# Patient Record
Sex: MALE | Race: WHITE | Employment: OTHER | URBAN - METROPOLITAN AREA
[De-identification: names, ages, dates, MRNs, and addresses within clinical notes are randomized per-mention and may not be internally consistent; named-entity substitution may affect disease eponyms.]

---

## 2018-06-27 ENCOUNTER — HOSPITAL ENCOUNTER (OUTPATIENT)
Age: 63
Discharge: HOME | End: 2018-06-29
Payer: MEDICARE

## 2018-06-27 LAB
AMPHETAMINE SCREEN, URINE: NOT DETECTED
BARBITURATE SCREEN URINE: NOT DETECTED
BENZODIAZEPINE SCREEN, URINE: NOT DETECTED
CANNABINOID SCREEN URINE: NOT DETECTED
COCAINE METABOLITE SCREEN URINE: NOT DETECTED
METHADONE SCREEN, URINE: NOT DETECTED
OPIATE SCREEN URINE: NOT DETECTED
PHENCYCLIDINE SCREEN URINE: NOT DETECTED
PROPOXYPHENE SCREEN: NOT DETECTED

## 2018-06-27 PROCEDURE — G0480 DRUG TEST DEF 1-7 CLASSES: HCPCS

## 2018-06-27 PROCEDURE — 80307 DRUG TEST PRSMV CHEM ANLYZR: CPT

## 2018-07-01 LAB
6AM URINE: <10 NG/ML
CODEINE, URINE: <20 NG/ML
HYDROCODONE, URINE: <20 NG/ML
HYDROMORPHONE, URINE: <20 NG/ML
MORPHINE URINE: <20 NG/ML
NORHYDROCODONE, URINE: <20 NG/ML
NOROXYCODONE, URINE: >4000 NG/ML
NOROXYMORPHONE, URINE: >1000 NG/ML
OXYCODONE, URINE CONFIRMATION: >4000 NG/ML
OXYMORPHONE, URINE: 187 NG/ML

## 2018-08-09 ENCOUNTER — HOSPITAL ENCOUNTER (OUTPATIENT)
Age: 63
Discharge: HOME | End: 2018-08-09
Payer: MEDICARE

## 2018-08-09 ENCOUNTER — HOSPITAL ENCOUNTER (OUTPATIENT)
Dept: MRI IMAGING | Age: 63
Discharge: HOME | End: 2018-08-11
Payer: MEDICARE

## 2018-08-09 DIAGNOSIS — Z86.69 H/O DEMYELINATING DISEASE: ICD-10-CM

## 2018-08-09 LAB
BUN BLDV-MCNC: 16 MG/DL (ref 8–23)
CREAT SERPL-MCNC: 1.8 MG/DL (ref 0.7–1.2)
GFR AFRICAN AMERICAN: 47
GFR NON-AFRICAN AMERICAN: 39 ML/MIN/1.73

## 2018-08-09 PROCEDURE — 36415 COLL VENOUS BLD VENIPUNCTURE: CPT

## 2018-08-09 PROCEDURE — 72156 MRI NECK SPINE W/O & W/DYE: CPT

## 2018-08-09 PROCEDURE — 72157 MRI CHEST SPINE W/O & W/DYE: CPT

## 2018-08-09 PROCEDURE — 6360000004 HC RX CONTRAST MEDICATION: Performed by: RADIOLOGY

## 2018-08-09 PROCEDURE — 84520 ASSAY OF UREA NITROGEN: CPT

## 2018-08-09 PROCEDURE — A9585 GADOBUTROL INJECTION: HCPCS | Performed by: RADIOLOGY

## 2018-08-09 PROCEDURE — 82565 ASSAY OF CREATININE: CPT

## 2018-08-09 RX ADMIN — GADOBUTROL 7 ML: 604.72 INJECTION INTRAVENOUS at 18:09

## 2019-08-02 ENCOUNTER — HOSPITAL ENCOUNTER (OUTPATIENT)
Age: 64
Discharge: HOME | End: 2019-08-04
Payer: MEDICARE

## 2019-08-02 LAB
ALBUMIN SERPL-MCNC: 3.6 G/DL (ref 3.5–5.2)
ALP BLD-CCNC: 136 U/L (ref 40–129)
ALT SERPL-CCNC: 6 U/L (ref 0–40)
ANION GAP SERPL CALCULATED.3IONS-SCNC: 18 MMOL/L (ref 7–16)
AST SERPL-CCNC: 16 U/L (ref 0–39)
BASOPHILS ABSOLUTE: 0.13 E9/L (ref 0–0.2)
BASOPHILS RELATIVE PERCENT: 1.2 % (ref 0–2)
BILIRUB SERPL-MCNC: 0.3 MG/DL (ref 0–1.2)
BUN BLDV-MCNC: 13 MG/DL (ref 8–23)
CALCIUM SERPL-MCNC: 9.2 MG/DL (ref 8.6–10.2)
CHLORIDE BLD-SCNC: 102 MMOL/L (ref 98–107)
CHOLESTEROL, TOTAL: 187 MG/DL (ref 0–199)
CO2: 22 MMOL/L (ref 22–29)
CREAT SERPL-MCNC: 1.8 MG/DL (ref 0.7–1.2)
EOSINOPHILS ABSOLUTE: 0.64 E9/L (ref 0.05–0.5)
EOSINOPHILS RELATIVE PERCENT: 5.7 % (ref 0–6)
GFR AFRICAN AMERICAN: 46
GFR NON-AFRICAN AMERICAN: 38 ML/MIN/1.73
GLUCOSE BLD-MCNC: 162 MG/DL (ref 74–99)
HCT VFR BLD CALC: 48.1 % (ref 37–54)
HDLC SERPL-MCNC: 36 MG/DL
HEMOGLOBIN: 14.7 G/DL (ref 12.5–16.5)
IMMATURE GRANULOCYTES #: 0.05 E9/L
IMMATURE GRANULOCYTES %: 0.4 % (ref 0–5)
LDL CHOLESTEROL CALCULATED: 115 MG/DL (ref 0–99)
LYMPHOCYTES ABSOLUTE: 2.29 E9/L (ref 1.5–4)
LYMPHOCYTES RELATIVE PERCENT: 20.5 % (ref 20–42)
MCH RBC QN AUTO: 28.8 PG (ref 26–35)
MCHC RBC AUTO-ENTMCNC: 30.6 % (ref 32–34.5)
MCV RBC AUTO: 94.1 FL (ref 80–99.9)
MONOCYTES ABSOLUTE: 0.84 E9/L (ref 0.1–0.95)
MONOCYTES RELATIVE PERCENT: 7.5 % (ref 2–12)
NEUTROPHILS ABSOLUTE: 7.23 E9/L (ref 1.8–7.3)
NEUTROPHILS RELATIVE PERCENT: 64.7 % (ref 43–80)
PDW BLD-RTO: 15.3 FL (ref 11.5–15)
PLATELET # BLD: 322 E9/L (ref 130–450)
PMV BLD AUTO: 10.8 FL (ref 7–12)
POTASSIUM SERPL-SCNC: 4.8 MMOL/L (ref 3.5–5)
PROSTATE SPECIFIC ANTIGEN: 4.78 NG/ML (ref 0–4)
RBC # BLD: 5.11 E12/L (ref 3.8–5.8)
SODIUM BLD-SCNC: 142 MMOL/L (ref 132–146)
TOTAL PROTEIN: 6.7 G/DL (ref 6.4–8.3)
TRIGL SERPL-MCNC: 181 MG/DL (ref 0–149)
TSH SERPL DL<=0.05 MIU/L-ACNC: 2.7 UIU/ML (ref 0.27–4.2)
VITAMIN D 25-HYDROXY: 21 NG/ML (ref 30–100)
VLDLC SERPL CALC-MCNC: 36 MG/DL
WBC # BLD: 11.2 E9/L (ref 4.5–11.5)

## 2019-08-02 PROCEDURE — G0103 PSA SCREENING: HCPCS

## 2019-08-02 PROCEDURE — 85025 COMPLETE CBC W/AUTO DIFF WBC: CPT

## 2019-08-02 PROCEDURE — 80053 COMPREHEN METABOLIC PANEL: CPT

## 2019-08-02 PROCEDURE — 80061 LIPID PANEL: CPT

## 2019-08-02 PROCEDURE — 82306 VITAMIN D 25 HYDROXY: CPT

## 2019-08-02 PROCEDURE — 84443 ASSAY THYROID STIM HORMONE: CPT

## 2019-08-20 ENCOUNTER — HOSPITAL ENCOUNTER (OUTPATIENT)
Age: 64
Discharge: HOME | End: 2019-08-20
Payer: MEDICARE

## 2019-08-28 ENCOUNTER — HOSPITAL ENCOUNTER (OUTPATIENT)
Age: 64
Discharge: HOME | End: 2019-08-28
Payer: MEDICARE

## 2019-08-28 PROCEDURE — 89055 LEUKOCYTE ASSESSMENT FECAL: CPT

## 2019-08-28 PROCEDURE — 87449 NOS EACH ORGANISM AG IA: CPT

## 2019-08-28 PROCEDURE — 83993 ASSAY FOR CALPROTECTIN FECAL: CPT

## 2019-08-28 PROCEDURE — 87328 CRYPTOSPORIDIUM AG IA: CPT

## 2019-08-28 PROCEDURE — 87324 CLOSTRIDIUM AG IA: CPT

## 2019-08-28 PROCEDURE — 87329 GIARDIA AG IA: CPT

## 2019-08-28 PROCEDURE — 87045 FECES CULTURE AEROBIC BACT: CPT

## 2019-08-29 LAB
C DIFF TOXIN/ANTIGEN: NORMAL
CRYPTOSPORIDIUM ANTIGEN STOOL: NORMAL
GIARDIA ANTIGEN STOOL: NORMAL
WHITE BLOOD CELLS (WBC), STOOL: NORMAL

## 2019-08-30 LAB — CULTURE, STOOL: NORMAL

## 2019-08-31 LAB — MISCELLANEOUS LAB TEST RESULT: ABNORMAL

## 2019-09-05 ENCOUNTER — HOSPITAL ENCOUNTER (OUTPATIENT)
Age: 64
Discharge: HOME | End: 2019-09-07
Payer: MEDICARE

## 2019-09-05 LAB
AMPHETAMINE SCREEN, URINE: NOT DETECTED
BARBITURATE SCREEN URINE: NOT DETECTED
BENZODIAZEPINE SCREEN, URINE: NOT DETECTED
CANNABINOID SCREEN URINE: NOT DETECTED
COCAINE METABOLITE SCREEN URINE: NOT DETECTED
Lab: NORMAL
METHADONE SCREEN, URINE: NOT DETECTED
OPIATE SCREEN URINE: NOT DETECTED
PHENCYCLIDINE SCREEN URINE: NOT DETECTED

## 2019-09-05 PROCEDURE — G0480 DRUG TEST DEF 1-7 CLASSES: HCPCS

## 2019-09-05 PROCEDURE — 80307 DRUG TEST PRSMV CHEM ANLYZR: CPT

## 2019-09-10 ENCOUNTER — HOSPITAL ENCOUNTER (OUTPATIENT)
Dept: GENERAL RADIOLOGY | Age: 64
Discharge: HOME | End: 2019-09-12
Payer: MEDICARE

## 2019-09-10 DIAGNOSIS — K50.119 CROHN'S DISEASE OF COLON WITH COMPLICATION (HCC): ICD-10-CM

## 2019-09-10 PROCEDURE — 2500000003 HC RX 250 WO HCPCS: Performed by: INTERNAL MEDICINE

## 2019-09-10 PROCEDURE — 74250 X-RAY XM SM INT 1CNTRST STD: CPT

## 2019-09-10 RX ADMIN — BARIUM SULFATE 352 G: 960 POWDER, FOR SUSPENSION ORAL at 08:47

## 2019-09-11 LAB
6AM URINE: <10 NG/ML
CODEINE, URINE: <20 NG/ML
HYDROCODONE, URINE: <20 NG/ML
HYDROMORPHONE, URINE: <20 NG/ML
MORPHINE URINE: <20 NG/ML
NORHYDROCODONE, URINE: <20 NG/ML
NOROXYCODONE, URINE: 3661 NG/ML
NOROXYMORPHONE, URINE: 380 NG/ML
OXYCODONE, URINE CONFIRMATION: 2041 NG/ML
OXYMORPHONE, URINE: 49 NG/ML

## 2019-12-23 ENCOUNTER — HOSPITAL ENCOUNTER (OUTPATIENT)
Age: 64
Discharge: HOME | End: 2019-12-23
Payer: MEDICARE

## 2019-12-23 LAB
ALBUMIN SERPL-MCNC: 3.8 G/DL (ref 3.5–5.2)
ALP BLD-CCNC: 107 U/L (ref 40–129)
ALT SERPL-CCNC: 15 U/L (ref 0–40)
ANION GAP SERPL CALCULATED.3IONS-SCNC: 12 MMOL/L (ref 7–16)
AST SERPL-CCNC: 18 U/L (ref 0–39)
BILIRUB SERPL-MCNC: <0.2 MG/DL (ref 0–1.2)
BUN BLDV-MCNC: 19 MG/DL (ref 8–23)
CALCIUM SERPL-MCNC: 9.2 MG/DL (ref 8.6–10.2)
CHLORIDE BLD-SCNC: 101 MMOL/L (ref 98–107)
CO2: 27 MMOL/L (ref 22–29)
CREAT SERPL-MCNC: 1.7 MG/DL (ref 0.7–1.2)
GFR AFRICAN AMERICAN: 50
GFR NON-AFRICAN AMERICAN: 41 ML/MIN/1.73
GLUCOSE BLD-MCNC: 71 MG/DL (ref 74–99)
POTASSIUM SERPL-SCNC: 4.2 MMOL/L (ref 3.5–5)
SODIUM BLD-SCNC: 140 MMOL/L (ref 132–146)
TOTAL PROTEIN: 6.5 G/DL (ref 6.4–8.3)

## 2019-12-23 PROCEDURE — 80053 COMPREHEN METABOLIC PANEL: CPT

## 2019-12-23 PROCEDURE — 36415 COLL VENOUS BLD VENIPUNCTURE: CPT

## 2020-01-01 ENCOUNTER — HOSPITAL ENCOUNTER (OUTPATIENT)
Age: 65
Discharge: HOME | End: 2020-09-27
Payer: MEDICARE

## 2020-01-01 ENCOUNTER — HOSPITAL ENCOUNTER (OUTPATIENT)
Age: 65
Discharge: HOME | End: 2020-12-28
Payer: MEDICARE

## 2020-01-01 LAB
ALBUMIN SERPL-MCNC: 3.5 G/DL (ref 3.5–5.2)
ALBUMIN SERPL-MCNC: 3.8 G/DL (ref 3.5–5.2)
ALP BLD-CCNC: 86 U/L (ref 40–129)
ALP BLD-CCNC: 96 U/L (ref 40–129)
ALT SERPL-CCNC: 25 U/L (ref 0–40)
ALT SERPL-CCNC: 8 U/L (ref 0–40)
ANION GAP SERPL CALCULATED.3IONS-SCNC: 12 MMOL/L (ref 7–16)
ANION GAP SERPL CALCULATED.3IONS-SCNC: 20 MMOL/L (ref 7–16)
AST SERPL-CCNC: 16 U/L (ref 0–39)
AST SERPL-CCNC: 29 U/L (ref 0–39)
BASOPHILS ABSOLUTE: 0.02 E9/L (ref 0–0.2)
BASOPHILS ABSOLUTE: 0.09 E9/L (ref 0–0.2)
BASOPHILS RELATIVE PERCENT: 0.1 % (ref 0–2)
BASOPHILS RELATIVE PERCENT: 0.8 % (ref 0–2)
BILIRUB SERPL-MCNC: 0.4 MG/DL (ref 0–1.2)
BILIRUB SERPL-MCNC: <0.2 MG/DL (ref 0–1.2)
BUN BLDV-MCNC: 11 MG/DL (ref 8–23)
BUN BLDV-MCNC: 16 MG/DL (ref 8–23)
C-REACTIVE PROTEIN: 10.5 MG/DL (ref 0–0.4)
CALCIUM SERPL-MCNC: 8.2 MG/DL (ref 8.6–10.2)
CALCIUM SERPL-MCNC: 8.6 MG/DL (ref 8.6–10.2)
CHLORIDE BLD-SCNC: 101 MMOL/L (ref 98–107)
CHLORIDE BLD-SCNC: 95 MMOL/L (ref 98–107)
CHOLESTEROL, TOTAL: 206 MG/DL (ref 0–199)
CO2: 21 MMOL/L (ref 22–29)
CO2: 30 MMOL/L (ref 22–29)
CREAT SERPL-MCNC: 1.6 MG/DL (ref 0.7–1.2)
CREAT SERPL-MCNC: 1.8 MG/DL (ref 0.7–1.2)
EOSINOPHILS ABSOLUTE: 0 E9/L (ref 0.05–0.5)
EOSINOPHILS ABSOLUTE: 0.21 E9/L (ref 0.05–0.5)
EOSINOPHILS RELATIVE PERCENT: 0 % (ref 0–6)
EOSINOPHILS RELATIVE PERCENT: 1.8 % (ref 0–6)
FERRITIN: 912 NG/ML
FOLATE: 12.6 NG/ML (ref 4.8–24.2)
GFR AFRICAN AMERICAN: 46
GFR AFRICAN AMERICAN: 53
GFR NON-AFRICAN AMERICAN: 38 ML/MIN/1.73
GFR NON-AFRICAN AMERICAN: 44 ML/MIN/1.73
GLUCOSE BLD-MCNC: 115 MG/DL (ref 74–99)
GLUCOSE BLD-MCNC: 153 MG/DL (ref 74–99)
HCT VFR BLD CALC: 43.9 % (ref 37–54)
HCT VFR BLD CALC: 46.3 % (ref 37–54)
HDLC SERPL-MCNC: 46 MG/DL
HEMOGLOBIN: 14 G/DL (ref 12.5–16.5)
HEMOGLOBIN: 14 G/DL (ref 12.5–16.5)
HEPATITIS B CORE TOTAL ANTIBODY: NONREACTIVE
IMMATURE GRANULOCYTES #: 0.1 E9/L
IMMATURE GRANULOCYTES #: 0.22 E9/L
IMMATURE GRANULOCYTES %: 0.8 % (ref 0–5)
IMMATURE GRANULOCYTES %: 1.6 % (ref 0–5)
IRON SATURATION: 10 % (ref 20–55)
IRON: 26 MCG/DL (ref 59–158)
LDL CHOLESTEROL CALCULATED: 125 MG/DL (ref 0–99)
LYMPHOCYTES ABSOLUTE: 1.23 E9/L (ref 1.5–4)
LYMPHOCYTES ABSOLUTE: 1.72 E9/L (ref 1.5–4)
LYMPHOCYTES RELATIVE PERCENT: 14.4 % (ref 20–42)
LYMPHOCYTES RELATIVE PERCENT: 9 % (ref 20–42)
MCH RBC QN AUTO: 28.1 PG (ref 26–35)
MCH RBC QN AUTO: 29 PG (ref 26–35)
MCHC RBC AUTO-ENTMCNC: 30.2 % (ref 32–34.5)
MCHC RBC AUTO-ENTMCNC: 31.9 % (ref 32–34.5)
MCV RBC AUTO: 91.1 FL (ref 80–99.9)
MCV RBC AUTO: 92.8 FL (ref 80–99.9)
MONOCYTES ABSOLUTE: 0.66 E9/L (ref 0.1–0.95)
MONOCYTES ABSOLUTE: 1.07 E9/L (ref 0.1–0.95)
MONOCYTES RELATIVE PERCENT: 4.8 % (ref 2–12)
MONOCYTES RELATIVE PERCENT: 9 % (ref 2–12)
NEUTROPHILS ABSOLUTE: 11.52 E9/L (ref 1.8–7.3)
NEUTROPHILS ABSOLUTE: 8.75 E9/L (ref 1.8–7.3)
NEUTROPHILS RELATIVE PERCENT: 73.2 % (ref 43–80)
NEUTROPHILS RELATIVE PERCENT: 84.5 % (ref 43–80)
PDW BLD-RTO: 14.6 FL (ref 11.5–15)
PDW BLD-RTO: 17 FL (ref 11.5–15)
PLATELET # BLD: 301 E9/L (ref 130–450)
PLATELET # BLD: 356 E9/L (ref 130–450)
PMV BLD AUTO: 10.4 FL (ref 7–12)
PMV BLD AUTO: 9.8 FL (ref 7–12)
POTASSIUM SERPL-SCNC: 3.5 MMOL/L (ref 3.5–5)
POTASSIUM SERPL-SCNC: 3.7 MMOL/L (ref 3.5–5)
PROSTATE SPECIFIC ANTIGEN: 3.14 NG/ML (ref 0–4)
RBC # BLD: 4.82 E12/L (ref 3.8–5.8)
RBC # BLD: 4.99 E12/L (ref 3.8–5.8)
SEDIMENTATION RATE, ERYTHROCYTE: 47 MM/HR (ref 0–15)
SODIUM BLD-SCNC: 137 MMOL/L (ref 132–146)
SODIUM BLD-SCNC: 142 MMOL/L (ref 132–146)
TOTAL IRON BINDING CAPACITY: 249 MCG/DL (ref 250–450)
TOTAL PROTEIN: 6.4 G/DL (ref 6.4–8.3)
TOTAL PROTEIN: 6.8 G/DL (ref 6.4–8.3)
TRIGL SERPL-MCNC: 173 MG/DL (ref 0–149)
TSH SERPL DL<=0.05 MIU/L-ACNC: 0.79 UIU/ML (ref 0.27–4.2)
VITAMIN B-12: 263 PG/ML (ref 211–946)
VLDLC SERPL CALC-MCNC: 35 MG/DL
WBC # BLD: 11.9 E9/L (ref 4.5–11.5)
WBC # BLD: 13.7 E9/L (ref 4.5–11.5)

## 2020-01-01 PROCEDURE — 86481 TB AG RESPONSE T-CELL SUSP: CPT

## 2020-01-01 PROCEDURE — 82728 ASSAY OF FERRITIN: CPT

## 2020-01-01 PROCEDURE — 80053 COMPREHEN METABOLIC PANEL: CPT

## 2020-01-01 PROCEDURE — 82746 ASSAY OF FOLIC ACID SERUM: CPT

## 2020-01-01 PROCEDURE — 84443 ASSAY THYROID STIM HORMONE: CPT

## 2020-01-01 PROCEDURE — 36415 COLL VENOUS BLD VENIPUNCTURE: CPT

## 2020-01-01 PROCEDURE — 85025 COMPLETE CBC W/AUTO DIFF WBC: CPT

## 2020-01-01 PROCEDURE — 85651 RBC SED RATE NONAUTOMATED: CPT

## 2020-01-01 PROCEDURE — 86140 C-REACTIVE PROTEIN: CPT

## 2020-01-01 PROCEDURE — 82607 VITAMIN B-12: CPT

## 2020-01-01 PROCEDURE — 86704 HEP B CORE ANTIBODY TOTAL: CPT

## 2020-01-01 PROCEDURE — G0103 PSA SCREENING: HCPCS

## 2020-01-01 PROCEDURE — 80061 LIPID PANEL: CPT

## 2020-01-01 PROCEDURE — 83540 ASSAY OF IRON: CPT

## 2020-01-01 PROCEDURE — 83550 IRON BINDING TEST: CPT

## 2021-01-01 ENCOUNTER — INITIAL CONSULT (OUTPATIENT)
Dept: SURGERY | Age: 66
End: 2021-01-01
Payer: MEDICARE

## 2021-01-01 ENCOUNTER — APPOINTMENT (OUTPATIENT)
Dept: GENERAL RADIOLOGY | Age: 66
DRG: 329 | End: 2021-01-01
Payer: MEDICARE

## 2021-01-01 ENCOUNTER — APPOINTMENT (OUTPATIENT)
Dept: CT IMAGING | Age: 66
End: 2021-01-01
Payer: MEDICARE

## 2021-01-01 ENCOUNTER — APPOINTMENT (OUTPATIENT)
Dept: NUCLEAR MEDICINE | Age: 66
DRG: 101 | End: 2021-01-01
Payer: MEDICARE

## 2021-01-01 ENCOUNTER — HOSPITAL ENCOUNTER (INPATIENT)
Age: 66
LOS: 5 days | Discharge: HOME | DRG: 871 | End: 2021-02-12
Attending: EMERGENCY MEDICINE | Admitting: INTERNAL MEDICINE
Payer: MEDICARE

## 2021-01-01 ENCOUNTER — HOSPITAL ENCOUNTER (OUTPATIENT)
Age: 66
Setting detail: OUTPATIENT SURGERY
Discharge: HOME | DRG: 327 | End: 2021-06-01
Attending: SURGERY | Admitting: SURGERY
Payer: MEDICARE

## 2021-01-01 ENCOUNTER — APPOINTMENT (OUTPATIENT)
Dept: CT IMAGING | Age: 66
DRG: 872 | End: 2021-01-01
Payer: MEDICARE

## 2021-01-01 ENCOUNTER — OFFICE VISIT (OUTPATIENT)
Dept: CARDIOLOGY CLINIC | Age: 66
End: 2021-01-01
Payer: MEDICARE

## 2021-01-01 ENCOUNTER — HOSPITAL ENCOUNTER (INPATIENT)
Age: 66
LOS: 5 days | Discharge: HOME | DRG: 176 | End: 2021-02-01
Attending: EMERGENCY MEDICINE | Admitting: INTERNAL MEDICINE
Payer: MEDICARE

## 2021-01-01 ENCOUNTER — APPOINTMENT (OUTPATIENT)
Dept: GENERAL RADIOLOGY | Age: 66
End: 2021-01-01
Payer: MEDICARE

## 2021-01-01 ENCOUNTER — HOSPITAL ENCOUNTER (INPATIENT)
Age: 66
LOS: 3 days | Discharge: HOME | DRG: 101 | End: 2021-04-21
Attending: EMERGENCY MEDICINE | Admitting: INTERNAL MEDICINE
Payer: MEDICARE

## 2021-01-01 ENCOUNTER — TELEPHONE (OUTPATIENT)
Dept: CARDIOLOGY CLINIC | Age: 66
End: 2021-01-01

## 2021-01-01 ENCOUNTER — HOSPITAL ENCOUNTER (OUTPATIENT)
Dept: INFUSION THERAPY | Age: 66
Setting detail: INFUSION SERIES
Discharge: HOME | End: 2021-01-11
Payer: MEDICARE

## 2021-01-01 ENCOUNTER — APPOINTMENT (OUTPATIENT)
Dept: CT IMAGING | Age: 66
DRG: 329 | End: 2021-01-01
Payer: MEDICARE

## 2021-01-01 ENCOUNTER — HOSPITAL ENCOUNTER (INPATIENT)
Age: 66
LOS: 5 days | Discharge: HOME | DRG: 327 | End: 2021-06-08
Attending: EMERGENCY MEDICINE | Admitting: INTERNAL MEDICINE
Payer: MEDICARE

## 2021-01-01 ENCOUNTER — APPOINTMENT (OUTPATIENT)
Dept: ULTRASOUND IMAGING | Age: 66
DRG: 872 | End: 2021-01-01
Payer: MEDICARE

## 2021-01-01 ENCOUNTER — HOSPITAL ENCOUNTER (INPATIENT)
Age: 66
LOS: 6 days | Discharge: HOME | DRG: 872 | End: 2021-04-02
Attending: EMERGENCY MEDICINE | Admitting: INTERNAL MEDICINE
Payer: MEDICARE

## 2021-01-01 ENCOUNTER — ANESTHESIA EVENT (OUTPATIENT)
Dept: ENDOSCOPY | Age: 66
DRG: 327 | End: 2021-01-01
Payer: MEDICARE

## 2021-01-01 ENCOUNTER — APPOINTMENT (OUTPATIENT)
Dept: GENERAL RADIOLOGY | Age: 66
DRG: 327 | End: 2021-01-01
Payer: MEDICARE

## 2021-01-01 ENCOUNTER — APPOINTMENT (OUTPATIENT)
Dept: GENERAL RADIOLOGY | Age: 66
DRG: 872 | End: 2021-01-01
Payer: MEDICARE

## 2021-01-01 ENCOUNTER — APPOINTMENT (OUTPATIENT)
Dept: GENERAL RADIOLOGY | Age: 66
DRG: 683 | End: 2021-01-01
Payer: MEDICARE

## 2021-01-01 ENCOUNTER — APPOINTMENT (OUTPATIENT)
Dept: NUCLEAR MEDICINE | Age: 66
DRG: 872 | End: 2021-01-01
Payer: MEDICARE

## 2021-01-01 ENCOUNTER — APPOINTMENT (OUTPATIENT)
Dept: NEUROLOGY | Age: 66
DRG: 101 | End: 2021-01-01
Payer: MEDICARE

## 2021-01-01 ENCOUNTER — ANESTHESIA (OUTPATIENT)
Dept: OPERATING ROOM | Age: 66
DRG: 327 | End: 2021-01-01
Payer: MEDICARE

## 2021-01-01 ENCOUNTER — HOSPITAL ENCOUNTER (INPATIENT)
Age: 66
LOS: 7 days | Discharge: HOME | DRG: 683 | End: 2021-05-10
Attending: EMERGENCY MEDICINE | Admitting: INTERNAL MEDICINE
Payer: MEDICARE

## 2021-01-01 ENCOUNTER — APPOINTMENT (OUTPATIENT)
Dept: NUCLEAR MEDICINE | Age: 66
DRG: 176 | End: 2021-01-01
Payer: MEDICARE

## 2021-01-01 ENCOUNTER — HOSPITAL ENCOUNTER (OUTPATIENT)
Dept: INFUSION THERAPY | Age: 66
Setting detail: INFUSION SERIES
Discharge: HOME | End: 2021-01-13
Payer: MEDICARE

## 2021-01-01 ENCOUNTER — HOSPITAL ENCOUNTER (EMERGENCY)
Age: 66
Discharge: HOME | End: 2021-01-10
Attending: EMERGENCY MEDICINE
Payer: MEDICARE

## 2021-01-01 ENCOUNTER — APPOINTMENT (OUTPATIENT)
Dept: GENERAL RADIOLOGY | Age: 66
DRG: 101 | End: 2021-01-01
Payer: MEDICARE

## 2021-01-01 ENCOUNTER — APPOINTMENT (OUTPATIENT)
Dept: GENERAL RADIOLOGY | Age: 66
DRG: 871 | End: 2021-01-01
Payer: MEDICARE

## 2021-01-01 ENCOUNTER — ANESTHESIA EVENT (OUTPATIENT)
Dept: SURGICAL ICU | Age: 66
DRG: 329 | End: 2021-01-01
Payer: MEDICARE

## 2021-01-01 ENCOUNTER — HOSPITAL ENCOUNTER (OUTPATIENT)
Dept: INFUSION THERAPY | Age: 66
Setting detail: INFUSION SERIES
Discharge: HOME | End: 2021-01-12
Payer: MEDICARE

## 2021-01-01 ENCOUNTER — HOSPITAL ENCOUNTER (INPATIENT)
Age: 66
LOS: 5 days | DRG: 329 | End: 2021-06-15
Attending: EMERGENCY MEDICINE | Admitting: INTERNAL MEDICINE
Payer: MEDICARE

## 2021-01-01 ENCOUNTER — APPOINTMENT (OUTPATIENT)
Dept: CT IMAGING | Age: 66
DRG: 101 | End: 2021-01-01
Payer: MEDICARE

## 2021-01-01 ENCOUNTER — ANESTHESIA EVENT (OUTPATIENT)
Dept: OPERATING ROOM | Age: 66
DRG: 329 | End: 2021-01-01
Payer: MEDICARE

## 2021-01-01 ENCOUNTER — APPOINTMENT (OUTPATIENT)
Dept: MRI IMAGING | Age: 66
DRG: 101 | End: 2021-01-01
Payer: MEDICARE

## 2021-01-01 ENCOUNTER — APPOINTMENT (OUTPATIENT)
Dept: CT IMAGING | Age: 66
DRG: 871 | End: 2021-01-01
Payer: MEDICARE

## 2021-01-01 ENCOUNTER — ANESTHESIA (OUTPATIENT)
Dept: ENDOSCOPY | Age: 66
DRG: 327 | End: 2021-01-01
Payer: MEDICARE

## 2021-01-01 ENCOUNTER — ANESTHESIA (OUTPATIENT)
Dept: OPERATING ROOM | Age: 66
DRG: 329 | End: 2021-01-01
Payer: MEDICARE

## 2021-01-01 ENCOUNTER — TELEPHONE (OUTPATIENT)
Dept: SURGERY | Age: 66
End: 2021-01-01

## 2021-01-01 ENCOUNTER — ANESTHESIA (OUTPATIENT)
Dept: SURGICAL ICU | Age: 66
DRG: 329 | End: 2021-01-01
Payer: MEDICARE

## 2021-01-01 ENCOUNTER — ANESTHESIA EVENT (OUTPATIENT)
Dept: ENDOSCOPY | Age: 66
DRG: 683 | End: 2021-01-01
Payer: MEDICARE

## 2021-01-01 ENCOUNTER — ANESTHESIA EVENT (OUTPATIENT)
Dept: OPERATING ROOM | Age: 66
DRG: 327 | End: 2021-01-01
Payer: MEDICARE

## 2021-01-01 ENCOUNTER — APPOINTMENT (OUTPATIENT)
Dept: CT IMAGING | Age: 66
DRG: 176 | End: 2021-01-01
Payer: MEDICARE

## 2021-01-01 ENCOUNTER — APPOINTMENT (OUTPATIENT)
Dept: CT IMAGING | Age: 66
DRG: 327 | End: 2021-01-01
Payer: MEDICARE

## 2021-01-01 ENCOUNTER — ANESTHESIA (OUTPATIENT)
Dept: ENDOSCOPY | Age: 66
DRG: 683 | End: 2021-01-01
Payer: MEDICARE

## 2021-01-01 VITALS
BODY MASS INDEX: 23.75 KG/M2 | TEMPERATURE: 97.5 F | HEART RATE: 83 BPM | OXYGEN SATURATION: 94 % | WEIGHT: 121 LBS | RESPIRATION RATE: 18 BRPM | HEIGHT: 60 IN | SYSTOLIC BLOOD PRESSURE: 137 MMHG | DIASTOLIC BLOOD PRESSURE: 76 MMHG

## 2021-01-01 VITALS
TEMPERATURE: 98.2 F | BODY MASS INDEX: 21.46 KG/M2 | SYSTOLIC BLOOD PRESSURE: 112 MMHG | OXYGEN SATURATION: 92 % | WEIGHT: 109.3 LBS | HEART RATE: 78 BPM | RESPIRATION RATE: 16 BRPM | DIASTOLIC BLOOD PRESSURE: 64 MMHG | HEIGHT: 60 IN

## 2021-01-01 VITALS
DIASTOLIC BLOOD PRESSURE: 73 MMHG | TEMPERATURE: 97.9 F | RESPIRATION RATE: 22 BRPM | OXYGEN SATURATION: 97 % | SYSTOLIC BLOOD PRESSURE: 150 MMHG | HEART RATE: 110 BPM

## 2021-01-01 VITALS
HEART RATE: 91 BPM | BODY MASS INDEX: 23.75 KG/M2 | HEIGHT: 60 IN | WEIGHT: 121 LBS | RESPIRATION RATE: 18 BRPM | OXYGEN SATURATION: 97 % | DIASTOLIC BLOOD PRESSURE: 75 MMHG | TEMPERATURE: 97.3 F | SYSTOLIC BLOOD PRESSURE: 152 MMHG

## 2021-01-01 VITALS
HEART RATE: 103 BPM | TEMPERATURE: 98 F | HEIGHT: 61 IN | SYSTOLIC BLOOD PRESSURE: 147 MMHG | BODY MASS INDEX: 24.89 KG/M2 | DIASTOLIC BLOOD PRESSURE: 97 MMHG | WEIGHT: 131.84 LBS | OXYGEN SATURATION: 38 % | RESPIRATION RATE: 26 BRPM

## 2021-01-01 VITALS
SYSTOLIC BLOOD PRESSURE: 131 MMHG | OXYGEN SATURATION: 99 % | DIASTOLIC BLOOD PRESSURE: 77 MMHG | RESPIRATION RATE: 11 BRPM

## 2021-01-01 VITALS
HEART RATE: 95 BPM | BODY MASS INDEX: 21.87 KG/M2 | TEMPERATURE: 96.9 F | RESPIRATION RATE: 18 BRPM | DIASTOLIC BLOOD PRESSURE: 62 MMHG | OXYGEN SATURATION: 98 % | SYSTOLIC BLOOD PRESSURE: 118 MMHG | WEIGHT: 112 LBS

## 2021-01-01 VITALS
SYSTOLIC BLOOD PRESSURE: 101 MMHG | BODY MASS INDEX: 20.66 KG/M2 | OXYGEN SATURATION: 99 % | DIASTOLIC BLOOD PRESSURE: 58 MMHG | HEIGHT: 61 IN | RESPIRATION RATE: 14 BRPM | TEMPERATURE: 97.4 F | HEART RATE: 58 BPM | WEIGHT: 109.44 LBS

## 2021-01-01 VITALS
HEART RATE: 80 BPM | SYSTOLIC BLOOD PRESSURE: 98 MMHG | WEIGHT: 109 LBS | TEMPERATURE: 98 F | DIASTOLIC BLOOD PRESSURE: 63 MMHG | BODY MASS INDEX: 21.4 KG/M2 | HEIGHT: 60 IN

## 2021-01-01 VITALS
TEMPERATURE: 98.2 F | SYSTOLIC BLOOD PRESSURE: 151 MMHG | HEIGHT: 61 IN | RESPIRATION RATE: 16 BRPM | BODY MASS INDEX: 18.69 KG/M2 | RESPIRATION RATE: 22 BRPM | HEART RATE: 64 BPM | SYSTOLIC BLOOD PRESSURE: 120 MMHG | OXYGEN SATURATION: 97 % | HEART RATE: 108 BPM | WEIGHT: 99 LBS | TEMPERATURE: 97 F | DIASTOLIC BLOOD PRESSURE: 70 MMHG | OXYGEN SATURATION: 97 % | DIASTOLIC BLOOD PRESSURE: 85 MMHG

## 2021-01-01 VITALS
RESPIRATION RATE: 18 BRPM | SYSTOLIC BLOOD PRESSURE: 150 MMHG | OXYGEN SATURATION: 96 % | DIASTOLIC BLOOD PRESSURE: 94 MMHG

## 2021-01-01 VITALS
HEART RATE: 71 BPM | RESPIRATION RATE: 17 BRPM | OXYGEN SATURATION: 99 % | SYSTOLIC BLOOD PRESSURE: 122 MMHG | HEIGHT: 61 IN | DIASTOLIC BLOOD PRESSURE: 72 MMHG | BODY MASS INDEX: 18.69 KG/M2 | WEIGHT: 99 LBS | TEMPERATURE: 98.6 F

## 2021-01-01 VITALS
RESPIRATION RATE: 16 BRPM | DIASTOLIC BLOOD PRESSURE: 55 MMHG | WEIGHT: 112 LBS | SYSTOLIC BLOOD PRESSURE: 105 MMHG | BODY MASS INDEX: 21.14 KG/M2 | HEIGHT: 61 IN | OXYGEN SATURATION: 100 % | HEART RATE: 59 BPM

## 2021-01-01 VITALS — OXYGEN SATURATION: 99 % | RESPIRATION RATE: 22 BRPM | TEMPERATURE: 90.9 F

## 2021-01-01 VITALS
RESPIRATION RATE: 22 BRPM | OXYGEN SATURATION: 97 % | TEMPERATURE: 98 F | DIASTOLIC BLOOD PRESSURE: 88 MMHG | HEART RATE: 70 BPM | SYSTOLIC BLOOD PRESSURE: 157 MMHG

## 2021-01-01 VITALS
SYSTOLIC BLOOD PRESSURE: 140 MMHG | HEART RATE: 94 BPM | DIASTOLIC BLOOD PRESSURE: 81 MMHG | BODY MASS INDEX: 23.16 KG/M2 | OXYGEN SATURATION: 97 % | WEIGHT: 118 LBS | RESPIRATION RATE: 18 BRPM | TEMPERATURE: 98.4 F | HEIGHT: 60 IN

## 2021-01-01 VITALS — SYSTOLIC BLOOD PRESSURE: 114 MMHG | DIASTOLIC BLOOD PRESSURE: 77 MMHG | OXYGEN SATURATION: 100 %

## 2021-01-01 DIAGNOSIS — E43 SEVERE PROTEIN-CALORIE MALNUTRITION (HCC): Primary | ICD-10-CM

## 2021-01-01 DIAGNOSIS — K50.919 CROHN'S DISEASE WITH COMPLICATION, UNSPECIFIED GASTROINTESTINAL TRACT LOCATION (HCC): ICD-10-CM

## 2021-01-01 DIAGNOSIS — E86.1 HYPOVOLEMIA: ICD-10-CM

## 2021-01-01 DIAGNOSIS — J18.9 HCAP (HEALTHCARE-ASSOCIATED PNEUMONIA): ICD-10-CM

## 2021-01-01 DIAGNOSIS — R10.84 GENERALIZED ABDOMINAL PAIN: Primary | ICD-10-CM

## 2021-01-01 DIAGNOSIS — K56.609 SBO (SMALL BOWEL OBSTRUCTION) (HCC): Primary | ICD-10-CM

## 2021-01-01 DIAGNOSIS — E83.51 HYPOCALCEMIA: ICD-10-CM

## 2021-01-01 DIAGNOSIS — E87.6 HYPOKALEMIA: ICD-10-CM

## 2021-01-01 DIAGNOSIS — E83.42 HYPOMAGNESEMIA: ICD-10-CM

## 2021-01-01 DIAGNOSIS — G35 MULTIPLE SCLEROSIS (HCC): Primary | ICD-10-CM

## 2021-01-01 DIAGNOSIS — Z79.01 ANTICOAGULATED: ICD-10-CM

## 2021-01-01 DIAGNOSIS — S00.81XA ABRASION OF FOREHEAD, INITIAL ENCOUNTER: ICD-10-CM

## 2021-01-01 DIAGNOSIS — R77.8 ELEVATED TROPONIN: ICD-10-CM

## 2021-01-01 DIAGNOSIS — K94.23 PEG TUBE MALFUNCTION (HCC): ICD-10-CM

## 2021-01-01 DIAGNOSIS — I21.4 NSTEMI (NON-ST ELEVATED MYOCARDIAL INFARCTION) (HCC): ICD-10-CM

## 2021-01-01 DIAGNOSIS — J18.9 PNEUMONIA DUE TO ORGANISM: ICD-10-CM

## 2021-01-01 DIAGNOSIS — N17.9 ACUTE RENAL FAILURE, UNSPECIFIED ACUTE RENAL FAILURE TYPE (HCC): Primary | ICD-10-CM

## 2021-01-01 DIAGNOSIS — E21.3 HYPERPARATHYROIDISM (HCC): ICD-10-CM

## 2021-01-01 DIAGNOSIS — I51.9 HEART PROBLEM: Primary | ICD-10-CM

## 2021-01-01 DIAGNOSIS — R63.8 DECREASED ORAL INTAKE: ICD-10-CM

## 2021-01-01 DIAGNOSIS — E86.0 SEVERE DEHYDRATION: ICD-10-CM

## 2021-01-01 DIAGNOSIS — G40.919 BREAKTHROUGH SEIZURE (HCC): ICD-10-CM

## 2021-01-01 DIAGNOSIS — K56.609 SMALL BOWEL OBSTRUCTION (HCC): ICD-10-CM

## 2021-01-01 DIAGNOSIS — R19.7 DIARRHEA, UNSPECIFIED TYPE: ICD-10-CM

## 2021-01-01 DIAGNOSIS — N28.9 ACUTE RENAL INSUFFICIENCY: ICD-10-CM

## 2021-01-01 DIAGNOSIS — I26.99 BILATERAL PULMONARY EMBOLISM (HCC): Primary | ICD-10-CM

## 2021-01-01 DIAGNOSIS — K50.919 EXACERBATION OF CROHN'S DISEASE WITH COMPLICATION (HCC): ICD-10-CM

## 2021-01-01 DIAGNOSIS — E86.0 DEHYDRATION: ICD-10-CM

## 2021-01-01 DIAGNOSIS — Z72.0 TOBACCO ABUSE: ICD-10-CM

## 2021-01-01 DIAGNOSIS — G35 MULTIPLE SCLEROSIS (HCC): ICD-10-CM

## 2021-01-01 DIAGNOSIS — E87.5 HYPERKALEMIA: ICD-10-CM

## 2021-01-01 DIAGNOSIS — I25.5 ISCHEMIC CARDIOMYOPATHY: Chronic | ICD-10-CM

## 2021-01-01 DIAGNOSIS — N39.0 URINARY TRACT INFECTION WITHOUT HEMATURIA, SITE UNSPECIFIED: ICD-10-CM

## 2021-01-01 DIAGNOSIS — S51.811A SKIN TEAR OF RIGHT FOREARM WITHOUT COMPLICATION, INITIAL ENCOUNTER: ICD-10-CM

## 2021-01-01 DIAGNOSIS — Z86.711 HISTORY OF PULMONARY EMBOLISM: Chronic | ICD-10-CM

## 2021-01-01 DIAGNOSIS — E43 SEVERE PROTEIN-CALORIE MALNUTRITION (HCC): Chronic | ICD-10-CM

## 2021-01-01 DIAGNOSIS — I26.99 BILATERAL PULMONARY EMBOLISM (HCC): ICD-10-CM

## 2021-01-01 DIAGNOSIS — K56.609 SBO (SMALL BOWEL OBSTRUCTION) (HCC): ICD-10-CM

## 2021-01-01 DIAGNOSIS — Q61.3 POLYCYSTIC KIDNEY: ICD-10-CM

## 2021-01-01 DIAGNOSIS — K56.609 SMALL BOWEL OBSTRUCTION (HCC): Primary | ICD-10-CM
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
## 2021-01-01 DIAGNOSIS — R41.0 CONFUSION: ICD-10-CM

## 2021-01-01 DIAGNOSIS — R56.9 SEIZURE-LIKE ACTIVITY (HCC): Primary | ICD-10-CM

## 2021-01-01 DIAGNOSIS — E16.2 HYPOGLYCEMIA: ICD-10-CM

## 2021-01-01 DIAGNOSIS — R56.9 SEIZURES (HCC): ICD-10-CM

## 2021-01-01 DIAGNOSIS — E44.0 MODERATE PROTEIN-CALORIE MALNUTRITION (HCC): ICD-10-CM

## 2021-01-01 DIAGNOSIS — E43 SEVERE MALNUTRITION (HCC): ICD-10-CM

## 2021-01-01 DIAGNOSIS — K50.119 CROHN'S COLITIS, UNSPECIFIED COMPLICATION (HCC): ICD-10-CM

## 2021-01-01 DIAGNOSIS — N17.9 ACUTE KIDNEY INJURY SUPERIMPOSED ON CKD (HCC): ICD-10-CM

## 2021-01-01 DIAGNOSIS — M25.562 ACUTE PAIN OF LEFT KNEE: ICD-10-CM

## 2021-01-01 DIAGNOSIS — N18.32 STAGE 3B CHRONIC KIDNEY DISEASE (HCC): ICD-10-CM

## 2021-01-01 DIAGNOSIS — Z01.818 PREOP TESTING: Primary | ICD-10-CM

## 2021-01-01 DIAGNOSIS — S09.90XA CLOSED HEAD INJURY, INITIAL ENCOUNTER: ICD-10-CM

## 2021-01-01 DIAGNOSIS — N18.9 ACUTE KIDNEY INJURY SUPERIMPOSED ON CKD (HCC): ICD-10-CM

## 2021-01-01 DIAGNOSIS — R55 SYNCOPE AND COLLAPSE: ICD-10-CM

## 2021-01-01 DIAGNOSIS — N28.9 ACUTE ON CHRONIC RENAL INSUFFICIENCY: ICD-10-CM

## 2021-01-01 DIAGNOSIS — N18.9 ACUTE ON CHRONIC RENAL INSUFFICIENCY: ICD-10-CM

## 2021-01-01 DIAGNOSIS — K66.8 PNEUMOPERITONEUM: ICD-10-CM

## 2021-01-01 LAB
AADO2: 441.7 MMHG
AADO2: 493.4 MMHG
AADO2: 515.9 MMHG
AADO2: 520.1 MMHG
AADO2: 526.8 MMHG
AADO2: 531.9 MMHG
AADO2: 561.2 MMHG
AADO2: 584.5 MMHG
AADO2: 585.3 MMHG
AADO2: 599.6 MMHG
ABO/RH: NORMAL
ABO/RH: NORMAL
ACANTHOCYTES: ABNORMAL
ALBUMIN SERPL-MCNC: 0.9 G/DL (ref 3.5–5.2)
ALBUMIN SERPL-MCNC: 1 G/DL (ref 3.5–5.2)
ALBUMIN SERPL-MCNC: 1.1 G/DL (ref 3.5–5.2)
ALBUMIN SERPL-MCNC: 1.5 G/DL (ref 3.5–5.2)
ALBUMIN SERPL-MCNC: 1.7 G/DL (ref 3.5–5.2)
ALBUMIN SERPL-MCNC: 1.9 G/DL (ref 3.5–5.2)
ALBUMIN SERPL-MCNC: 2 G/DL (ref 3.5–5.2)
ALBUMIN SERPL-MCNC: 2 G/DL (ref 3.5–5.2)
ALBUMIN SERPL-MCNC: 2.2 G/DL (ref 3.5–5.2)
ALBUMIN SERPL-MCNC: 2.2 G/DL (ref 3.5–5.2)
ALBUMIN SERPL-MCNC: 2.3 G/DL (ref 3.5–5.2)
ALBUMIN SERPL-MCNC: 2.4 G/DL (ref 3.5–5.2)
ALBUMIN SERPL-MCNC: 2.5 G/DL (ref 3.5–5.2)
ALBUMIN SERPL-MCNC: 2.6 G/DL (ref 3.5–5.2)
ALBUMIN SERPL-MCNC: 2.7 G/DL (ref 3.5–5.2)
ALBUMIN SERPL-MCNC: 2.8 G/DL (ref 3.5–5.2)
ALBUMIN SERPL-MCNC: 2.8 G/DL (ref 3.5–5.2)
ALBUMIN SERPL-MCNC: 2.9 G/DL (ref 3.5–5.2)
ALBUMIN SERPL-MCNC: 2.9 G/DL (ref 3.5–5.2)
ALBUMIN SERPL-MCNC: 3.1 G/DL (ref 3.5–5.2)
ALBUMIN SERPL-MCNC: 3.2 G/DL (ref 3.5–5.2)
ALBUMIN SERPL-MCNC: 3.2 G/DL (ref 3.5–5.2)
ALP BLD-CCNC: 101 U/L (ref 40–129)
ALP BLD-CCNC: 103 U/L (ref 40–129)
ALP BLD-CCNC: 104 U/L (ref 40–129)
ALP BLD-CCNC: 106 U/L (ref 40–129)
ALP BLD-CCNC: 107 U/L (ref 40–129)
ALP BLD-CCNC: 114 U/L (ref 40–129)
ALP BLD-CCNC: 122 U/L (ref 40–129)
ALP BLD-CCNC: 125 U/L (ref 40–129)
ALP BLD-CCNC: 133 U/L (ref 40–129)
ALP BLD-CCNC: 153 U/L (ref 40–129)
ALP BLD-CCNC: 161 U/L (ref 40–129)
ALP BLD-CCNC: 31 U/L (ref 40–129)
ALP BLD-CCNC: 32 U/L (ref 40–129)
ALP BLD-CCNC: 36 U/L (ref 40–129)
ALP BLD-CCNC: 43 U/L (ref 40–129)
ALP BLD-CCNC: 53 U/L (ref 40–129)
ALP BLD-CCNC: 53 U/L (ref 40–129)
ALP BLD-CCNC: 57 U/L (ref 40–129)
ALP BLD-CCNC: 58 U/L (ref 40–129)
ALP BLD-CCNC: 62 U/L (ref 40–129)
ALP BLD-CCNC: 75 U/L (ref 40–129)
ALP BLD-CCNC: 78 U/L (ref 40–129)
ALP BLD-CCNC: 79 U/L (ref 40–129)
ALP BLD-CCNC: 80 U/L (ref 40–129)
ALP BLD-CCNC: 81 U/L (ref 40–129)
ALP BLD-CCNC: 84 U/L (ref 40–129)
ALP BLD-CCNC: 87 U/L (ref 40–129)
ALP BLD-CCNC: 89 U/L (ref 40–129)
ALP BLD-CCNC: 91 U/L (ref 40–129)
ALP BLD-CCNC: 91 U/L (ref 40–129)
ALP BLD-CCNC: 93 U/L (ref 40–129)
ALP BLD-CCNC: 93 U/L (ref 40–129)
ALP BLD-CCNC: 94 U/L (ref 40–129)
ALP BLD-CCNC: 96 U/L (ref 40–129)
ALP BLD-CCNC: 98 U/L (ref 40–129)
ALT SERPL-CCNC: 10 U/L (ref 0–40)
ALT SERPL-CCNC: 107 U/L (ref 0–40)
ALT SERPL-CCNC: 11 U/L (ref 0–40)
ALT SERPL-CCNC: 114 U/L (ref 0–40)
ALT SERPL-CCNC: 116 U/L (ref 0–40)
ALT SERPL-CCNC: 12 U/L (ref 0–40)
ALT SERPL-CCNC: 120 U/L (ref 0–40)
ALT SERPL-CCNC: 13 U/L (ref 0–40)
ALT SERPL-CCNC: 133 U/L (ref 0–40)
ALT SERPL-CCNC: 14 U/L (ref 0–40)
ALT SERPL-CCNC: 14 U/L (ref 0–40)
ALT SERPL-CCNC: 15 U/L (ref 0–40)
ALT SERPL-CCNC: 15 U/L (ref 0–40)
ALT SERPL-CCNC: 16 U/L (ref 0–40)
ALT SERPL-CCNC: 16 U/L (ref 0–40)
ALT SERPL-CCNC: 18 U/L (ref 0–40)
ALT SERPL-CCNC: 20 U/L (ref 0–40)
ALT SERPL-CCNC: 20 U/L (ref 0–40)
ALT SERPL-CCNC: 21 U/L (ref 0–40)
ALT SERPL-CCNC: 21 U/L (ref 0–40)
ALT SERPL-CCNC: 22 U/L (ref 0–40)
ALT SERPL-CCNC: 23 U/L (ref 0–40)
ALT SERPL-CCNC: 26 U/L (ref 0–40)
ALT SERPL-CCNC: 30 U/L (ref 0–40)
ALT SERPL-CCNC: 8 U/L (ref 0–40)
ALT SERPL-CCNC: 8 U/L (ref 0–40)
AMMONIA: 29 UMOL/L (ref 16–60)
ANGLE (CLOT STRENGTH): 61.6 DEGREE (ref 59–74)
ANION GAP SERPL CALCULATED.3IONS-SCNC: 10 MMOL/L (ref 7–16)
ANION GAP SERPL CALCULATED.3IONS-SCNC: 11 MMOL/L (ref 7–16)
ANION GAP SERPL CALCULATED.3IONS-SCNC: 12 MMOL/L (ref 7–16)
ANION GAP SERPL CALCULATED.3IONS-SCNC: 12 MMOL/L (ref 7–16)
ANION GAP SERPL CALCULATED.3IONS-SCNC: 13 MMOL/L (ref 7–16)
ANION GAP SERPL CALCULATED.3IONS-SCNC: 14 MMOL/L (ref 7–16)
ANION GAP SERPL CALCULATED.3IONS-SCNC: 15 MMOL/L (ref 7–16)
ANION GAP SERPL CALCULATED.3IONS-SCNC: 16 MMOL/L (ref 7–16)
ANION GAP SERPL CALCULATED.3IONS-SCNC: 17 MMOL/L (ref 7–16)
ANION GAP SERPL CALCULATED.3IONS-SCNC: 18 MMOL/L (ref 7–16)
ANION GAP SERPL CALCULATED.3IONS-SCNC: 19 MMOL/L (ref 7–16)
ANION GAP SERPL CALCULATED.3IONS-SCNC: 6 MMOL/L (ref 7–16)
ANION GAP SERPL CALCULATED.3IONS-SCNC: 7 MMOL/L (ref 7–16)
ANION GAP SERPL CALCULATED.3IONS-SCNC: 8 MMOL/L (ref 7–16)
ANION GAP SERPL CALCULATED.3IONS-SCNC: 9 MMOL/L (ref 7–16)
ANION GAP SERPL CALCULATED.3IONS-SCNC: 9 MMOL/L (ref 7–16)
ANISOCYTOSIS: ABNORMAL
ANTIBODY SCREEN: NORMAL
ANTIBODY SCREEN: NORMAL
APTT: 112.6 SEC (ref 24.5–35.1)
APTT: 128.8 SEC (ref 24.5–35.1)
APTT: 151.8 SEC (ref 24.5–35.1)
APTT: 154.6 SEC (ref 24.5–35.1)
APTT: 23.7 SEC (ref 24.5–35.1)
APTT: 28.2 SEC (ref 24.5–35.1)
APTT: 29.1 SEC (ref 24.5–35.1)
APTT: 30.1 SEC (ref 24.5–35.1)
APTT: 31.8 SEC (ref 24.5–35.1)
APTT: 33.2 SEC (ref 24.5–35.1)
APTT: 40.1 SEC (ref 24.5–35.1)
APTT: 42.9 SEC (ref 24.5–35.1)
APTT: 46 SEC (ref 24.5–35.1)
APTT: 48.5 SEC (ref 24.5–35.1)
APTT: 52.4 SEC (ref 24.5–35.1)
APTT: 79.6 SEC (ref 24.5–35.1)
APTT: 89.3 SEC (ref 24.5–35.1)
APTT: 92.4 SEC (ref 24.5–35.1)
APTT: >240 SEC (ref 24.5–35.1)
APTT: >240 SEC (ref 24.5–35.1)
AST SERPL-CCNC: 13 U/L (ref 0–39)
AST SERPL-CCNC: 14 U/L (ref 0–39)
AST SERPL-CCNC: 15 U/L (ref 0–39)
AST SERPL-CCNC: 16 U/L (ref 0–39)
AST SERPL-CCNC: 19 U/L (ref 0–39)
AST SERPL-CCNC: 19 U/L (ref 0–39)
AST SERPL-CCNC: 20 U/L (ref 0–39)
AST SERPL-CCNC: 21 U/L (ref 0–39)
AST SERPL-CCNC: 22 U/L (ref 0–39)
AST SERPL-CCNC: 23 U/L (ref 0–39)
AST SERPL-CCNC: 238 U/L (ref 0–39)
AST SERPL-CCNC: 248 U/L (ref 0–39)
AST SERPL-CCNC: 25 U/L (ref 0–39)
AST SERPL-CCNC: 25 U/L (ref 0–39)
AST SERPL-CCNC: 251 U/L (ref 0–39)
AST SERPL-CCNC: 26 U/L (ref 0–39)
AST SERPL-CCNC: 27 U/L (ref 0–39)
AST SERPL-CCNC: 27 U/L (ref 0–39)
AST SERPL-CCNC: 28 U/L (ref 0–39)
AST SERPL-CCNC: 281 U/L (ref 0–39)
AST SERPL-CCNC: 29 U/L (ref 0–39)
AST SERPL-CCNC: 29 U/L (ref 0–39)
AST SERPL-CCNC: 31 U/L (ref 0–39)
AST SERPL-CCNC: 328 U/L (ref 0–39)
AST SERPL-CCNC: 34 U/L (ref 0–39)
AST SERPL-CCNC: 35 U/L (ref 0–39)
AST SERPL-CCNC: 38 U/L (ref 0–39)
AST SERPL-CCNC: 42 U/L (ref 0–39)
AST SERPL-CCNC: 43 U/L (ref 0–39)
B.E.: -10.3 MMOL/L (ref -3–3)
B.E.: -10.7 MMOL/L (ref -3–3)
B.E.: -11.2 MMOL/L (ref -3–3)
B.E.: -11.6 MMOL/L (ref -3–3)
B.E.: -11.6 MMOL/L (ref -3–3)
B.E.: -13.3 MMOL/L (ref -3–3)
B.E.: -13.3 MMOL/L (ref -3–3)
B.E.: -13.6 MMOL/L (ref -3–3)
B.E.: -13.8 MMOL/L (ref -3–3)
B.E.: -15.3 MMOL/L (ref -3–3)
B.E.: -15.3 MMOL/L (ref -3–3)
B.E.: -4.9 MMOL/L (ref -3–3)
B.E.: -7 MMOL/L (ref -3–3)
B.E.: -9.2 MMOL/L (ref -3–0)
B.E.: -9.2 MMOL/L (ref -3–0)
BACTERIA: ABNORMAL /HPF
BACTERIA: ABNORMAL /HPF
BASOPHILS ABSOLUTE: 0 E9/L (ref 0–0.2)
BASOPHILS ABSOLUTE: 0.01 E9/L (ref 0–0.2)
BASOPHILS ABSOLUTE: 0.04 E9/L (ref 0–0.2)
BASOPHILS ABSOLUTE: 0.04 E9/L (ref 0–0.2)
BASOPHILS ABSOLUTE: 0.05 E9/L (ref 0–0.2)
BASOPHILS ABSOLUTE: 0.06 E9/L (ref 0–0.2)
BASOPHILS ABSOLUTE: 0.08 E9/L (ref 0–0.2)
BASOPHILS ABSOLUTE: 0.09 E9/L (ref 0–0.2)
BASOPHILS ABSOLUTE: 0.18 E9/L (ref 0–0.2)
BASOPHILS ABSOLUTE: 0.2 E9/L (ref 0–0.2)
BASOPHILS ABSOLUTE: 0.26 E9/L (ref 0–0.2)
BASOPHILS RELATIVE PERCENT: 0 % (ref 0–2)
BASOPHILS RELATIVE PERCENT: 0.2 % (ref 0–2)
BASOPHILS RELATIVE PERCENT: 0.3 % (ref 0–2)
BASOPHILS RELATIVE PERCENT: 0.4 % (ref 0–2)
BASOPHILS RELATIVE PERCENT: 0.5 % (ref 0–2)
BASOPHILS RELATIVE PERCENT: 0.6 % (ref 0–2)
BASOPHILS RELATIVE PERCENT: 0.9 % (ref 0–2)
BASOPHILS RELATIVE PERCENT: 1 % (ref 0–2)
BASOPHILS RELATIVE PERCENT: 1 % (ref 0–2)
BASOPHILS RELATIVE PERCENT: 1.5 % (ref 0–2)
BILIRUB SERPL-MCNC: 0.3 MG/DL (ref 0–1.2)
BILIRUB SERPL-MCNC: 0.4 MG/DL (ref 0–1.2)
BILIRUB SERPL-MCNC: 0.5 MG/DL (ref 0–1.2)
BILIRUB SERPL-MCNC: 0.6 MG/DL (ref 0–1.2)
BILIRUB SERPL-MCNC: 0.7 MG/DL (ref 0–1.2)
BILIRUB SERPL-MCNC: 0.7 MG/DL (ref 0–1.2)
BILIRUB SERPL-MCNC: 0.9 MG/DL (ref 0–1.2)
BILIRUB SERPL-MCNC: <0.2 MG/DL (ref 0–1.2)
BILIRUBIN URINE: NEGATIVE
BLOOD BANK DISPENSE STATUS: NORMAL
BLOOD BANK PRODUCT CODE: NORMAL
BLOOD CULTURE, ROUTINE: NORMAL
BLOOD, URINE: ABNORMAL
BLOOD, URINE: ABNORMAL
BLOOD, URINE: NEGATIVE
BLOOD, URINE: NEGATIVE
BPU ID: NORMAL
BUN BLDV-MCNC: 10 MG/DL (ref 6–23)
BUN BLDV-MCNC: 12 MG/DL (ref 6–23)
BUN BLDV-MCNC: 12 MG/DL (ref 6–23)
BUN BLDV-MCNC: 12 MG/DL (ref 8–23)
BUN BLDV-MCNC: 12 MG/DL (ref 8–23)
BUN BLDV-MCNC: 13 MG/DL (ref 6–23)
BUN BLDV-MCNC: 14 MG/DL (ref 6–23)
BUN BLDV-MCNC: 15 MG/DL (ref 6–23)
BUN BLDV-MCNC: 16 MG/DL (ref 8–23)
BUN BLDV-MCNC: 17 MG/DL (ref 6–23)
BUN BLDV-MCNC: 17 MG/DL (ref 8–23)
BUN BLDV-MCNC: 18 MG/DL (ref 6–23)
BUN BLDV-MCNC: 18 MG/DL (ref 8–23)
BUN BLDV-MCNC: 19 MG/DL (ref 8–23)
BUN BLDV-MCNC: 20 MG/DL (ref 6–23)
BUN BLDV-MCNC: 22 MG/DL (ref 6–23)
BUN BLDV-MCNC: 25 MG/DL (ref 6–23)
BUN BLDV-MCNC: 25 MG/DL (ref 8–23)
BUN BLDV-MCNC: 28 MG/DL (ref 8–23)
BUN BLDV-MCNC: 31 MG/DL (ref 6–23)
BUN BLDV-MCNC: 32 MG/DL (ref 8–23)
BUN BLDV-MCNC: 36 MG/DL (ref 8–23)
BUN BLDV-MCNC: 42 MG/DL (ref 8–23)
BUN BLDV-MCNC: 43 MG/DL (ref 8–23)
BUN BLDV-MCNC: 44 MG/DL (ref 6–23)
BUN BLDV-MCNC: 60 MG/DL (ref 8–23)
BUN BLDV-MCNC: 61 MG/DL (ref 8–23)
BUN BLDV-MCNC: 65 MG/DL (ref 8–23)
BUN BLDV-MCNC: 67 MG/DL (ref 8–23)
BUN BLDV-MCNC: 68 MG/DL (ref 6–23)
BUN BLDV-MCNC: 72 MG/DL (ref 6–23)
BUN BLDV-MCNC: 9 MG/DL (ref 8–23)
BURR CELLS: ABNORMAL
C DIFF TOXIN/ANTIGEN: NORMAL
C DIFF TOXIN/ANTIGEN: NORMAL
C-REACTIVE PROTEIN: 12 MG/DL (ref 0–0.4)
C-REACTIVE PROTEIN: 16.1 MG/DL (ref 0–0.4)
C-REACTIVE PROTEIN: 2.1 MG/DL (ref 0–0.4)
CALCIUM IONIZED: 0.93 MMOL/L (ref 1.15–1.33)
CALCIUM IONIZED: 1 MMOL/L (ref 1.15–1.33)
CALCIUM IONIZED: 1.11 MMOL/L (ref 1.15–1.33)
CALCIUM IONIZED: 1.13 MMOL/L (ref 1.15–1.33)
CALCIUM IONIZED: 1.14 MMOL/L (ref 1.15–1.33)
CALCIUM IONIZED: 1.14 MMOL/L (ref 1.15–1.33)
CALCIUM IONIZED: 1.16 MMOL/L (ref 1.15–1.33)
CALCIUM IONIZED: 1.16 MMOL/L (ref 1.15–1.33)
CALCIUM IONIZED: 1.21 MMOL/L (ref 1.15–1.33)
CALCIUM SERPL-MCNC: 5.4 MG/DL (ref 8.6–10.2)
CALCIUM SERPL-MCNC: 5.4 MG/DL (ref 8.6–10.2)
CALCIUM SERPL-MCNC: 6 MG/DL (ref 8.6–10.2)
CALCIUM SERPL-MCNC: 6.4 MG/DL (ref 8.6–10.2)
CALCIUM SERPL-MCNC: 6.5 MG/DL (ref 8.6–10.2)
CALCIUM SERPL-MCNC: 6.6 MG/DL (ref 8.6–10.2)
CALCIUM SERPL-MCNC: 6.7 MG/DL (ref 8.6–10.2)
CALCIUM SERPL-MCNC: 6.7 MG/DL (ref 8.6–10.2)
CALCIUM SERPL-MCNC: 6.8 MG/DL (ref 8.6–10.2)
CALCIUM SERPL-MCNC: 6.9 MG/DL (ref 8.6–10.2)
CALCIUM SERPL-MCNC: 7 MG/DL (ref 8.6–10.2)
CALCIUM SERPL-MCNC: 7 MG/DL (ref 8.6–10.2)
CALCIUM SERPL-MCNC: 7.1 MG/DL (ref 8.6–10.2)
CALCIUM SERPL-MCNC: 7.2 MG/DL (ref 8.6–10.2)
CALCIUM SERPL-MCNC: 7.3 MG/DL (ref 8.6–10.2)
CALCIUM SERPL-MCNC: 7.4 MG/DL (ref 8.6–10.2)
CALCIUM SERPL-MCNC: 7.4 MG/DL (ref 8.6–10.2)
CALCIUM SERPL-MCNC: 7.5 MG/DL (ref 8.6–10.2)
CALCIUM SERPL-MCNC: 7.6 MG/DL (ref 8.6–10.2)
CALCIUM SERPL-MCNC: 7.8 MG/DL (ref 8.6–10.2)
CALCIUM SERPL-MCNC: 7.9 MG/DL (ref 8.6–10.2)
CALCIUM SERPL-MCNC: 7.9 MG/DL (ref 8.6–10.2)
CALCIUM SERPL-MCNC: 8.3 MG/DL (ref 8.6–10.2)
CALCIUM SERPL-MCNC: 9.2 MG/DL (ref 8.6–10.2)
CALCIUM SERPL-MCNC: 9.5 MG/DL (ref 8.6–10.2)
CARDIOPULMONARY BYPASS: NO
CARDIOPULMONARY BYPASS: NO
CHLORIDE BLD-SCNC: 100 MMOL/L (ref 98–107)
CHLORIDE BLD-SCNC: 101 MMOL/L (ref 98–107)
CHLORIDE BLD-SCNC: 102 MMOL/L (ref 98–107)
CHLORIDE BLD-SCNC: 103 MMOL/L (ref 98–107)
CHLORIDE BLD-SCNC: 104 MMOL/L (ref 98–107)
CHLORIDE BLD-SCNC: 105 MMOL/L (ref 98–107)
CHLORIDE BLD-SCNC: 106 MMOL/L (ref 98–107)
CHLORIDE BLD-SCNC: 107 MMOL/L (ref 98–107)
CHLORIDE BLD-SCNC: 108 MMOL/L (ref 98–107)
CHLORIDE BLD-SCNC: 108 MMOL/L (ref 98–107)
CHLORIDE BLD-SCNC: 109 MMOL/L (ref 98–107)
CHLORIDE BLD-SCNC: 110 MMOL/L (ref 98–107)
CHLORIDE BLD-SCNC: 111 MMOL/L (ref 98–107)
CHLORIDE BLD-SCNC: 111 MMOL/L (ref 98–107)
CHLORIDE BLD-SCNC: 112 MMOL/L (ref 98–107)
CHLORIDE BLD-SCNC: 114 MMOL/L (ref 98–107)
CHLORIDE BLD-SCNC: 94 MMOL/L (ref 98–107)
CHLORIDE BLD-SCNC: 95 MMOL/L (ref 98–107)
CHLORIDE BLD-SCNC: 98 MMOL/L (ref 98–107)
CHLORIDE BLD-SCNC: 99 MMOL/L (ref 98–107)
CHLORIDE BLD-SCNC: 99 MMOL/L (ref 98–107)
CHLORIDE URINE RANDOM: 33 MMOL/L
CHP ED QC CHECK: YES
CLARITY: ABNORMAL
CLARITY: ABNORMAL
CLARITY: CLEAR
CLARITY: CLEAR
CO2: 12 MMOL/L (ref 22–29)
CO2: 12 MMOL/L (ref 22–29)
CO2: 13 MMOL/L (ref 22–29)
CO2: 13 MMOL/L (ref 22–29)
CO2: 14 MMOL/L (ref 22–29)
CO2: 14 MMOL/L (ref 22–29)
CO2: 15 MMOL/L (ref 22–29)
CO2: 16 MMOL/L (ref 22–29)
CO2: 16 MMOL/L (ref 22–29)
CO2: 17 MMOL/L (ref 22–29)
CO2: 18 MMOL/L (ref 22–29)
CO2: 19 MMOL/L (ref 22–29)
CO2: 20 MMOL/L (ref 22–29)
CO2: 20 MMOL/L (ref 22–29)
CO2: 21 MMOL/L (ref 22–29)
CO2: 22 MMOL/L (ref 22–29)
CO2: 23 MMOL/L (ref 22–29)
CO2: 24 MMOL/L (ref 22–29)
CO2: 25 MMOL/L (ref 22–29)
CO2: 25 MMOL/L (ref 22–29)
CO2: 26 MMOL/L (ref 22–29)
CO2: 27 MMOL/L (ref 22–29)
CO2: 29 MMOL/L (ref 22–29)
COHB: 0 % (ref 0–1.5)
COHB: 0 % (ref 0–1.5)
COHB: 0.2 % (ref 0–1.5)
COHB: 0.3 % (ref 0–1.5)
COHB: 0.4 % (ref 0–1.5)
COHB: 0.4 % (ref 0–1.5)
COHB: 0.5 % (ref 0–1.5)
COHB: 0.7 % (ref 0–1.5)
COHB: 0.7 % (ref 0–1.5)
COLOR: ABNORMAL
COLOR: YELLOW
COMMENT: NORMAL
CREAT SERPL-MCNC: 0.9 MG/DL (ref 0.7–1.2)
CREAT SERPL-MCNC: 1 MG/DL (ref 0.7–1.2)
CREAT SERPL-MCNC: 1.1 MG/DL (ref 0.7–1.2)
CREAT SERPL-MCNC: 1.2 MG/DL (ref 0.7–1.2)
CREAT SERPL-MCNC: 1.3 MG/DL (ref 0.7–1.2)
CREAT SERPL-MCNC: 1.4 MG/DL (ref 0.7–1.2)
CREAT SERPL-MCNC: 1.5 MG/DL (ref 0.7–1.2)
CREAT SERPL-MCNC: 1.6 MG/DL (ref 0.7–1.2)
CREAT SERPL-MCNC: 1.6 MG/DL (ref 0.7–1.2)
CREAT SERPL-MCNC: 1.7 MG/DL (ref 0.7–1.2)
CREAT SERPL-MCNC: 1.8 MG/DL (ref 0.7–1.2)
CREAT SERPL-MCNC: 2 MG/DL (ref 0.7–1.2)
CREAT SERPL-MCNC: 2.1 MG/DL (ref 0.7–1.2)
CREAT SERPL-MCNC: 2.5 MG/DL (ref 0.7–1.2)
CREAT SERPL-MCNC: 2.7 MG/DL (ref 0.7–1.2)
CREAT SERPL-MCNC: 2.9 MG/DL (ref 0.7–1.2)
CREAT SERPL-MCNC: 3 MG/DL (ref 0.7–1.2)
CREAT SERPL-MCNC: 3.3 MG/DL (ref 0.7–1.2)
CREAT SERPL-MCNC: 3.3 MG/DL (ref 0.7–1.2)
CREAT SERPL-MCNC: 3.8 MG/DL (ref 0.7–1.2)
CREAT SERPL-MCNC: 4.1 MG/DL (ref 0.7–1.2)
CREATININE URINE: 131 MG/DL (ref 40–278)
CRITICAL NOTIFICATION: YES
CRITICAL NOTIFICATION: YES
CRITICAL: ABNORMAL
CULTURE, BLOOD 2: NORMAL
DATE ANALYZED: ABNORMAL
DATE OF COLLECTION: ABNORMAL
DESCRIPTION BLOOD BANK: NORMAL
DEVICE: ABNORMAL
DEVICE: ABNORMAL
DOHLE BODIES: ABNORMAL
DOHLE BODIES: ABNORMAL
EKG ATRIAL RATE: 83 BPM
EKG ATRIAL RATE: 83 BPM
EKG ATRIAL RATE: 89 BPM
EKG ATRIAL RATE: 92 BPM
EKG ATRIAL RATE: 93 BPM
EKG ATRIAL RATE: 95 BPM
EKG P AXIS: 60 DEGREES
EKG P AXIS: 62 DEGREES
EKG P AXIS: 62 DEGREES
EKG P AXIS: 76 DEGREES
EKG P AXIS: 76 DEGREES
EKG P AXIS: 78 DEGREES
EKG P-R INTERVAL: 102 MS
EKG P-R INTERVAL: 106 MS
EKG P-R INTERVAL: 112 MS
EKG P-R INTERVAL: 116 MS
EKG P-R INTERVAL: 116 MS
EKG P-R INTERVAL: 118 MS
EKG Q-T INTERVAL: 330 MS
EKG Q-T INTERVAL: 366 MS
EKG Q-T INTERVAL: 390 MS
EKG Q-T INTERVAL: 396 MS
EKG Q-T INTERVAL: 404 MS
EKG Q-T INTERVAL: 420 MS
EKG QRS DURATION: 68 MS
EKG QRS DURATION: 70 MS
EKG QRS DURATION: 78 MS
EKG QRS DURATION: 80 MS
EKG QTC CALCULATION (BAZETT): 415 MS
EKG QTC CALCULATION (BAZETT): 455 MS
EKG QTC CALCULATION (BAZETT): 474 MS
EKG QTC CALCULATION (BAZETT): 479 MS
EKG QTC CALCULATION (BAZETT): 481 MS
EKG QTC CALCULATION (BAZETT): 493 MS
EKG R AXIS: 60 DEGREES
EKG R AXIS: 66 DEGREES
EKG R AXIS: 68 DEGREES
EKG R AXIS: 72 DEGREES
EKG R AXIS: 75 DEGREES
EKG R AXIS: 82 DEGREES
EKG T AXIS: -11 DEGREES
EKG T AXIS: -2 DEGREES
EKG T AXIS: 3 DEGREES
EKG T AXIS: 40 DEGREES
EKG T AXIS: 52 DEGREES
EKG T AXIS: 74 DEGREES
EKG VENTRICULAR RATE: 83 BPM
EKG VENTRICULAR RATE: 83 BPM
EKG VENTRICULAR RATE: 89 BPM
EKG VENTRICULAR RATE: 91 BPM
EKG VENTRICULAR RATE: 93 BPM
EKG VENTRICULAR RATE: 95 BPM
EOSINOPHIL, URINE: 0 % (ref 0–1)
EOSINOPHILS ABSOLUTE: 0 E9/L (ref 0.05–0.5)
EOSINOPHILS ABSOLUTE: 0.02 E9/L (ref 0.05–0.5)
EOSINOPHILS ABSOLUTE: 0.02 E9/L (ref 0.05–0.5)
EOSINOPHILS ABSOLUTE: 0.04 E9/L (ref 0.05–0.5)
EOSINOPHILS ABSOLUTE: 0.13 E9/L (ref 0.05–0.5)
EOSINOPHILS ABSOLUTE: 0.13 E9/L (ref 0.05–0.5)
EOSINOPHILS ABSOLUTE: 0.21 E9/L (ref 0.05–0.5)
EOSINOPHILS ABSOLUTE: 0.22 E9/L (ref 0.05–0.5)
EOSINOPHILS ABSOLUTE: 0.28 E9/L (ref 0.05–0.5)
EOSINOPHILS ABSOLUTE: 0.57 E9/L (ref 0.05–0.5)
EOSINOPHILS ABSOLUTE: 1.07 E9/L (ref 0.05–0.5)
EOSINOPHILS RELATIVE PERCENT: 0 % (ref 0–6)
EOSINOPHILS RELATIVE PERCENT: 0.2 % (ref 0–6)
EOSINOPHILS RELATIVE PERCENT: 0.2 % (ref 0–6)
EOSINOPHILS RELATIVE PERCENT: 0.4 % (ref 0–6)
EOSINOPHILS RELATIVE PERCENT: 0.9 % (ref 0–6)
EOSINOPHILS RELATIVE PERCENT: 1 % (ref 0–6)
EOSINOPHILS RELATIVE PERCENT: 1 % (ref 0–6)
EOSINOPHILS RELATIVE PERCENT: 1.2 % (ref 0–6)
EOSINOPHILS RELATIVE PERCENT: 1.5 % (ref 0–6)
EOSINOPHILS RELATIVE PERCENT: 11 % (ref 0–6)
EOSINOPHILS RELATIVE PERCENT: 2.3 % (ref 0–6)
EOSINOPHILS RELATIVE PERCENT: 3 % (ref 0–6)
EOSINOPHILS RELATIVE PERCENT: 5.3 % (ref 0–6)
EPL-TEG: 0 % (ref 0–15)
FERRITIN: 724 NG/ML
FIO2: 100 %
FIO2: 85 %
FIO2: 90 %
FIO2: 95 %
FOLATE: 7.4 NG/ML (ref 4.8–24.2)
G-TEG: 7.4 K D/SC (ref 4.5–11)
GFR AFRICAN AMERICAN: 18
GFR AFRICAN AMERICAN: 20
GFR AFRICAN AMERICAN: 23
GFR AFRICAN AMERICAN: 23
GFR AFRICAN AMERICAN: 26
GFR AFRICAN AMERICAN: 27
GFR AFRICAN AMERICAN: 29
GFR AFRICAN AMERICAN: 32
GFR AFRICAN AMERICAN: 39
GFR AFRICAN AMERICAN: 41
GFR AFRICAN AMERICAN: 46
GFR AFRICAN AMERICAN: 49
GFR AFRICAN AMERICAN: 53
GFR AFRICAN AMERICAN: 53
GFR AFRICAN AMERICAN: 57
GFR AFRICAN AMERICAN: >60
GFR NON-AFRICAN AMERICAN: 15 ML/MIN/1.73
GFR NON-AFRICAN AMERICAN: 16 ML/MIN/1.73
GFR NON-AFRICAN AMERICAN: 19 ML/MIN/1.73
GFR NON-AFRICAN AMERICAN: 19 ML/MIN/1.73
GFR NON-AFRICAN AMERICAN: 21 ML/MIN/1.73
GFR NON-AFRICAN AMERICAN: 22 ML/MIN/1.73
GFR NON-AFRICAN AMERICAN: 24 ML/MIN/1.73
GFR NON-AFRICAN AMERICAN: 26 ML/MIN/1.73
GFR NON-AFRICAN AMERICAN: 32 ML/MIN/1.73
GFR NON-AFRICAN AMERICAN: 34 ML/MIN/1.73
GFR NON-AFRICAN AMERICAN: 38 ML/MIN/1.73
GFR NON-AFRICAN AMERICAN: 41 ML/MIN/1.73
GFR NON-AFRICAN AMERICAN: 44 ML/MIN/1.73
GFR NON-AFRICAN AMERICAN: 44 ML/MIN/1.73
GFR NON-AFRICAN AMERICAN: 47 ML/MIN/1.73
GFR NON-AFRICAN AMERICAN: 51 ML/MIN/1.73
GFR NON-AFRICAN AMERICAN: 56 ML/MIN/1.73
GFR NON-AFRICAN AMERICAN: >60 ML/MIN/1.73
GLUCOSE BLD-MCNC: 101 MG/DL (ref 74–99)
GLUCOSE BLD-MCNC: 104 MG/DL (ref 74–99)
GLUCOSE BLD-MCNC: 105 MG/DL (ref 74–99)
GLUCOSE BLD-MCNC: 109 MG/DL
GLUCOSE BLD-MCNC: 109 MG/DL (ref 74–99)
GLUCOSE BLD-MCNC: 111 MG/DL (ref 74–99)
GLUCOSE BLD-MCNC: 113 MG/DL (ref 74–99)
GLUCOSE BLD-MCNC: 116 MG/DL (ref 74–99)
GLUCOSE BLD-MCNC: 117 MG/DL (ref 74–99)
GLUCOSE BLD-MCNC: 119 MG/DL (ref 74–99)
GLUCOSE BLD-MCNC: 122 MG/DL (ref 74–99)
GLUCOSE BLD-MCNC: 124 MG/DL (ref 74–99)
GLUCOSE BLD-MCNC: 125 MG/DL (ref 74–99)
GLUCOSE BLD-MCNC: 125 MG/DL (ref 74–99)
GLUCOSE BLD-MCNC: 128 MG/DL (ref 74–99)
GLUCOSE BLD-MCNC: 131 MG/DL (ref 74–99)
GLUCOSE BLD-MCNC: 132 MG/DL (ref 74–99)
GLUCOSE BLD-MCNC: 135 MG/DL (ref 74–99)
GLUCOSE BLD-MCNC: 136 MG/DL (ref 74–99)
GLUCOSE BLD-MCNC: 143 MG/DL
GLUCOSE BLD-MCNC: 144 MG/DL (ref 74–99)
GLUCOSE BLD-MCNC: 148 MG/DL (ref 74–99)
GLUCOSE BLD-MCNC: 151 MG/DL (ref 74–99)
GLUCOSE BLD-MCNC: 160 MG/DL (ref 74–99)
GLUCOSE BLD-MCNC: 163 MG/DL (ref 74–99)
GLUCOSE BLD-MCNC: 167 MG/DL (ref 74–99)
GLUCOSE BLD-MCNC: 174 MG/DL (ref 74–99)
GLUCOSE BLD-MCNC: 181 MG/DL (ref 74–99)
GLUCOSE BLD-MCNC: 39 MG/DL (ref 74–99)
GLUCOSE BLD-MCNC: 52 MG/DL (ref 74–99)
GLUCOSE BLD-MCNC: 54 MG/DL (ref 74–99)
GLUCOSE BLD-MCNC: 67 MG/DL (ref 74–99)
GLUCOSE BLD-MCNC: 67 MG/DL (ref 74–99)
GLUCOSE BLD-MCNC: 70 MG/DL
GLUCOSE BLD-MCNC: 70 MG/DL (ref 74–99)
GLUCOSE BLD-MCNC: 75 MG/DL (ref 74–99)
GLUCOSE BLD-MCNC: 80 MG/DL (ref 74–99)
GLUCOSE BLD-MCNC: 83 MG/DL (ref 74–99)
GLUCOSE BLD-MCNC: 84 MG/DL (ref 74–99)
GLUCOSE BLD-MCNC: 92 MG/DL (ref 74–99)
GLUCOSE BLD-MCNC: 93 MG/DL (ref 74–99)
GLUCOSE BLD-MCNC: 93 MG/DL (ref 74–99)
GLUCOSE BLD-MCNC: 94 MG/DL (ref 74–99)
GLUCOSE BLD-MCNC: 94 MG/DL (ref 74–99)
GLUCOSE BLD-MCNC: 95 MG/DL (ref 74–99)
GLUCOSE URINE: NEGATIVE MG/DL
GRAM STAIN ORDERABLE: NORMAL
HCO3 ARTERIAL: 16.5 MMOL/L (ref 22–26)
HCO3 ARTERIAL: 18.1 MMOL/L (ref 22–26)
HCO3: 12 MMOL/L (ref 22–26)
HCO3: 12.4 MMOL/L (ref 22–26)
HCO3: 12.9 MMOL/L (ref 22–26)
HCO3: 13 MMOL/L (ref 22–26)
HCO3: 13 MMOL/L (ref 22–26)
HCO3: 13.2 MMOL/L (ref 22–26)
HCO3: 13.3 MMOL/L (ref 22–26)
HCO3: 13.3 MMOL/L (ref 22–26)
HCO3: 14.9 MMOL/L (ref 22–26)
HCO3: 15.5 MMOL/L (ref 22–26)
HCO3: 15.6 MMOL/L (ref 22–26)
HCO3: 17.9 MMOL/L (ref 22–26)
HCO3: 18.9 MMOL/L (ref 22–26)
HCT (EST): 12 % (ref 37–54)
HCT (EST): 18 % (ref 37–54)
HCT VFR BLD CALC: 19.4 % (ref 37–54)
HCT VFR BLD CALC: 20 % (ref 37–54)
HCT VFR BLD CALC: 22.4 % (ref 37–54)
HCT VFR BLD CALC: 22.5 % (ref 37–54)
HCT VFR BLD CALC: 25.9 % (ref 37–54)
HCT VFR BLD CALC: 26 % (ref 37–54)
HCT VFR BLD CALC: 26 % (ref 37–54)
HCT VFR BLD CALC: 26.5 % (ref 37–54)
HCT VFR BLD CALC: 26.7 % (ref 37–54)
HCT VFR BLD CALC: 26.9 % (ref 37–54)
HCT VFR BLD CALC: 27.1 % (ref 37–54)
HCT VFR BLD CALC: 27.1 % (ref 37–54)
HCT VFR BLD CALC: 27.4 % (ref 37–54)
HCT VFR BLD CALC: 27.6 % (ref 37–54)
HCT VFR BLD CALC: 27.8 % (ref 37–54)
HCT VFR BLD CALC: 28.4 % (ref 37–54)
HCT VFR BLD CALC: 29 % (ref 37–54)
HCT VFR BLD CALC: 29.2 % (ref 37–54)
HCT VFR BLD CALC: 29.4 % (ref 37–54)
HCT VFR BLD CALC: 29.8 % (ref 37–54)
HCT VFR BLD CALC: 29.8 % (ref 37–54)
HCT VFR BLD CALC: 30.8 % (ref 37–54)
HCT VFR BLD CALC: 31.2 % (ref 37–54)
HCT VFR BLD CALC: 31.4 % (ref 37–54)
HCT VFR BLD CALC: 31.5 % (ref 37–54)
HCT VFR BLD CALC: 31.6 % (ref 37–54)
HCT VFR BLD CALC: 32 % (ref 37–54)
HCT VFR BLD CALC: 32.6 % (ref 37–54)
HCT VFR BLD CALC: 32.8 % (ref 37–54)
HCT VFR BLD CALC: 32.9 % (ref 37–54)
HCT VFR BLD CALC: 33 % (ref 37–54)
HCT VFR BLD CALC: 33.3 % (ref 37–54)
HCT VFR BLD CALC: 33.4 % (ref 37–54)
HCT VFR BLD CALC: 33.6 % (ref 37–54)
HCT VFR BLD CALC: 33.8 % (ref 37–54)
HCT VFR BLD CALC: 34.4 % (ref 37–54)
HCT VFR BLD CALC: 34.9 % (ref 37–54)
HCT VFR BLD CALC: 36.2 % (ref 37–54)
HCT VFR BLD CALC: 37.2 % (ref 37–54)
HCT VFR BLD CALC: 37.4 % (ref 37–54)
HCT VFR BLD CALC: 37.9 % (ref 37–54)
HCT VFR BLD CALC: 38.9 % (ref 37–54)
HCT VFR BLD CALC: 39.5 % (ref 37–54)
HCT VFR BLD CALC: 39.8 % (ref 37–54)
HCT VFR BLD CALC: 40.3 % (ref 37–54)
HCT VFR BLD CALC: 41.3 % (ref 37–54)
HCT VFR BLD CALC: 41.5 % (ref 37–54)
HCT VFR BLD CALC: 41.6 % (ref 37–54)
HCT VFR BLD CALC: 42.9 % (ref 37–54)
HCT VFR BLD CALC: 47.4 % (ref 37–54)
HEMOGLOBIN: 10 G/DL (ref 12.5–16.5)
HEMOGLOBIN: 10 G/DL (ref 12.5–16.5)
HEMOGLOBIN: 10.2 G/DL (ref 12.5–16.5)
HEMOGLOBIN: 10.3 G/DL (ref 12.5–16.5)
HEMOGLOBIN: 10.5 G/DL (ref 12.5–16.5)
HEMOGLOBIN: 10.7 G/DL (ref 12.5–16.5)
HEMOGLOBIN: 10.7 G/DL (ref 12.5–16.5)
HEMOGLOBIN: 10.8 G/DL (ref 12.5–16.5)
HEMOGLOBIN: 10.9 G/DL (ref 12.5–16.5)
HEMOGLOBIN: 11 G/DL (ref 12.5–16.5)
HEMOGLOBIN: 11.1 G/DL (ref 12.5–16.5)
HEMOGLOBIN: 11.2 G/DL (ref 12.5–16.5)
HEMOGLOBIN: 11.3 G/DL (ref 12.5–16.5)
HEMOGLOBIN: 11.4 G/DL (ref 12.5–16.5)
HEMOGLOBIN: 11.4 G/DL (ref 12.5–16.5)
HEMOGLOBIN: 12.2 G/DL (ref 12.5–16.5)
HEMOGLOBIN: 12.3 G/DL (ref 12.5–16.5)
HEMOGLOBIN: 12.7 G/DL (ref 12.5–16.5)
HEMOGLOBIN: 12.8 G/DL (ref 12.5–16.5)
HEMOGLOBIN: 13.1 G/DL (ref 12.5–16.5)
HEMOGLOBIN: 13.2 G/DL (ref 12.5–16.5)
HEMOGLOBIN: 14.3 G/DL (ref 12.5–16.5)
HEMOGLOBIN: 6.2 G/DL (ref 12.5–16.5)
HEMOGLOBIN: 6.5 G/DL (ref 12.5–16.5)
HEMOGLOBIN: 6.9 G/DL (ref 12.5–16.5)
HEMOGLOBIN: 7.1 G/DL (ref 12.5–16.5)
HEMOGLOBIN: 7.9 G/DL (ref 12.5–16.5)
HEMOGLOBIN: 7.9 G/DL (ref 12.5–16.5)
HEMOGLOBIN: 8 G/DL (ref 12.5–16.5)
HEMOGLOBIN: 8.1 G/DL (ref 12.5–16.5)
HEMOGLOBIN: 8.1 G/DL (ref 12.5–16.5)
HEMOGLOBIN: 8.6 G/DL (ref 12.5–16.5)
HEMOGLOBIN: 8.6 G/DL (ref 12.5–16.5)
HEMOGLOBIN: 8.7 G/DL (ref 12.5–16.5)
HEMOGLOBIN: 8.9 G/DL (ref 12.5–16.5)
HEMOGLOBIN: 8.9 G/DL (ref 12.5–16.5)
HEMOGLOBIN: 9 G/DL (ref 12.5–16.5)
HEMOGLOBIN: 9.2 G/DL (ref 12.5–16.5)
HEMOGLOBIN: 9.3 G/DL (ref 12.5–16.5)
HEMOGLOBIN: 9.4 G/DL (ref 12.5–16.5)
HEMOGLOBIN: 9.5 G/DL (ref 12.5–16.5)
HEMOGLOBIN: 9.7 G/DL (ref 12.5–16.5)
HEMOGLOBIN: 9.8 G/DL (ref 12.5–16.5)
HEMOGLOBIN: 9.9 G/DL (ref 12.5–16.5)
HGB, (EST): 4.2 G/DL (ref 12.5–15.5)
HGB, (EST): 6.3 G/DL (ref 12.5–15.5)
HHB: 1.9 % (ref 0–5)
HHB: 12.1 % (ref 0–5)
HHB: 2 % (ref 0–5)
HHB: 2.7 % (ref 0–5)
HHB: 20.3 % (ref 0–5)
HHB: 3.2 % (ref 0–5)
HHB: 3.3 % (ref 0–5)
HHB: 4.6 % (ref 0–5)
HHB: 5 % (ref 0–5)
HHB: 5.5 % (ref 0–5)
HHB: 6.1 % (ref 0–5)
HHB: 6.7 % (ref 0–5)
HHB: 8.4 % (ref 0–5)
HYPOCHROMIA: ABNORMAL
IMMATURE GRANULOCYTES #: 0.14 E9/L
IMMATURE GRANULOCYTES #: 0.17 E9/L
IMMATURE GRANULOCYTES #: 0.22 E9/L
IMMATURE GRANULOCYTES #: 0.31 E9/L
IMMATURE GRANULOCYTES #: 0.74 E9/L
IMMATURE GRANULOCYTES #: 0.77 E9/L
IMMATURE GRANULOCYTES %: 1.2 % (ref 0–5)
IMMATURE GRANULOCYTES %: 1.6 % (ref 0–5)
IMMATURE GRANULOCYTES %: 1.6 % (ref 0–5)
IMMATURE GRANULOCYTES %: 1.8 % (ref 0–5)
IMMATURE GRANULOCYTES %: 15.5 % (ref 0–5)
IMMATURE GRANULOCYTES %: 7.5 % (ref 0–5)
INR BLD: 1.4
INR BLD: 1.7
INR BLD: 2.4
INR BLD: 2.7
IRON SATURATION: 58 % (ref 20–55)
IRON: 105 MCG/DL (ref 59–158)
K (CLOTTING TIME): 2 MIN (ref 1–3)
KEPPRA: 34 UG/ML (ref 12–46)
KETONES, URINE: NEGATIVE MG/DL
LAB: ABNORMAL
LACTIC ACID, SEPSIS: 1.2 MMOL/L (ref 0.5–1.9)
LACTIC ACID, SEPSIS: 1.7 MMOL/L (ref 0.5–1.9)
LACTIC ACID, SEPSIS: 1.8 MMOL/L (ref 0.5–1.9)
LACTIC ACID, SEPSIS: 2.1 MMOL/L (ref 0.5–1.9)
LACTIC ACID, SEPSIS: 2.2 MMOL/L (ref 0.5–1.9)
LACTIC ACID, SEPSIS: 2.4 MMOL/L (ref 0.5–1.9)
LACTIC ACID, SEPSIS: 2.8 MMOL/L (ref 0.5–1.9)
LACTIC ACID, SEPSIS: 5.1 MMOL/L (ref 0.5–1.9)
LACTIC ACID: 1.2 MMOL/L (ref 0.5–2.2)
LACTIC ACID: 1.4 MMOL/L (ref 0.5–2.2)
LACTIC ACID: 1.9 MMOL/L (ref 0.5–2.2)
LACTIC ACID: 2 MMOL/L (ref 0.5–2.2)
LACTIC ACID: 2.4 MMOL/L (ref 0.5–2.2)
LACTIC ACID: 2.5 MMOL/L (ref 0.5–2.2)
LACTIC ACID: 4.7 MMOL/L (ref 0.5–2.2)
LACTIC ACID: 7.1 MMOL/L (ref 0.5–2.2)
LACTIC ACID: 7.4 MMOL/L (ref 0.5–2.2)
LACTIC ACID: 7.8 MMOL/L (ref 0.5–2.2)
LACTIC ACID: 7.8 MMOL/L (ref 0.5–2.2)
LACTIC ACID: 8.1 MMOL/L (ref 0.5–2.2)
LACTIC ACID: 8.4 MMOL/L (ref 0.5–2.2)
LEUKOCYTE ESTERASE, URINE: ABNORMAL
LEUKOCYTE ESTERASE, URINE: NEGATIVE
LIPASE: 22 U/L (ref 13–60)
LIPASE: 33 U/L (ref 13–60)
LV EF: 43 %
LV EF: 53 %
LV EF: 84 %
LVEF MODALITY: NORMAL
LY30 (FIBRINOLYSIS): 0 % (ref 0–8)
LYMPHOCYTES ABSOLUTE: 0.31 E9/L (ref 1.5–4)
LYMPHOCYTES ABSOLUTE: 0.34 E9/L (ref 1.5–4)
LYMPHOCYTES ABSOLUTE: 0.34 E9/L (ref 1.5–4)
LYMPHOCYTES ABSOLUTE: 0.37 E9/L (ref 1.5–4)
LYMPHOCYTES ABSOLUTE: 0.4 E9/L (ref 1.5–4)
LYMPHOCYTES ABSOLUTE: 0.43 E9/L (ref 1.5–4)
LYMPHOCYTES ABSOLUTE: 0.56 E9/L (ref 1.5–4)
LYMPHOCYTES ABSOLUTE: 0.67 E9/L (ref 1.5–4)
LYMPHOCYTES ABSOLUTE: 0.86 E9/L (ref 1.5–4)
LYMPHOCYTES ABSOLUTE: 0.92 E9/L (ref 1.5–4)
LYMPHOCYTES ABSOLUTE: 0.95 E9/L (ref 1.5–4)
LYMPHOCYTES ABSOLUTE: 1.38 E9/L (ref 1.5–4)
LYMPHOCYTES ABSOLUTE: 1.58 E9/L (ref 1.5–4)
LYMPHOCYTES ABSOLUTE: 1.77 E9/L (ref 1.5–4)
LYMPHOCYTES ABSOLUTE: 1.84 E9/L (ref 1.5–4)
LYMPHOCYTES ABSOLUTE: 1.85 E9/L (ref 1.5–4)
LYMPHOCYTES ABSOLUTE: 2.12 E9/L (ref 1.5–4)
LYMPHOCYTES ABSOLUTE: 2.38 E9/L (ref 1.5–4)
LYMPHOCYTES ABSOLUTE: 2.51 E9/L (ref 1.5–4)
LYMPHOCYTES ABSOLUTE: 3.16 E9/L (ref 1.5–4)
LYMPHOCYTES ABSOLUTE: 3.42 E9/L (ref 1.5–4)
LYMPHOCYTES ABSOLUTE: 3.46 E9/L (ref 1.5–4)
LYMPHOCYTES RELATIVE PERCENT: 10 % (ref 20–42)
LYMPHOCYTES RELATIVE PERCENT: 10 % (ref 20–42)
LYMPHOCYTES RELATIVE PERCENT: 10.2 % (ref 20–42)
LYMPHOCYTES RELATIVE PERCENT: 12.3 % (ref 20–42)
LYMPHOCYTES RELATIVE PERCENT: 14 % (ref 20–42)
LYMPHOCYTES RELATIVE PERCENT: 14 % (ref 20–42)
LYMPHOCYTES RELATIVE PERCENT: 14.2 % (ref 20–42)
LYMPHOCYTES RELATIVE PERCENT: 16.4 % (ref 20–42)
LYMPHOCYTES RELATIVE PERCENT: 18 % (ref 20–42)
LYMPHOCYTES RELATIVE PERCENT: 18.6 % (ref 20–42)
LYMPHOCYTES RELATIVE PERCENT: 23 % (ref 20–42)
LYMPHOCYTES RELATIVE PERCENT: 28 % (ref 20–42)
LYMPHOCYTES RELATIVE PERCENT: 3.5 % (ref 20–42)
LYMPHOCYTES RELATIVE PERCENT: 31.9 % (ref 20–42)
LYMPHOCYTES RELATIVE PERCENT: 34 % (ref 20–42)
LYMPHOCYTES RELATIVE PERCENT: 4.4 % (ref 20–42)
LYMPHOCYTES RELATIVE PERCENT: 45.5 % (ref 20–42)
LYMPHOCYTES RELATIVE PERCENT: 5 % (ref 20–42)
LYMPHOCYTES RELATIVE PERCENT: 5.7 % (ref 20–42)
LYMPHOCYTES RELATIVE PERCENT: 6 % (ref 20–42)
LYMPHOCYTES RELATIVE PERCENT: 7 % (ref 20–42)
LYMPHOCYTES RELATIVE PERCENT: 7.4 % (ref 20–42)
Lab: ABNORMAL
MA (MAX AMPLITUDE): 59.7 MM (ref 50–70)
MAGNESIUM: 0.5 MG/DL (ref 1.6–2.6)
MAGNESIUM: 1 MG/DL (ref 1.6–2.6)
MAGNESIUM: 1.2 MG/DL (ref 1.6–2.6)
MAGNESIUM: 1.4 MG/DL (ref 1.6–2.6)
MAGNESIUM: 1.5 MG/DL (ref 1.6–2.6)
MAGNESIUM: 1.5 MG/DL (ref 1.6–2.6)
MAGNESIUM: 1.7 MG/DL (ref 1.6–2.6)
MAGNESIUM: 1.8 MG/DL (ref 1.6–2.6)
MAGNESIUM: 1.9 MG/DL (ref 1.6–2.6)
MAGNESIUM: 1.9 MG/DL (ref 1.6–2.6)
MAGNESIUM: 2 MG/DL (ref 1.6–2.6)
MAGNESIUM: 2 MG/DL (ref 1.6–2.6)
MAGNESIUM: 2.1 MG/DL (ref 1.6–2.6)
MAGNESIUM: 2.2 MG/DL (ref 1.6–2.6)
MAGNESIUM: 2.7 MG/DL (ref 1.6–2.6)
MCH RBC QN AUTO: 26.3 PG (ref 26–35)
MCH RBC QN AUTO: 26.7 PG (ref 26–35)
MCH RBC QN AUTO: 26.9 PG (ref 26–35)
MCH RBC QN AUTO: 27 PG (ref 26–35)
MCH RBC QN AUTO: 27.1 PG (ref 26–35)
MCH RBC QN AUTO: 27.2 PG (ref 26–35)
MCH RBC QN AUTO: 27.3 PG (ref 26–35)
MCH RBC QN AUTO: 27.3 PG (ref 26–35)
MCH RBC QN AUTO: 27.4 PG (ref 26–35)
MCH RBC QN AUTO: 27.5 PG (ref 26–35)
MCH RBC QN AUTO: 27.6 PG (ref 26–35)
MCH RBC QN AUTO: 27.7 PG (ref 26–35)
MCH RBC QN AUTO: 27.8 PG (ref 26–35)
MCH RBC QN AUTO: 27.9 PG (ref 26–35)
MCH RBC QN AUTO: 28 PG (ref 26–35)
MCH RBC QN AUTO: 28.2 PG (ref 26–35)
MCH RBC QN AUTO: 28.2 PG (ref 26–35)
MCH RBC QN AUTO: 28.3 PG (ref 26–35)
MCH RBC QN AUTO: 28.4 PG (ref 26–35)
MCH RBC QN AUTO: 28.5 PG (ref 26–35)
MCH RBC QN AUTO: 28.6 PG (ref 26–35)
MCH RBC QN AUTO: 28.8 PG (ref 26–35)
MCH RBC QN AUTO: 28.9 PG (ref 26–35)
MCH RBC QN AUTO: 29 PG (ref 26–35)
MCH RBC QN AUTO: 29 PG (ref 26–35)
MCH RBC QN AUTO: 29.1 PG (ref 26–35)
MCH RBC QN AUTO: 29.3 PG (ref 26–35)
MCH RBC QN AUTO: 29.6 PG (ref 26–35)
MCH RBC QN AUTO: 29.8 PG (ref 26–35)
MCHC RBC AUTO-ENTMCNC: 29 % (ref 32–34.5)
MCHC RBC AUTO-ENTMCNC: 29.5 % (ref 32–34.5)
MCHC RBC AUTO-ENTMCNC: 29.8 % (ref 32–34.5)
MCHC RBC AUTO-ENTMCNC: 29.9 % (ref 32–34.5)
MCHC RBC AUTO-ENTMCNC: 30.1 % (ref 32–34.5)
MCHC RBC AUTO-ENTMCNC: 30.2 % (ref 32–34.5)
MCHC RBC AUTO-ENTMCNC: 30.2 % (ref 32–34.5)
MCHC RBC AUTO-ENTMCNC: 30.3 % (ref 32–34.5)
MCHC RBC AUTO-ENTMCNC: 30.4 % (ref 32–34.5)
MCHC RBC AUTO-ENTMCNC: 30.4 % (ref 32–34.5)
MCHC RBC AUTO-ENTMCNC: 30.5 % (ref 32–34.5)
MCHC RBC AUTO-ENTMCNC: 30.6 % (ref 32–34.5)
MCHC RBC AUTO-ENTMCNC: 30.7 % (ref 32–34.5)
MCHC RBC AUTO-ENTMCNC: 30.8 % (ref 32–34.5)
MCHC RBC AUTO-ENTMCNC: 30.9 % (ref 32–34.5)
MCHC RBC AUTO-ENTMCNC: 30.9 % (ref 32–34.5)
MCHC RBC AUTO-ENTMCNC: 31 % (ref 32–34.5)
MCHC RBC AUTO-ENTMCNC: 31 % (ref 32–34.5)
MCHC RBC AUTO-ENTMCNC: 31.2 % (ref 32–34.5)
MCHC RBC AUTO-ENTMCNC: 31.4 % (ref 32–34.5)
MCHC RBC AUTO-ENTMCNC: 31.7 % (ref 32–34.5)
MCHC RBC AUTO-ENTMCNC: 31.7 % (ref 32–34.5)
MCHC RBC AUTO-ENTMCNC: 31.8 % (ref 32–34.5)
MCHC RBC AUTO-ENTMCNC: 31.8 % (ref 32–34.5)
MCHC RBC AUTO-ENTMCNC: 31.9 % (ref 32–34.5)
MCHC RBC AUTO-ENTMCNC: 32 % (ref 32–34.5)
MCHC RBC AUTO-ENTMCNC: 32 % (ref 32–34.5)
MCHC RBC AUTO-ENTMCNC: 32.1 % (ref 32–34.5)
MCHC RBC AUTO-ENTMCNC: 32.1 % (ref 32–34.5)
MCHC RBC AUTO-ENTMCNC: 32.2 % (ref 32–34.5)
MCHC RBC AUTO-ENTMCNC: 32.3 % (ref 32–34.5)
MCHC RBC AUTO-ENTMCNC: 32.6 % (ref 32–34.5)
MCHC RBC AUTO-ENTMCNC: 32.6 % (ref 32–34.5)
MCHC RBC AUTO-ENTMCNC: 32.7 % (ref 32–34.5)
MCHC RBC AUTO-ENTMCNC: 32.8 % (ref 32–34.5)
MCHC RBC AUTO-ENTMCNC: 33.5 % (ref 32–34.5)
MCHC RBC AUTO-ENTMCNC: 33.9 % (ref 32–34.5)
MCV RBC AUTO: 84.3 FL (ref 80–99.9)
MCV RBC AUTO: 84.8 FL (ref 80–99.9)
MCV RBC AUTO: 85.4 FL (ref 80–99.9)
MCV RBC AUTO: 85.7 FL (ref 80–99.9)
MCV RBC AUTO: 85.7 FL (ref 80–99.9)
MCV RBC AUTO: 86.1 FL (ref 80–99.9)
MCV RBC AUTO: 86.4 FL (ref 80–99.9)
MCV RBC AUTO: 87 FL (ref 80–99.9)
MCV RBC AUTO: 87 FL (ref 80–99.9)
MCV RBC AUTO: 87.7 FL (ref 80–99.9)
MCV RBC AUTO: 88 FL (ref 80–99.9)
MCV RBC AUTO: 88.1 FL (ref 80–99.9)
MCV RBC AUTO: 88.3 FL (ref 80–99.9)
MCV RBC AUTO: 88.4 FL (ref 80–99.9)
MCV RBC AUTO: 88.5 FL (ref 80–99.9)
MCV RBC AUTO: 88.6 FL (ref 80–99.9)
MCV RBC AUTO: 88.7 FL (ref 80–99.9)
MCV RBC AUTO: 89.2 FL (ref 80–99.9)
MCV RBC AUTO: 89.3 FL (ref 80–99.9)
MCV RBC AUTO: 89.3 FL (ref 80–99.9)
MCV RBC AUTO: 89.5 FL (ref 80–99.9)
MCV RBC AUTO: 90.1 FL (ref 80–99.9)
MCV RBC AUTO: 90.2 FL (ref 80–99.9)
MCV RBC AUTO: 90.4 FL (ref 80–99.9)
MCV RBC AUTO: 90.6 FL (ref 80–99.9)
MCV RBC AUTO: 90.9 FL (ref 80–99.9)
MCV RBC AUTO: 91.1 FL (ref 80–99.9)
MCV RBC AUTO: 91.2 FL (ref 80–99.9)
MCV RBC AUTO: 91.4 FL (ref 80–99.9)
MCV RBC AUTO: 91.4 FL (ref 80–99.9)
MCV RBC AUTO: 91.5 FL (ref 80–99.9)
MCV RBC AUTO: 91.5 FL (ref 80–99.9)
MCV RBC AUTO: 91.7 FL (ref 80–99.9)
MCV RBC AUTO: 91.9 FL (ref 80–99.9)
MCV RBC AUTO: 92.3 FL (ref 80–99.9)
MCV RBC AUTO: 93.1 FL (ref 80–99.9)
MCV RBC AUTO: 93.4 FL (ref 80–99.9)
MCV RBC AUTO: 93.5 FL (ref 80–99.9)
MCV RBC AUTO: 94.4 FL (ref 80–99.9)
METAMYELOCYTES RELATIVE PERCENT: 0.9 % (ref 0–1)
METAMYELOCYTES RELATIVE PERCENT: 1 % (ref 0–1)
METAMYELOCYTES RELATIVE PERCENT: 1.7 % (ref 0–1)
METAMYELOCYTES RELATIVE PERCENT: 24 % (ref 0–1)
METAMYELOCYTES RELATIVE PERCENT: 5.1 % (ref 0–1)
METAMYELOCYTES RELATIVE PERCENT: 9 % (ref 0–1)
METER GLUCOSE: 108 MG/DL (ref 74–99)
METER GLUCOSE: 109 MG/DL (ref 74–99)
METER GLUCOSE: 110 MG/DL (ref 74–99)
METER GLUCOSE: 130 MG/DL (ref 74–99)
METER GLUCOSE: 140 MG/DL (ref 74–99)
METER GLUCOSE: 143 MG/DL (ref 74–99)
METER GLUCOSE: 149 MG/DL (ref 74–99)
METER GLUCOSE: 150 MG/DL (ref 74–99)
METER GLUCOSE: 185 MG/DL (ref 74–99)
METER GLUCOSE: 59 MG/DL (ref 74–99)
METER GLUCOSE: 62 MG/DL (ref 74–99)
METER GLUCOSE: 70 MG/DL (ref 74–99)
METER GLUCOSE: 90 MG/DL (ref 74–99)
METER GLUCOSE: <40 MG/DL (ref 74–99)
METHB: 0 % (ref 0–1.5)
METHB: 0.2 % (ref 0–1.5)
METHB: 0.3 % (ref 0–1.5)
METHB: 0.4 % (ref 0–1.5)
METHB: 0.5 % (ref 0–1.5)
METHB: 0.5 % (ref 0–1.5)
MODE: ABNORMAL
MODE: AC
MONOCYTES ABSOLUTE: 0.08 E9/L (ref 0.1–0.95)
MONOCYTES ABSOLUTE: 0.11 E9/L (ref 0.1–0.95)
MONOCYTES ABSOLUTE: 0.14 E9/L (ref 0.1–0.95)
MONOCYTES ABSOLUTE: 0.19 E9/L (ref 0.1–0.95)
MONOCYTES ABSOLUTE: 0.21 E9/L (ref 0.1–0.95)
MONOCYTES ABSOLUTE: 0.29 E9/L (ref 0.1–0.95)
MONOCYTES ABSOLUTE: 0.36 E9/L (ref 0.1–0.95)
MONOCYTES ABSOLUTE: 0.53 E9/L (ref 0.1–0.95)
MONOCYTES ABSOLUTE: 0.56 E9/L (ref 0.1–0.95)
MONOCYTES ABSOLUTE: 0.58 E9/L (ref 0.1–0.95)
MONOCYTES ABSOLUTE: 0.64 E9/L (ref 0.1–0.95)
MONOCYTES ABSOLUTE: 0.65 E9/L (ref 0.1–0.95)
MONOCYTES ABSOLUTE: 0.72 E9/L (ref 0.1–0.95)
MONOCYTES ABSOLUTE: 0.85 E9/L (ref 0.1–0.95)
MONOCYTES ABSOLUTE: 0.97 E9/L (ref 0.1–0.95)
MONOCYTES ABSOLUTE: 1.19 E9/L (ref 0.1–0.95)
MONOCYTES ABSOLUTE: 1.2 E9/L (ref 0.1–0.95)
MONOCYTES ABSOLUTE: 1.45 E9/L (ref 0.1–0.95)
MONOCYTES ABSOLUTE: 1.5 E9/L (ref 0.1–0.95)
MONOCYTES ABSOLUTE: 1.51 E9/L (ref 0.1–0.95)
MONOCYTES ABSOLUTE: 1.57 E9/L (ref 0.1–0.95)
MONOCYTES ABSOLUTE: 2.28 E9/L (ref 0.1–0.95)
MONOCYTES RELATIVE PERCENT: 1 % (ref 2–12)
MONOCYTES RELATIVE PERCENT: 10.5 % (ref 2–12)
MONOCYTES RELATIVE PERCENT: 11 % (ref 2–12)
MONOCYTES RELATIVE PERCENT: 11 % (ref 2–12)
MONOCYTES RELATIVE PERCENT: 11.2 % (ref 2–12)
MONOCYTES RELATIVE PERCENT: 12 % (ref 2–12)
MONOCYTES RELATIVE PERCENT: 15 % (ref 2–12)
MONOCYTES RELATIVE PERCENT: 16 % (ref 2–12)
MONOCYTES RELATIVE PERCENT: 2.1 % (ref 2–12)
MONOCYTES RELATIVE PERCENT: 2.8 % (ref 2–12)
MONOCYTES RELATIVE PERCENT: 21.4 % (ref 2–12)
MONOCYTES RELATIVE PERCENT: 3 % (ref 2–12)
MONOCYTES RELATIVE PERCENT: 3.4 % (ref 2–12)
MONOCYTES RELATIVE PERCENT: 4.5 % (ref 2–12)
MONOCYTES RELATIVE PERCENT: 5 % (ref 2–12)
MONOCYTES RELATIVE PERCENT: 5.9 % (ref 2–12)
MONOCYTES RELATIVE PERCENT: 6 % (ref 2–12)
MONOCYTES RELATIVE PERCENT: 6.2 % (ref 2–12)
MONOCYTES RELATIVE PERCENT: 6.4 % (ref 2–12)
MONOCYTES RELATIVE PERCENT: 7 % (ref 2–12)
MONOCYTES RELATIVE PERCENT: 7.9 % (ref 2–12)
MONOCYTES RELATIVE PERCENT: 8.7 % (ref 2–12)
MRSA CULTURE ONLY: NORMAL
MYELOCYTE PERCENT: 0.9 % (ref 0–0)
MYELOCYTE PERCENT: 2 % (ref 0–0)
MYELOCYTE PERCENT: 3 % (ref 0–0)
MYELOCYTE PERCENT: 5 % (ref 0–0)
MYELOCYTE PERCENT: 7 % (ref 0–0)
NEUTROPHILS ABSOLUTE: 0.49 E9/L (ref 1.8–7.3)
NEUTROPHILS ABSOLUTE: 0.67 E9/L (ref 1.8–7.3)
NEUTROPHILS ABSOLUTE: 1.43 E9/L (ref 1.8–7.3)
NEUTROPHILS ABSOLUTE: 1.68 E9/L (ref 1.8–7.3)
NEUTROPHILS ABSOLUTE: 11.15 E9/L (ref 1.8–7.3)
NEUTROPHILS ABSOLUTE: 11.87 E9/L (ref 1.8–7.3)
NEUTROPHILS ABSOLUTE: 15.22 E9/L (ref 1.8–7.3)
NEUTROPHILS ABSOLUTE: 17.08 E9/L (ref 1.8–7.3)
NEUTROPHILS ABSOLUTE: 21.08 E9/L (ref 1.8–7.3)
NEUTROPHILS ABSOLUTE: 22.52 E9/L (ref 1.8–7.3)
NEUTROPHILS ABSOLUTE: 3.69 E9/L (ref 1.8–7.3)
NEUTROPHILS ABSOLUTE: 6.16 E9/L (ref 1.8–7.3)
NEUTROPHILS ABSOLUTE: 6.5 E9/L (ref 1.8–7.3)
NEUTROPHILS ABSOLUTE: 7.27 E9/L (ref 1.8–7.3)
NEUTROPHILS ABSOLUTE: 7.3 E9/L (ref 1.8–7.3)
NEUTROPHILS ABSOLUTE: 7.52 E9/L (ref 1.8–7.3)
NEUTROPHILS ABSOLUTE: 8.22 E9/L (ref 1.8–7.3)
NEUTROPHILS ABSOLUTE: 8.38 E9/L (ref 1.8–7.3)
NEUTROPHILS ABSOLUTE: 8.74 E9/L (ref 1.8–7.3)
NEUTROPHILS ABSOLUTE: 9.15 E9/L (ref 1.8–7.3)
NEUTROPHILS ABSOLUTE: 9.18 E9/L (ref 1.8–7.3)
NEUTROPHILS ABSOLUTE: 9.24 E9/L (ref 1.8–7.3)
NEUTROPHILS RELATIVE PERCENT: 31.3 % (ref 43–80)
NEUTROPHILS RELATIVE PERCENT: 54 % (ref 43–80)
NEUTROPHILS RELATIVE PERCENT: 56 % (ref 43–80)
NEUTROPHILS RELATIVE PERCENT: 56.6 % (ref 43–80)
NEUTROPHILS RELATIVE PERCENT: 63 % (ref 43–80)
NEUTROPHILS RELATIVE PERCENT: 63.7 % (ref 43–80)
NEUTROPHILS RELATIVE PERCENT: 65.3 % (ref 43–80)
NEUTROPHILS RELATIVE PERCENT: 69 % (ref 43–80)
NEUTROPHILS RELATIVE PERCENT: 70 % (ref 43–80)
NEUTROPHILS RELATIVE PERCENT: 70 % (ref 43–80)
NEUTROPHILS RELATIVE PERCENT: 74 % (ref 43–80)
NEUTROPHILS RELATIVE PERCENT: 74.1 % (ref 43–80)
NEUTROPHILS RELATIVE PERCENT: 74.4 % (ref 43–80)
NEUTROPHILS RELATIVE PERCENT: 75.2 % (ref 43–80)
NEUTROPHILS RELATIVE PERCENT: 78.1 % (ref 43–80)
NEUTROPHILS RELATIVE PERCENT: 78.8 % (ref 43–80)
NEUTROPHILS RELATIVE PERCENT: 79 % (ref 43–80)
NEUTROPHILS RELATIVE PERCENT: 81 % (ref 43–80)
NEUTROPHILS RELATIVE PERCENT: 83.9 % (ref 43–80)
NEUTROPHILS RELATIVE PERCENT: 84.2 % (ref 43–80)
NEUTROPHILS RELATIVE PERCENT: 86 % (ref 43–80)
NEUTROPHILS RELATIVE PERCENT: 90 % (ref 43–80)
NITRITE, URINE: NEGATIVE
O2 CONTENT: 15.6 ML/DL
O2 CONTENT: 16.2 ML/DL
O2 SATURATION: 79.6 % (ref 92–98.5)
O2 SATURATION: 87.8 % (ref 92–98.5)
O2 SATURATION: 91.6 % (ref 92–98.5)
O2 SATURATION: 93.2 % (ref 92–98.5)
O2 SATURATION: 93.9 % (ref 92–98.5)
O2 SATURATION: 94.5 % (ref 92–98.5)
O2 SATURATION: 95 % (ref 92–98.5)
O2 SATURATION: 95.4 % (ref 92–98.5)
O2 SATURATION: 95.9 % (ref 92–98.5)
O2 SATURATION: 96.7 % (ref 92–98.5)
O2 SATURATION: 96.7 % (ref 92–98.5)
O2 SATURATION: 96.8 % (ref 92–98.5)
O2 SATURATION: 97.3 % (ref 92–98.5)
O2 SATURATION: 98 % (ref 92–98.5)
O2 SATURATION: 98.1 % (ref 92–98.5)
O2HB: 79.1 % (ref 94–97)
O2HB: 87.4 % (ref 94–97)
O2HB: 91.1 % (ref 94–97)
O2HB: 92.4 % (ref 94–97)
O2HB: 93.2 % (ref 94–97)
O2HB: 94 % (ref 94–97)
O2HB: 94.6 % (ref 94–97)
O2HB: 94.8 % (ref 94–97)
O2HB: 95.5 % (ref 94–97)
O2HB: 96.1 % (ref 94–97)
O2HB: 96.1 % (ref 94–97)
O2HB: 97.6 % (ref 94–97)
O2HB: 97.8 % (ref 94–97)
OCCULT BLOOD DIAGNOSTIC: NORMAL
OPERATOR ID: 1632
OPERATOR ID: 1926
OPERATOR ID: 274
OPERATOR ID: 366
OPERATOR ID: 5100
OPERATOR ID: ABNORMAL
ORGANISM: ABNORMAL
OSMOLALITY URINE: 526 MOSM/KG (ref 300–900)
OVALOCYTES: ABNORMAL
PARATHYROID HORMONE INTACT: 124 PG/ML (ref 15–65)
PARATHYROID HORMONE INTACT: 133 PG/ML (ref 15–65)
PATIENT TEMP: 37
PATIENT TEMP: 37 C
PCO2 ARTERIAL: 34.4 MMHG (ref 35–45)
PCO2 ARTERIAL: 46.1 MMHG (ref 35–45)
PCO2: 22.7 MMHG (ref 35–45)
PCO2: 22.9 MMHG (ref 35–45)
PCO2: 26.2 MMHG (ref 35–45)
PCO2: 26.3 MMHG (ref 35–45)
PCO2: 26.7 MMHG (ref 35–45)
PCO2: 27.8 MMHG (ref 35–45)
PCO2: 29.4 MMHG (ref 35–45)
PCO2: 33.4 MMHG (ref 35–45)
PCO2: 38.7 MMHG (ref 35–45)
PCO2: 39.7 MMHG (ref 35–45)
PCO2: 41.2 MMHG (ref 35–45)
PCO2: 47.3 MMHG (ref 35–45)
PCO2: 62.1 MMHG (ref 35–45)
PDW BLD-RTO: 15.9 FL (ref 11.5–15)
PDW BLD-RTO: 16 FL (ref 11.5–15)
PDW BLD-RTO: 16.1 FL (ref 11.5–15)
PDW BLD-RTO: 16.1 FL (ref 11.5–15)
PDW BLD-RTO: 16.3 FL (ref 11.5–15)
PDW BLD-RTO: 16.3 FL (ref 11.5–15)
PDW BLD-RTO: 16.4 FL (ref 11.5–15)
PDW BLD-RTO: 16.5 FL (ref 11.5–15)
PDW BLD-RTO: 16.6 FL (ref 11.5–15)
PDW BLD-RTO: 16.6 FL (ref 11.5–15)
PDW BLD-RTO: 16.7 FL (ref 11.5–15)
PDW BLD-RTO: 16.7 FL (ref 11.5–15)
PDW BLD-RTO: 16.8 FL (ref 11.5–15)
PDW BLD-RTO: 16.9 FL (ref 11.5–15)
PDW BLD-RTO: 16.9 FL (ref 11.5–15)
PDW BLD-RTO: 17 FL (ref 11.5–15)
PDW BLD-RTO: 17.2 FL (ref 11.5–15)
PDW BLD-RTO: 17.2 FL (ref 11.5–15)
PDW BLD-RTO: 17.3 FL (ref 11.5–15)
PDW BLD-RTO: 17.4 FL (ref 11.5–15)
PDW BLD-RTO: 17.4 FL (ref 11.5–15)
PDW BLD-RTO: 17.5 FL (ref 11.5–15)
PDW BLD-RTO: 17.6 FL (ref 11.5–15)
PDW BLD-RTO: 17.8 FL (ref 11.5–15)
PDW BLD-RTO: 17.9 FL (ref 11.5–15)
PDW BLD-RTO: 18 FL (ref 11.5–15)
PDW BLD-RTO: 18.1 FL (ref 11.5–15)
PDW BLD-RTO: 18.1 FL (ref 11.5–15)
PDW BLD-RTO: 18.3 FL (ref 11.5–15)
PDW BLD-RTO: 18.4 FL (ref 11.5–15)
PDW BLD-RTO: 18.4 FL (ref 11.5–15)
PDW BLD-RTO: 18.5 FL (ref 11.5–15)
PDW BLD-RTO: 18.6 FL (ref 11.5–15)
PDW BLD-RTO: 18.6 FL (ref 11.5–15)
PDW BLD-RTO: 18.8 FL (ref 11.5–15)
PEEP/CPAP: 10 CMH2O
PEEP/CPAP: 8 CMH2O
PFO2: 0.61 MMHG/%
PFO2: 0.62 MMHG/%
PFO2: 0.72 MMHG/%
PFO2: 0.76 MMHG/%
PFO2: 0.8 MMHG/%
PFO2: 0.88 MMHG/%
PFO2: 1.16 MMHG/%
PFO2: 1.35 MMHG/%
PFO2: 1.38 MMHG/%
PFO2: 1.95 MMHG/%
PH BLOOD GAS: 7.02 (ref 7.35–7.45)
PH BLOOD GAS: 7.12 (ref 7.35–7.45)
PH BLOOD GAS: 7.12 (ref 7.35–7.45)
PH BLOOD GAS: 7.2 (ref 7.35–7.45)
PH BLOOD GAS: 7.22 (ref 7.35–7.45)
PH BLOOD GAS: 7.22 (ref 7.35–7.45)
PH BLOOD GAS: 7.24 (ref 7.35–7.45)
PH BLOOD GAS: 7.28 (ref 7.35–7.45)
PH BLOOD GAS: 7.29 (ref 7.35–7.45)
PH BLOOD GAS: 7.3 (ref 7.35–7.45)
PH BLOOD GAS: 7.3 (ref 7.35–7.45)
PH BLOOD GAS: 7.31 (ref 7.35–7.45)
PH BLOOD GAS: 7.37 (ref 7.35–7.45)
PH BLOOD GAS: 7.38 (ref 7.35–7.45)
PH BLOOD GAS: 7.44 (ref 7.35–7.45)
PH UA: 5.5 (ref 5–9)
PH UA: 6 (ref 5–9)
PHOSPHORUS: 1.6 MG/DL (ref 2.5–4.5)
PHOSPHORUS: 1.7 MG/DL (ref 2.5–4.5)
PHOSPHORUS: 1.8 MG/DL (ref 2.5–4.5)
PHOSPHORUS: 2.7 MG/DL (ref 2.5–4.5)
PHOSPHORUS: 2.9 MG/DL (ref 2.5–4.5)
PHOSPHORUS: 3.1 MG/DL (ref 2.5–4.5)
PHOSPHORUS: 3.2 MG/DL (ref 2.5–4.5)
PHOSPHORUS: 3.2 MG/DL (ref 2.5–4.5)
PHOSPHORUS: 3.5 MG/DL (ref 2.5–4.5)
PHOSPHORUS: 4.2 MG/DL (ref 2.5–4.5)
PHOSPHORUS: 4.2 MG/DL (ref 2.5–4.5)
PHOSPHORUS: 4.6 MG/DL (ref 2.5–4.5)
PHOSPHORUS: 4.8 MG/DL (ref 2.5–4.5)
PHOSPHORUS: 4.9 MG/DL (ref 2.5–4.5)
PHOSPHORUS: 6.1 MG/DL (ref 2.5–4.5)
PLATELET # BLD: 11 E9/L (ref 130–450)
PLATELET # BLD: 112 E9/L (ref 130–450)
PLATELET # BLD: 14 E9/L (ref 130–450)
PLATELET # BLD: 170 E9/L (ref 130–450)
PLATELET # BLD: 201 E9/L (ref 130–450)
PLATELET # BLD: 206 E9/L (ref 130–450)
PLATELET # BLD: 208 E9/L (ref 130–450)
PLATELET # BLD: 210 E9/L (ref 130–450)
PLATELET # BLD: 238 E9/L (ref 130–450)
PLATELET # BLD: 246 E9/L (ref 130–450)
PLATELET # BLD: 254 E9/L (ref 130–450)
PLATELET # BLD: 27 E9/L (ref 130–450)
PLATELET # BLD: 270 E9/L (ref 130–450)
PLATELET # BLD: 272 E9/L (ref 130–450)
PLATELET # BLD: 273 E9/L (ref 130–450)
PLATELET # BLD: 280 E9/L (ref 130–450)
PLATELET # BLD: 281 E9/L (ref 130–450)
PLATELET # BLD: 285 E9/L (ref 130–450)
PLATELET # BLD: 287 E9/L (ref 130–450)
PLATELET # BLD: 289 E9/L (ref 130–450)
PLATELET # BLD: 292 E9/L (ref 130–450)
PLATELET # BLD: 298 E9/L (ref 130–450)
PLATELET # BLD: 304 E9/L (ref 130–450)
PLATELET # BLD: 309 E9/L (ref 130–450)
PLATELET # BLD: 316 E9/L (ref 130–450)
PLATELET # BLD: 321 E9/L (ref 130–450)
PLATELET # BLD: 322 E9/L (ref 130–450)
PLATELET # BLD: 324 E9/L (ref 130–450)
PLATELET # BLD: 341 E9/L (ref 130–450)
PLATELET # BLD: 342 E9/L (ref 130–450)
PLATELET # BLD: 344 E9/L (ref 130–450)
PLATELET # BLD: 347 E9/L (ref 130–450)
PLATELET # BLD: 359 E9/L (ref 130–450)
PLATELET # BLD: 36 E9/L (ref 130–450)
PLATELET # BLD: 365 E9/L (ref 130–450)
PLATELET # BLD: 383 E9/L (ref 130–450)
PLATELET # BLD: 388 E9/L (ref 130–450)
PLATELET # BLD: 395 E9/L (ref 130–450)
PLATELET # BLD: 396 E9/L (ref 130–450)
PLATELET # BLD: 400 E9/L (ref 130–450)
PLATELET # BLD: 406 E9/L (ref 130–450)
PLATELET # BLD: 423 E9/L (ref 130–450)
PLATELET # BLD: 449 E9/L (ref 130–450)
PLATELET # BLD: 460 E9/L (ref 130–450)
PLATELET # BLD: 57 E9/L (ref 130–450)
PLATELET # BLD: 649 E9/L (ref 130–450)
PLATELET # BLD: 72 E9/L (ref 130–450)
PLATELET CONFIRMATION: NORMAL
PMV BLD AUTO: 10 FL (ref 7–12)
PMV BLD AUTO: 10 FL (ref 7–12)
PMV BLD AUTO: 10.1 FL (ref 7–12)
PMV BLD AUTO: 10.2 FL (ref 7–12)
PMV BLD AUTO: 10.2 FL (ref 7–12)
PMV BLD AUTO: 10.3 FL (ref 7–12)
PMV BLD AUTO: 10.4 FL (ref 7–12)
PMV BLD AUTO: 10.5 FL (ref 7–12)
PMV BLD AUTO: 10.6 FL (ref 7–12)
PMV BLD AUTO: 10.7 FL (ref 7–12)
PMV BLD AUTO: 10.8 FL (ref 7–12)
PMV BLD AUTO: 11 FL (ref 7–12)
PMV BLD AUTO: 11 FL (ref 7–12)
PMV BLD AUTO: 11.1 FL (ref 7–12)
PMV BLD AUTO: 11.3 FL (ref 7–12)
PMV BLD AUTO: 11.4 FL (ref 7–12)
PMV BLD AUTO: 9.4 FL (ref 7–12)
PMV BLD AUTO: 9.4 FL (ref 7–12)
PMV BLD AUTO: 9.6 FL (ref 7–12)
PMV BLD AUTO: 9.6 FL (ref 7–12)
PMV BLD AUTO: 9.7 FL (ref 7–12)
PMV BLD AUTO: 9.7 FL (ref 7–12)
PMV BLD AUTO: 9.8 FL (ref 7–12)
PMV BLD AUTO: 9.9 FL (ref 7–12)
PMV BLD AUTO: ABNORMAL FL (ref 7–12)
PO2 ARTERIAL: 107.2 MMHG (ref 60–80)
PO2 ARTERIAL: 89.6 MMHG (ref 60–80)
PO2: 116.4 MMHG (ref 75–100)
PO2: 134.5 MMHG (ref 75–100)
PO2: 138.3 MMHG (ref 75–100)
PO2: 142.6 MMHG (ref 75–100)
PO2: 185.4 MMHG (ref 75–100)
PO2: 54.8 MMHG (ref 75–100)
PO2: 60.9 MMHG (ref 75–100)
PO2: 62.1 MMHG (ref 75–100)
PO2: 75.7 MMHG (ref 75–100)
PO2: 77.4 MMHG (ref 75–100)
PO2: 80 MMHG (ref 75–100)
PO2: 88.1 MMHG (ref 75–100)
PO2: 88.9 MMHG (ref 75–100)
POIKILOCYTES: ABNORMAL
POLYCHROMASIA: ABNORMAL
POTASSIUM REFLEX MAGNESIUM: 3.5 MMOL/L (ref 3.5–5)
POTASSIUM REFLEX MAGNESIUM: 4.8 MMOL/L (ref 3.5–5)
POTASSIUM REFLEX MAGNESIUM: 5 MMOL/L (ref 3.5–5)
POTASSIUM SERPL-SCNC: 2.8 MMOL/L (ref 3.5–5.5)
POTASSIUM SERPL-SCNC: 3.1 MMOL/L (ref 3.5–5)
POTASSIUM SERPL-SCNC: 3.2 MMOL/L (ref 3.5–5)
POTASSIUM SERPL-SCNC: 3.2 MMOL/L (ref 3.5–5)
POTASSIUM SERPL-SCNC: 3.3 MMOL/L (ref 3.5–5)
POTASSIUM SERPL-SCNC: 3.4 MMOL/L (ref 3.5–5)
POTASSIUM SERPL-SCNC: 3.4 MMOL/L (ref 3.5–5)
POTASSIUM SERPL-SCNC: 3.4 MMOL/L (ref 3.5–5.5)
POTASSIUM SERPL-SCNC: 3.5 MMOL/L (ref 3.5–5)
POTASSIUM SERPL-SCNC: 3.7 MMOL/L (ref 3.5–5)
POTASSIUM SERPL-SCNC: 3.8 MMOL/L (ref 3.5–5)
POTASSIUM SERPL-SCNC: 3.9 MMOL/L (ref 3.5–5)
POTASSIUM SERPL-SCNC: 4 MMOL/L (ref 3.5–5)
POTASSIUM SERPL-SCNC: 4.1 MMOL/L (ref 3.5–5)
POTASSIUM SERPL-SCNC: 4.2 MMOL/L (ref 3.5–5)
POTASSIUM SERPL-SCNC: 4.28 MMOL/L (ref 3.5–5)
POTASSIUM SERPL-SCNC: 4.3 MMOL/L (ref 3.5–5)
POTASSIUM SERPL-SCNC: 4.3 MMOL/L (ref 3.5–5)
POTASSIUM SERPL-SCNC: 4.4 MMOL/L (ref 3.5–5)
POTASSIUM SERPL-SCNC: 4.4 MMOL/L (ref 3.5–5)
POTASSIUM SERPL-SCNC: 4.44 MMOL/L (ref 3.5–5)
POTASSIUM SERPL-SCNC: 4.5 MMOL/L (ref 3.5–5)
POTASSIUM SERPL-SCNC: 4.5 MMOL/L (ref 3.5–5)
POTASSIUM SERPL-SCNC: 4.6 MMOL/L (ref 3.5–5)
POTASSIUM SERPL-SCNC: 4.6 MMOL/L (ref 3.5–5)
POTASSIUM SERPL-SCNC: 4.7 MMOL/L (ref 3.5–5)
POTASSIUM SERPL-SCNC: 4.8 MMOL/L (ref 3.5–5)
POTASSIUM SERPL-SCNC: 4.8 MMOL/L (ref 3.5–5)
POTASSIUM SERPL-SCNC: 4.9 MMOL/L (ref 3.5–5)
POTASSIUM SERPL-SCNC: 4.9 MMOL/L (ref 3.5–5)
POTASSIUM SERPL-SCNC: 5.1 MMOL/L (ref 3.5–5)
POTASSIUM SERPL-SCNC: 5.4 MMOL/L (ref 3.5–5)
POTASSIUM, UR: 40.2 MMOL/L
PRO-BNP: 2149 PG/ML (ref 0–125)
PRO-BNP: 2592 PG/ML (ref 0–125)
PRO-BNP: 559 PG/ML (ref 0–125)
PROCALCITONIN: 45.46 NG/ML (ref 0–0.08)
PROCALCITONIN: >100 NG/ML (ref 0–0.08)
PROTEIN UA: 30 MG/DL
PROTEIN UA: NEGATIVE MG/DL
PROTHROMBIN TIME: 16.2 SEC (ref 9.3–12.4)
PROTHROMBIN TIME: 18.9 SEC (ref 9.3–12.4)
PROTHROMBIN TIME: 19.2 SEC (ref 9.3–12.4)
PROTHROMBIN TIME: 19.4 SEC (ref 9.3–12.4)
PROTHROMBIN TIME: 27.5 SEC (ref 9.3–12.4)
PROTHROMBIN TIME: 31.4 SEC (ref 9.3–12.4)
R (REACTION TIME): 9.3 MIN (ref 5–10)
RBC # BLD: 2.14 E12/L (ref 3.8–5.8)
RBC # BLD: 2.3 E12/L (ref 3.8–5.8)
RBC # BLD: 2.41 E12/L (ref 3.8–5.8)
RBC # BLD: 2.46 E12/L (ref 3.8–5.8)
RBC # BLD: 2.93 E12/L (ref 3.8–5.8)
RBC # BLD: 2.94 E12/L (ref 3.8–5.8)
RBC # BLD: 2.94 E12/L (ref 3.8–5.8)
RBC # BLD: 3 E12/L (ref 3.8–5.8)
RBC # BLD: 3.08 E12/L (ref 3.8–5.8)
RBC # BLD: 3.17 E12/L (ref 3.8–5.8)
RBC # BLD: 3.22 E12/L (ref 3.8–5.8)
RBC # BLD: 3.23 E12/L (ref 3.8–5.8)
RBC # BLD: 3.24 E12/L (ref 3.8–5.8)
RBC # BLD: 3.26 E12/L (ref 3.8–5.8)
RBC # BLD: 3.27 E12/L (ref 3.8–5.8)
RBC # BLD: 3.35 E12/L (ref 3.8–5.8)
RBC # BLD: 3.35 E12/L (ref 3.8–5.8)
RBC # BLD: 3.49 E12/L (ref 3.8–5.8)
RBC # BLD: 3.54 E12/L (ref 3.8–5.8)
RBC # BLD: 3.56 E12/L (ref 3.8–5.8)
RBC # BLD: 3.58 E12/L (ref 3.8–5.8)
RBC # BLD: 3.61 E12/L (ref 3.8–5.8)
RBC # BLD: 3.61 E12/L (ref 3.8–5.8)
RBC # BLD: 3.68 E12/L (ref 3.8–5.8)
RBC # BLD: 3.7 E12/L (ref 3.8–5.8)
RBC # BLD: 3.7 E12/L (ref 3.8–5.8)
RBC # BLD: 3.73 E12/L (ref 3.8–5.8)
RBC # BLD: 3.74 E12/L (ref 3.8–5.8)
RBC # BLD: 3.77 E12/L (ref 3.8–5.8)
RBC # BLD: 3.84 E12/L (ref 3.8–5.8)
RBC # BLD: 3.89 E12/L (ref 3.8–5.8)
RBC # BLD: 3.91 E12/L (ref 3.8–5.8)
RBC # BLD: 3.92 E12/L (ref 3.8–5.8)
RBC # BLD: 3.94 E12/L (ref 3.8–5.8)
RBC # BLD: 4.08 E12/L (ref 3.8–5.8)
RBC # BLD: 4.14 E12/L (ref 3.8–5.8)
RBC # BLD: 4.31 E12/L (ref 3.8–5.8)
RBC # BLD: 4.33 E12/L (ref 3.8–5.8)
RBC # BLD: 4.42 E12/L (ref 3.8–5.8)
RBC # BLD: 4.46 E12/L (ref 3.8–5.8)
RBC # BLD: 4.52 E12/L (ref 3.8–5.8)
RBC # BLD: 4.55 E12/L (ref 3.8–5.8)
RBC # BLD: 4.73 E12/L (ref 3.8–5.8)
RBC # BLD: 4.76 E12/L (ref 3.8–5.8)
RBC # BLD: 5.36 E12/L (ref 3.8–5.8)
RBC UA: >20 /HPF (ref 0–2)
RBC UA: ABNORMAL /HPF (ref 0–2)
REASON FOR REJECTION: NORMAL
REASON FOR REJECTION: NORMAL
REJECTED TEST: NORMAL
REJECTED TEST: NORMAL
REPORT: NORMAL
RI(T): 2.38
RI(T): 3.81
RI(T): 3.87
RI(T): 4.57
RI(T): 6.37
RI(T): 7.32
RI(T): 7.72
RI(T): 8.1
RI(T): 9.41
RI(T): 9.66
RR MECHANICAL: 22 B/MIN
SARS-COV-2, NAAT: NOT DETECTED
SCHISTOCYTES: ABNORMAL
SEDIMENTATION RATE, ERYTHROCYTE: 10 MM/HR (ref 0–15)
SEDIMENTATION RATE, ERYTHROCYTE: 70 MM/HR (ref 0–15)
SMUDGE CELLS: ABNORMAL
SODIUM BLD-SCNC: 121 MMOL/L (ref 132–146)
SODIUM BLD-SCNC: 128 MMOL/L (ref 132–146)
SODIUM BLD-SCNC: 129 MMOL/L (ref 132–146)
SODIUM BLD-SCNC: 129 MMOL/L (ref 132–146)
SODIUM BLD-SCNC: 130 MMOL/L (ref 132–146)
SODIUM BLD-SCNC: 132 MMOL/L (ref 132–146)
SODIUM BLD-SCNC: 132 MMOL/L (ref 132–146)
SODIUM BLD-SCNC: 133 MMOL/L (ref 132–146)
SODIUM BLD-SCNC: 133 MMOL/L (ref 132–146)
SODIUM BLD-SCNC: 134 MMOL/L (ref 132–146)
SODIUM BLD-SCNC: 135 MMOL/L (ref 132–146)
SODIUM BLD-SCNC: 135 MMOL/L (ref 132–146)
SODIUM BLD-SCNC: 136 MMOL/L (ref 132–146)
SODIUM BLD-SCNC: 137 MMOL/L (ref 132–146)
SODIUM BLD-SCNC: 138 MMOL/L (ref 132–146)
SODIUM BLD-SCNC: 139 MMOL/L (ref 132–146)
SODIUM BLD-SCNC: 140 MMOL/L (ref 132–146)
SODIUM BLD-SCNC: 141 MMOL/L (ref 132–146)
SODIUM BLD-SCNC: 142 MMOL/L (ref 132–146)
SODIUM BLD-SCNC: 142 MMOL/L (ref 132–146)
SODIUM BLD-SCNC: 143 MMOL/L (ref 132–146)
SODIUM BLD-SCNC: 144 MMOL/L (ref 132–146)
SODIUM BLD-SCNC: 146 MMOL/L (ref 132–146)
SODIUM URINE: 47 MMOL/L
SOURCE, BLOOD GAS: ABNORMAL
SPECIFIC GRAVITY UA: 1.01 (ref 1–1.03)
SPECIFIC GRAVITY UA: 1.01 (ref 1–1.03)
SPECIFIC GRAVITY UA: <=1.005 (ref 1–1.03)
SPECIFIC GRAVITY UA: >=1.03 (ref 1–1.03)
THB: 10.5 G/DL (ref 11.5–16.5)
THB: 10.6 G/DL (ref 11.5–16.5)
THB: 11.2 G/DL (ref 11.5–16.5)
THB: 11.4 G/DL (ref 11.5–16.5)
THB: 11.7 G/DL (ref 11.5–16.5)
THB: 11.9 G/DL (ref 11.5–16.5)
THB: 12.1 G/DL (ref 11.5–16.5)
THB: 12.1 G/DL (ref 11.5–16.5)
THB: 6.9 G/DL (ref 11.5–16.5)
THB: 7 G/DL (ref 11.5–16.5)
THB: 7.6 G/DL (ref 11.5–16.5)
THB: 9.1 G/DL (ref 11.5–16.5)
THB: 9.4 G/DL (ref 11.5–16.5)
TIME ANALYZED: 1207
TIME ANALYZED: 1226
TIME ANALYZED: 1305
TIME ANALYZED: 1500
TIME ANALYZED: 1740
TIME ANALYZED: 2118
TIME ANALYZED: 2216
TIME ANALYZED: 36
TIME ANALYZED: 42
TIME ANALYZED: 454
TIME ANALYZED: 557
TIME ANALYZED: 608
TIME ANALYZED: 913
TOTAL CK: 118 U/L (ref 20–200)
TOTAL CK: 25 U/L (ref 20–200)
TOTAL IRON BINDING CAPACITY: 181 MCG/DL (ref 250–450)
TOTAL PROTEIN: 1.7 G/DL (ref 6.4–8.3)
TOTAL PROTEIN: 2.1 G/DL (ref 6.4–8.3)
TOTAL PROTEIN: 2.3 G/DL (ref 6.4–8.3)
TOTAL PROTEIN: 2.4 G/DL (ref 6.4–8.3)
TOTAL PROTEIN: 3.5 G/DL (ref 6.4–8.3)
TOTAL PROTEIN: 3.9 G/DL (ref 6.4–8.3)
TOTAL PROTEIN: 3.9 G/DL (ref 6.4–8.3)
TOTAL PROTEIN: 4.1 G/DL (ref 6.4–8.3)
TOTAL PROTEIN: 4.3 G/DL (ref 6.4–8.3)
TOTAL PROTEIN: 4.5 G/DL (ref 6.4–8.3)
TOTAL PROTEIN: 4.6 G/DL (ref 6.4–8.3)
TOTAL PROTEIN: 4.7 G/DL (ref 6.4–8.3)
TOTAL PROTEIN: 4.8 G/DL (ref 6.4–8.3)
TOTAL PROTEIN: 4.9 G/DL (ref 6.4–8.3)
TOTAL PROTEIN: 4.9 G/DL (ref 6.4–8.3)
TOTAL PROTEIN: 5 G/DL (ref 6.4–8.3)
TOTAL PROTEIN: 5.1 G/DL (ref 6.4–8.3)
TOTAL PROTEIN: 5.6 G/DL (ref 6.4–8.3)
TOTAL PROTEIN: 5.6 G/DL (ref 6.4–8.3)
TOTAL PROTEIN: 5.8 G/DL (ref 6.4–8.3)
TOTAL PROTEIN: 5.9 G/DL (ref 6.4–8.3)
TOTAL PROTEIN: 6.9 G/DL (ref 6.4–8.3)
TOXIC GRANULATION: ABNORMAL
TROPONIN, HIGH SENSITIVITY: 32 NG/L (ref 0–11)
TROPONIN, HIGH SENSITIVITY: 38 NG/L (ref 0–11)
TROPONIN, HIGH SENSITIVITY: 38 NG/L (ref 0–11)
TROPONIN, HIGH SENSITIVITY: 39 NG/L (ref 0–11)
TROPONIN, HIGH SENSITIVITY: 68 NG/L (ref 0–11)
TROPONIN, HIGH SENSITIVITY: 69 NG/L (ref 0–11)
TROPONIN: 0.03 NG/ML (ref 0–0.03)
TROPONIN: 0.03 NG/ML (ref 0–0.03)
TROPONIN: 0.04 NG/ML (ref 0–0.03)
TROPONIN: 0.21 NG/ML (ref 0–0.03)
TROPONIN: 0.8 NG/ML (ref 0–0.03)
TROPONIN: 0.82 NG/ML (ref 0–0.03)
TROPONIN: 0.84 NG/ML (ref 0–0.03)
TROPONIN: 0.9 NG/ML (ref 0–0.03)
TSH SERPL DL<=0.05 MIU/L-ACNC: 1.02 UIU/ML (ref 0.27–4.2)
UREA NITROGEN, UR: 800 MG/DL (ref 800–1666)
URINE CULTURE, ROUTINE: ABNORMAL
URINE CULTURE, ROUTINE: NORMAL
UROBILINOGEN, URINE: 0.2 E.U./DL
VACUOLATED NEUTROPHILS: ABNORMAL
VITAMIN B-12: 208 PG/ML (ref 211–946)
VITAMIN D 25-HYDROXY: 27 NG/ML (ref 30–100)
VT MECHANICAL: 400 ML
WBC # BLD: 0.9 E9/L (ref 4.5–11.5)
WBC # BLD: 1.2 E9/L (ref 4.5–11.5)
WBC # BLD: 10.3 E9/L (ref 4.5–11.5)
WBC # BLD: 10.4 E9/L (ref 4.5–11.5)
WBC # BLD: 10.5 E9/L (ref 4.5–11.5)
WBC # BLD: 10.6 E9/L (ref 4.5–11.5)
WBC # BLD: 10.7 E9/L (ref 4.5–11.5)
WBC # BLD: 10.8 E9/L (ref 4.5–11.5)
WBC # BLD: 10.8 E9/L (ref 4.5–11.5)
WBC # BLD: 11.3 E9/L (ref 4.5–11.5)
WBC # BLD: 11.5 E9/L (ref 4.5–11.5)
WBC # BLD: 12 E9/L (ref 4.5–11.5)
WBC # BLD: 12.8 E9/L (ref 4.5–11.5)
WBC # BLD: 13.2 E9/L (ref 4.5–11.5)
WBC # BLD: 13.6 E9/L (ref 4.5–11.5)
WBC # BLD: 13.7 E9/L (ref 4.5–11.5)
WBC # BLD: 14.1 E9/L (ref 4.5–11.5)
WBC # BLD: 14.3 E9/L (ref 4.5–11.5)
WBC # BLD: 14.5 E9/L (ref 4.5–11.5)
WBC # BLD: 14.7 E9/L (ref 4.5–11.5)
WBC # BLD: 16 E9/L (ref 4.5–11.5)
WBC # BLD: 16.3 E9/L (ref 4.5–11.5)
WBC # BLD: 17.2 E9/L (ref 4.5–11.5)
WBC # BLD: 17.7 E9/L (ref 4.5–11.5)
WBC # BLD: 19 E9/L (ref 4.5–11.5)
WBC # BLD: 2.4 E9/L (ref 4.5–11.5)
WBC # BLD: 23 E9/L (ref 4.5–11.5)
WBC # BLD: 23.1 E9/L (ref 4.5–11.5)
WBC # BLD: 25.1 E9/L (ref 4.5–11.5)
WBC # BLD: 28.5 E9/L (ref 4.5–11.5)
WBC # BLD: 28.5 E9/L (ref 4.5–11.5)
WBC # BLD: 4.6 E9/L (ref 4.5–11.5)
WBC # BLD: 5 E9/L (ref 4.5–11.5)
WBC # BLD: 6.3 E9/L (ref 4.5–11.5)
WBC # BLD: 6.7 E9/L (ref 4.5–11.5)
WBC # BLD: 7.8 E9/L (ref 4.5–11.5)
WBC # BLD: 8 E9/L (ref 4.5–11.5)
WBC # BLD: 9.2 E9/L (ref 4.5–11.5)
WBC # BLD: 9.4 E9/L (ref 4.5–11.5)
WBC # BLD: 9.6 E9/L (ref 4.5–11.5)
WBC # BLD: 9.7 E9/L (ref 4.5–11.5)
WBC # BLD: 9.7 E9/L (ref 4.5–11.5)
WBC # BLD: 9.8 E9/L (ref 4.5–11.5)
WBC UA: ABNORMAL /HPF (ref 0–5)
WBC UA: ABNORMAL /HPF (ref 0–5)

## 2021-01-01 PROCEDURE — 85610 PROTHROMBIN TIME: CPT

## 2021-01-01 PROCEDURE — 6370000000 HC RX 637 (ALT 250 FOR IP): Performed by: INTERNAL MEDICINE

## 2021-01-01 PROCEDURE — 2580000003 HC RX 258: Performed by: INTERNAL MEDICINE

## 2021-01-01 PROCEDURE — 6360000002 HC RX W HCPCS: Performed by: INTERNAL MEDICINE

## 2021-01-01 PROCEDURE — 6370000000 HC RX 637 (ALT 250 FOR IP)

## 2021-01-01 PROCEDURE — 6360000002 HC RX W HCPCS: Performed by: EMERGENCY MEDICINE

## 2021-01-01 PROCEDURE — 74019 RADEX ABDOMEN 2 VIEWS: CPT

## 2021-01-01 PROCEDURE — 87324 CLOSTRIDIUM AG IA: CPT

## 2021-01-01 PROCEDURE — 2580000003 HC RX 258: Performed by: EMERGENCY MEDICINE

## 2021-01-01 PROCEDURE — 2580000003 HC RX 258: Performed by: STUDENT IN AN ORGANIZED HEALTH CARE EDUCATION/TRAINING PROGRAM

## 2021-01-01 PROCEDURE — 80048 BASIC METABOLIC PNL TOTAL CA: CPT

## 2021-01-01 PROCEDURE — 84100 ASSAY OF PHOSPHORUS: CPT

## 2021-01-01 PROCEDURE — 2060000000 HC ICU INTERMEDIATE R&B

## 2021-01-01 PROCEDURE — 85025 COMPLETE CBC W/AUTO DIFF WBC: CPT

## 2021-01-01 PROCEDURE — 36556 INSERT NON-TUNNEL CV CATH: CPT

## 2021-01-01 PROCEDURE — 87186 SC STD MICRODIL/AGAR DIL: CPT

## 2021-01-01 PROCEDURE — 1200000000 HC SEMI PRIVATE

## 2021-01-01 PROCEDURE — 97530 THERAPEUTIC ACTIVITIES: CPT

## 2021-01-01 PROCEDURE — 2709999900 HC NON-CHARGEABLE SUPPLY: Performed by: SURGERY

## 2021-01-01 PROCEDURE — 83935 ASSAY OF URINE OSMOLALITY: CPT

## 2021-01-01 PROCEDURE — 93010 ELECTROCARDIOGRAM REPORT: CPT | Performed by: INTERNAL MEDICINE

## 2021-01-01 PROCEDURE — 36430 TRANSFUSION BLD/BLD COMPNT: CPT

## 2021-01-01 PROCEDURE — 93000 ELECTROCARDIOGRAM COMPLETE: CPT | Performed by: NURSE PRACTITIONER

## 2021-01-01 PROCEDURE — 36415 COLL VENOUS BLD VENIPUNCTURE: CPT

## 2021-01-01 PROCEDURE — 6370000000 HC RX 637 (ALT 250 FOR IP): Performed by: SURGERY

## 2021-01-01 PROCEDURE — 96372 THER/PROPH/DIAG INJ SC/IM: CPT

## 2021-01-01 PROCEDURE — 06HY33Z INSERTION OF INFUSION DEVICE INTO LOWER VEIN, PERCUTANEOUS APPROACH: ICD-10-PCS | Performed by: EMERGENCY MEDICINE

## 2021-01-01 PROCEDURE — 78452 HT MUSCLE IMAGE SPECT MULT: CPT

## 2021-01-01 PROCEDURE — 78708 K FLOW/FUNCT IMAGE W/DRUG: CPT

## 2021-01-01 PROCEDURE — 99232 SBSQ HOSP IP/OBS MODERATE 35: CPT | Performed by: STUDENT IN AN ORGANIZED HEALTH CARE EDUCATION/TRAINING PROGRAM

## 2021-01-01 PROCEDURE — C9113 INJ PANTOPRAZOLE SODIUM, VIA: HCPCS | Performed by: INTERNAL MEDICINE

## 2021-01-01 PROCEDURE — 6360000002 HC RX W HCPCS: Performed by: SURGERY

## 2021-01-01 PROCEDURE — 6360000002 HC RX W HCPCS: Performed by: NURSE ANESTHETIST, CERTIFIED REGISTERED

## 2021-01-01 PROCEDURE — 97165 OT EVAL LOW COMPLEX 30 MIN: CPT

## 2021-01-01 PROCEDURE — 3600000014 HC SURGERY LEVEL 4 ADDTL 15MIN: Performed by: SURGERY

## 2021-01-01 PROCEDURE — 96365 THER/PROPH/DIAG IV INF INIT: CPT

## 2021-01-01 PROCEDURE — 71275 CT ANGIOGRAPHY CHEST: CPT

## 2021-01-01 PROCEDURE — 70450 CT HEAD/BRAIN W/O DYE: CPT

## 2021-01-01 PROCEDURE — 86140 C-REACTIVE PROTEIN: CPT

## 2021-01-01 PROCEDURE — 71045 X-RAY EXAM CHEST 1 VIEW: CPT

## 2021-01-01 PROCEDURE — 82805 BLOOD GASES W/O2 SATURATION: CPT

## 2021-01-01 PROCEDURE — 82962 GLUCOSE BLOOD TEST: CPT

## 2021-01-01 PROCEDURE — C9113 INJ PANTOPRAZOLE SODIUM, VIA: HCPCS | Performed by: FAMILY MEDICINE

## 2021-01-01 PROCEDURE — 99233 SBSQ HOSP IP/OBS HIGH 50: CPT | Performed by: SURGERY

## 2021-01-01 PROCEDURE — 2700000000 HC OXYGEN THERAPY PER DAY

## 2021-01-01 PROCEDURE — 99214 OFFICE O/P EST MOD 30 MIN: CPT | Performed by: NURSE PRACTITIONER

## 2021-01-01 PROCEDURE — 96374 THER/PROPH/DIAG INJ IV PUSH: CPT

## 2021-01-01 PROCEDURE — 85014 HEMATOCRIT: CPT

## 2021-01-01 PROCEDURE — 3430000000 HC RX DIAGNOSTIC RADIOPHARMACEUTICAL: Performed by: RADIOLOGY

## 2021-01-01 PROCEDURE — 36620 INSERTION CATHETER ARTERY: CPT

## 2021-01-01 PROCEDURE — 87088 URINE BACTERIA CULTURE: CPT

## 2021-01-01 PROCEDURE — 82330 ASSAY OF CALCIUM: CPT

## 2021-01-01 PROCEDURE — 83605 ASSAY OF LACTIC ACID: CPT

## 2021-01-01 PROCEDURE — 3017F COLORECTAL CA SCREEN DOC REV: CPT | Performed by: NURSE PRACTITIONER

## 2021-01-01 PROCEDURE — 6360000002 HC RX W HCPCS: Performed by: FAMILY MEDICINE

## 2021-01-01 PROCEDURE — 99291 CRITICAL CARE FIRST HOUR: CPT | Performed by: SURGERY

## 2021-01-01 PROCEDURE — 99285 EMERGENCY DEPT VISIT HI MDM: CPT

## 2021-01-01 PROCEDURE — 2580000003 HC RX 258: Performed by: NURSE ANESTHETIST, CERTIFIED REGISTERED

## 2021-01-01 PROCEDURE — 96361 HYDRATE IV INFUSION ADD-ON: CPT

## 2021-01-01 PROCEDURE — 2000000000 HC ICU R&B

## 2021-01-01 PROCEDURE — 80053 COMPREHEN METABOLIC PANEL: CPT

## 2021-01-01 PROCEDURE — 2580000003 HC RX 258

## 2021-01-01 PROCEDURE — 85730 THROMBOPLASTIN TIME PARTIAL: CPT

## 2021-01-01 PROCEDURE — 2500000003 HC RX 250 WO HCPCS: Performed by: INTERNAL MEDICINE

## 2021-01-01 PROCEDURE — 83735 ASSAY OF MAGNESIUM: CPT

## 2021-01-01 PROCEDURE — 83540 ASSAY OF IRON: CPT

## 2021-01-01 PROCEDURE — 87205 SMEAR GRAM STAIN: CPT

## 2021-01-01 PROCEDURE — 2709999900 HC NON-CHARGEABLE SUPPLY: Performed by: INTERNAL MEDICINE

## 2021-01-01 PROCEDURE — 6360000002 HC RX W HCPCS: Performed by: STUDENT IN AN ORGANIZED HEALTH CARE EDUCATION/TRAINING PROGRAM

## 2021-01-01 PROCEDURE — 99024 POSTOP FOLLOW-UP VISIT: CPT | Performed by: SURGERY

## 2021-01-01 PROCEDURE — 96375 TX/PRO/DX INJ NEW DRUG ADDON: CPT

## 2021-01-01 PROCEDURE — 3700000000 HC ANESTHESIA ATTENDED CARE: Performed by: INTERNAL MEDICINE

## 2021-01-01 PROCEDURE — 85651 RBC SED RATE NONAUTOMATED: CPT

## 2021-01-01 PROCEDURE — 84145 PROCALCITONIN (PCT): CPT

## 2021-01-01 PROCEDURE — 6370000000 HC RX 637 (ALT 250 FOR IP): Performed by: FAMILY MEDICINE

## 2021-01-01 PROCEDURE — 84484 ASSAY OF TROPONIN QUANT: CPT

## 2021-01-01 PROCEDURE — 2500000003 HC RX 250 WO HCPCS

## 2021-01-01 PROCEDURE — 37799 UNLISTED PX VASCULAR SURGERY: CPT

## 2021-01-01 PROCEDURE — 83970 ASSAY OF PARATHORMONE: CPT

## 2021-01-01 PROCEDURE — 86900 BLOOD TYPING SEROLOGIC ABO: CPT

## 2021-01-01 PROCEDURE — 2580000003 HC RX 258: Performed by: FAMILY MEDICINE

## 2021-01-01 PROCEDURE — 99284 EMERGENCY DEPT VISIT MOD MDM: CPT

## 2021-01-01 PROCEDURE — 85027 COMPLETE CBC AUTOMATED: CPT

## 2021-01-01 PROCEDURE — 74230 X-RAY XM SWLNG FUNCJ C+: CPT

## 2021-01-01 PROCEDURE — 97161 PT EVAL LOW COMPLEX 20 MIN: CPT

## 2021-01-01 PROCEDURE — 86850 RBC ANTIBODY SCREEN: CPT

## 2021-01-01 PROCEDURE — 74018 RADEX ABDOMEN 1 VIEW: CPT

## 2021-01-01 PROCEDURE — 85018 HEMOGLOBIN: CPT

## 2021-01-01 PROCEDURE — 6370000000 HC RX 637 (ALT 250 FOR IP): Performed by: ANESTHESIOLOGY

## 2021-01-01 PROCEDURE — U0002 COVID-19 LAB TEST NON-CDC: HCPCS

## 2021-01-01 PROCEDURE — 6370000000 HC RX 637 (ALT 250 FOR IP): Performed by: PODIATRIST

## 2021-01-01 PROCEDURE — A9500 TC99M SESTAMIBI: HCPCS | Performed by: RADIOLOGY

## 2021-01-01 PROCEDURE — 49020 DRAINAGE ABDOM ABSCESS OPEN: CPT | Performed by: SURGERY

## 2021-01-01 PROCEDURE — 78070 PARATHYROID PLANAR IMAGING: CPT

## 2021-01-01 PROCEDURE — 83880 ASSAY OF NATRIURETIC PEPTIDE: CPT

## 2021-01-01 PROCEDURE — 87449 NOS EACH ORGANISM AG IA: CPT

## 2021-01-01 PROCEDURE — 3700000001 HC ADD 15 MINUTES (ANESTHESIA): Performed by: SURGERY

## 2021-01-01 PROCEDURE — 76770 US EXAM ABDO BACK WALL COMP: CPT

## 2021-01-01 PROCEDURE — 0DTF0ZZ RESECTION OF RIGHT LARGE INTESTINE, OPEN APPROACH: ICD-10-PCS | Performed by: SURGERY

## 2021-01-01 PROCEDURE — 94002 VENT MGMT INPAT INIT DAY: CPT

## 2021-01-01 PROCEDURE — 97161 PT EVAL LOW COMPLEX 20 MIN: CPT | Performed by: PHYSICAL THERAPIST

## 2021-01-01 PROCEDURE — 88312 SPECIAL STAINS GROUP 1: CPT

## 2021-01-01 PROCEDURE — 1036F TOBACCO NON-USER: CPT | Performed by: NURSE PRACTITIONER

## 2021-01-01 PROCEDURE — P9035 PLATELET PHERES LEUKOREDUCED: HCPCS

## 2021-01-01 PROCEDURE — 87040 BLOOD CULTURE FOR BACTERIA: CPT

## 2021-01-01 PROCEDURE — 2720000010 HC SURG SUPPLY STERILE: Performed by: SURGERY

## 2021-01-01 PROCEDURE — 99232 SBSQ HOSP IP/OBS MODERATE 35: CPT | Performed by: SURGERY

## 2021-01-01 PROCEDURE — 74177 CT ABD & PELVIS W/CONTRAST: CPT

## 2021-01-01 PROCEDURE — 2580000003 HC RX 258: Performed by: SURGERY

## 2021-01-01 PROCEDURE — 73630 X-RAY EXAM OF FOOT: CPT

## 2021-01-01 PROCEDURE — A9562 TC99M MERTIATIDE: HCPCS | Performed by: RADIOLOGY

## 2021-01-01 PROCEDURE — 88307 TISSUE EXAM BY PATHOLOGIST: CPT

## 2021-01-01 PROCEDURE — P9045 ALBUMIN (HUMAN), 5%, 250 ML: HCPCS | Performed by: NURSE ANESTHETIST, CERTIFIED REGISTERED

## 2021-01-01 PROCEDURE — C1751 CATH, INF, PER/CENT/MIDLINE: HCPCS

## 2021-01-01 PROCEDURE — 6370000000 HC RX 637 (ALT 250 FOR IP): Performed by: STUDENT IN AN ORGANIZED HEALTH CARE EDUCATION/TRAINING PROGRAM

## 2021-01-01 PROCEDURE — 81003 URINALYSIS AUTO W/O SCOPE: CPT

## 2021-01-01 PROCEDURE — C9113 INJ PANTOPRAZOLE SODIUM, VIA: HCPCS | Performed by: HOSPITALIST

## 2021-01-01 PROCEDURE — 2500000003 HC RX 250 WO HCPCS: Performed by: SURGERY

## 2021-01-01 PROCEDURE — 6360000004 HC RX CONTRAST MEDICATION: Performed by: RADIOLOGY

## 2021-01-01 PROCEDURE — P9016 RBC LEUKOCYTES REDUCED: HCPCS

## 2021-01-01 PROCEDURE — 96366 THER/PROPH/DIAG IV INF ADDON: CPT

## 2021-01-01 PROCEDURE — 82306 VITAMIN D 25 HYDROXY: CPT

## 2021-01-01 PROCEDURE — 97535 SELF CARE MNGMENT TRAINING: CPT

## 2021-01-01 PROCEDURE — 99222 1ST HOSP IP/OBS MODERATE 55: CPT | Performed by: PHYSICIAN ASSISTANT

## 2021-01-01 PROCEDURE — 36600 WITHDRAWAL OF ARTERIAL BLOOD: CPT

## 2021-01-01 PROCEDURE — 0DB58ZX EXCISION OF ESOPHAGUS, VIA NATURAL OR ARTIFICIAL OPENING ENDOSCOPIC, DIAGNOSTIC: ICD-10-PCS | Performed by: INTERNAL MEDICINE

## 2021-01-01 PROCEDURE — 76937 US GUIDE VASCULAR ACCESS: CPT

## 2021-01-01 PROCEDURE — 97110 THERAPEUTIC EXERCISES: CPT

## 2021-01-01 PROCEDURE — 51798 US URINE CAPACITY MEASURE: CPT

## 2021-01-01 PROCEDURE — 97112 NEUROMUSCULAR REEDUCATION: CPT | Performed by: PHYSICAL THERAPIST

## 2021-01-01 PROCEDURE — 84443 ASSAY THYROID STIM HORMONE: CPT

## 2021-01-01 PROCEDURE — 6360000002 HC RX W HCPCS

## 2021-01-01 PROCEDURE — 99222 1ST HOSP IP/OBS MODERATE 55: CPT | Performed by: SURGERY

## 2021-01-01 PROCEDURE — 80177 DRUG SCRN QUAN LEVETIRACETAM: CPT

## 2021-01-01 PROCEDURE — 93005 ELECTROCARDIOGRAM TRACING: CPT | Performed by: STUDENT IN AN ORGANIZED HEALTH CARE EDUCATION/TRAINING PROGRAM

## 2021-01-01 PROCEDURE — 2500000003 HC RX 250 WO HCPCS: Performed by: FAMILY MEDICINE

## 2021-01-01 PROCEDURE — 99214 OFFICE O/P EST MOD 30 MIN: CPT | Performed by: SURGERY

## 2021-01-01 PROCEDURE — 96367 TX/PROPH/DG ADDL SEQ IV INF: CPT

## 2021-01-01 PROCEDURE — 02HV33Z INSERTION OF INFUSION DEVICE INTO SUPERIOR VENA CAVA, PERCUTANEOUS APPROACH: ICD-10-PCS | Performed by: STUDENT IN AN ORGANIZED HEALTH CARE EDUCATION/TRAINING PROGRAM

## 2021-01-01 PROCEDURE — G8427 DOCREV CUR MEDS BY ELIG CLIN: HCPCS | Performed by: NURSE PRACTITIONER

## 2021-01-01 PROCEDURE — 85347 COAGULATION TIME ACTIVATED: CPT

## 2021-01-01 PROCEDURE — 44141 PARTIAL REMOVAL OF COLON: CPT | Performed by: SURGERY

## 2021-01-01 PROCEDURE — 83690 ASSAY OF LIPASE: CPT

## 2021-01-01 PROCEDURE — 3600000004 HC SURGERY LEVEL 4 BASE: Performed by: SURGERY

## 2021-01-01 PROCEDURE — 2500000003 HC RX 250 WO HCPCS: Performed by: STUDENT IN AN ORGANIZED HEALTH CARE EDUCATION/TRAINING PROGRAM

## 2021-01-01 PROCEDURE — 3017F COLORECTAL CA SCREEN DOC REV: CPT | Performed by: SURGERY

## 2021-01-01 PROCEDURE — 7100000001 HC PACU RECOVERY - ADDTL 15 MIN: Performed by: SURGERY

## 2021-01-01 PROCEDURE — C9113 INJ PANTOPRAZOLE SODIUM, VIA: HCPCS | Performed by: STUDENT IN AN ORGANIZED HEALTH CARE EDUCATION/TRAINING PROGRAM

## 2021-01-01 PROCEDURE — 99221 1ST HOSP IP/OBS SF/LOW 40: CPT | Performed by: SURGERY

## 2021-01-01 PROCEDURE — 31500 INSERT EMERGENCY AIRWAY: CPT | Performed by: ANESTHESIOLOGY

## 2021-01-01 PROCEDURE — 51702 INSERT TEMP BLADDER CATH: CPT

## 2021-01-01 PROCEDURE — 94003 VENT MGMT INPAT SUBQ DAY: CPT

## 2021-01-01 PROCEDURE — 86923 COMPATIBILITY TEST ELECTRIC: CPT

## 2021-01-01 PROCEDURE — 93306 TTE W/DOPPLER COMPLETE: CPT

## 2021-01-01 PROCEDURE — 72125 CT NECK SPINE W/O DYE: CPT

## 2021-01-01 PROCEDURE — 2580000003 HC RX 258: Performed by: RADIOLOGY

## 2021-01-01 PROCEDURE — 0DH68UZ INSERTION OF FEEDING DEVICE INTO STOMACH, VIA NATURAL OR ARTIFICIAL OPENING ENDOSCOPIC: ICD-10-PCS | Performed by: SURGERY

## 2021-01-01 PROCEDURE — 84540 ASSAY OF URINE/UREA-N: CPT

## 2021-01-01 PROCEDURE — 3700000000 HC ANESTHESIA ATTENDED CARE: Performed by: SURGERY

## 2021-01-01 PROCEDURE — 86901 BLOOD TYPING SEROLOGIC RH(D): CPT

## 2021-01-01 PROCEDURE — 6360000002 HC RX W HCPCS: Performed by: RADIOLOGY

## 2021-01-01 PROCEDURE — 90715 TDAP VACCINE 7 YRS/> IM: CPT | Performed by: EMERGENCY MEDICINE

## 2021-01-01 PROCEDURE — 5A1935Z RESPIRATORY VENTILATION, LESS THAN 24 CONSECUTIVE HOURS: ICD-10-PCS | Performed by: STUDENT IN AN ORGANIZED HEALTH CARE EDUCATION/TRAINING PROGRAM

## 2021-01-01 PROCEDURE — 84132 ASSAY OF SERUM POTASSIUM: CPT

## 2021-01-01 PROCEDURE — 99221 1ST HOSP IP/OBS SF/LOW 40: CPT | Performed by: STUDENT IN AN ORGANIZED HEALTH CARE EDUCATION/TRAINING PROGRAM

## 2021-01-01 PROCEDURE — 2580000003 HC RX 258: Performed by: HOSPITALIST

## 2021-01-01 PROCEDURE — 78452 HT MUSCLE IMAGE SPECT MULT: CPT | Performed by: STUDENT IN AN ORGANIZED HEALTH CARE EDUCATION/TRAINING PROGRAM

## 2021-01-01 PROCEDURE — 0DN84ZZ RELEASE SMALL INTESTINE, PERCUTANEOUS ENDOSCOPIC APPROACH: ICD-10-PCS | Performed by: SURGERY

## 2021-01-01 PROCEDURE — 1111F DSCHRG MED/CURRENT MED MERGE: CPT | Performed by: SURGERY

## 2021-01-01 PROCEDURE — 0DP64UZ REMOVAL OF FEEDING DEVICE FROM STOMACH, PERCUTANEOUS ENDOSCOPIC APPROACH: ICD-10-PCS | Performed by: SURGERY

## 2021-01-01 PROCEDURE — 3700000001 HC ADD 15 MINUTES (ANESTHESIA): Performed by: INTERNAL MEDICINE

## 2021-01-01 PROCEDURE — 85576 BLOOD PLATELET AGGREGATION: CPT

## 2021-01-01 PROCEDURE — 93005 ELECTROCARDIOGRAM TRACING: CPT | Performed by: EMERGENCY MEDICINE

## 2021-01-01 PROCEDURE — 7100000010 HC PHASE II RECOVERY - FIRST 15 MIN: Performed by: INTERNAL MEDICINE

## 2021-01-01 PROCEDURE — 87075 CULTR BACTERIA EXCEPT BLOOD: CPT

## 2021-01-01 PROCEDURE — 97116 GAIT TRAINING THERAPY: CPT | Performed by: PHYSICAL THERAPIST

## 2021-01-01 PROCEDURE — 93018 CV STRESS TEST I&R ONLY: CPT | Performed by: STUDENT IN AN ORGANIZED HEALTH CARE EDUCATION/TRAINING PROGRAM

## 2021-01-01 PROCEDURE — 84133 ASSAY OF URINE POTASSIUM: CPT

## 2021-01-01 PROCEDURE — 74176 CT ABD & PELVIS W/O CONTRAST: CPT

## 2021-01-01 PROCEDURE — 99222 1ST HOSP IP/OBS MODERATE 55: CPT | Performed by: STUDENT IN AN ORGANIZED HEALTH CARE EDUCATION/TRAINING PROGRAM

## 2021-01-01 PROCEDURE — 93005 ELECTROCARDIOGRAM TRACING: CPT | Performed by: INTERNAL MEDICINE

## 2021-01-01 PROCEDURE — 82746 ASSAY OF FOLIC ACID SERUM: CPT

## 2021-01-01 PROCEDURE — 97530 THERAPEUTIC ACTIVITIES: CPT | Performed by: PHYSICAL THERAPIST

## 2021-01-01 PROCEDURE — 6360000002 HC RX W HCPCS: Performed by: ANESTHESIOLOGY

## 2021-01-01 PROCEDURE — 92611 MOTION FLUOROSCOPY/SWALLOW: CPT | Performed by: SPEECH-LANGUAGE PATHOLOGIST

## 2021-01-01 PROCEDURE — 73562 X-RAY EXAM OF KNEE 3: CPT

## 2021-01-01 PROCEDURE — 1111F DSCHRG MED/CURRENT MED MERGE: CPT | Performed by: NURSE PRACTITIONER

## 2021-01-01 PROCEDURE — 99233 SBSQ HOSP IP/OBS HIGH 50: CPT | Performed by: NURSE PRACTITIONER

## 2021-01-01 PROCEDURE — 99283 EMERGENCY DEPT VISIT LOW MDM: CPT

## 2021-01-01 PROCEDURE — 3609013300 HC EGD TUBE PLACEMENT: Performed by: SURGERY

## 2021-01-01 PROCEDURE — 90471 IMMUNIZATION ADMIN: CPT | Performed by: EMERGENCY MEDICINE

## 2021-01-01 PROCEDURE — 7100000000 HC PACU RECOVERY - FIRST 15 MIN

## 2021-01-01 PROCEDURE — 95819 EEG AWAKE AND ASLEEP: CPT

## 2021-01-01 PROCEDURE — 7100000011 HC PHASE II RECOVERY - ADDTL 15 MIN: Performed by: INTERNAL MEDICINE

## 2021-01-01 PROCEDURE — 87077 CULTURE AEROBIC IDENTIFY: CPT

## 2021-01-01 PROCEDURE — 43246 EGD PLACE GASTROSTOMY TUBE: CPT | Performed by: SURGERY

## 2021-01-01 PROCEDURE — 7100000000 HC PACU RECOVERY - FIRST 15 MIN: Performed by: SURGERY

## 2021-01-01 PROCEDURE — 7100000010 HC PHASE II RECOVERY - FIRST 15 MIN: Performed by: SURGERY

## 2021-01-01 PROCEDURE — G8484 FLU IMMUNIZE NO ADMIN: HCPCS | Performed by: NURSE PRACTITIONER

## 2021-01-01 PROCEDURE — 83550 IRON BINDING TEST: CPT

## 2021-01-01 PROCEDURE — 92610 EVALUATE SWALLOWING FUNCTION: CPT | Performed by: SPEECH-LANGUAGE PATHOLOGIST

## 2021-01-01 PROCEDURE — 87635 SARS-COV-2 COVID-19 AMP PRB: CPT

## 2021-01-01 PROCEDURE — P9041 ALBUMIN (HUMAN),5%, 50ML: HCPCS

## 2021-01-01 PROCEDURE — 81001 URINALYSIS AUTO W/SCOPE: CPT

## 2021-01-01 PROCEDURE — 82803 BLOOD GASES ANY COMBINATION: CPT

## 2021-01-01 PROCEDURE — 36569 INSJ PICC 5 YR+ W/O IMAGING: CPT

## 2021-01-01 PROCEDURE — 44180 LAP ENTEROLYSIS: CPT | Performed by: SURGERY

## 2021-01-01 PROCEDURE — 96360 HYDRATION IV INFUSION INIT: CPT

## 2021-01-01 PROCEDURE — 87081 CULTURE SCREEN ONLY: CPT

## 2021-01-01 PROCEDURE — 82728 ASSAY OF FERRITIN: CPT

## 2021-01-01 PROCEDURE — 82607 VITAMIN B-12: CPT

## 2021-01-01 PROCEDURE — 31500 INSERT EMERGENCY AIRWAY: CPT

## 2021-01-01 PROCEDURE — G8420 CALC BMI NORM PARAMETERS: HCPCS | Performed by: NURSE PRACTITIONER

## 2021-01-01 PROCEDURE — 3609012400 HC EGD TRANSORAL BIOPSY SINGLE/MULTIPLE: Performed by: INTERNAL MEDICINE

## 2021-01-01 PROCEDURE — 70551 MRI BRAIN STEM W/O DYE: CPT

## 2021-01-01 PROCEDURE — 2500000003 HC RX 250 WO HCPCS: Performed by: NURSE ANESTHETIST, CERTIFIED REGISTERED

## 2021-01-01 PROCEDURE — 7100000011 HC PHASE II RECOVERY - ADDTL 15 MIN: Performed by: SURGERY

## 2021-01-01 PROCEDURE — 82436 ASSAY OF URINE CHLORIDE: CPT

## 2021-01-01 PROCEDURE — 84300 ASSAY OF URINE SODIUM: CPT

## 2021-01-01 PROCEDURE — 0DH63UZ INSERTION OF FEEDING DEVICE INTO STOMACH, PERCUTANEOUS APPROACH: ICD-10-PCS | Performed by: SURGERY

## 2021-01-01 PROCEDURE — 88305 TISSUE EXAM BY PATHOLOGIST: CPT

## 2021-01-01 PROCEDURE — 0BH18EZ INSERTION OF ENDOTRACHEAL AIRWAY INTO TRACHEA, VIA NATURAL OR ARTIFICIAL OPENING ENDOSCOPIC: ICD-10-PCS | Performed by: ANESTHESIOLOGY

## 2021-01-01 PROCEDURE — 7100000001 HC PACU RECOVERY - ADDTL 15 MIN

## 2021-01-01 PROCEDURE — 82272 OCCULT BLD FECES 1-3 TESTS: CPT

## 2021-01-01 PROCEDURE — 0DH64UZ INSERTION OF FEEDING DEVICE INTO STOMACH, PERCUTANEOUS ENDOSCOPIC APPROACH: ICD-10-PCS | Performed by: SURGERY

## 2021-01-01 PROCEDURE — 92526 ORAL FUNCTION THERAPY: CPT | Performed by: SPEECH-LANGUAGE PATHOLOGIST

## 2021-01-01 PROCEDURE — 74022 RADEX COMPL AQT ABD SERIES: CPT

## 2021-01-01 PROCEDURE — 82140 ASSAY OF AMMONIA: CPT

## 2021-01-01 PROCEDURE — 87070 CULTURE OTHR SPECIMN AEROBIC: CPT

## 2021-01-01 PROCEDURE — 82550 ASSAY OF CK (CPK): CPT

## 2021-01-01 PROCEDURE — 93971 EXTREMITY STUDY: CPT

## 2021-01-01 PROCEDURE — G8420 CALC BMI NORM PARAMETERS: HCPCS | Performed by: SURGERY

## 2021-01-01 PROCEDURE — 93017 CV STRESS TEST TRACING ONLY: CPT

## 2021-01-01 PROCEDURE — 0D1B0Z4 BYPASS ILEUM TO CUTANEOUS, OPEN APPROACH: ICD-10-PCS | Performed by: SURGERY

## 2021-01-01 PROCEDURE — 1036F TOBACCO NON-USER: CPT | Performed by: SURGERY

## 2021-01-01 PROCEDURE — 74250 X-RAY XM SM INT 1CNTRST STD: CPT

## 2021-01-01 PROCEDURE — 87102 FUNGUS ISOLATION CULTURE: CPT

## 2021-01-01 PROCEDURE — 93016 CV STRESS TEST SUPVJ ONLY: CPT | Performed by: STUDENT IN AN ORGANIZED HEALTH CARE EDUCATION/TRAINING PROGRAM

## 2021-01-01 PROCEDURE — 6360000002 HC RX W HCPCS: Performed by: HOSPITALIST

## 2021-01-01 PROCEDURE — 82570 ASSAY OF URINE CREATININE: CPT

## 2021-01-01 PROCEDURE — 97116 GAIT TRAINING THERAPY: CPT

## 2021-01-01 PROCEDURE — G8427 DOCREV CUR MEDS BY ELIG CLIN: HCPCS | Performed by: SURGERY

## 2021-01-01 PROCEDURE — 85384 FIBRINOGEN ACTIVITY: CPT

## 2021-01-01 RX ORDER — ATORVASTATIN CALCIUM 40 MG/1
80 TABLET, FILM COATED ORAL NIGHTLY
Status: DISCONTINUED | OUTPATIENT
Start: 2021-01-01 | End: 2021-01-01 | Stop reason: HOSPADM

## 2021-01-01 RX ORDER — ASPIRIN 81 MG/1
81 TABLET, CHEWABLE ORAL DAILY
Status: DISCONTINUED | OUTPATIENT
Start: 2021-01-01 | End: 2021-01-01 | Stop reason: HOSPADM

## 2021-01-01 RX ORDER — LORAZEPAM 2 MG/ML
0.5 INJECTION INTRAMUSCULAR
Status: DISCONTINUED | OUTPATIENT
Start: 2021-01-01 | End: 2021-06-16 | Stop reason: HOSPADM

## 2021-01-01 RX ORDER — ATORVASTATIN CALCIUM 80 MG/1
80 TABLET, FILM COATED ORAL DAILY
COMMUNITY
Start: 2021-01-01 | End: 2021-01-01 | Stop reason: SDUPTHER

## 2021-01-01 RX ORDER — OXYCODONE HYDROCHLORIDE 5 MG/1
10 TABLET ORAL EVERY 6 HOURS PRN
Status: DISCONTINUED | OUTPATIENT
Start: 2021-01-01 | End: 2021-01-01 | Stop reason: HOSPADM

## 2021-01-01 RX ORDER — METHYLPREDNISOLONE SODIUM SUCCINATE 40 MG/ML
40 INJECTION, POWDER, LYOPHILIZED, FOR SOLUTION INTRAMUSCULAR; INTRAVENOUS DAILY
Status: DISCONTINUED | OUTPATIENT
Start: 2021-01-01 | End: 2021-01-01

## 2021-01-01 RX ORDER — SODIUM CHLORIDE 9 MG/ML
10 INJECTION INTRAVENOUS 2 TIMES DAILY
Status: DISCONTINUED | OUTPATIENT
Start: 2021-01-01 | End: 2021-01-01 | Stop reason: HOSPADM

## 2021-01-01 RX ORDER — SODIUM CHLORIDE 0.9 % (FLUSH) 0.9 %
5-40 SYRINGE (ML) INJECTION PRN
Status: DISCONTINUED | OUTPATIENT
Start: 2021-01-01 | End: 2021-01-01 | Stop reason: SDUPTHER

## 2021-01-01 RX ORDER — VASOPRESSIN 20 U/ML
INJECTION PARENTERAL
Status: COMPLETED
Start: 2021-01-01 | End: 2021-01-01

## 2021-01-01 RX ORDER — SODIUM CHLORIDE 9 MG/ML
INJECTION, SOLUTION INTRAVENOUS CONTINUOUS
Status: DISCONTINUED | OUTPATIENT
Start: 2021-01-01 | End: 2021-01-01

## 2021-01-01 RX ORDER — LIDOCAINE HYDROCHLORIDE 20 MG/ML
INJECTION, SOLUTION INTRAVENOUS PRN
Status: DISCONTINUED | OUTPATIENT
Start: 2021-01-01 | End: 2021-01-01 | Stop reason: SDUPTHER

## 2021-01-01 RX ORDER — FLUCONAZOLE 2 MG/ML
200 INJECTION, SOLUTION INTRAVENOUS EVERY 24 HOURS
Status: DISCONTINUED | OUTPATIENT
Start: 2021-01-01 | End: 2021-01-01

## 2021-01-01 RX ORDER — ALBUMIN, HUMAN INJ 5% 5 %
SOLUTION INTRAVENOUS PRN
Status: DISCONTINUED | OUTPATIENT
Start: 2021-01-01 | End: 2021-01-01 | Stop reason: SDUPTHER

## 2021-01-01 RX ORDER — METOPROLOL SUCCINATE 50 MG/1
50 TABLET, EXTENDED RELEASE ORAL DAILY
Status: DISCONTINUED | OUTPATIENT
Start: 2021-01-01 | End: 2021-01-01

## 2021-01-01 RX ORDER — MORPHINE SULFATE 2 MG/ML
INJECTION, SOLUTION INTRAMUSCULAR; INTRAVENOUS
Status: COMPLETED
Start: 2021-01-01 | End: 2021-01-01

## 2021-01-01 RX ORDER — PROPOFOL 10 MG/ML
INJECTION, EMULSION INTRAVENOUS PRN
Status: DISCONTINUED | OUTPATIENT
Start: 2021-01-01 | End: 2021-01-01 | Stop reason: SDUPTHER

## 2021-01-01 RX ORDER — BACLOFEN 10 MG/1
10 TABLET ORAL 2 TIMES DAILY
Status: DISCONTINUED | OUTPATIENT
Start: 2021-01-01 | End: 2021-01-01 | Stop reason: HOSPADM

## 2021-01-01 RX ORDER — OXYCODONE HYDROCHLORIDE 5 MG/1
10 TABLET ORAL EVERY 4 HOURS
Status: DISCONTINUED | OUTPATIENT
Start: 2021-01-01 | End: 2021-01-01 | Stop reason: HOSPADM

## 2021-01-01 RX ORDER — ONDANSETRON 2 MG/ML
4 INJECTION INTRAMUSCULAR; INTRAVENOUS EVERY 6 HOURS PRN
Status: DISCONTINUED | OUTPATIENT
Start: 2021-01-01 | End: 2021-01-01 | Stop reason: HOSPADM

## 2021-01-01 RX ORDER — DEXTRAN 70, GLYCERIN, HYPROMELLOSE 1; 2; 3 MG/ML; MG/ML; MG/ML
1 SOLUTION/ DROPS OPHTHALMIC EVERY 4 HOURS
Status: DISCONTINUED | OUTPATIENT
Start: 2021-01-01 | End: 2021-01-01

## 2021-01-01 RX ORDER — POTASSIUM CHLORIDE 20 MEQ/1
40 TABLET, EXTENDED RELEASE ORAL ONCE
Status: COMPLETED | OUTPATIENT
Start: 2021-01-01 | End: 2021-01-01

## 2021-01-01 RX ORDER — CYANOCOBALAMIN 1000 UG/ML
1000 INJECTION INTRAMUSCULAR; SUBCUTANEOUS ONCE
Status: COMPLETED | OUTPATIENT
Start: 2021-01-01 | End: 2021-01-01

## 2021-01-01 RX ORDER — SODIUM CHLORIDE 9 MG/ML
INJECTION, SOLUTION INTRAVENOUS PRN
Status: DISCONTINUED | OUTPATIENT
Start: 2021-01-01 | End: 2021-01-01

## 2021-01-01 RX ORDER — SODIUM CHLORIDE 0.9 % (FLUSH) 0.9 %
10 SYRINGE (ML) INJECTION PRN
Status: CANCELLED | OUTPATIENT
Start: 2021-01-01

## 2021-01-01 RX ORDER — CLOTRIMAZOLE AND BETAMETHASONE DIPROPIONATE 10; .64 MG/G; MG/G
1 CREAM TOPICAL DAILY
Status: DISCONTINUED | OUTPATIENT
Start: 2021-01-01 | End: 2021-01-01 | Stop reason: HOSPADM

## 2021-01-01 RX ORDER — METOPROLOL SUCCINATE 25 MG/1
25 TABLET, EXTENDED RELEASE ORAL DAILY
Status: DISCONTINUED | OUTPATIENT
Start: 2021-01-01 | End: 2021-01-01 | Stop reason: HOSPADM

## 2021-01-01 RX ORDER — DEXTROSE MONOHYDRATE 25 G/50ML
12.5 INJECTION, SOLUTION INTRAVENOUS PRN
Status: DISCONTINUED | OUTPATIENT
Start: 2021-01-01 | End: 2021-01-01

## 2021-01-01 RX ORDER — METHYLPREDNISOLONE SODIUM SUCCINATE 40 MG/ML
20 INJECTION, POWDER, LYOPHILIZED, FOR SOLUTION INTRAMUSCULAR; INTRAVENOUS EVERY 12 HOURS
Status: DISCONTINUED | OUTPATIENT
Start: 2021-01-01 | End: 2021-01-01

## 2021-01-01 RX ORDER — LABETALOL HYDROCHLORIDE 5 MG/ML
5 INJECTION, SOLUTION INTRAVENOUS ONCE
Status: COMPLETED | OUTPATIENT
Start: 2021-01-01 | End: 2021-01-01

## 2021-01-01 RX ORDER — DIPHENHYDRAMINE HYDROCHLORIDE 50 MG/ML
12.5 INJECTION INTRAMUSCULAR; INTRAVENOUS
Status: DISCONTINUED | OUTPATIENT
Start: 2021-01-01 | End: 2021-01-01 | Stop reason: HOSPADM

## 2021-01-01 RX ORDER — METOPROLOL TARTRATE 5 MG/5ML
5 INJECTION INTRAVENOUS EVERY 6 HOURS PRN
Qty: 15 ML | Refills: 0
Start: 2021-01-01 | End: 2021-01-01 | Stop reason: HOSPADM

## 2021-01-01 RX ORDER — ETOMIDATE 2 MG/ML
INJECTION INTRAVENOUS PRN
Status: DISCONTINUED | OUTPATIENT
Start: 2021-01-01 | End: 2021-01-01 | Stop reason: SDUPTHER

## 2021-01-01 RX ORDER — HYDROXYZINE HYDROCHLORIDE 10 MG/1
25 TABLET, FILM COATED ORAL EVERY 6 HOURS PRN
Status: DISCONTINUED | OUTPATIENT
Start: 2021-01-01 | End: 2021-01-01

## 2021-01-01 RX ORDER — OSTOMY ADHESIVE
1 STRIP MISCELLANEOUS ONCE
Status: DISCONTINUED | OUTPATIENT
Start: 2021-01-01 | End: 2021-01-01

## 2021-01-01 RX ORDER — PROPOFOL 10 MG/ML
INJECTION, EMULSION INTRAVENOUS CONTINUOUS PRN
Status: DISCONTINUED | OUTPATIENT
Start: 2021-01-01 | End: 2021-01-01 | Stop reason: SDUPTHER

## 2021-01-01 RX ORDER — MORPHINE SULFATE 2 MG/ML
2 INJECTION, SOLUTION INTRAMUSCULAR; INTRAVENOUS
Status: DISCONTINUED | OUTPATIENT
Start: 2021-01-01 | End: 2021-01-01 | Stop reason: HOSPADM

## 2021-01-01 RX ORDER — ONDANSETRON 4 MG/1
4 TABLET, ORALLY DISINTEGRATING ORAL EVERY 8 HOURS PRN
Status: DISCONTINUED | OUTPATIENT
Start: 2021-01-01 | End: 2021-01-01 | Stop reason: HOSPADM

## 2021-01-01 RX ORDER — CEFAZOLIN SODIUM 2 G/50ML
2000 SOLUTION INTRAVENOUS
Status: DISCONTINUED | OUTPATIENT
Start: 2021-01-01 | End: 2021-01-01 | Stop reason: HOSPADM

## 2021-01-01 RX ORDER — ONDANSETRON 2 MG/ML
4 INJECTION INTRAMUSCULAR; INTRAVENOUS ONCE
Status: COMPLETED | OUTPATIENT
Start: 2021-01-01 | End: 2021-01-01

## 2021-01-01 RX ORDER — TRIAMCINOLONE ACETONIDE 1 MG/G
1 CREAM TOPICAL 2 TIMES DAILY
Status: DISCONTINUED | OUTPATIENT
Start: 2021-01-01 | End: 2021-01-01 | Stop reason: HOSPADM

## 2021-01-01 RX ORDER — OXYCODONE HYDROCHLORIDE 5 MG/1
10 TABLET ORAL EVERY 4 HOURS
Status: DISCONTINUED | OUTPATIENT
Start: 2021-01-01 | End: 2021-01-01

## 2021-01-01 RX ORDER — ATORVASTATIN CALCIUM 20 MG/1
80 TABLET, FILM COATED ORAL NIGHTLY
Status: DISCONTINUED | OUTPATIENT
Start: 2021-01-01 | End: 2021-01-01 | Stop reason: HOSPADM

## 2021-01-01 RX ORDER — SUCRALFATE 1 G/1
1 TABLET ORAL 4 TIMES DAILY
Status: DISCONTINUED | OUTPATIENT
Start: 2021-01-01 | End: 2021-01-01 | Stop reason: HOSPADM

## 2021-01-01 RX ORDER — LEVOFLOXACIN 750 MG/1
750 TABLET ORAL DAILY
Qty: 10 TABLET | Refills: 0 | Status: SHIPPED | OUTPATIENT
Start: 2021-01-01 | End: 2021-01-01

## 2021-01-01 RX ORDER — PANTOPRAZOLE SODIUM 20 MG/1
20 TABLET, DELAYED RELEASE ORAL
Status: DISCONTINUED | OUTPATIENT
Start: 2021-01-01 | End: 2021-01-01 | Stop reason: HOSPADM

## 2021-01-01 RX ORDER — METHYLPREDNISOLONE SODIUM SUCCINATE 40 MG/ML
40 INJECTION, POWDER, LYOPHILIZED, FOR SOLUTION INTRAMUSCULAR; INTRAVENOUS DAILY
Status: DISCONTINUED | OUTPATIENT
Start: 2021-01-01 | End: 2021-01-01 | Stop reason: HOSPADM

## 2021-01-01 RX ORDER — SODIUM CHLORIDE 9 MG/ML
25 INJECTION, SOLUTION INTRAVENOUS PRN
Status: DISCONTINUED | OUTPATIENT
Start: 2021-01-01 | End: 2021-01-01 | Stop reason: HOSPADM

## 2021-01-01 RX ORDER — OXYBUTYNIN CHLORIDE 5 MG/1
5 TABLET ORAL PRN
COMMUNITY

## 2021-01-01 RX ORDER — 0.9 % SODIUM CHLORIDE 0.9 %
1000 INTRAVENOUS SOLUTION INTRAVENOUS ONCE
Status: COMPLETED | OUTPATIENT
Start: 2021-01-01 | End: 2021-01-01

## 2021-01-01 RX ORDER — METOPROLOL TARTRATE 50 MG/1
50 TABLET, FILM COATED ORAL ONCE
Status: DISCONTINUED | OUTPATIENT
Start: 2021-01-01 | End: 2021-01-01

## 2021-01-01 RX ORDER — SODIUM CHLORIDE 0.9 % (FLUSH) 0.9 %
5 SYRINGE (ML) INJECTION PRN
Status: CANCELLED | OUTPATIENT
Start: 2021-01-01

## 2021-01-01 RX ORDER — SODIUM CHLORIDE 9 MG/ML
INJECTION, SOLUTION INTRAVENOUS CONTINUOUS
Status: DISCONTINUED | OUTPATIENT
Start: 2021-01-01 | End: 2021-01-01 | Stop reason: HOSPADM

## 2021-01-01 RX ORDER — LEVETIRACETAM 500 MG/1
500 TABLET ORAL 2 TIMES DAILY
Qty: 60 TABLET | Refills: 3 | Status: SHIPPED | OUTPATIENT
Start: 2021-01-01 | End: 2021-01-01 | Stop reason: DRUGHIGH

## 2021-01-01 RX ORDER — LEVETIRACETAM 5 MG/ML
250 INJECTION INTRAVASCULAR DAILY
Status: DISCONTINUED | OUTPATIENT
Start: 2021-01-01 | End: 2021-01-01 | Stop reason: CLARIF

## 2021-01-01 RX ORDER — LEVETIRACETAM 100 MG/ML
500 SOLUTION ORAL NIGHTLY
Status: DISCONTINUED | OUTPATIENT
Start: 2021-01-01 | End: 2021-01-01 | Stop reason: HOSPADM

## 2021-01-01 RX ORDER — 0.9 % SODIUM CHLORIDE 0.9 %
1000 INTRAVENOUS SOLUTION INTRAVENOUS ONCE
Status: DISCONTINUED | OUTPATIENT
Start: 2021-01-01 | End: 2021-01-01 | Stop reason: HOSPADM

## 2021-01-01 RX ORDER — ASPIRIN 81 MG/1
81 TABLET, CHEWABLE ORAL DAILY
Qty: 30 TABLET | Refills: 3 | Status: ON HOLD | OUTPATIENT
Start: 2021-01-01 | End: 2021-01-01 | Stop reason: HOSPADM

## 2021-01-01 RX ORDER — SODIUM CHLORIDE 0.9 % (FLUSH) 0.9 %
10 SYRINGE (ML) INJECTION EVERY 12 HOURS SCHEDULED
Status: DISCONTINUED | OUTPATIENT
Start: 2021-01-01 | End: 2021-01-01 | Stop reason: HOSPADM

## 2021-01-01 RX ORDER — MINERAL OIL AND WHITE PETROLATUM 150; 830 MG/G; MG/G
OINTMENT OPHTHALMIC EVERY 4 HOURS
Status: DISCONTINUED | OUTPATIENT
Start: 2021-01-01 | End: 2021-01-01

## 2021-01-01 RX ORDER — OXYBUTYNIN CHLORIDE 5 MG/1
5 TABLET ORAL 2 TIMES DAILY
Status: DISCONTINUED | OUTPATIENT
Start: 2021-01-01 | End: 2021-01-01 | Stop reason: HOSPADM

## 2021-01-01 RX ORDER — FENTANYL CITRATE 50 UG/ML
50 INJECTION, SOLUTION INTRAMUSCULAR; INTRAVENOUS ONCE
Status: COMPLETED | OUTPATIENT
Start: 2021-01-01 | End: 2021-01-01

## 2021-01-01 RX ORDER — METOPROLOL SUCCINATE 50 MG/1
50 TABLET, EXTENDED RELEASE ORAL DAILY
Status: DISCONTINUED | OUTPATIENT
Start: 2021-01-01 | End: 2021-01-01 | Stop reason: HOSPADM

## 2021-01-01 RX ORDER — SUCCINYLCHOLINE/SOD CL,ISO/PF 200MG/10ML
SYRINGE (ML) INTRAVENOUS PRN
Status: DISCONTINUED | OUTPATIENT
Start: 2021-01-01 | End: 2021-01-01 | Stop reason: SDUPTHER

## 2021-01-01 RX ORDER — HEPARIN SODIUM 10000 [USP'U]/100ML
5-30 INJECTION, SOLUTION INTRAVENOUS CONTINUOUS
Status: DISCONTINUED | OUTPATIENT
Start: 2021-01-01 | End: 2021-01-01 | Stop reason: HOSPADM

## 2021-01-01 RX ORDER — MORPHINE SULFATE 2 MG/ML
2 INJECTION, SOLUTION INTRAMUSCULAR; INTRAVENOUS EVERY 4 HOURS PRN
Status: DISCONTINUED | OUTPATIENT
Start: 2021-01-01 | End: 2021-01-01

## 2021-01-01 RX ORDER — MORPHINE SULFATE 2 MG/ML
2 INJECTION, SOLUTION INTRAMUSCULAR; INTRAVENOUS ONCE
Status: COMPLETED | OUTPATIENT
Start: 2021-01-01 | End: 2021-01-01

## 2021-01-01 RX ORDER — DEXTROSE AND SODIUM CHLORIDE 5; .45 G/100ML; G/100ML
INJECTION, SOLUTION INTRAVENOUS CONTINUOUS
Status: DISCONTINUED | OUTPATIENT
Start: 2021-01-01 | End: 2021-01-01

## 2021-01-01 RX ORDER — POTASSIUM CHLORIDE 7.45 MG/ML
10 INJECTION INTRAVENOUS
Status: COMPLETED | OUTPATIENT
Start: 2021-01-01 | End: 2021-01-01

## 2021-01-01 RX ORDER — SUCRALFATE 1 G/1
1 TABLET ORAL 4 TIMES DAILY
Qty: 120 TABLET | Refills: 3 | Status: SHIPPED | OUTPATIENT
Start: 2021-01-01

## 2021-01-01 RX ORDER — METOPROLOL SUCCINATE 50 MG/1
50 TABLET, EXTENDED RELEASE ORAL DAILY
Qty: 30 TABLET | Refills: 5 | Status: ON HOLD | OUTPATIENT
Start: 2021-01-01 | End: 2021-01-01 | Stop reason: HOSPADM

## 2021-01-01 RX ORDER — SODIUM CHLORIDE, SODIUM LACTATE, POTASSIUM CHLORIDE, CALCIUM CHLORIDE 600; 310; 30; 20 MG/100ML; MG/100ML; MG/100ML; MG/100ML
INJECTION, SOLUTION INTRAVENOUS CONTINUOUS
Status: DISCONTINUED | OUTPATIENT
Start: 2021-01-01 | End: 2021-01-01

## 2021-01-01 RX ORDER — ACETAMINOPHEN 325 MG/1
650 TABLET ORAL EVERY 4 HOURS PRN
Status: DISCONTINUED | OUTPATIENT
Start: 2021-01-01 | End: 2021-01-01 | Stop reason: HOSPADM

## 2021-01-01 RX ORDER — HEPARIN SODIUM (PORCINE) LOCK FLUSH IV SOLN 100 UNIT/ML 100 UNIT/ML
500 SOLUTION INTRAVENOUS PRN
Status: DISCONTINUED | OUTPATIENT
Start: 2021-01-01 | End: 2021-01-01 | Stop reason: HOSPADM

## 2021-01-01 RX ORDER — SODIUM CHLORIDE, SODIUM LACTATE, POTASSIUM CHLORIDE, AND CALCIUM CHLORIDE .6; .31; .03; .02 G/100ML; G/100ML; G/100ML; G/100ML
1000 INJECTION, SOLUTION INTRAVENOUS ONCE
Status: DISCONTINUED | OUTPATIENT
Start: 2021-01-01 | End: 2021-01-01

## 2021-01-01 RX ORDER — POLYETHYLENE GLYCOL 3350 17 G/17G
17 POWDER, FOR SOLUTION ORAL DAILY PRN
Status: DISCONTINUED | OUTPATIENT
Start: 2021-01-01 | End: 2021-01-01

## 2021-01-01 RX ORDER — MAGNESIUM SULFATE IN WATER 40 MG/ML
2000 INJECTION, SOLUTION INTRAVENOUS ONCE
Status: COMPLETED | OUTPATIENT
Start: 2021-01-01 | End: 2021-01-01

## 2021-01-01 RX ORDER — LABETALOL HYDROCHLORIDE 5 MG/ML
5 INJECTION, SOLUTION INTRAVENOUS EVERY 10 MIN PRN
Status: DISCONTINUED | OUTPATIENT
Start: 2021-01-01 | End: 2021-01-01 | Stop reason: HOSPADM

## 2021-01-01 RX ORDER — PANTOPRAZOLE SODIUM 40 MG/1
40 TABLET, DELAYED RELEASE ORAL
Status: DISCONTINUED | OUTPATIENT
Start: 2021-01-01 | End: 2021-01-01 | Stop reason: HOSPADM

## 2021-01-01 RX ORDER — LEVETIRACETAM 500 MG/1
500 TABLET ORAL NIGHTLY
Status: ON HOLD | COMMUNITY
End: 2021-01-01 | Stop reason: HOSPADM

## 2021-01-01 RX ORDER — HEPARIN SODIUM (PORCINE) LOCK FLUSH IV SOLN 100 UNIT/ML 100 UNIT/ML
500 SOLUTION INTRAVENOUS PRN
Status: CANCELLED | OUTPATIENT
Start: 2021-01-01

## 2021-01-01 RX ORDER — MEPERIDINE HYDROCHLORIDE 25 MG/ML
INJECTION INTRAMUSCULAR; INTRAVENOUS; SUBCUTANEOUS
Status: COMPLETED
Start: 2021-01-01 | End: 2021-01-01

## 2021-01-01 RX ORDER — MAGNESIUM SULFATE IN WATER 40 MG/ML
4000 INJECTION, SOLUTION INTRAVENOUS ONCE
Status: COMPLETED | OUTPATIENT
Start: 2021-01-01 | End: 2021-01-01

## 2021-01-01 RX ORDER — LISINOPRIL 5 MG/1
5 TABLET ORAL DAILY
Qty: 30 TABLET | Refills: 3 | Status: SHIPPED | OUTPATIENT
Start: 2021-01-01 | End: 2021-01-01

## 2021-01-01 RX ORDER — SODIUM CHLORIDE 9 MG/ML
INJECTION, SOLUTION INTRAVENOUS CONTINUOUS PRN
Status: DISCONTINUED | OUTPATIENT
Start: 2021-01-01 | End: 2021-01-01 | Stop reason: SDUPTHER

## 2021-01-01 RX ORDER — ONDANSETRON 2 MG/ML
INJECTION INTRAMUSCULAR; INTRAVENOUS PRN
Status: DISCONTINUED | OUTPATIENT
Start: 2021-01-01 | End: 2021-01-01 | Stop reason: SDUPTHER

## 2021-01-01 RX ORDER — SODIUM CHLORIDE, SODIUM LACTATE, POTASSIUM CHLORIDE, CALCIUM CHLORIDE 600; 310; 30; 20 MG/100ML; MG/100ML; MG/100ML; MG/100ML
1000 INJECTION, SOLUTION INTRAVENOUS ONCE
Status: COMPLETED | OUTPATIENT
Start: 2021-01-01 | End: 2021-01-01

## 2021-01-01 RX ORDER — ONDANSETRON 2 MG/ML
4 INJECTION INTRAMUSCULAR; INTRAVENOUS EVERY 6 HOURS PRN
Status: DISCONTINUED | OUTPATIENT
Start: 2021-01-01 | End: 2021-01-01

## 2021-01-01 RX ORDER — OXYBUTYNIN CHLORIDE 5 MG/1
TABLET ORAL
Status: ON HOLD | COMMUNITY
Start: 2021-01-01 | End: 2021-01-01 | Stop reason: HOSPADM

## 2021-01-01 RX ORDER — HEPARIN SODIUM 1000 [USP'U]/ML
80 INJECTION, SOLUTION INTRAVENOUS; SUBCUTANEOUS PRN
Status: DISCONTINUED | OUTPATIENT
Start: 2021-01-01 | End: 2021-01-01 | Stop reason: HOSPADM

## 2021-01-01 RX ORDER — OXYCODONE HYDROCHLORIDE 5 MG/1
10 TABLET ORAL EVERY 4 HOURS PRN
Status: DISCONTINUED | OUTPATIENT
Start: 2021-01-01 | End: 2021-01-01 | Stop reason: HOSPADM

## 2021-01-01 RX ORDER — LEVETIRACETAM 500 MG/1
250 TABLET ORAL DAILY
Status: DISCONTINUED | OUTPATIENT
Start: 2021-01-01 | End: 2021-01-01

## 2021-01-01 RX ORDER — ONDANSETRON 4 MG/1
4 TABLET, ORALLY DISINTEGRATING ORAL EVERY 8 HOURS PRN
Status: ON HOLD | COMMUNITY
End: 2021-01-01 | Stop reason: HOSPADM

## 2021-01-01 RX ORDER — CLONIDINE 0.1 MG/24H
1 PATCH, EXTENDED RELEASE TRANSDERMAL WEEKLY
Qty: 4 PATCH | Refills: 0
Start: 2021-01-01

## 2021-01-01 RX ORDER — PANTOPRAZOLE SODIUM 40 MG/10ML
40 INJECTION, POWDER, LYOPHILIZED, FOR SOLUTION INTRAVENOUS 2 TIMES DAILY
Status: DISCONTINUED | OUTPATIENT
Start: 2021-01-01 | End: 2021-01-01

## 2021-01-01 RX ORDER — ACETAMINOPHEN 650 MG/1
650 SUPPOSITORY RECTAL EVERY 6 HOURS PRN
Status: DISCONTINUED | OUTPATIENT
Start: 2021-01-01 | End: 2021-01-01

## 2021-01-01 RX ORDER — DEXTROSE, SODIUM CHLORIDE, AND POTASSIUM CHLORIDE 5; .45; .15 G/100ML; G/100ML; G/100ML
INJECTION INTRAVENOUS CONTINUOUS
Status: DISCONTINUED | OUTPATIENT
Start: 2021-01-01 | End: 2021-01-01 | Stop reason: HOSPADM

## 2021-01-01 RX ORDER — PANTOPRAZOLE SODIUM 40 MG/10ML
40 INJECTION, POWDER, LYOPHILIZED, FOR SOLUTION INTRAVENOUS 2 TIMES DAILY
Qty: 800 MG | Refills: 0
Start: 2021-01-01 | End: 2021-06-23

## 2021-01-01 RX ORDER — DEXTROSE MONOHYDRATE 50 MG/ML
100 INJECTION, SOLUTION INTRAVENOUS PRN
Status: DISCONTINUED | OUTPATIENT
Start: 2021-01-01 | End: 2021-01-01

## 2021-01-01 RX ORDER — MAGNESIUM SULFATE IN WATER 40 MG/ML
2000 INJECTION, SOLUTION INTRAVENOUS ONCE
Status: DISCONTINUED | OUTPATIENT
Start: 2021-01-01 | End: 2021-01-01

## 2021-01-01 RX ORDER — PREDNISONE 10 MG/1
TABLET ORAL
Qty: 70 TABLET | Refills: 0 | Status: ON HOLD | OUTPATIENT
Start: 2021-01-01 | End: 2021-01-01

## 2021-01-01 RX ORDER — PANTOPRAZOLE SODIUM 40 MG/1
40 TABLET, DELAYED RELEASE ORAL 2 TIMES DAILY
Status: ON HOLD | COMMUNITY
End: 2021-01-01 | Stop reason: HOSPADM

## 2021-01-01 RX ORDER — LANOLIN ALCOHOL/MO/W.PET/CERES
1000 CREAM (GRAM) TOPICAL DAILY
Status: DISCONTINUED | OUTPATIENT
Start: 2021-01-01 | End: 2021-01-01 | Stop reason: HOSPADM

## 2021-01-01 RX ORDER — LEVETIRACETAM 5 MG/ML
500 INJECTION INTRAVASCULAR NIGHTLY
Status: DISCONTINUED | OUTPATIENT
Start: 2021-01-01 | End: 2021-01-01

## 2021-01-01 RX ORDER — PANTOPRAZOLE SODIUM 40 MG/1
40 TABLET, DELAYED RELEASE ORAL
Status: DISCONTINUED | OUTPATIENT
Start: 2021-01-01 | End: 2021-01-01

## 2021-01-01 RX ORDER — FENTANYL CITRATE 50 UG/ML
50 INJECTION, SOLUTION INTRAMUSCULAR; INTRAVENOUS ONCE
Status: DISCONTINUED | OUTPATIENT
Start: 2021-01-01 | End: 2021-01-01

## 2021-01-01 RX ORDER — POTASSIUM CHLORIDE 29.8 MG/ML
20 INJECTION INTRAVENOUS
Status: COMPLETED | OUTPATIENT
Start: 2021-01-01 | End: 2021-01-01

## 2021-01-01 RX ORDER — LORAZEPAM 2 MG/ML
0.5 INJECTION INTRAMUSCULAR ONCE
Status: DISCONTINUED | OUTPATIENT
Start: 2021-01-01 | End: 2021-01-01 | Stop reason: HOSPADM

## 2021-01-01 RX ORDER — HEPARIN SODIUM 1000 [USP'U]/ML
40 INJECTION, SOLUTION INTRAVENOUS; SUBCUTANEOUS PRN
Status: DISCONTINUED | OUTPATIENT
Start: 2021-01-01 | End: 2021-01-01 | Stop reason: HOSPADM

## 2021-01-01 RX ORDER — FENTANYL CITRATE 50 UG/ML
INJECTION, SOLUTION INTRAMUSCULAR; INTRAVENOUS PRN
Status: DISCONTINUED | OUTPATIENT
Start: 2021-01-01 | End: 2021-01-01 | Stop reason: SDUPTHER

## 2021-01-01 RX ORDER — METOPROLOL TARTRATE 5 MG/5ML
5 INJECTION INTRAVENOUS EVERY 6 HOURS
Status: DISCONTINUED | OUTPATIENT
Start: 2021-01-01 | End: 2021-01-01

## 2021-01-01 RX ORDER — SODIUM CHLORIDE, SODIUM LACTATE, POTASSIUM CHLORIDE, CALCIUM CHLORIDE 600; 310; 30; 20 MG/100ML; MG/100ML; MG/100ML; MG/100ML
INJECTION, SOLUTION INTRAVENOUS CONTINUOUS
Status: ACTIVE | OUTPATIENT
Start: 2021-01-01 | End: 2021-01-01

## 2021-01-01 RX ORDER — MORPHINE SULFATE 4 MG/ML
4 INJECTION, SOLUTION INTRAMUSCULAR; INTRAVENOUS ONCE
Status: COMPLETED | OUTPATIENT
Start: 2021-01-01 | End: 2021-01-01

## 2021-01-01 RX ORDER — DEXTROSE, SODIUM CHLORIDE, SODIUM LACTATE, POTASSIUM CHLORIDE, AND CALCIUM CHLORIDE 5; .6; .31; .03; .02 G/100ML; G/100ML; G/100ML; G/100ML; G/100ML
INJECTION, SOLUTION INTRAVENOUS CONTINUOUS
Status: DISCONTINUED | OUTPATIENT
Start: 2021-01-01 | End: 2021-01-01

## 2021-01-01 RX ORDER — LEVETIRACETAM 5 MG/ML
250 INJECTION INTRAVASCULAR DAILY
Status: DISCONTINUED | OUTPATIENT
Start: 2021-01-01 | End: 2021-01-01

## 2021-01-01 RX ORDER — METOPROLOL TARTRATE 5 MG/5ML
5 INJECTION INTRAVENOUS EVERY 6 HOURS PRN
Status: DISCONTINUED | OUTPATIENT
Start: 2021-01-01 | End: 2021-01-01

## 2021-01-01 RX ORDER — PROMETHAZINE HYDROCHLORIDE 25 MG/ML
6.25 INJECTION, SOLUTION INTRAMUSCULAR; INTRAVENOUS
Status: DISCONTINUED | OUTPATIENT
Start: 2021-01-01 | End: 2021-01-01 | Stop reason: HOSPADM

## 2021-01-01 RX ORDER — MIRTAZAPINE 15 MG/1
15 TABLET, FILM COATED ORAL NIGHTLY
Status: DISCONTINUED | OUTPATIENT
Start: 2021-01-01 | End: 2021-01-01 | Stop reason: HOSPADM

## 2021-01-01 RX ORDER — SODIUM CHLORIDE 450 MG/100ML
INJECTION, SOLUTION INTRAVENOUS CONTINUOUS
Status: DISCONTINUED | OUTPATIENT
Start: 2021-01-01 | End: 2021-01-01 | Stop reason: HOSPADM

## 2021-01-01 RX ORDER — ACETAMINOPHEN 325 MG/1
650 TABLET ORAL EVERY 6 HOURS PRN
Status: DISCONTINUED | OUTPATIENT
Start: 2021-01-01 | End: 2021-01-01

## 2021-01-01 RX ORDER — PREDNISONE 10 MG/1
10 TABLET ORAL DAILY
COMMUNITY
End: 2021-01-01 | Stop reason: ALTCHOICE

## 2021-01-01 RX ORDER — LISINOPRIL 5 MG/1
2.5 TABLET ORAL DAILY
Status: DISCONTINUED | OUTPATIENT
Start: 2021-01-01 | End: 2021-01-01

## 2021-01-01 RX ORDER — METOPROLOL TARTRATE 50 MG/1
50 TABLET, FILM COATED ORAL 2 TIMES DAILY
Status: DISCONTINUED | OUTPATIENT
Start: 2021-01-01 | End: 2021-01-01 | Stop reason: HOSPADM

## 2021-01-01 RX ORDER — PANTOPRAZOLE SODIUM 40 MG/10ML
40 INJECTION, POWDER, LYOPHILIZED, FOR SOLUTION INTRAVENOUS DAILY
Status: DISCONTINUED | OUTPATIENT
Start: 2021-01-01 | End: 2021-01-01

## 2021-01-01 RX ORDER — LANOLIN ALCOHOL/MO/W.PET/CERES
400 CREAM (GRAM) TOPICAL ONCE
Status: COMPLETED | OUTPATIENT
Start: 2021-01-01 | End: 2021-01-01

## 2021-01-01 RX ORDER — OXYCODONE HYDROCHLORIDE 10 MG/1
10 TABLET ORAL EVERY 4 HOURS
Status: ON HOLD | COMMUNITY
End: 2021-01-01 | Stop reason: HOSPADM

## 2021-01-01 RX ORDER — OYSTER SHELL CALCIUM WITH VITAMIN D 500; 200 MG/1; [IU]/1
1 TABLET, FILM COATED ORAL 2 TIMES DAILY
Qty: 30 TABLET | Refills: 3 | Status: CANCELLED | OUTPATIENT
Start: 2021-01-01

## 2021-01-01 RX ORDER — LEVETIRACETAM 500 MG/1
250 TABLET ORAL EVERY MORNING
Status: ON HOLD | COMMUNITY
End: 2021-01-01 | Stop reason: HOSPADM

## 2021-01-01 RX ORDER — LIDOCAINE HYDROCHLORIDE 10 MG/ML
5 INJECTION, SOLUTION EPIDURAL; INFILTRATION; INTRACAUDAL; PERINEURAL ONCE
Status: DISCONTINUED | OUTPATIENT
Start: 2021-01-01 | End: 2021-01-01

## 2021-01-01 RX ORDER — PANTOPRAZOLE SODIUM 40 MG/10ML
40 INJECTION, POWDER, LYOPHILIZED, FOR SOLUTION INTRAVENOUS 2 TIMES DAILY
Status: DISCONTINUED | OUTPATIENT
Start: 2021-01-01 | End: 2021-01-01 | Stop reason: HOSPADM

## 2021-01-01 RX ORDER — SODIUM CHLORIDE 0.9 % (FLUSH) 0.9 %
5-40 SYRINGE (ML) INJECTION PRN
Status: DISCONTINUED | OUTPATIENT
Start: 2021-01-01 | End: 2021-01-01

## 2021-01-01 RX ORDER — LORAZEPAM 2 MG/ML
2 INJECTION INTRAMUSCULAR ONCE
Status: DISCONTINUED | OUTPATIENT
Start: 2021-01-01 | End: 2021-01-01 | Stop reason: ALTCHOICE

## 2021-01-01 RX ORDER — METOPROLOL TARTRATE 5 MG/5ML
5 INJECTION INTRAVENOUS EVERY 6 HOURS
Qty: 15 ML | Refills: 0
Start: 2021-01-01

## 2021-01-01 RX ORDER — SODIUM CHLORIDE 9 MG/ML
25 INJECTION, SOLUTION INTRAVENOUS EVERY 12 HOURS
Status: DISCONTINUED | OUTPATIENT
Start: 2021-01-01 | End: 2021-01-01 | Stop reason: HOSPADM

## 2021-01-01 RX ORDER — SODIUM CHLORIDE 0.9 % (FLUSH) 0.9 %
5-40 SYRINGE (ML) INJECTION EVERY 12 HOURS SCHEDULED
Status: DISCONTINUED | OUTPATIENT
Start: 2021-01-01 | End: 2021-01-01 | Stop reason: HOSPADM

## 2021-01-01 RX ORDER — BUPIVACAINE HYDROCHLORIDE AND EPINEPHRINE 2.5; 5 MG/ML; UG/ML
INJECTION, SOLUTION EPIDURAL; INFILTRATION; INTRACAUDAL; PERINEURAL PRN
Status: DISCONTINUED | OUTPATIENT
Start: 2021-01-01 | End: 2021-01-01 | Stop reason: ALTCHOICE

## 2021-01-01 RX ORDER — ATORVASTATIN CALCIUM 80 MG/1
80 TABLET, FILM COATED ORAL NIGHTLY
Qty: 30 TABLET | Refills: 3 | Status: SHIPPED | OUTPATIENT
Start: 2021-01-01

## 2021-01-01 RX ORDER — METHYLPREDNISOLONE SODIUM SUCCINATE 40 MG/ML
20 INJECTION, POWDER, LYOPHILIZED, FOR SOLUTION INTRAMUSCULAR; INTRAVENOUS EVERY 12 HOURS
Qty: 400 MG | Refills: 0
Start: 2021-01-01 | End: 2021-06-23

## 2021-01-01 RX ORDER — ATORVASTATIN CALCIUM 40 MG/1
80 TABLET, FILM COATED ORAL NIGHTLY
Status: DISCONTINUED | OUTPATIENT
Start: 2021-01-01 | End: 2021-01-01

## 2021-01-01 RX ORDER — VECURONIUM BROMIDE 1 MG/ML
INJECTION, POWDER, LYOPHILIZED, FOR SOLUTION INTRAVENOUS PRN
Status: DISCONTINUED | OUTPATIENT
Start: 2021-01-01 | End: 2021-01-01 | Stop reason: SDUPTHER

## 2021-01-01 RX ORDER — METOPROLOL TARTRATE 50 MG/1
50 TABLET, FILM COATED ORAL ONCE
Status: COMPLETED | OUTPATIENT
Start: 2021-01-01 | End: 2021-01-01

## 2021-01-01 RX ORDER — IPRATROPIUM BROMIDE AND ALBUTEROL SULFATE 2.5; .5 MG/3ML; MG/3ML
1 SOLUTION RESPIRATORY (INHALATION) ONCE
Status: COMPLETED | OUTPATIENT
Start: 2021-01-01 | End: 2021-01-01

## 2021-01-01 RX ORDER — SODIUM CHLORIDE 0.9 % (FLUSH) 0.9 %
10 SYRINGE (ML) INJECTION PRN
Status: DISCONTINUED | OUTPATIENT
Start: 2021-01-01 | End: 2021-01-01

## 2021-01-01 RX ORDER — MORPHINE SULFATE 4 MG/ML
4 INJECTION, SOLUTION INTRAMUSCULAR; INTRAVENOUS EVERY 4 HOURS PRN
Status: DISCONTINUED | OUTPATIENT
Start: 2021-01-01 | End: 2021-01-01

## 2021-01-01 RX ORDER — METOPROLOL SUCCINATE 50 MG/1
50 TABLET, EXTENDED RELEASE ORAL DAILY
COMMUNITY
End: 2021-01-01

## 2021-01-01 RX ORDER — PIPERACILLIN SODIUM, TAZOBACTAM SODIUM 3; .375 G/15ML; G/15ML
INJECTION, POWDER, LYOPHILIZED, FOR SOLUTION INTRAVENOUS PRN
Status: DISCONTINUED | OUTPATIENT
Start: 2021-01-01 | End: 2021-01-01 | Stop reason: SDUPTHER

## 2021-01-01 RX ORDER — GLYCOPYRROLATE 0.2 MG/ML
0.2 INJECTION INTRAMUSCULAR; INTRAVENOUS EVERY 4 HOURS PRN
Status: DISCONTINUED | OUTPATIENT
Start: 2021-01-01 | End: 2021-06-16 | Stop reason: HOSPADM

## 2021-01-01 RX ORDER — SODIUM CHLORIDE 9 MG/ML
25 INJECTION, SOLUTION INTRAVENOUS PRN
Status: DISCONTINUED | OUTPATIENT
Start: 2021-01-01 | End: 2021-01-01 | Stop reason: SDUPTHER

## 2021-01-01 RX ORDER — DEXAMETHASONE SODIUM PHOSPHATE 10 MG/ML
4 INJECTION INTRAMUSCULAR; INTRAVENOUS
Status: DISCONTINUED | OUTPATIENT
Start: 2021-01-01 | End: 2021-01-01

## 2021-01-01 RX ORDER — LISINOPRIL 5 MG/1
2.5 TABLET ORAL ONCE
Status: COMPLETED | OUTPATIENT
Start: 2021-01-01 | End: 2021-01-01

## 2021-01-01 RX ORDER — LEVETIRACETAM 100 MG/ML
250 SOLUTION ORAL EVERY MORNING
Status: DISCONTINUED | OUTPATIENT
Start: 2021-01-01 | End: 2021-01-01 | Stop reason: HOSPADM

## 2021-01-01 RX ORDER — MIRTAZAPINE 15 MG/1
7.5 TABLET, FILM COATED ORAL NIGHTLY
Status: DISCONTINUED | OUTPATIENT
Start: 2021-01-01 | End: 2021-01-01 | Stop reason: HOSPADM

## 2021-01-01 RX ORDER — LEVETIRACETAM 250 MG/1
250 TABLET ORAL EVERY MORNING
Status: DISCONTINUED | OUTPATIENT
Start: 2021-01-01 | End: 2021-01-01 | Stop reason: CLARIF

## 2021-01-01 RX ORDER — LEVETIRACETAM 5 MG/ML
250 INJECTION INTRAVASCULAR DAILY
Qty: 4000 ML | Refills: 0
Start: 2021-01-01

## 2021-01-01 RX ORDER — DEXTROSE, SODIUM CHLORIDE, AND POTASSIUM CHLORIDE 5; .45; .15 G/100ML; G/100ML; G/100ML
INJECTION INTRAVENOUS CONTINUOUS
Status: DISCONTINUED | OUTPATIENT
Start: 2021-01-01 | End: 2021-01-01

## 2021-01-01 RX ORDER — OSTOMY ADHESIVE
1 STRIP MISCELLANEOUS ONCE
Qty: 1 EACH | Refills: 0
Start: 2021-01-01 | End: 2021-01-01

## 2021-01-01 RX ORDER — METOPROLOL TARTRATE 50 MG/1
TABLET, FILM COATED ORAL
Status: COMPLETED
Start: 2021-01-01 | End: 2021-01-01

## 2021-01-01 RX ORDER — FENTANYL CITRATE 50 UG/ML
INJECTION, SOLUTION INTRAMUSCULAR; INTRAVENOUS
Status: COMPLETED
Start: 2021-01-01 | End: 2021-01-01

## 2021-01-01 RX ORDER — LISINOPRIL 5 MG/1
5 TABLET ORAL DAILY
Status: DISCONTINUED | OUTPATIENT
Start: 2021-01-01 | End: 2021-01-01 | Stop reason: HOSPADM

## 2021-01-01 RX ORDER — METHYLPREDNISOLONE SODIUM SUCCINATE 40 MG/ML
40 INJECTION, POWDER, LYOPHILIZED, FOR SOLUTION INTRAMUSCULAR; INTRAVENOUS EVERY 12 HOURS
Status: DISCONTINUED | OUTPATIENT
Start: 2021-01-01 | End: 2021-01-01

## 2021-01-01 RX ORDER — OSTOMY ADHESIVE
2 STRIP MISCELLANEOUS DAILY
Qty: 1 EACH | Refills: 0
Start: 2021-01-01

## 2021-01-01 RX ORDER — PROMETHAZINE HYDROCHLORIDE 25 MG/ML
25 INJECTION, SOLUTION INTRAMUSCULAR; INTRAVENOUS ONCE
Status: COMPLETED | OUTPATIENT
Start: 2021-01-01 | End: 2021-01-01

## 2021-01-01 RX ORDER — MAGNESIUM SULFATE HEPTAHYDRATE 500 MG/ML
2000 INJECTION, SOLUTION INTRAMUSCULAR; INTRAVENOUS ONCE
Status: DISCONTINUED | OUTPATIENT
Start: 2021-01-01 | End: 2021-01-01

## 2021-01-01 RX ORDER — POTASSIUM CHLORIDE 7.45 MG/ML
10 INJECTION INTRAVENOUS
Status: DISPENSED | OUTPATIENT
Start: 2021-01-01 | End: 2021-01-01

## 2021-01-01 RX ORDER — SODIUM CHLORIDE 9 MG/ML
INJECTION, SOLUTION INTRAVENOUS CONTINUOUS
Status: CANCELLED | OUTPATIENT
Start: 2021-01-01

## 2021-01-01 RX ORDER — SODIUM CHLORIDE 9 MG/ML
INJECTION, SOLUTION INTRAVENOUS PRN
Status: DISCONTINUED | OUTPATIENT
Start: 2021-01-01 | End: 2021-01-01 | Stop reason: HOSPADM

## 2021-01-01 RX ORDER — HEPARIN SODIUM 1000 [USP'U]/ML
80 INJECTION, SOLUTION INTRAVENOUS; SUBCUTANEOUS ONCE
Status: COMPLETED | OUTPATIENT
Start: 2021-01-01 | End: 2021-01-01

## 2021-01-01 RX ORDER — OSTOMY ADHESIVE
2 STRIP MISCELLANEOUS DAILY
Status: COMPLETED | OUTPATIENT
Start: 2021-01-01 | End: 2021-01-01

## 2021-01-01 RX ORDER — CLOTRIMAZOLE AND BETAMETHASONE DIPROPIONATE 10; .64 MG/G; MG/G
1 CREAM TOPICAL DAILY
COMMUNITY
End: 2021-01-01 | Stop reason: ALTCHOICE

## 2021-01-01 RX ORDER — PANTOPRAZOLE SODIUM 40 MG/1
40 TABLET, DELAYED RELEASE ORAL
Qty: 30 TABLET | Refills: 3 | Status: ON HOLD | OUTPATIENT
Start: 2021-01-01 | End: 2021-01-01 | Stop reason: HOSPADM

## 2021-01-01 RX ORDER — PHENOL 1.4 %
1 AEROSOL, SPRAY (ML) MUCOUS MEMBRANE DAILY
COMMUNITY

## 2021-01-01 RX ORDER — LEVETIRACETAM 500 MG/1
500 TABLET ORAL NIGHTLY
Status: DISCONTINUED | OUTPATIENT
Start: 2021-01-01 | End: 2021-01-01 | Stop reason: CLARIF

## 2021-01-01 RX ORDER — SODIUM CHLORIDE 9 MG/ML
25 INJECTION, SOLUTION INTRAVENOUS PRN
Status: CANCELLED | OUTPATIENT
Start: 2021-01-01

## 2021-01-01 RX ORDER — PREDNISONE 20 MG/1
40 TABLET ORAL DAILY
Status: DISCONTINUED | OUTPATIENT
Start: 2021-01-01 | End: 2021-01-01 | Stop reason: HOSPADM

## 2021-01-01 RX ORDER — MORPHINE SULFATE 4 MG/ML
4 INJECTION, SOLUTION INTRAMUSCULAR; INTRAVENOUS
Status: DISCONTINUED | OUTPATIENT
Start: 2021-01-01 | End: 2021-01-01 | Stop reason: HOSPADM

## 2021-01-01 RX ORDER — MORPHINE SULFATE 2 MG/ML
2 INJECTION, SOLUTION INTRAMUSCULAR; INTRAVENOUS
Status: DISCONTINUED | OUTPATIENT
Start: 2021-01-01 | End: 2021-01-01

## 2021-01-01 RX ORDER — SODIUM CHLORIDE 0.9 % (FLUSH) 0.9 %
5-40 SYRINGE (ML) INJECTION PRN
Status: DISCONTINUED | OUTPATIENT
Start: 2021-01-01 | End: 2021-01-01 | Stop reason: HOSPADM

## 2021-01-01 RX ORDER — BACLOFEN 10 MG/1
10 TABLET ORAL 2 TIMES DAILY
Status: ON HOLD | COMMUNITY
End: 2021-01-01 | Stop reason: HOSPADM

## 2021-01-01 RX ORDER — PANTOPRAZOLE SODIUM 40 MG/1
40 TABLET, DELAYED RELEASE ORAL DAILY
Status: DISCONTINUED | OUTPATIENT
Start: 2021-01-01 | End: 2021-01-01

## 2021-01-01 RX ORDER — CLONIDINE 0.1 MG/24H
1 PATCH, EXTENDED RELEASE TRANSDERMAL WEEKLY
Status: DISCONTINUED | OUTPATIENT
Start: 2021-01-01 | End: 2021-01-01

## 2021-01-01 RX ORDER — SUCRALFATE 1 G/1
1 TABLET ORAL EVERY 6 HOURS SCHEDULED
Status: DISCONTINUED | OUTPATIENT
Start: 2021-01-01 | End: 2021-01-01 | Stop reason: HOSPADM

## 2021-01-01 RX ORDER — FENTANYL CITRATE 50 UG/ML
100 INJECTION, SOLUTION INTRAMUSCULAR; INTRAVENOUS ONCE
Status: COMPLETED | OUTPATIENT
Start: 2021-01-01 | End: 2021-01-01

## 2021-01-01 RX ORDER — ONDANSETRON 4 MG/1
4 TABLET, ORALLY DISINTEGRATING ORAL ONCE
Status: DISCONTINUED | OUTPATIENT
Start: 2021-01-01 | End: 2021-01-01 | Stop reason: HOSPADM

## 2021-01-01 RX ORDER — SODIUM CHLORIDE 0.9 % (FLUSH) 0.9 %
SYRINGE (ML) INJECTION
Status: DISPENSED
Start: 2021-01-01 | End: 2021-01-01

## 2021-01-01 RX ORDER — SODIUM CHLORIDE 0.9 % (FLUSH) 0.9 %
10 SYRINGE (ML) INJECTION ONCE
Status: COMPLETED | OUTPATIENT
Start: 2021-01-01 | End: 2021-01-01

## 2021-01-01 RX ORDER — IPRATROPIUM BROMIDE AND ALBUTEROL SULFATE 2.5; .5 MG/3ML; MG/3ML
SOLUTION RESPIRATORY (INHALATION)
Status: COMPLETED
Start: 2021-01-01 | End: 2021-01-01

## 2021-01-01 RX ORDER — HEPARIN SODIUM 10000 [USP'U]/100ML
5-30 INJECTION, SOLUTION INTRAVENOUS CONTINUOUS
Status: DISCONTINUED | OUTPATIENT
Start: 2021-01-01 | End: 2021-01-01

## 2021-01-01 RX ORDER — SODIUM CHLORIDE, SODIUM LACTATE, POTASSIUM CHLORIDE, AND CALCIUM CHLORIDE .6; .31; .03; .02 G/100ML; G/100ML; G/100ML; G/100ML
1000 INJECTION, SOLUTION INTRAVENOUS ONCE
Status: COMPLETED | OUTPATIENT
Start: 2021-01-01 | End: 2021-01-01

## 2021-01-01 RX ORDER — LEVETIRACETAM 10 MG/ML
1000 INJECTION INTRAVASCULAR ONCE
Status: DISCONTINUED | OUTPATIENT
Start: 2021-01-01 | End: 2021-01-01 | Stop reason: CLARIF

## 2021-01-01 RX ORDER — ONDANSETRON 4 MG/1
4 TABLET, ORALLY DISINTEGRATING ORAL EVERY 8 HOURS PRN
Status: DISCONTINUED | OUTPATIENT
Start: 2021-01-01 | End: 2021-01-01

## 2021-01-01 RX ORDER — SODIUM CHLORIDE 0.9 % (FLUSH) 0.9 %
5-40 SYRINGE (ML) INJECTION EVERY 12 HOURS SCHEDULED
Status: DISCONTINUED | OUTPATIENT
Start: 2021-01-01 | End: 2021-01-01 | Stop reason: SDUPTHER

## 2021-01-01 RX ORDER — CHLORHEXIDINE GLUCONATE 0.12 MG/ML
15 RINSE ORAL 2 TIMES DAILY
Status: DISCONTINUED | OUTPATIENT
Start: 2021-01-01 | End: 2021-01-01

## 2021-01-01 RX ORDER — PROCHLORPERAZINE EDISYLATE 5 MG/ML
5 INJECTION INTRAMUSCULAR; INTRAVENOUS
Status: DISCONTINUED | OUTPATIENT
Start: 2021-01-01 | End: 2021-01-01 | Stop reason: HOSPADM

## 2021-01-01 RX ORDER — MIRTAZAPINE 15 MG/1
15 TABLET, FILM COATED ORAL NIGHTLY
Status: DISCONTINUED | OUTPATIENT
Start: 2021-01-01 | End: 2021-01-01

## 2021-01-01 RX ORDER — SUCRALFATE 1 G/1
1 TABLET ORAL 4 TIMES DAILY
Status: DISCONTINUED | OUTPATIENT
Start: 2021-01-01 | End: 2021-01-01

## 2021-01-01 RX ORDER — PREDNISONE 10 MG/1
10 TABLET ORAL 3 TIMES DAILY
Status: DISCONTINUED | OUTPATIENT
Start: 2021-01-01 | End: 2021-01-01 | Stop reason: HOSPADM

## 2021-01-01 RX ORDER — SODIUM CHLORIDE 9 MG/ML
25 INJECTION, SOLUTION INTRAVENOUS PRN
Status: DISCONTINUED | OUTPATIENT
Start: 2021-01-01 | End: 2021-01-01

## 2021-01-01 RX ORDER — HEPARIN SODIUM (PORCINE) LOCK FLUSH IV SOLN 100 UNIT/ML 100 UNIT/ML
3 SOLUTION INTRAVENOUS PRN
Status: DISCONTINUED | OUTPATIENT
Start: 2021-01-01 | End: 2021-01-01

## 2021-01-01 RX ORDER — SODIUM CHLORIDE 0.9 % (FLUSH) 0.9 %
5-40 SYRINGE (ML) INJECTION EVERY 12 HOURS SCHEDULED
Status: DISCONTINUED | OUTPATIENT
Start: 2021-01-01 | End: 2021-01-01

## 2021-01-01 RX ORDER — SODIUM CHLORIDE 9 MG/ML
10 INJECTION INTRAVENOUS DAILY
Status: DISCONTINUED | OUTPATIENT
Start: 2021-01-01 | End: 2021-01-01 | Stop reason: HOSPADM

## 2021-01-01 RX ORDER — CALCIUM CHLORIDE 100 MG/ML
INJECTION INTRAVENOUS; INTRAVENTRICULAR PRN
Status: DISCONTINUED | OUTPATIENT
Start: 2021-01-01 | End: 2021-01-01 | Stop reason: SDUPTHER

## 2021-01-01 RX ORDER — OXYCODONE HYDROCHLORIDE 10 MG/1
10 TABLET ORAL EVERY 4 HOURS
COMMUNITY

## 2021-01-01 RX ORDER — HEPARIN SODIUM (PORCINE) LOCK FLUSH IV SOLN 100 UNIT/ML 100 UNIT/ML
3 SOLUTION INTRAVENOUS EVERY 12 HOURS SCHEDULED
Status: DISCONTINUED | OUTPATIENT
Start: 2021-01-01 | End: 2021-01-01

## 2021-01-01 RX ORDER — LORAZEPAM 2 MG/ML
INJECTION INTRAMUSCULAR
Status: COMPLETED
Start: 2021-01-01 | End: 2021-01-01

## 2021-01-01 RX ORDER — LEVETIRACETAM 5 MG/ML
500 INJECTION INTRAVASCULAR NIGHTLY
Qty: 4000 ML | Refills: 0
Start: 2021-01-01

## 2021-01-01 RX ORDER — PROPOFOL 10 MG/ML
5-50 INJECTION, EMULSION INTRAVENOUS
Status: DISCONTINUED | OUTPATIENT
Start: 2021-01-01 | End: 2021-01-01

## 2021-01-01 RX ORDER — MEPERIDINE HYDROCHLORIDE 25 MG/ML
12.5 INJECTION INTRAMUSCULAR; INTRAVENOUS; SUBCUTANEOUS EVERY 5 MIN PRN
Status: DISCONTINUED | OUTPATIENT
Start: 2021-01-01 | End: 2021-01-01 | Stop reason: HOSPADM

## 2021-01-01 RX ORDER — VITAMIN B COMPLEX
2000 TABLET ORAL DAILY
Status: DISCONTINUED | OUTPATIENT
Start: 2021-01-01 | End: 2021-01-01 | Stop reason: HOSPADM

## 2021-01-01 RX ORDER — POTASSIUM CHLORIDE 20 MEQ/1
20 TABLET, EXTENDED RELEASE ORAL ONCE
Status: COMPLETED | OUTPATIENT
Start: 2021-01-01 | End: 2021-01-01

## 2021-01-01 RX ORDER — NICOTINE POLACRILEX 4 MG
15 LOZENGE BUCCAL PRN
Status: DISCONTINUED | OUTPATIENT
Start: 2021-01-01 | End: 2021-01-01

## 2021-01-01 RX ORDER — SODIUM CHLORIDE 0.9 % (FLUSH) 0.9 %
10 SYRINGE (ML) INJECTION PRN
Status: DISCONTINUED | OUTPATIENT
Start: 2021-01-01 | End: 2021-01-01 | Stop reason: HOSPADM

## 2021-01-01 RX ORDER — POTASSIUM CHLORIDE 7.45 MG/ML
10 INJECTION INTRAVENOUS ONCE
Status: DISCONTINUED | OUTPATIENT
Start: 2021-01-01 | End: 2021-01-01

## 2021-01-01 RX ORDER — ACETAMINOPHEN 650 MG
TABLET, EXTENDED RELEASE ORAL PRN
Status: DISCONTINUED | OUTPATIENT
Start: 2021-01-01 | End: 2021-01-01 | Stop reason: HOSPADM

## 2021-01-01 RX ORDER — HEPARIN SODIUM 5000 [USP'U]/ML
5000 INJECTION, SOLUTION INTRAVENOUS; SUBCUTANEOUS EVERY 8 HOURS SCHEDULED
Status: DISCONTINUED | OUTPATIENT
Start: 2021-01-01 | End: 2021-01-01

## 2021-01-01 RX ORDER — LEVETIRACETAM 500 MG/1
500 TABLET ORAL 2 TIMES DAILY
COMMUNITY
Start: 2021-01-01 | End: 2021-01-01 | Stop reason: SDUPTHER

## 2021-01-01 RX ORDER — ROCURONIUM BROMIDE 10 MG/ML
INJECTION, SOLUTION INTRAVENOUS PRN
Status: DISCONTINUED | OUTPATIENT
Start: 2021-01-01 | End: 2021-01-01 | Stop reason: SDUPTHER

## 2021-01-01 RX ORDER — SODIUM CHLORIDE, SODIUM LACTATE, POTASSIUM CHLORIDE, CALCIUM CHLORIDE 600; 310; 30; 20 MG/100ML; MG/100ML; MG/100ML; MG/100ML
INJECTION, SOLUTION INTRAVENOUS CONTINUOUS PRN
Status: DISCONTINUED | OUTPATIENT
Start: 2021-01-01 | End: 2021-01-01 | Stop reason: SDUPTHER

## 2021-01-01 RX ORDER — MORPHINE SULFATE 4 MG/ML
4 INJECTION, SOLUTION INTRAMUSCULAR; INTRAVENOUS EVERY 4 HOURS PRN
Status: DISCONTINUED | OUTPATIENT
Start: 2021-01-01 | End: 2021-01-01 | Stop reason: HOSPADM

## 2021-01-01 RX ORDER — SODIUM CHLORIDE 9 MG/ML
10 INJECTION INTRAVENOUS DAILY
Status: DISCONTINUED | OUTPATIENT
Start: 2021-01-01 | End: 2021-01-01

## 2021-01-01 RX ORDER — OXYCODONE AND ACETAMINOPHEN 10; 325 MG/1; MG/1
1 TABLET ORAL EVERY 4 HOURS PRN
COMMUNITY
End: 2021-01-01 | Stop reason: ALTCHOICE

## 2021-01-01 RX ORDER — METOPROLOL SUCCINATE 50 MG/1
50 TABLET, EXTENDED RELEASE ORAL 2 TIMES DAILY
Status: DISCONTINUED | OUTPATIENT
Start: 2021-01-01 | End: 2021-01-01 | Stop reason: HOSPADM

## 2021-01-01 RX ORDER — VASOPRESSIN 20 U/ML
INJECTION PARENTERAL PRN
Status: DISCONTINUED | OUTPATIENT
Start: 2021-01-01 | End: 2021-01-01 | Stop reason: SDUPTHER

## 2021-01-01 RX ORDER — LEVETIRACETAM 500 MG/1
500 TABLET ORAL NIGHTLY
Status: DISCONTINUED | OUTPATIENT
Start: 2021-01-01 | End: 2021-01-01

## 2021-01-01 RX ORDER — HYDROXYZINE HYDROCHLORIDE 25 MG/1
25 TABLET, FILM COATED ORAL EVERY 6 HOURS PRN
Qty: 120 TABLET | Refills: 0
Start: 2021-01-01 | End: 2021-07-13

## 2021-01-01 RX ORDER — MORPHINE SULFATE 2 MG/ML
2 INJECTION, SOLUTION INTRAMUSCULAR; INTRAVENOUS
Status: DISCONTINUED | OUTPATIENT
Start: 2021-01-01 | End: 2021-06-16 | Stop reason: HOSPADM

## 2021-01-01 RX ORDER — SODIUM CHLORIDE 0.9 % (FLUSH) 0.9 %
10 SYRINGE (ML) INJECTION EVERY 12 HOURS SCHEDULED
Status: CANCELLED | OUTPATIENT
Start: 2021-01-01

## 2021-01-01 RX ORDER — OXYCODONE AND ACETAMINOPHEN 10; 325 MG/1; MG/1
1 TABLET ORAL ONCE
Status: COMPLETED | OUTPATIENT
Start: 2021-01-01 | End: 2021-01-01

## 2021-01-01 RX ORDER — ONDANSETRON 2 MG/ML
8 INJECTION INTRAMUSCULAR; INTRAVENOUS ONCE
Status: COMPLETED | OUTPATIENT
Start: 2021-01-01 | End: 2021-01-01

## 2021-01-01 RX ORDER — MORPHINE SULFATE 2 MG/ML
2 INJECTION, SOLUTION INTRAMUSCULAR; INTRAVENOUS EVERY 4 HOURS PRN
Status: DISCONTINUED | OUTPATIENT
Start: 2021-01-01 | End: 2021-01-01 | Stop reason: HOSPADM

## 2021-01-01 RX ADMIN — MORPHINE SULFATE 2 MG: 2 INJECTION, SOLUTION INTRAMUSCULAR; INTRAVENOUS at 08:33

## 2021-01-01 RX ADMIN — Medication 0.25 MG: at 18:32

## 2021-01-01 RX ADMIN — MIRTAZAPINE 15 MG: 15 TABLET, FILM COATED ORAL at 20:53

## 2021-01-01 RX ADMIN — ALBUMIN HUMAN 12.5 G: 0.05 INJECTION, SOLUTION INTRAVENOUS at 16:41

## 2021-01-01 RX ADMIN — VASOPRESSIN 1 UNITS: 20 INJECTION INTRAVENOUS at 23:17

## 2021-01-01 RX ADMIN — ATORVASTATIN CALCIUM 80 MG: 40 TABLET, FILM COATED ORAL at 21:59

## 2021-01-01 RX ADMIN — OXYCODONE 10 MG: 5 TABLET ORAL at 23:44

## 2021-01-01 RX ADMIN — SODIUM CHLORIDE 1000 ML: 9 INJECTION, SOLUTION INTRAVENOUS at 14:28

## 2021-01-01 RX ADMIN — METOPROLOL TARTRATE 50 MG: 50 TABLET, FILM COATED ORAL at 01:04

## 2021-01-01 RX ADMIN — OXYCODONE 10 MG: 5 TABLET ORAL at 14:16

## 2021-01-01 RX ADMIN — MIRTAZAPINE 15 MG: 15 TABLET, FILM COATED ORAL at 20:02

## 2021-01-01 RX ADMIN — PANTOPRAZOLE SODIUM 40 MG: 40 INJECTION, POWDER, FOR SOLUTION INTRAVENOUS at 08:36

## 2021-01-01 RX ADMIN — SODIUM CHLORIDE, POTASSIUM CHLORIDE, SODIUM LACTATE AND CALCIUM CHLORIDE: 600; 310; 30; 20 INJECTION, SOLUTION INTRAVENOUS at 23:49

## 2021-01-01 RX ADMIN — Medication 1 TABLET: at 10:08

## 2021-01-01 RX ADMIN — MORPHINE SULFATE 2 MG: 2 INJECTION, SOLUTION INTRAMUSCULAR; INTRAVENOUS at 05:31

## 2021-01-01 RX ADMIN — SODIUM CHLORIDE, PRESERVATIVE FREE 10 ML: 5 INJECTION INTRAVENOUS at 12:49

## 2021-01-01 RX ADMIN — LEVETIRACETAM 500 MG: 5 INJECTION INTRAVENOUS at 23:26

## 2021-01-01 RX ADMIN — METRONIDAZOLE 500 MG: 500 INJECTION, SOLUTION INTRAVENOUS at 11:31

## 2021-01-01 RX ADMIN — SODIUM CHLORIDE, PRESERVATIVE FREE 10 ML: 5 INJECTION INTRAVENOUS at 09:45

## 2021-01-01 RX ADMIN — Medication 30 MILLICURIE: at 10:52

## 2021-01-01 RX ADMIN — POTASSIUM CHLORIDE 10 MEQ: 10 INJECTION, SOLUTION INTRAVENOUS at 10:51

## 2021-01-01 RX ADMIN — HEPARIN 300 UNITS: 100 SYRINGE at 21:54

## 2021-01-01 RX ADMIN — SODIUM CHLORIDE 25 ML: 9 INJECTION, SOLUTION INTRAVENOUS at 16:25

## 2021-01-01 RX ADMIN — SODIUM CHLORIDE: 9 INJECTION, SOLUTION INTRAVENOUS at 11:06

## 2021-01-01 RX ADMIN — OXYCODONE 10 MG: 5 TABLET ORAL at 00:34

## 2021-01-01 RX ADMIN — METOPROLOL SUCCINATE 50 MG: 50 TABLET, EXTENDED RELEASE ORAL at 08:00

## 2021-01-01 RX ADMIN — METOPROLOL SUCCINATE 50 MG: 50 TABLET, EXTENDED RELEASE ORAL at 08:56

## 2021-01-01 RX ADMIN — APIXABAN 5 MG: 5 TABLET, FILM COATED ORAL at 09:21

## 2021-01-01 RX ADMIN — SODIUM BICARBONATE: 84 INJECTION, SOLUTION INTRAVENOUS at 10:04

## 2021-01-01 RX ADMIN — HEPARIN SODIUM 13.03 UNITS/KG/HR: 10000 INJECTION, SOLUTION INTRAVENOUS at 06:41

## 2021-01-01 RX ADMIN — OXYBUTYNIN CHLORIDE 5 MG: 5 TABLET ORAL at 10:07

## 2021-01-01 RX ADMIN — CALCIUM GLUCONATE 1000 MG: 98 INJECTION, SOLUTION INTRAVENOUS at 15:41

## 2021-01-01 RX ADMIN — BACLOFEN 10 MG: 10 TABLET ORAL at 08:48

## 2021-01-01 RX ADMIN — ONDANSETRON 8 MG: 2 INJECTION INTRAMUSCULAR; INTRAVENOUS at 15:48

## 2021-01-01 RX ADMIN — MORPHINE SULFATE 2 MG: 2 INJECTION, SOLUTION INTRAMUSCULAR; INTRAVENOUS at 11:00

## 2021-01-01 RX ADMIN — OXYBUTYNIN CHLORIDE 5 MG: 5 TABLET ORAL at 22:07

## 2021-01-01 RX ADMIN — SUCRALFATE 1 G: 1 TABLET ORAL at 08:50

## 2021-01-01 RX ADMIN — METOPROLOL SUCCINATE 50 MG: 50 TABLET, EXTENDED RELEASE ORAL at 09:00

## 2021-01-01 RX ADMIN — ACETAMINOPHEN 650 MG: 325 TABLET ORAL at 16:16

## 2021-01-01 RX ADMIN — IPRATROPIUM BROMIDE AND ALBUTEROL SULFATE 1 AMPULE: .5; 3 SOLUTION RESPIRATORY (INHALATION) at 17:55

## 2021-01-01 RX ADMIN — ONDANSETRON 4 MG: 2 INJECTION INTRAMUSCULAR; INTRAVENOUS at 14:49

## 2021-01-01 RX ADMIN — FENTANYL CITRATE 50 MCG: 50 INJECTION INTRAMUSCULAR; INTRAVENOUS at 16:19

## 2021-01-01 RX ADMIN — LEVETIRACETAM 250 MG: 5 INJECTION INTRAVENOUS at 11:45

## 2021-01-01 RX ADMIN — SODIUM CHLORIDE 25 ML: 9 INJECTION, SOLUTION INTRAVENOUS at 15:49

## 2021-01-01 RX ADMIN — PIPERACILLIN AND TAZOBACTAM 3375 MG: 3; .375 INJECTION, POWDER, LYOPHILIZED, FOR SOLUTION INTRAVENOUS at 07:30

## 2021-01-01 RX ADMIN — MORPHINE SULFATE 4 MG: 4 INJECTION, SOLUTION INTRAMUSCULAR; INTRAVENOUS at 11:31

## 2021-01-01 RX ADMIN — ONDANSETRON 4 MG: 2 INJECTION INTRAMUSCULAR; INTRAVENOUS at 05:50

## 2021-01-01 RX ADMIN — SODIUM CHLORIDE 25 ML: 9 INJECTION, SOLUTION INTRAVENOUS at 03:20

## 2021-01-01 RX ADMIN — OXYCODONE 10 MG: 5 TABLET ORAL at 21:00

## 2021-01-01 RX ADMIN — SODIUM CHLORIDE, POTASSIUM CHLORIDE, SODIUM LACTATE AND CALCIUM CHLORIDE: 600; 310; 30; 20 INJECTION, SOLUTION INTRAVENOUS at 07:57

## 2021-01-01 RX ADMIN — MINERAL OIL AND PETROLATUM: 150; 830 OINTMENT OPHTHALMIC at 01:31

## 2021-01-01 RX ADMIN — MIRTAZAPINE 15 MG: 15 TABLET, FILM COATED ORAL at 20:58

## 2021-01-01 RX ADMIN — PANTOPRAZOLE SODIUM 40 MG: 40 INJECTION, POWDER, FOR SOLUTION INTRAVENOUS at 08:50

## 2021-01-01 RX ADMIN — SUCRALFATE 1 G: 1 TABLET ORAL at 16:23

## 2021-01-01 RX ADMIN — HYDROCORTISONE SODIUM SUCCINATE 100 MG: 100 INJECTION, POWDER, FOR SOLUTION INTRAMUSCULAR; INTRAVENOUS at 15:22

## 2021-01-01 RX ADMIN — BACLOFEN 10 MG: 10 TABLET ORAL at 11:30

## 2021-01-01 RX ADMIN — OXYBUTYNIN CHLORIDE 5 MG: 5 TABLET ORAL at 10:47

## 2021-01-01 RX ADMIN — HEPARIN SODIUM 4060 UNITS: 1000 INJECTION INTRAVENOUS; SUBCUTANEOUS at 22:20

## 2021-01-01 RX ADMIN — CALCIUM GLUCONATE 4000 MG: 98 INJECTION, SOLUTION INTRAVENOUS at 02:23

## 2021-01-01 RX ADMIN — OXYCODONE 10 MG: 5 TABLET ORAL at 23:48

## 2021-01-01 RX ADMIN — LISINOPRIL 2.5 MG: 5 TABLET ORAL at 08:48

## 2021-01-01 RX ADMIN — SODIUM CHLORIDE, POTASSIUM CHLORIDE, SODIUM LACTATE AND CALCIUM CHLORIDE: 600; 310; 30; 20 INJECTION, SOLUTION INTRAVENOUS at 21:41

## 2021-01-01 RX ADMIN — ENOXAPARIN SODIUM 50 MG: 60 INJECTION SUBCUTANEOUS at 20:54

## 2021-01-01 RX ADMIN — MINERAL OIL AND PETROLATUM: 150; 830 OINTMENT OPHTHALMIC at 13:31

## 2021-01-01 RX ADMIN — APIXABAN 5 MG: 5 TABLET, FILM COATED ORAL at 10:46

## 2021-01-01 RX ADMIN — SUCRALFATE 1 G: 1 TABLET ORAL at 09:49

## 2021-01-01 RX ADMIN — POTASSIUM CHLORIDE 10 MEQ: 7.46 INJECTION, SOLUTION INTRAVENOUS at 12:23

## 2021-01-01 RX ADMIN — OXYCODONE 10 MG: 5 TABLET ORAL at 06:56

## 2021-01-01 RX ADMIN — CEFEPIME HYDROCHLORIDE 1000 MG: 1 INJECTION, POWDER, FOR SOLUTION INTRAMUSCULAR; INTRAVENOUS at 11:34

## 2021-01-01 RX ADMIN — MAGNESIUM SULFATE HEPTAHYDRATE 2000 MG: 40 INJECTION, SOLUTION INTRAVENOUS at 12:25

## 2021-01-01 RX ADMIN — APIXABAN 5 MG: 5 TABLET, FILM COATED ORAL at 21:22

## 2021-01-01 RX ADMIN — OXYBUTYNIN CHLORIDE 5 MG: 5 TABLET ORAL at 22:09

## 2021-01-01 RX ADMIN — SUCRALFATE 1 G: 1 TABLET ORAL at 00:34

## 2021-01-01 RX ADMIN — METOPROLOL SUCCINATE 50 MG: 50 TABLET, EXTENDED RELEASE ORAL at 20:57

## 2021-01-01 RX ADMIN — Medication 1 TABLET: at 20:02

## 2021-01-01 RX ADMIN — POTASSIUM CHLORIDE 40 MEQ: 1500 TABLET, EXTENDED RELEASE ORAL at 19:39

## 2021-01-01 RX ADMIN — VECURONIUM BROMIDE 10 MG: 10 INJECTION, POWDER, LYOPHILIZED, FOR SOLUTION INTRAVENOUS at 22:48

## 2021-01-01 RX ADMIN — OXYCODONE 10 MG: 5 TABLET ORAL at 22:39

## 2021-01-01 RX ADMIN — SODIUM CHLORIDE, PRESERVATIVE FREE 10 ML: 5 INJECTION INTRAVENOUS at 12:10

## 2021-01-01 RX ADMIN — ALBUMIN (HUMAN) 500 ML: 12.5 INJECTION, SOLUTION INTRAVENOUS at 23:23

## 2021-01-01 RX ADMIN — DEXTROSE AND SODIUM CHLORIDE: 5; 450 INJECTION, SOLUTION INTRAVENOUS at 02:37

## 2021-01-01 RX ADMIN — HEPARIN SODIUM 5000 UNITS: 5000 INJECTION INTRAVENOUS; SUBCUTANEOUS at 14:19

## 2021-01-01 RX ADMIN — APIXABAN 5 MG: 5 TABLET, FILM COATED ORAL at 11:44

## 2021-01-01 RX ADMIN — MORPHINE SULFATE 2 MG: 2 INJECTION, SOLUTION INTRAMUSCULAR; INTRAVENOUS at 13:09

## 2021-01-01 RX ADMIN — SODIUM CHLORIDE, PRESERVATIVE FREE 10 ML: 5 INJECTION INTRAVENOUS at 21:55

## 2021-01-01 RX ADMIN — SODIUM CHLORIDE, PRESERVATIVE FREE 10 ML: 5 INJECTION INTRAVENOUS at 13:30

## 2021-01-01 RX ADMIN — OXYCODONE 10 MG: 5 TABLET ORAL at 03:58

## 2021-01-01 RX ADMIN — ATORVASTATIN CALCIUM 80 MG: 40 TABLET, FILM COATED ORAL at 20:56

## 2021-01-01 RX ADMIN — SODIUM CHLORIDE: 9 INJECTION, SOLUTION INTRAVENOUS at 15:43

## 2021-01-01 RX ADMIN — Medication 400 MG: at 13:10

## 2021-01-01 RX ADMIN — MAGNESIUM SULFATE HEPTAHYDRATE 2000 MG: 40 INJECTION, SOLUTION INTRAVENOUS at 20:16

## 2021-01-01 RX ADMIN — POTASSIUM CHLORIDE: 2 INJECTION, SOLUTION, CONCENTRATE INTRAVENOUS at 18:58

## 2021-01-01 RX ADMIN — ASPIRIN 81 MG: 81 TABLET, CHEWABLE ORAL at 08:41

## 2021-01-01 RX ADMIN — POTASSIUM CHLORIDE: 2 INJECTION, SOLUTION, CONCENTRATE INTRAVENOUS at 20:55

## 2021-01-01 RX ADMIN — MIRTAZAPINE 15 MG: 15 TABLET, FILM COATED ORAL at 19:56

## 2021-01-01 RX ADMIN — LABETALOL HYDROCHLORIDE 5 MG: 5 INJECTION INTRAVENOUS at 14:45

## 2021-01-01 RX ADMIN — BACLOFEN 10 MG: 10 TABLET ORAL at 20:27

## 2021-01-01 RX ADMIN — PHENYLEPHRINE HYDROCHLORIDE 100 MCG: 10 INJECTION INTRAVENOUS at 16:31

## 2021-01-01 RX ADMIN — LEVETIRACETAM 500 MG: 100 INJECTION, SOLUTION INTRAVENOUS at 21:23

## 2021-01-01 RX ADMIN — POTASSIUM CHLORIDE: 2 INJECTION, SOLUTION, CONCENTRATE INTRAVENOUS at 18:53

## 2021-01-01 RX ADMIN — BACLOFEN 10 MG: 10 TABLET ORAL at 20:53

## 2021-01-01 RX ADMIN — PANTOPRAZOLE SODIUM 40 MG: 40 TABLET, DELAYED RELEASE ORAL at 05:28

## 2021-01-01 RX ADMIN — METHYLPREDNISOLONE SODIUM SUCCINATE 40 MG: 40 INJECTION, POWDER, FOR SOLUTION INTRAMUSCULAR; INTRAVENOUS at 12:22

## 2021-01-01 RX ADMIN — ASPIRIN 81 MG: 81 TABLET, CHEWABLE ORAL at 16:42

## 2021-01-01 RX ADMIN — MORPHINE SULFATE 2 MG: 2 INJECTION, SOLUTION INTRAMUSCULAR; INTRAVENOUS at 05:09

## 2021-01-01 RX ADMIN — METOPROLOL SUCCINATE 50 MG: 50 TABLET, EXTENDED RELEASE ORAL at 07:40

## 2021-01-01 RX ADMIN — OXYCODONE 10 MG: 5 TABLET ORAL at 16:35

## 2021-01-01 RX ADMIN — PANTOPRAZOLE SODIUM 40 MG: 40 INJECTION, POWDER, FOR SOLUTION INTRAVENOUS at 22:06

## 2021-01-01 RX ADMIN — ASPIRIN 81 MG: 81 TABLET, CHEWABLE ORAL at 11:30

## 2021-01-01 RX ADMIN — TRIAMCINOLONE ACETONIDE 0.1 G: 1 CREAM TOPICAL at 20:27

## 2021-01-01 RX ADMIN — OXYCODONE 10 MG: 5 TABLET ORAL at 01:28

## 2021-01-01 RX ADMIN — LISINOPRIL 5 MG: 5 TABLET ORAL at 08:35

## 2021-01-01 RX ADMIN — HYDROCORTISONE SODIUM SUCCINATE 100 MG: 100 INJECTION, POWDER, FOR SOLUTION INTRAMUSCULAR; INTRAVENOUS at 08:04

## 2021-01-01 RX ADMIN — POTASSIUM CHLORIDE: 2 INJECTION, SOLUTION, CONCENTRATE INTRAVENOUS at 01:25

## 2021-01-01 RX ADMIN — OXYCODONE 10 MG: 5 TABLET ORAL at 02:49

## 2021-01-01 RX ADMIN — APIXABAN 5 MG: 5 TABLET, FILM COATED ORAL at 07:40

## 2021-01-01 RX ADMIN — LEVETIRACETAM 500 MG: 500 TABLET ORAL at 21:18

## 2021-01-01 RX ADMIN — SUCRALFATE 1 G: 1 TABLET ORAL at 05:54

## 2021-01-01 RX ADMIN — BACLOFEN 10 MG: 10 TABLET ORAL at 09:06

## 2021-01-01 RX ADMIN — MIRTAZAPINE 15 MG: 15 TABLET, FILM COATED ORAL at 20:27

## 2021-01-01 RX ADMIN — CEFEPIME HYDROCHLORIDE 1000 MG: 1 INJECTION, POWDER, FOR SOLUTION INTRAMUSCULAR; INTRAVENOUS at 11:49

## 2021-01-01 RX ADMIN — Medication 10 ML: at 11:33

## 2021-01-01 RX ADMIN — HEPARIN SODIUM 5000 UNITS: 5000 INJECTION INTRAVENOUS; SUBCUTANEOUS at 05:50

## 2021-01-01 RX ADMIN — SODIUM BICARBONATE 100 MEQ: 84 INJECTION, SOLUTION INTRAVENOUS at 22:23

## 2021-01-01 RX ADMIN — SODIUM BICARBONATE: 84 INJECTION, SOLUTION INTRAVENOUS at 02:04

## 2021-01-01 RX ADMIN — HEPARIN SODIUM 5000 UNITS: 5000 INJECTION INTRAVENOUS; SUBCUTANEOUS at 05:28

## 2021-01-01 RX ADMIN — HEPARIN SODIUM 4390 UNITS: 1000 INJECTION INTRAVENOUS; SUBCUTANEOUS at 21:33

## 2021-01-01 RX ADMIN — PREDNISONE 10 MG: 10 TABLET ORAL at 21:40

## 2021-01-01 RX ADMIN — OXYCODONE 10 MG: 5 TABLET ORAL at 06:29

## 2021-01-01 RX ADMIN — POTASSIUM CHLORIDE: 149 INJECTION, SOLUTION, CONCENTRATE INTRAVENOUS at 20:12

## 2021-01-01 RX ADMIN — METOPROLOL TARTRATE 25 MG: 25 TABLET, FILM COATED ORAL at 10:46

## 2021-01-01 RX ADMIN — Medication 10 MILLICURIE: at 10:15

## 2021-01-01 RX ADMIN — FENTANYL CITRATE 50 MCG: 50 INJECTION, SOLUTION INTRAMUSCULAR; INTRAVENOUS at 21:21

## 2021-01-01 RX ADMIN — MAGNESIUM SULFATE HEPTAHYDRATE 2000 MG: 40 INJECTION, SOLUTION INTRAVENOUS at 09:08

## 2021-01-01 RX ADMIN — BARIUM SULFATE 45 ML: 400 SUSPENSION ORAL at 14:58

## 2021-01-01 RX ADMIN — APIXABAN 5 MG: 5 TABLET, FILM COATED ORAL at 20:15

## 2021-01-01 RX ADMIN — HEPARIN SODIUM 18 UNITS/KG/HR: 10000 INJECTION, SOLUTION INTRAVENOUS at 20:42

## 2021-01-01 RX ADMIN — PIPERACILLIN AND TAZOBACTAM 3375 MG: 3; .375 INJECTION, POWDER, LYOPHILIZED, FOR SOLUTION INTRAVENOUS at 12:19

## 2021-01-01 RX ADMIN — ENOXAPARIN SODIUM 50 MG: 60 INJECTION SUBCUTANEOUS at 08:47

## 2021-01-01 RX ADMIN — TRIAMCINOLONE ACETONIDE 0.1 G: 1 CREAM TOPICAL at 08:35

## 2021-01-01 RX ADMIN — PANTOPRAZOLE SODIUM 40 MG: 40 INJECTION, POWDER, FOR SOLUTION INTRAVENOUS at 09:10

## 2021-01-01 RX ADMIN — METOPROLOL SUCCINATE 50 MG: 50 TABLET, EXTENDED RELEASE ORAL at 22:07

## 2021-01-01 RX ADMIN — LORAZEPAM 0.5 MG: 2 INJECTION INTRAMUSCULAR; INTRAVENOUS at 21:21

## 2021-01-01 RX ADMIN — HEPARIN SODIUM 5000 UNITS: 5000 INJECTION INTRAVENOUS; SUBCUTANEOUS at 14:47

## 2021-01-01 RX ADMIN — VASOPRESSIN 0.04 UNITS/MIN: 20 INJECTION INTRAVENOUS at 01:12

## 2021-01-01 RX ADMIN — MEPERIDINE HYDROCHLORIDE 12.5 MG: 25 INJECTION, SOLUTION INTRAMUSCULAR; INTRAVENOUS; SUBCUTANEOUS at 18:07

## 2021-01-01 RX ADMIN — OXYCODONE 10 MG: 5 TABLET ORAL at 09:21

## 2021-01-01 RX ADMIN — SUCRALFATE 1 G: 1 TABLET ORAL at 05:09

## 2021-01-01 RX ADMIN — OXYCODONE 10 MG: 5 TABLET ORAL at 13:11

## 2021-01-01 RX ADMIN — SODIUM CHLORIDE 1000 ML: 9 INJECTION, SOLUTION INTRAVENOUS at 12:15

## 2021-01-01 RX ADMIN — SUCRALFATE 1 G: 1 TABLET ORAL at 23:35

## 2021-01-01 RX ADMIN — LEVETIRACETAM 500 MG: 5 INJECTION INTRAVENOUS at 00:56

## 2021-01-01 RX ADMIN — LEVETIRACETAM 250 MG: 100 INJECTION, SOLUTION INTRAVENOUS at 08:31

## 2021-01-01 RX ADMIN — MINERAL OIL AND PETROLATUM: 150; 830 OINTMENT OPHTHALMIC at 08:54

## 2021-01-01 RX ADMIN — SODIUM CHLORIDE 125 MG: 9 INJECTION, SOLUTION INTRAVENOUS at 11:40

## 2021-01-01 RX ADMIN — METOPROLOL SUCCINATE 50 MG: 50 TABLET, EXTENDED RELEASE ORAL at 14:13

## 2021-01-01 RX ADMIN — Medication 10 ML: at 17:59

## 2021-01-01 RX ADMIN — ATORVASTATIN CALCIUM 80 MG: 40 TABLET, FILM COATED ORAL at 21:21

## 2021-01-01 RX ADMIN — PANTOPRAZOLE SODIUM 40 MG: 40 INJECTION, POWDER, FOR SOLUTION INTRAVENOUS at 10:03

## 2021-01-01 RX ADMIN — METOPROLOL TARTRATE 50 MG: 50 TABLET, FILM COATED ORAL at 22:09

## 2021-01-01 RX ADMIN — SODIUM CHLORIDE, PRESERVATIVE FREE 10 ML: 5 INJECTION INTRAVENOUS at 08:00

## 2021-01-01 RX ADMIN — LORAZEPAM 2 MG: 2 INJECTION INTRAMUSCULAR at 19:10

## 2021-01-01 RX ADMIN — MIRTAZAPINE 15 MG: 15 TABLET, FILM COATED ORAL at 22:29

## 2021-01-01 RX ADMIN — ASPIRIN 81 MG: 81 TABLET, CHEWABLE ORAL at 09:16

## 2021-01-01 RX ADMIN — PROPOFOL 100 MG: 10 INJECTION, EMULSION INTRAVENOUS at 16:30

## 2021-01-01 RX ADMIN — ACETAMINOPHEN 650 MG: 325 TABLET ORAL at 13:32

## 2021-01-01 RX ADMIN — ATORVASTATIN CALCIUM 80 MG: 40 TABLET, FILM COATED ORAL at 21:26

## 2021-01-01 RX ADMIN — SODIUM CHLORIDE: 4.5 INJECTION, SOLUTION INTRAVENOUS at 15:50

## 2021-01-01 RX ADMIN — POTASSIUM CHLORIDE, DEXTROSE MONOHYDRATE AND SODIUM CHLORIDE: 150; 5; 450 INJECTION, SOLUTION INTRAVENOUS at 14:33

## 2021-01-01 RX ADMIN — MINERAL OIL AND PETROLATUM: 150; 830 OINTMENT OPHTHALMIC at 17:51

## 2021-01-01 RX ADMIN — OXYCODONE 10 MG: 5 TABLET ORAL at 18:07

## 2021-01-01 RX ADMIN — PREDNISONE 40 MG: 20 TABLET ORAL at 07:41

## 2021-01-01 RX ADMIN — PANTOPRAZOLE SODIUM 40 MG: 40 INJECTION, POWDER, FOR SOLUTION INTRAVENOUS at 20:49

## 2021-01-01 RX ADMIN — SUCRALFATE 1 G: 1 TABLET ORAL at 08:08

## 2021-01-01 RX ADMIN — METOPROLOL SUCCINATE 50 MG: 50 TABLET, EXTENDED RELEASE ORAL at 21:22

## 2021-01-01 RX ADMIN — PANTOPRAZOLE SODIUM 40 MG: 40 INJECTION, POWDER, FOR SOLUTION INTRAVENOUS at 09:48

## 2021-01-01 RX ADMIN — HEPARIN SODIUM 25.98 UNITS/KG/HR: 10000 INJECTION, SOLUTION INTRAVENOUS at 09:19

## 2021-01-01 RX ADMIN — OXYCODONE 10 MG: 5 TABLET ORAL at 13:53

## 2021-01-01 RX ADMIN — BACLOFEN 10 MG: 10 TABLET ORAL at 19:52

## 2021-01-01 RX ADMIN — ENOXAPARIN SODIUM 50 MG: 60 INJECTION SUBCUTANEOUS at 09:23

## 2021-01-01 RX ADMIN — LEVETIRACETAM 250 MG: 5 INJECTION INTRAVENOUS at 08:55

## 2021-01-01 RX ADMIN — MORPHINE SULFATE 4 MG: 4 INJECTION, SOLUTION INTRAMUSCULAR; INTRAVENOUS at 06:08

## 2021-01-01 RX ADMIN — OXYCODONE 10 MG: 5 TABLET ORAL at 15:54

## 2021-01-01 RX ADMIN — PIPERACILLIN AND TAZOBACTAM 3375 MG: 3; .375 INJECTION, POWDER, LYOPHILIZED, FOR SOLUTION INTRAVENOUS at 07:00

## 2021-01-01 RX ADMIN — METHYLPREDNISOLONE SODIUM SUCCINATE 20 MG: 40 INJECTION, POWDER, FOR SOLUTION INTRAMUSCULAR; INTRAVENOUS at 18:21

## 2021-01-01 RX ADMIN — VASOPRESSIN 0.04 UNITS/MIN: 20 INJECTION INTRAVENOUS at 00:12

## 2021-01-01 RX ADMIN — ROCURONIUM BROMIDE 10 MG: 10 SOLUTION INTRAVENOUS at 16:30

## 2021-01-01 RX ADMIN — ATORVASTATIN CALCIUM 80 MG: 20 TABLET, FILM COATED ORAL at 20:15

## 2021-01-01 RX ADMIN — PIPERACILLIN AND TAZOBACTAM 3375 MG: 3; .375 INJECTION, POWDER, LYOPHILIZED, FOR SOLUTION INTRAVENOUS at 23:30

## 2021-01-01 RX ADMIN — BARIUM SULFATE 45 G: 0.6 CREAM ORAL at 14:58

## 2021-01-01 RX ADMIN — BACLOFEN 10 MG: 10 TABLET ORAL at 19:56

## 2021-01-01 RX ADMIN — PANTOPRAZOLE SODIUM 40 MG: 40 TABLET, DELAYED RELEASE ORAL at 08:48

## 2021-01-01 RX ADMIN — ENOXAPARIN SODIUM 50 MG: 60 INJECTION SUBCUTANEOUS at 22:28

## 2021-01-01 RX ADMIN — FENTANYL CITRATE 100 MCG: 50 INJECTION, SOLUTION INTRAMUSCULAR; INTRAVENOUS at 22:48

## 2021-01-01 RX ADMIN — SODIUM CHLORIDE, PRESERVATIVE FREE 10 ML: 5 INJECTION INTRAVENOUS at 21:30

## 2021-01-01 RX ADMIN — SODIUM CHLORIDE: 9 INJECTION, SOLUTION INTRAVENOUS at 11:08

## 2021-01-01 RX ADMIN — POTASSIUM CHLORIDE 20 MEQ: 20 TABLET, EXTENDED RELEASE ORAL at 13:10

## 2021-01-01 RX ADMIN — Medication 2 EACH: at 08:54

## 2021-01-01 RX ADMIN — CYANOCOBALAMIN 1000 MCG: 1000 INJECTION, SOLUTION INTRAMUSCULAR at 13:32

## 2021-01-01 RX ADMIN — Medication 100 MEQ: at 22:23

## 2021-01-01 RX ADMIN — TRIAMCINOLONE ACETONIDE 0.1 G: 1 CREAM TOPICAL at 08:48

## 2021-01-01 RX ADMIN — METHYLPREDNISOLONE SODIUM SUCCINATE 40 MG: 40 INJECTION, POWDER, FOR SOLUTION INTRAMUSCULAR; INTRAVENOUS at 09:08

## 2021-01-01 RX ADMIN — PANTOPRAZOLE SODIUM 40 MG: 40 INJECTION, POWDER, FOR SOLUTION INTRAVENOUS at 08:00

## 2021-01-01 RX ADMIN — FENTANYL CITRATE 50 MCG: 50 INJECTION, SOLUTION INTRAMUSCULAR; INTRAVENOUS at 00:53

## 2021-01-01 RX ADMIN — PANTOPRAZOLE SODIUM 40 MG: 40 INJECTION, POWDER, FOR SOLUTION INTRAVENOUS at 19:56

## 2021-01-01 RX ADMIN — SODIUM CHLORIDE, POTASSIUM CHLORIDE, SODIUM LACTATE AND CALCIUM CHLORIDE 1000 ML: 600; 310; 30; 20 INJECTION, SOLUTION INTRAVENOUS at 22:23

## 2021-01-01 RX ADMIN — DEXTRAN 70, GLYCERIN, HYPROMELLOSE 1 DROP: 1; 2; 3 SOLUTION/ DROPS OPHTHALMIC at 03:30

## 2021-01-01 RX ADMIN — HEPARIN 300 UNITS: 100 SYRINGE at 08:55

## 2021-01-01 RX ADMIN — TETANUS TOXOID, REDUCED DIPHTHERIA TOXOID AND ACELLULAR PERTUSSIS VACCINE, ADSORBED 0.5 ML: 5; 2.5; 8; 8; 2.5 SUSPENSION INTRAMUSCULAR at 11:28

## 2021-01-01 RX ADMIN — MORPHINE SULFATE 2 MG: 2 INJECTION, SOLUTION INTRAMUSCULAR; INTRAVENOUS at 17:36

## 2021-01-01 RX ADMIN — SODIUM CHLORIDE: 4.5 INJECTION, SOLUTION INTRAVENOUS at 14:20

## 2021-01-01 RX ADMIN — OXYCODONE 10 MG: 5 TABLET ORAL at 17:14

## 2021-01-01 RX ADMIN — PANTOPRAZOLE SODIUM 40 MG: 40 TABLET, DELAYED RELEASE ORAL at 05:54

## 2021-01-01 RX ADMIN — ENOXAPARIN SODIUM 40 MG: 40 INJECTION SUBCUTANEOUS at 22:10

## 2021-01-01 RX ADMIN — MORPHINE SULFATE 4 MG: 4 INJECTION, SOLUTION INTRAMUSCULAR; INTRAVENOUS at 11:40

## 2021-01-01 RX ADMIN — MORPHINE SULFATE 4 MG: 4 INJECTION, SOLUTION INTRAMUSCULAR; INTRAVENOUS at 12:58

## 2021-01-01 RX ADMIN — Medication 10 ML: at 22:31

## 2021-01-01 RX ADMIN — OXYCODONE 10 MG: 5 TABLET ORAL at 02:56

## 2021-01-01 RX ADMIN — SUCRALFATE 1 G: 1 TABLET ORAL at 23:47

## 2021-01-01 RX ADMIN — POTASSIUM CHLORIDE: 149 INJECTION, SOLUTION, CONCENTRATE INTRAVENOUS at 06:59

## 2021-01-01 RX ADMIN — APIXABAN 5 MG: 5 TABLET, FILM COATED ORAL at 20:57

## 2021-01-01 RX ADMIN — Medication 2000 UNITS: at 08:29

## 2021-01-01 RX ADMIN — SUCRALFATE 1 G: 1 TABLET ORAL at 22:08

## 2021-01-01 RX ADMIN — OXYBUTYNIN CHLORIDE 5 MG: 5 TABLET ORAL at 08:48

## 2021-01-01 RX ADMIN — POTASSIUM CHLORIDE: 2 INJECTION, SOLUTION, CONCENTRATE INTRAVENOUS at 16:01

## 2021-01-01 RX ADMIN — PANTOPRAZOLE SODIUM 40 MG: 40 INJECTION, POWDER, FOR SOLUTION INTRAVENOUS at 08:29

## 2021-01-01 RX ADMIN — OXYCODONE 10 MG: 5 TABLET ORAL at 18:53

## 2021-01-01 RX ADMIN — FENTANYL CITRATE 100 MCG: 50 INJECTION, SOLUTION INTRAMUSCULAR; INTRAVENOUS at 23:51

## 2021-01-01 RX ADMIN — Medication 1 TABLET: at 21:17

## 2021-01-01 RX ADMIN — MIRTAZAPINE 15 MG: 15 TABLET, FILM COATED ORAL at 22:07

## 2021-01-01 RX ADMIN — OXYCODONE 10 MG: 5 TABLET ORAL at 21:23

## 2021-01-01 RX ADMIN — MORPHINE SULFATE 2 MG: 2 INJECTION, SOLUTION INTRAMUSCULAR; INTRAVENOUS at 16:01

## 2021-01-01 RX ADMIN — POTASSIUM CHLORIDE, DEXTROSE MONOHYDRATE AND SODIUM CHLORIDE: 150; 5; 450 INJECTION, SOLUTION INTRAVENOUS at 22:27

## 2021-01-01 RX ADMIN — CEFEPIME HYDROCHLORIDE 1000 MG: 1 INJECTION, POWDER, FOR SOLUTION INTRAMUSCULAR; INTRAVENOUS at 11:41

## 2021-01-01 RX ADMIN — FUROSEMIDE 40 MG: 10 INJECTION, SOLUTION INTRAMUSCULAR; INTRAVENOUS at 10:39

## 2021-01-01 RX ADMIN — ACETAMINOPHEN 650 MG: 325 TABLET, FILM COATED ORAL at 20:53

## 2021-01-01 RX ADMIN — APIXABAN 5 MG: 5 TABLET, FILM COATED ORAL at 20:02

## 2021-01-01 RX ADMIN — SODIUM CHLORIDE, POTASSIUM CHLORIDE, SODIUM LACTATE AND CALCIUM CHLORIDE 1000 ML: 600; 310; 30; 20 INJECTION, SOLUTION INTRAVENOUS at 05:12

## 2021-01-01 RX ADMIN — SODIUM CHLORIDE, PRESERVATIVE FREE 10 ML: 5 INJECTION INTRAVENOUS at 08:55

## 2021-01-01 RX ADMIN — MIRTAZAPINE 7.5 MG: 15 TABLET, FILM COATED ORAL at 20:15

## 2021-01-01 RX ADMIN — SODIUM CHLORIDE, PRESERVATIVE FREE 10 ML: 5 INJECTION INTRAVENOUS at 08:51

## 2021-01-01 RX ADMIN — MORPHINE SULFATE 2 MG: 2 INJECTION, SOLUTION INTRAMUSCULAR; INTRAVENOUS at 08:00

## 2021-01-01 RX ADMIN — MORPHINE SULFATE 4 MG: 4 INJECTION, SOLUTION INTRAMUSCULAR; INTRAVENOUS at 16:58

## 2021-01-01 RX ADMIN — SODIUM CHLORIDE 1000 ML: 9 INJECTION, SOLUTION INTRAVENOUS at 13:05

## 2021-01-01 RX ADMIN — MORPHINE SULFATE 2 MG: 2 INJECTION, SOLUTION INTRAMUSCULAR; INTRAVENOUS at 13:48

## 2021-01-01 RX ADMIN — METHYLPREDNISOLONE SODIUM SUCCINATE 40 MG: 40 INJECTION, POWDER, FOR SOLUTION INTRAMUSCULAR; INTRAVENOUS at 09:22

## 2021-01-01 RX ADMIN — APIXABAN 5 MG: 5 TABLET, FILM COATED ORAL at 08:08

## 2021-01-01 RX ADMIN — MIRTAZAPINE 15 MG: 15 TABLET, FILM COATED ORAL at 22:08

## 2021-01-01 RX ADMIN — METOPROLOL SUCCINATE 25 MG: 25 TABLET, EXTENDED RELEASE ORAL at 11:30

## 2021-01-01 RX ADMIN — PREDNISONE 10 MG: 10 TABLET ORAL at 16:35

## 2021-01-01 RX ADMIN — OXYCODONE 10 MG: 5 TABLET ORAL at 03:30

## 2021-01-01 RX ADMIN — MORPHINE SULFATE 2 MG: 2 INJECTION, SOLUTION INTRAMUSCULAR; INTRAVENOUS at 21:15

## 2021-01-01 RX ADMIN — HYDROMORPHONE HYDROCHLORIDE 0.5 MG: 1 INJECTION, SOLUTION INTRAMUSCULAR; INTRAVENOUS; SUBCUTANEOUS at 18:54

## 2021-01-01 RX ADMIN — SODIUM CHLORIDE, PRESERVATIVE FREE 10 ML: 5 INJECTION INTRAVENOUS at 11:49

## 2021-01-01 RX ADMIN — PANTOPRAZOLE SODIUM 40 MG: 40 INJECTION, POWDER, FOR SOLUTION INTRAVENOUS at 09:45

## 2021-01-01 RX ADMIN — MAGNESIUM SULFATE HEPTAHYDRATE 2000 MG: 40 INJECTION, SOLUTION INTRAVENOUS at 21:29

## 2021-01-01 RX ADMIN — SUCRALFATE 1 G: 1 TABLET ORAL at 17:13

## 2021-01-01 RX ADMIN — BACLOFEN 10 MG: 10 TABLET ORAL at 08:41

## 2021-01-01 RX ADMIN — MORPHINE SULFATE 2 MG: 2 INJECTION, SOLUTION INTRAMUSCULAR; INTRAVENOUS at 10:31

## 2021-01-01 RX ADMIN — OXYCODONE 10 MG: 5 TABLET ORAL at 08:56

## 2021-01-01 RX ADMIN — SODIUM CHLORIDE 1000 ML: 9 INJECTION, SOLUTION INTRAVENOUS at 00:48

## 2021-01-01 RX ADMIN — MORPHINE SULFATE 2 MG: 2 INJECTION, SOLUTION INTRAMUSCULAR; INTRAVENOUS at 23:20

## 2021-01-01 RX ADMIN — HYDROCORTISONE SODIUM SUCCINATE 100 MG: 100 INJECTION, POWDER, FOR SOLUTION INTRAMUSCULAR; INTRAVENOUS at 06:43

## 2021-01-01 RX ADMIN — ONDANSETRON 4 MG: 2 INJECTION INTRAMUSCULAR; INTRAVENOUS at 12:00

## 2021-01-01 RX ADMIN — DEXTRAN 70, GLYCERIN, HYPROMELLOSE 1 DROP: 1; 2; 3 SOLUTION/ DROPS OPHTHALMIC at 22:41

## 2021-01-01 RX ADMIN — PANTOPRAZOLE SODIUM 40 MG: 40 TABLET, DELAYED RELEASE ORAL at 06:39

## 2021-01-01 RX ADMIN — ENOXAPARIN SODIUM 40 MG: 40 INJECTION SUBCUTANEOUS at 11:35

## 2021-01-01 RX ADMIN — OXYBUTYNIN CHLORIDE 5 MG: 5 TABLET ORAL at 21:22

## 2021-01-01 RX ADMIN — PANTOPRAZOLE SODIUM 40 MG: 40 TABLET, DELAYED RELEASE ORAL at 08:33

## 2021-01-01 RX ADMIN — HEPARIN SODIUM 23.97 UNITS/KG/HR: 10000 INJECTION, SOLUTION INTRAVENOUS at 02:55

## 2021-01-01 RX ADMIN — SUCRALFATE 1 G: 1 TABLET ORAL at 09:26

## 2021-01-01 RX ADMIN — DEXTRAN 70, GLYCERIN, HYPROMELLOSE 1 DROP: 1; 2; 3 SOLUTION/ DROPS OPHTHALMIC at 11:08

## 2021-01-01 RX ADMIN — FLUCONAZOLE 200 MG: 200 INJECTION, SOLUTION INTRAVENOUS at 12:08

## 2021-01-01 RX ADMIN — MAGNESIUM SULFATE HEPTAHYDRATE 4000 MG: 40 INJECTION, SOLUTION INTRAVENOUS at 02:24

## 2021-01-01 RX ADMIN — METOPROLOL TARTRATE 50 MG: 50 TABLET, FILM COATED ORAL at 08:08

## 2021-01-01 RX ADMIN — MINERAL OIL AND PETROLATUM: 150; 830 OINTMENT OPHTHALMIC at 17:14

## 2021-01-01 RX ADMIN — Medication 10 ML: at 08:47

## 2021-01-01 RX ADMIN — HYDROCORTISONE SODIUM SUCCINATE 100 MG: 100 INJECTION, POWDER, FOR SOLUTION INTRAMUSCULAR; INTRAVENOUS at 00:00

## 2021-01-01 RX ADMIN — CEFEPIME HYDROCHLORIDE 1000 MG: 1 INJECTION, POWDER, FOR SOLUTION INTRAMUSCULAR; INTRAVENOUS at 22:59

## 2021-01-01 RX ADMIN — SUCRALFATE 1 G: 1 TABLET ORAL at 04:58

## 2021-01-01 RX ADMIN — Medication 5 ML: at 10:49

## 2021-01-01 RX ADMIN — OXYCODONE 10 MG: 5 TABLET ORAL at 10:51

## 2021-01-01 RX ADMIN — CHLORHEXIDINE GLUCONATE 15 ML: 1.2 RINSE ORAL at 08:55

## 2021-01-01 RX ADMIN — SODIUM CHLORIDE: 9 INJECTION, SOLUTION INTRAVENOUS at 19:04

## 2021-01-01 RX ADMIN — SODIUM CHLORIDE, POTASSIUM CHLORIDE, SODIUM LACTATE AND CALCIUM CHLORIDE: 600; 310; 30; 20 INJECTION, SOLUTION INTRAVENOUS at 13:29

## 2021-01-01 RX ADMIN — ONDANSETRON 4 MG: 4 TABLET, ORALLY DISINTEGRATING ORAL at 15:50

## 2021-01-01 RX ADMIN — PANTOPRAZOLE SODIUM 40 MG: 40 INJECTION, POWDER, FOR SOLUTION INTRAVENOUS at 08:55

## 2021-01-01 RX ADMIN — OXYCODONE 10 MG: 5 TABLET ORAL at 19:36

## 2021-01-01 RX ADMIN — SUCRALFATE 1 G: 1 TABLET ORAL at 16:36

## 2021-01-01 RX ADMIN — MORPHINE SULFATE 2 MG: 2 INJECTION, SOLUTION INTRAMUSCULAR; INTRAVENOUS at 23:46

## 2021-01-01 RX ADMIN — OXYCODONE 10 MG: 5 TABLET ORAL at 20:46

## 2021-01-01 RX ADMIN — SODIUM CHLORIDE, PRESERVATIVE FREE 10 ML: 5 INJECTION INTRAVENOUS at 09:49

## 2021-01-01 RX ADMIN — OXYCODONE 10 MG: 5 TABLET ORAL at 08:43

## 2021-01-01 RX ADMIN — MINERAL OIL AND PETROLATUM: 150; 830 OINTMENT OPHTHALMIC at 13:08

## 2021-01-01 RX ADMIN — PANTOPRAZOLE SODIUM 40 MG: 40 TABLET, DELAYED RELEASE ORAL at 06:29

## 2021-01-01 RX ADMIN — IOPAMIDOL 90 ML: 755 INJECTION, SOLUTION INTRAVENOUS at 17:59

## 2021-01-01 RX ADMIN — ONDANSETRON 4 MG: 2 INJECTION INTRAMUSCULAR; INTRAVENOUS at 18:21

## 2021-01-01 RX ADMIN — ATORVASTATIN CALCIUM 80 MG: 40 TABLET, FILM COATED ORAL at 21:12

## 2021-01-01 RX ADMIN — OXYCODONE 10 MG: 5 TABLET ORAL at 19:25

## 2021-01-01 RX ADMIN — HEPARIN SODIUM 4060 UNITS: 1000 INJECTION, SOLUTION INTRAVENOUS; SUBCUTANEOUS at 07:56

## 2021-01-01 RX ADMIN — OXYCODONE 10 MG: 5 TABLET ORAL at 13:07

## 2021-01-01 RX ADMIN — SODIUM CHLORIDE, PRESERVATIVE FREE 10 ML: 5 INJECTION INTRAVENOUS at 14:35

## 2021-01-01 RX ADMIN — METOPROLOL SUCCINATE 50 MG: 50 TABLET, EXTENDED RELEASE ORAL at 09:06

## 2021-01-01 RX ADMIN — METHYLPREDNISOLONE SODIUM SUCCINATE 40 MG: 40 INJECTION, POWDER, FOR SOLUTION INTRAMUSCULAR; INTRAVENOUS at 08:29

## 2021-01-01 RX ADMIN — PANTOPRAZOLE SODIUM 40 MG: 40 TABLET, DELAYED RELEASE ORAL at 05:09

## 2021-01-01 RX ADMIN — LEVETIRACETAM 500 MG: 5 INJECTION INTRAVENOUS at 21:41

## 2021-01-01 RX ADMIN — HEPARIN SODIUM 4390 UNITS: 1000 INJECTION, SOLUTION INTRAVENOUS; SUBCUTANEOUS at 20:42

## 2021-01-01 RX ADMIN — Medication 10 ML: at 10:13

## 2021-01-01 RX ADMIN — SODIUM CHLORIDE, POTASSIUM CHLORIDE, SODIUM LACTATE AND CALCIUM CHLORIDE: 600; 310; 30; 20 INJECTION, SOLUTION INTRAVENOUS at 06:35

## 2021-01-01 RX ADMIN — ONDANSETRON 4 MG: 2 INJECTION INTRAMUSCULAR; INTRAVENOUS at 16:37

## 2021-01-01 RX ADMIN — APIXABAN 5 MG: 5 TABLET, FILM COATED ORAL at 08:33

## 2021-01-01 RX ADMIN — Medication 1 TABLET: at 19:54

## 2021-01-01 RX ADMIN — Medication 1 TABLET: at 09:22

## 2021-01-01 RX ADMIN — PHENYLEPHRINE HYDROCHLORIDE 200 MCG: 10 INJECTION INTRAVENOUS at 16:36

## 2021-01-01 RX ADMIN — SODIUM CHLORIDE, POTASSIUM CHLORIDE, SODIUM LACTATE AND CALCIUM CHLORIDE: 600; 310; 30; 20 INJECTION, SOLUTION INTRAVENOUS at 12:34

## 2021-01-01 RX ADMIN — SUCRALFATE 1 G: 1 TABLET ORAL at 22:07

## 2021-01-01 RX ADMIN — Medication 1 TABLET: at 10:33

## 2021-01-01 RX ADMIN — METOPROLOL SUCCINATE 50 MG: 50 TABLET, EXTENDED RELEASE ORAL at 08:49

## 2021-01-01 RX ADMIN — HEPARIN SODIUM 29.92 UNITS/KG/HR: 10000 INJECTION, SOLUTION INTRAVENOUS at 11:23

## 2021-01-01 RX ADMIN — Medication 120 MG: at 22:12

## 2021-01-01 RX ADMIN — HEPARIN SODIUM 5000 UNITS: 5000 INJECTION INTRAVENOUS; SUBCUTANEOUS at 22:11

## 2021-01-01 RX ADMIN — POTASSIUM CHLORIDE: 2 INJECTION, SOLUTION, CONCENTRATE INTRAVENOUS at 05:20

## 2021-01-01 RX ADMIN — OXYBUTYNIN CHLORIDE 5 MG: 5 TABLET ORAL at 09:22

## 2021-01-01 RX ADMIN — POTASSIUM CHLORIDE 10 MEQ: 10 INJECTION, SOLUTION INTRAVENOUS at 16:30

## 2021-01-01 RX ADMIN — METOPROLOL SUCCINATE 50 MG: 50 TABLET, EXTENDED RELEASE ORAL at 08:33

## 2021-01-01 RX ADMIN — POTASSIUM CHLORIDE 10 MEQ: 10 INJECTION, SOLUTION INTRAVENOUS at 15:28

## 2021-01-01 RX ADMIN — PREDNISONE 40 MG: 20 TABLET ORAL at 11:00

## 2021-01-01 RX ADMIN — LEVETIRACETAM 1000 MG: 100 INJECTION, SOLUTION, CONCENTRATE INTRAVENOUS at 19:35

## 2021-01-01 RX ADMIN — SODIUM CHLORIDE: 9 INJECTION, SOLUTION INTRAVENOUS at 02:33

## 2021-01-01 RX ADMIN — SODIUM CHLORIDE, PRESERVATIVE FREE 10 ML: 5 INJECTION INTRAVENOUS at 21:25

## 2021-01-01 RX ADMIN — IOPAMIDOL 80 ML: 755 INJECTION, SOLUTION INTRAVENOUS at 07:24

## 2021-01-01 RX ADMIN — PIPERACILLIN AND TAZOBACTAM 3375 MG: 3; .375 INJECTION, POWDER, LYOPHILIZED, FOR SOLUTION INTRAVENOUS at 06:15

## 2021-01-01 RX ADMIN — VASOPRESSIN 0.04 UNITS/HR: 20 INJECTION INTRAVENOUS at 23:17

## 2021-01-01 RX ADMIN — MORPHINE SULFATE 2 MG: 2 INJECTION, SOLUTION INTRAMUSCULAR; INTRAVENOUS at 13:37

## 2021-01-01 RX ADMIN — ENOXAPARIN SODIUM 40 MG: 40 INJECTION SUBCUTANEOUS at 08:57

## 2021-01-01 RX ADMIN — METRONIDAZOLE 500 MG: 500 INJECTION, SOLUTION INTRAVENOUS at 11:08

## 2021-01-01 RX ADMIN — MORPHINE SULFATE 2 MG: 2 INJECTION, SOLUTION INTRAMUSCULAR; INTRAVENOUS at 06:26

## 2021-01-01 RX ADMIN — PREDNISONE 10 MG: 10 TABLET ORAL at 07:48

## 2021-01-01 RX ADMIN — Medication 10 MCG/MIN: at 22:00

## 2021-01-01 RX ADMIN — Medication 25 MCG/HR: at 19:06

## 2021-01-01 RX ADMIN — OXYCODONE 10 MG: 5 TABLET ORAL at 19:30

## 2021-01-01 RX ADMIN — CLOTRIMAZOLE AND BETAMETHASONE DIPROPIONATE 1 EACH: 10; .5 CREAM TOPICAL at 11:59

## 2021-01-01 RX ADMIN — OXYCODONE 10 MG: 5 TABLET ORAL at 05:32

## 2021-01-01 RX ADMIN — SODIUM CHLORIDE 1000 ML: 9 INJECTION, SOLUTION INTRAVENOUS at 05:21

## 2021-01-01 RX ADMIN — OXYCODONE 10 MG: 5 TABLET ORAL at 11:58

## 2021-01-01 RX ADMIN — MORPHINE SULFATE 2 MG: 2 INJECTION, SOLUTION INTRAMUSCULAR; INTRAVENOUS at 13:53

## 2021-01-01 RX ADMIN — OXYBUTYNIN CHLORIDE 5 MG: 5 TABLET ORAL at 10:33

## 2021-01-01 RX ADMIN — OXYCODONE 10 MG: 5 TABLET ORAL at 18:20

## 2021-01-01 RX ADMIN — OXYCODONE 10 MG: 5 TABLET ORAL at 04:55

## 2021-01-01 RX ADMIN — CEFEPIME HYDROCHLORIDE 1000 MG: 1 INJECTION, POWDER, FOR SOLUTION INTRAMUSCULAR; INTRAVENOUS at 12:05

## 2021-01-01 RX ADMIN — AZITHROMYCIN DIHYDRATE 500 MG: 500 INJECTION, POWDER, LYOPHILIZED, FOR SOLUTION INTRAVENOUS at 16:24

## 2021-01-01 RX ADMIN — MIRTAZAPINE 15 MG: 15 TABLET, FILM COATED ORAL at 21:22

## 2021-01-01 RX ADMIN — SODIUM CHLORIDE 1000 MG: 0.9 INJECTION, SOLUTION INTRAVENOUS at 12:43

## 2021-01-01 RX ADMIN — OXYCODONE 10 MG: 5 TABLET ORAL at 00:00

## 2021-01-01 RX ADMIN — METHYLPREDNISOLONE SODIUM SUCCINATE 40 MG: 40 INJECTION, POWDER, FOR SOLUTION INTRAMUSCULAR; INTRAVENOUS at 08:00

## 2021-01-01 RX ADMIN — MORPHINE SULFATE 4 MG: 4 INJECTION, SOLUTION INTRAMUSCULAR; INTRAVENOUS at 04:47

## 2021-01-01 RX ADMIN — HYDROMORPHONE HYDROCHLORIDE 0.25 MG: 1 INJECTION, SOLUTION INTRAMUSCULAR; INTRAVENOUS; SUBCUTANEOUS at 18:32

## 2021-01-01 RX ADMIN — MIRTAZAPINE 15 MG: 15 TABLET, FILM COATED ORAL at 21:18

## 2021-01-01 RX ADMIN — SODIUM CHLORIDE 25 ML: 9 INJECTION, SOLUTION INTRAVENOUS at 15:43

## 2021-01-01 RX ADMIN — ATORVASTATIN CALCIUM 80 MG: 40 TABLET, FILM COATED ORAL at 19:54

## 2021-01-01 RX ADMIN — CEFEPIME HYDROCHLORIDE 1000 MG: 1 INJECTION, POWDER, FOR SOLUTION INTRAMUSCULAR; INTRAVENOUS at 23:02

## 2021-01-01 RX ADMIN — MORPHINE SULFATE 2 MG: 2 INJECTION, SOLUTION INTRAMUSCULAR; INTRAVENOUS at 22:14

## 2021-01-01 RX ADMIN — Medication 1 TABLET: at 10:29

## 2021-01-01 RX ADMIN — METHYLPREDNISOLONE SODIUM SUCCINATE 20 MG: 40 INJECTION, POWDER, FOR SOLUTION INTRAMUSCULAR; INTRAVENOUS at 05:12

## 2021-01-01 RX ADMIN — PANTOPRAZOLE SODIUM 40 MG: 40 INJECTION, POWDER, FOR SOLUTION INTRAVENOUS at 21:35

## 2021-01-01 RX ADMIN — ASPIRIN 81 MG: 81 TABLET, CHEWABLE ORAL at 08:35

## 2021-01-01 RX ADMIN — IPRATROPIUM BROMIDE AND ALBUTEROL SULFATE 1 AMPULE: 2.5; .5 SOLUTION RESPIRATORY (INHALATION) at 17:55

## 2021-01-01 RX ADMIN — POTASSIUM CHLORIDE 20 MEQ: 400 INJECTION, SOLUTION INTRAVENOUS at 01:52

## 2021-01-01 RX ADMIN — PROPOFOL 100 MG: 10 INJECTION, EMULSION INTRAVENOUS at 16:48

## 2021-01-01 RX ADMIN — OXYCODONE 10 MG: 5 TABLET ORAL at 06:59

## 2021-01-01 RX ADMIN — OXYCODONE 10 MG: 5 TABLET ORAL at 20:48

## 2021-01-01 RX ADMIN — OXYBUTYNIN CHLORIDE 5 MG: 5 TABLET ORAL at 20:02

## 2021-01-01 RX ADMIN — ATORVASTATIN CALCIUM 80 MG: 40 TABLET, FILM COATED ORAL at 22:09

## 2021-01-01 RX ADMIN — ATORVASTATIN CALCIUM 80 MG: 40 TABLET, FILM COATED ORAL at 19:56

## 2021-01-01 RX ADMIN — PANTOPRAZOLE SODIUM 40 MG: 40 TABLET, DELAYED RELEASE ORAL at 08:08

## 2021-01-01 RX ADMIN — REGADENOSON 0.4 MG: 0.08 INJECTION, SOLUTION INTRAVENOUS at 09:50

## 2021-01-01 RX ADMIN — LEVETIRACETAM 250 MG: 500 TABLET ORAL at 11:44

## 2021-01-01 RX ADMIN — ONDANSETRON 4 MG: 2 INJECTION INTRAMUSCULAR; INTRAVENOUS at 13:06

## 2021-01-01 RX ADMIN — VASOPRESSIN 0.04 UNITS/MIN: 20 INJECTION INTRAVENOUS at 16:06

## 2021-01-01 RX ADMIN — MORPHINE SULFATE 2 MG: 2 INJECTION, SOLUTION INTRAMUSCULAR; INTRAVENOUS at 21:52

## 2021-01-01 RX ADMIN — OXYBUTYNIN CHLORIDE 5 MG: 5 TABLET ORAL at 11:45

## 2021-01-01 RX ADMIN — CEFTRIAXONE SODIUM 1000 MG: 1 INJECTION, POWDER, FOR SOLUTION INTRAMUSCULAR; INTRAVENOUS at 16:20

## 2021-01-01 RX ADMIN — OXYCODONE 10 MG: 5 TABLET ORAL at 13:49

## 2021-01-01 RX ADMIN — Medication 10 ML: at 21:59

## 2021-01-01 RX ADMIN — SODIUM CHLORIDE, POTASSIUM CHLORIDE, SODIUM LACTATE AND CALCIUM CHLORIDE: 600; 310; 30; 20 INJECTION, SOLUTION INTRAVENOUS at 16:17

## 2021-01-01 RX ADMIN — METOPROLOL SUCCINATE 25 MG: 25 TABLET, EXTENDED RELEASE ORAL at 08:48

## 2021-01-01 RX ADMIN — SODIUM CHLORIDE, POTASSIUM CHLORIDE, SODIUM LACTATE AND CALCIUM CHLORIDE 1000 ML: 600; 310; 30; 20 INJECTION, SOLUTION INTRAVENOUS at 21:54

## 2021-01-01 RX ADMIN — PANTOPRAZOLE SODIUM 40 MG: 40 TABLET, DELAYED RELEASE ORAL at 10:29

## 2021-01-01 RX ADMIN — OXYBUTYNIN CHLORIDE 5 MG: 5 TABLET ORAL at 10:29

## 2021-01-01 RX ADMIN — SODIUM CHLORIDE, PRESERVATIVE FREE 10 ML: 5 INJECTION INTRAVENOUS at 21:42

## 2021-01-01 RX ADMIN — CALCIUM GLUCONATE 1000 MG: 98 INJECTION, SOLUTION INTRAVENOUS at 19:35

## 2021-01-01 RX ADMIN — POTASSIUM CHLORIDE 20 MEQ: 400 INJECTION, SOLUTION INTRAVENOUS at 02:43

## 2021-01-01 RX ADMIN — PREDNISONE 10 MG: 10 TABLET ORAL at 20:15

## 2021-01-01 RX ADMIN — METHYLPREDNISOLONE SODIUM SUCCINATE 20 MG: 40 INJECTION, POWDER, FOR SOLUTION INTRAMUSCULAR; INTRAVENOUS at 18:45

## 2021-01-01 RX ADMIN — Medication 10 ML: at 10:10

## 2021-01-01 RX ADMIN — Medication 1 TABLET: at 21:21

## 2021-01-01 RX ADMIN — SODIUM CHLORIDE, PRESERVATIVE FREE 10 ML: 5 INJECTION INTRAVENOUS at 08:31

## 2021-01-01 RX ADMIN — BACLOFEN 10 MG: 10 TABLET ORAL at 08:44

## 2021-01-01 RX ADMIN — SUCRALFATE 1 G: 1 TABLET ORAL at 00:27

## 2021-01-01 RX ADMIN — ASPIRIN 81 MG: 81 TABLET, CHEWABLE ORAL at 09:08

## 2021-01-01 RX ADMIN — LEVETIRACETAM 250 MG: 100 INJECTION, SOLUTION INTRAVENOUS at 10:45

## 2021-01-01 RX ADMIN — POTASSIUM CHLORIDE: 149 INJECTION, SOLUTION, CONCENTRATE INTRAVENOUS at 10:09

## 2021-01-01 RX ADMIN — POTASSIUM CHLORIDE, DEXTROSE MONOHYDRATE AND SODIUM CHLORIDE: 150; 5; 450 INJECTION, SOLUTION INTRAVENOUS at 20:45

## 2021-01-01 RX ADMIN — APIXABAN 5 MG: 5 TABLET, FILM COATED ORAL at 10:29

## 2021-01-01 RX ADMIN — OXYCODONE 10 MG: 5 TABLET ORAL at 05:54

## 2021-01-01 RX ADMIN — Medication 25 MCG/HR: at 13:56

## 2021-01-01 RX ADMIN — OXYCODONE 10 MG: 5 TABLET ORAL at 09:05

## 2021-01-01 RX ADMIN — IOPAMIDOL 90 ML: 755 INJECTION, SOLUTION INTRAVENOUS at 20:15

## 2021-01-01 RX ADMIN — MORPHINE SULFATE 2 MG: 2 INJECTION, SOLUTION INTRAMUSCULAR; INTRAVENOUS at 07:41

## 2021-01-01 RX ADMIN — POTASSIUM CHLORIDE 10 MEQ: 7.46 INJECTION, SOLUTION INTRAVENOUS at 11:13

## 2021-01-01 RX ADMIN — SODIUM CHLORIDE 25 ML: 9 INJECTION, SOLUTION INTRAVENOUS at 16:10

## 2021-01-01 RX ADMIN — SUCRALFATE 1 G: 1 TABLET ORAL at 17:14

## 2021-01-01 RX ADMIN — SODIUM CHLORIDE, PRESERVATIVE FREE 10 ML: 5 INJECTION INTRAVENOUS at 08:29

## 2021-01-01 RX ADMIN — POTASSIUM CHLORIDE: 2 INJECTION, SOLUTION, CONCENTRATE INTRAVENOUS at 09:43

## 2021-01-01 RX ADMIN — MORPHINE SULFATE 4 MG: 4 INJECTION, SOLUTION INTRAMUSCULAR; INTRAVENOUS at 22:11

## 2021-01-01 RX ADMIN — CALCIUM GLUCONATE 2000 MG: 98 INJECTION, SOLUTION INTRAVENOUS at 10:40

## 2021-01-01 RX ADMIN — MORPHINE SULFATE 4 MG: 4 INJECTION, SOLUTION INTRAMUSCULAR; INTRAVENOUS at 19:04

## 2021-01-01 RX ADMIN — OXYCODONE 10 MG: 5 TABLET ORAL at 22:52

## 2021-01-01 RX ADMIN — SODIUM CHLORIDE 25 ML: 9 INJECTION, SOLUTION INTRAVENOUS at 03:13

## 2021-01-01 RX ADMIN — OXYBUTYNIN CHLORIDE 5 MG: 5 TABLET ORAL at 22:29

## 2021-01-01 RX ADMIN — SUCRALFATE 1 G: 1 TABLET ORAL at 13:08

## 2021-01-01 RX ADMIN — METOPROLOL SUCCINATE 50 MG: 50 TABLET, EXTENDED RELEASE ORAL at 08:44

## 2021-01-01 RX ADMIN — PHENYLEPHRINE HYDROCHLORIDE 100 MCG: 10 INJECTION INTRAVENOUS at 16:37

## 2021-01-01 RX ADMIN — ATORVASTATIN CALCIUM 80 MG: 40 TABLET, FILM COATED ORAL at 20:53

## 2021-01-01 RX ADMIN — LEVETIRACETAM 250 MG: 500 SOLUTION ORAL at 10:29

## 2021-01-01 RX ADMIN — LEVETIRACETAM 250 MG: 5 INJECTION INTRAVENOUS at 08:53

## 2021-01-01 RX ADMIN — PANTOPRAZOLE SODIUM 40 MG: 40 INJECTION, POWDER, FOR SOLUTION INTRAVENOUS at 00:02

## 2021-01-01 RX ADMIN — TRIAMCINOLONE ACETONIDE 0.1 G: 1 CREAM TOPICAL at 11:59

## 2021-01-01 RX ADMIN — POTASSIUM & SODIUM PHOSPHATES POWDER PACK 280-160-250 MG 250 MG: 280-160-250 PACK at 13:10

## 2021-01-01 RX ADMIN — LEVETIRACETAM 250 MG: 5 INJECTION INTRAVENOUS at 10:19

## 2021-01-01 RX ADMIN — METOPROLOL SUCCINATE 50 MG: 50 TABLET, EXTENDED RELEASE ORAL at 11:44

## 2021-01-01 RX ADMIN — SODIUM CHLORIDE: 9 INJECTION, SOLUTION INTRAVENOUS at 11:30

## 2021-01-01 RX ADMIN — SODIUM CHLORIDE 1000 ML: 9 INJECTION, SOLUTION INTRAVENOUS at 07:07

## 2021-01-01 RX ADMIN — METHYLPREDNISOLONE SODIUM SUCCINATE 40 MG: 40 INJECTION, POWDER, FOR SOLUTION INTRAMUSCULAR; INTRAVENOUS at 05:56

## 2021-01-01 RX ADMIN — SUCRALFATE 1 G: 1 TABLET ORAL at 04:55

## 2021-01-01 RX ADMIN — CLOTRIMAZOLE AND BETAMETHASONE DIPROPIONATE 1 EACH: 10; .5 CREAM TOPICAL at 08:34

## 2021-01-01 RX ADMIN — SUCRALFATE 1 G: 1 TABLET ORAL at 13:31

## 2021-01-01 RX ADMIN — SODIUM CHLORIDE, POTASSIUM CHLORIDE, SODIUM LACTATE AND CALCIUM CHLORIDE: 600; 310; 30; 20 INJECTION, SOLUTION INTRAVENOUS at 00:19

## 2021-01-01 RX ADMIN — METOPROLOL SUCCINATE 50 MG: 50 TABLET, EXTENDED RELEASE ORAL at 09:07

## 2021-01-01 RX ADMIN — ONDANSETRON 4 MG: 2 INJECTION INTRAMUSCULAR; INTRAVENOUS at 16:17

## 2021-01-01 RX ADMIN — SODIUM CHLORIDE: 9 INJECTION, SOLUTION INTRAVENOUS at 22:40

## 2021-01-01 RX ADMIN — LISINOPRIL 2.5 MG: 5 TABLET ORAL at 16:43

## 2021-01-01 RX ADMIN — CEFEPIME HYDROCHLORIDE 1000 MG: 1 INJECTION, POWDER, FOR SOLUTION INTRAMUSCULAR; INTRAVENOUS at 11:00

## 2021-01-01 RX ADMIN — Medication 10 ML: at 10:38

## 2021-01-01 RX ADMIN — OXYCODONE 10 MG: 5 TABLET ORAL at 11:01

## 2021-01-01 RX ADMIN — OXYCODONE 10 MG: 5 TABLET ORAL at 23:47

## 2021-01-01 RX ADMIN — LEVETIRACETAM 500 MG: 100 INJECTION, SOLUTION INTRAVENOUS at 21:33

## 2021-01-01 RX ADMIN — VASOPRESSIN 0.04 UNITS/MIN: 20 INJECTION INTRAVENOUS at 06:44

## 2021-01-01 RX ADMIN — POTASSIUM CHLORIDE, DEXTROSE MONOHYDRATE AND SODIUM CHLORIDE: 150; 5; 450 INJECTION, SOLUTION INTRAVENOUS at 16:24

## 2021-01-01 RX ADMIN — PANTOPRAZOLE SODIUM 40 MG: 40 INJECTION, POWDER, FOR SOLUTION INTRAVENOUS at 21:30

## 2021-01-01 RX ADMIN — ENOXAPARIN SODIUM 40 MG: 40 INJECTION SUBCUTANEOUS at 11:40

## 2021-01-01 RX ADMIN — OXYBUTYNIN CHLORIDE 5 MG: 5 TABLET ORAL at 20:58

## 2021-01-01 RX ADMIN — Medication 1 TABLET: at 21:22

## 2021-01-01 RX ADMIN — APIXABAN 5 MG: 5 TABLET, FILM COATED ORAL at 20:47

## 2021-01-01 RX ADMIN — POTASSIUM CHLORIDE 10 MEQ: 7.46 INJECTION, SOLUTION INTRAVENOUS at 20:16

## 2021-01-01 RX ADMIN — OXYBUTYNIN CHLORIDE 5 MG: 5 TABLET ORAL at 20:17

## 2021-01-01 RX ADMIN — ATORVASTATIN CALCIUM 80 MG: 20 TABLET, FILM COATED ORAL at 21:39

## 2021-01-01 RX ADMIN — SODIUM CHLORIDE: 9 INJECTION, SOLUTION INTRAVENOUS at 13:05

## 2021-01-01 RX ADMIN — CEFEPIME HYDROCHLORIDE 1000 MG: 1 INJECTION, POWDER, FOR SOLUTION INTRAMUSCULAR; INTRAVENOUS at 23:17

## 2021-01-01 RX ADMIN — PANTOPRAZOLE SODIUM 40 MG: 40 TABLET, DELAYED RELEASE ORAL at 12:03

## 2021-01-01 RX ADMIN — DEXTROSE MONOHYDRATE 12.5 G: 25 INJECTION, SOLUTION INTRAVENOUS at 04:17

## 2021-01-01 RX ADMIN — SUCRALFATE 1 G: 1 TABLET ORAL at 16:58

## 2021-01-01 RX ADMIN — LEVETIRACETAM 250 MG: 250 TABLET, FILM COATED ORAL at 10:46

## 2021-01-01 RX ADMIN — DEXTRAN 70, GLYCERIN, HYPROMELLOSE 1 DROP: 1; 2; 3 SOLUTION/ DROPS OPHTHALMIC at 06:38

## 2021-01-01 RX ADMIN — VASOPRESSIN 0.04 UNITS/MIN: 20 INJECTION INTRAVENOUS at 07:57

## 2021-01-01 RX ADMIN — OXYCODONE 10 MG: 5 TABLET ORAL at 11:45

## 2021-01-01 RX ADMIN — Medication 20 MILLICURIE: at 12:58

## 2021-01-01 RX ADMIN — MIRTAZAPINE 7.5 MG: 15 TABLET, FILM COATED ORAL at 21:40

## 2021-01-01 RX ADMIN — SODIUM BICARBONATE: 84 INJECTION, SOLUTION INTRAVENOUS at 09:00

## 2021-01-01 RX ADMIN — PANTOPRAZOLE SODIUM 40 MG: 40 TABLET, DELAYED RELEASE ORAL at 18:06

## 2021-01-01 RX ADMIN — OXYBUTYNIN CHLORIDE 5 MG: 5 TABLET ORAL at 07:48

## 2021-01-01 RX ADMIN — SODIUM CHLORIDE, PRESERVATIVE FREE 10 ML: 5 INJECTION INTRAVENOUS at 12:32

## 2021-01-01 RX ADMIN — OXYCODONE 10 MG: 5 TABLET ORAL at 14:13

## 2021-01-01 RX ADMIN — VASOPRESSIN 0.04 UNITS/MIN: 20 INJECTION INTRAVENOUS at 08:34

## 2021-01-01 RX ADMIN — HEPARIN SODIUM 5000 UNITS: 5000 INJECTION INTRAVENOUS; SUBCUTANEOUS at 22:41

## 2021-01-01 RX ADMIN — Medication 1 TABLET: at 08:08

## 2021-01-01 RX ADMIN — OXYCODONE 10 MG: 5 TABLET ORAL at 10:47

## 2021-01-01 RX ADMIN — SODIUM CHLORIDE, PRESERVATIVE FREE 10 ML: 5 INJECTION INTRAVENOUS at 09:10

## 2021-01-01 RX ADMIN — OXYCODONE 10 MG: 5 TABLET ORAL at 05:09

## 2021-01-01 RX ADMIN — ENOXAPARIN SODIUM 40 MG: 40 INJECTION SUBCUTANEOUS at 08:35

## 2021-01-01 RX ADMIN — HEPARIN SODIUM 25.98 UNITS/KG/HR: 10000 INJECTION, SOLUTION INTRAVENOUS at 06:58

## 2021-01-01 RX ADMIN — OXYCODONE HYDROCHLORIDE AND ACETAMINOPHEN 1 TABLET: 10; 325 TABLET ORAL at 11:32

## 2021-01-01 RX ADMIN — ASPIRIN 81 MG: 81 TABLET, CHEWABLE ORAL at 09:06

## 2021-01-01 RX ADMIN — BARIUM SULFATE 176 G: 960 POWDER, FOR SUSPENSION ORAL at 14:11

## 2021-01-01 RX ADMIN — MORPHINE SULFATE 2 MG: 2 INJECTION, SOLUTION INTRAMUSCULAR; INTRAVENOUS at 05:43

## 2021-01-01 RX ADMIN — HYDROCORTISONE SODIUM SUCCINATE 100 MG: 100 INJECTION, POWDER, FOR SOLUTION INTRAMUSCULAR; INTRAVENOUS at 15:00

## 2021-01-01 RX ADMIN — OXYCODONE 10 MG: 5 TABLET ORAL at 08:33

## 2021-01-01 RX ADMIN — OXYBUTYNIN CHLORIDE 5 MG: 5 TABLET ORAL at 21:21

## 2021-01-01 RX ADMIN — SUCRALFATE 1 G: 1 TABLET ORAL at 13:11

## 2021-01-01 RX ADMIN — PIPERACILLIN AND TAZOBACTAM 3.38 G: 3; .375 INJECTION, POWDER, FOR SOLUTION INTRAVENOUS at 22:59

## 2021-01-01 RX ADMIN — MIRTAZAPINE 15 MG: 15 TABLET, FILM COATED ORAL at 20:49

## 2021-01-01 RX ADMIN — ENOXAPARIN SODIUM 50 MG: 60 INJECTION SUBCUTANEOUS at 22:01

## 2021-01-01 RX ADMIN — SODIUM CHLORIDE, POTASSIUM CHLORIDE, SODIUM LACTATE AND CALCIUM CHLORIDE 1000 ML: 600; 310; 30; 20 INJECTION, SOLUTION INTRAVENOUS at 10:24

## 2021-01-01 RX ADMIN — MINERAL OIL AND PETROLATUM: 150; 830 OINTMENT OPHTHALMIC at 01:30

## 2021-01-01 RX ADMIN — MORPHINE SULFATE 2 MG: 2 INJECTION, SOLUTION INTRAMUSCULAR; INTRAVENOUS at 18:22

## 2021-01-01 RX ADMIN — LEVETIRACETAM 250 MG: 500 SOLUTION ORAL at 08:07

## 2021-01-01 RX ADMIN — LEVETIRACETAM 500 MG: 100 INJECTION, SOLUTION INTRAVENOUS at 23:34

## 2021-01-01 RX ADMIN — APIXABAN 5 MG: 5 TABLET, FILM COATED ORAL at 07:48

## 2021-01-01 RX ADMIN — CALCIUM GLUCONATE 2000 MG: 98 INJECTION, SOLUTION INTRAVENOUS at 10:32

## 2021-01-01 RX ADMIN — PANTOPRAZOLE SODIUM 40 MG: 40 TABLET, DELAYED RELEASE ORAL at 16:36

## 2021-01-01 RX ADMIN — ENOXAPARIN SODIUM 40 MG: 40 INJECTION SUBCUTANEOUS at 20:16

## 2021-01-01 RX ADMIN — FENTANYL CITRATE 100 MCG: 50 INJECTION, SOLUTION INTRAMUSCULAR; INTRAVENOUS at 16:50

## 2021-01-01 RX ADMIN — LISINOPRIL 5 MG: 5 TABLET ORAL at 11:30

## 2021-01-01 RX ADMIN — POTASSIUM CHLORIDE 10 MEQ: 10 INJECTION, SOLUTION INTRAVENOUS at 12:32

## 2021-01-01 RX ADMIN — LEVETIRACETAM 500 MG: 500 TABLET ORAL at 20:01

## 2021-01-01 RX ADMIN — BACLOFEN 10 MG: 10 TABLET ORAL at 09:22

## 2021-01-01 RX ADMIN — Medication 1 TABLET: at 10:46

## 2021-01-01 RX ADMIN — CLOTRIMAZOLE AND BETAMETHASONE DIPROPIONATE 1 EACH: 10; .5 CREAM TOPICAL at 08:47

## 2021-01-01 RX ADMIN — MORPHINE SULFATE 2 MG: 2 INJECTION, SOLUTION INTRAMUSCULAR; INTRAVENOUS at 01:44

## 2021-01-01 RX ADMIN — Medication 1 TABLET: at 20:57

## 2021-01-01 RX ADMIN — Medication 2 EACH: at 09:50

## 2021-01-01 RX ADMIN — CEFEPIME HYDROCHLORIDE 1000 MG: 1 INJECTION, POWDER, FOR SOLUTION INTRAMUSCULAR; INTRAVENOUS at 22:10

## 2021-01-01 RX ADMIN — ACETAMINOPHEN 650 MG: 325 TABLET ORAL at 22:07

## 2021-01-01 RX ADMIN — PANTOPRAZOLE SODIUM 40 MG: 40 TABLET, DELAYED RELEASE ORAL at 16:04

## 2021-01-01 RX ADMIN — SODIUM BICARBONATE: 84 INJECTION, SOLUTION INTRAVENOUS at 12:23

## 2021-01-01 RX ADMIN — Medication 2000 UNITS: at 07:40

## 2021-01-01 RX ADMIN — POTASSIUM CHLORIDE, DEXTROSE MONOHYDRATE AND SODIUM CHLORIDE: 150; 5; 450 INJECTION, SOLUTION INTRAVENOUS at 00:38

## 2021-01-01 RX ADMIN — LEVETIRACETAM 250 MG: 100 INJECTION, SOLUTION INTRAVENOUS at 09:21

## 2021-01-01 RX ADMIN — SUCRALFATE 1 G: 1 TABLET ORAL at 13:15

## 2021-01-01 RX ADMIN — SUCRALFATE 1 G: 1 TABLET ORAL at 17:41

## 2021-01-01 RX ADMIN — PANTOPRAZOLE SODIUM 40 MG: 40 INJECTION, POWDER, FOR SOLUTION INTRAVENOUS at 09:05

## 2021-01-01 RX ADMIN — DEXTROSE AND SODIUM CHLORIDE: 5; 450 INJECTION, SOLUTION INTRAVENOUS at 02:45

## 2021-01-01 RX ADMIN — OXYCODONE 10 MG: 5 TABLET ORAL at 00:27

## 2021-01-01 RX ADMIN — SODIUM CHLORIDE, PRESERVATIVE FREE 10 ML: 5 INJECTION INTRAVENOUS at 12:28

## 2021-01-01 RX ADMIN — Medication 10 ML: at 08:49

## 2021-01-01 RX ADMIN — HEPARIN SODIUM 29.92 UNITS/KG/HR: 10000 INJECTION, SOLUTION INTRAVENOUS at 00:22

## 2021-01-01 RX ADMIN — PROPOFOL 100 MCG/KG/MIN: 10 INJECTION, EMULSION INTRAVENOUS at 13:25

## 2021-01-01 RX ADMIN — ASPIRIN 81 MG: 81 TABLET, CHEWABLE ORAL at 08:44

## 2021-01-01 RX ADMIN — ONDANSETRON 4 MG: 2 INJECTION INTRAMUSCULAR; INTRAVENOUS at 12:11

## 2021-01-01 RX ADMIN — APIXABAN 5 MG: 5 TABLET, FILM COATED ORAL at 21:21

## 2021-01-01 RX ADMIN — WATER 1000 MG: 1 INJECTION INTRAMUSCULAR; INTRAVENOUS; SUBCUTANEOUS at 16:35

## 2021-01-01 RX ADMIN — FENTANYL CITRATE 50 MCG: 50 INJECTION, SOLUTION INTRAMUSCULAR; INTRAVENOUS at 21:40

## 2021-01-01 RX ADMIN — SODIUM CHLORIDE, POTASSIUM CHLORIDE, SODIUM LACTATE AND CALCIUM CHLORIDE: 600; 310; 30; 20 INJECTION, SOLUTION INTRAVENOUS at 22:00

## 2021-01-01 RX ADMIN — OXYCODONE 10 MG: 5 TABLET ORAL at 01:30

## 2021-01-01 RX ADMIN — SODIUM CHLORIDE 1000 ML: 9 INJECTION, SOLUTION INTRAVENOUS at 15:48

## 2021-01-01 RX ADMIN — FENTANYL CITRATE 100 MCG: 50 INJECTION, SOLUTION INTRAMUSCULAR; INTRAVENOUS at 16:16

## 2021-01-01 RX ADMIN — SUCRALFATE 1 G: 1 TABLET ORAL at 17:30

## 2021-01-01 RX ADMIN — ETOMIDATE 16 MG: 2 INJECTION, SOLUTION INTRAVENOUS at 22:12

## 2021-01-01 RX ADMIN — PANTOPRAZOLE SODIUM 40 MG: 40 TABLET, DELAYED RELEASE ORAL at 06:35

## 2021-01-01 RX ADMIN — MIRTAZAPINE 15 MG: 15 TABLET, FILM COATED ORAL at 21:59

## 2021-01-01 RX ADMIN — PROPOFOL 120 MCG/KG/MIN: 10 INJECTION, EMULSION INTRAVENOUS at 10:33

## 2021-01-01 RX ADMIN — OXYCODONE 10 MG: 5 TABLET ORAL at 22:07

## 2021-01-01 RX ADMIN — BACLOFEN 10 MG: 10 TABLET ORAL at 21:59

## 2021-01-01 RX ADMIN — SUCRALFATE 1 G: 1 TABLET ORAL at 17:50

## 2021-01-01 RX ADMIN — OXYCODONE 10 MG: 5 TABLET ORAL at 06:39

## 2021-01-01 RX ADMIN — LISINOPRIL 2.5 MG: 5 TABLET ORAL at 08:42

## 2021-01-01 RX ADMIN — SUCRALFATE 1 G: 1 TABLET ORAL at 13:05

## 2021-01-01 RX ADMIN — OXYCODONE 10 MG: 5 TABLET ORAL at 11:52

## 2021-01-01 RX ADMIN — OXYCODONE 10 MG: 5 TABLET ORAL at 15:24

## 2021-01-01 RX ADMIN — DEXTRAN 70, GLYCERIN, HYPROMELLOSE 1 DROP: 1; 2; 3 SOLUTION/ DROPS OPHTHALMIC at 07:30

## 2021-01-01 RX ADMIN — CLOTRIMAZOLE AND BETAMETHASONE DIPROPIONATE 1 EACH: 10; .5 CREAM TOPICAL at 08:48

## 2021-01-01 RX ADMIN — SODIUM CHLORIDE, POTASSIUM CHLORIDE, SODIUM LACTATE AND CALCIUM CHLORIDE: 600; 310; 30; 20 INJECTION, SOLUTION INTRAVENOUS at 20:49

## 2021-01-01 RX ADMIN — SODIUM CHLORIDE 125 MG: 9 INJECTION, SOLUTION INTRAVENOUS at 10:36

## 2021-01-01 RX ADMIN — SUCRALFATE 1 G: 1 TABLET ORAL at 13:54

## 2021-01-01 RX ADMIN — SODIUM CHLORIDE, POTASSIUM CHLORIDE, SODIUM LACTATE AND CALCIUM CHLORIDE 1000 ML: 600; 310; 30; 20 INJECTION, SOLUTION INTRAVENOUS at 08:35

## 2021-01-01 RX ADMIN — MORPHINE SULFATE 2 MG: 2 INJECTION, SOLUTION INTRAMUSCULAR; INTRAVENOUS at 18:06

## 2021-01-01 RX ADMIN — DEXTROSE AND SODIUM CHLORIDE: 5; 450 INJECTION, SOLUTION INTRAVENOUS at 12:03

## 2021-01-01 RX ADMIN — SUCRALFATE 1 G: 1 TABLET ORAL at 12:12

## 2021-01-01 RX ADMIN — OXYCODONE HYDROCHLORIDE 10 MG: 5 TABLET ORAL at 16:35

## 2021-01-01 RX ADMIN — SODIUM CHLORIDE, PRESERVATIVE FREE 10 ML: 5 INJECTION INTRAVENOUS at 00:13

## 2021-01-01 RX ADMIN — OXYCODONE 10 MG: 5 TABLET ORAL at 16:28

## 2021-01-01 RX ADMIN — TRIAMCINOLONE ACETONIDE 0.1 G: 1 CREAM TOPICAL at 21:45

## 2021-01-01 RX ADMIN — MIRTAZAPINE 15 MG: 15 TABLET, FILM COATED ORAL at 19:54

## 2021-01-01 RX ADMIN — ATORVASTATIN CALCIUM 80 MG: 40 TABLET, FILM COATED ORAL at 20:49

## 2021-01-01 RX ADMIN — PANTOPRAZOLE SODIUM 40 MG: 40 INJECTION, POWDER, FOR SOLUTION INTRAVENOUS at 09:23

## 2021-01-01 RX ADMIN — ATORVASTATIN CALCIUM 80 MG: 40 TABLET, FILM COATED ORAL at 21:22

## 2021-01-01 RX ADMIN — SODIUM CHLORIDE, PRESERVATIVE FREE 10 ML: 5 INJECTION INTRAVENOUS at 09:06

## 2021-01-01 RX ADMIN — OXYBUTYNIN CHLORIDE 5 MG: 5 TABLET ORAL at 21:40

## 2021-01-01 RX ADMIN — LEVETIRACETAM 250 MG: 100 INJECTION, SOLUTION INTRAVENOUS at 09:44

## 2021-01-01 RX ADMIN — GLYCOPYRROLATE 0.2 MG: 0.2 INJECTION INTRAMUSCULAR; INTRAVENOUS at 21:20

## 2021-01-01 RX ADMIN — ENOXAPARIN SODIUM 50 MG: 60 INJECTION SUBCUTANEOUS at 20:47

## 2021-01-01 RX ADMIN — PIPERACILLIN AND TAZOBACTAM 3375 MG: 3; .375 INJECTION, POWDER, LYOPHILIZED, FOR SOLUTION INTRAVENOUS at 16:57

## 2021-01-01 RX ADMIN — POTASSIUM CHLORIDE, DEXTROSE MONOHYDRATE AND SODIUM CHLORIDE: 150; 5; 450 INJECTION, SOLUTION INTRAVENOUS at 23:23

## 2021-01-01 RX ADMIN — POTASSIUM CHLORIDE: 149 INJECTION, SOLUTION, CONCENTRATE INTRAVENOUS at 21:57

## 2021-01-01 RX ADMIN — SODIUM CHLORIDE 1000 ML: 9 INJECTION, SOLUTION INTRAVENOUS at 14:24

## 2021-01-01 RX ADMIN — Medication 10 ML: at 22:12

## 2021-01-01 RX ADMIN — OXYCODONE 10 MG: 5 TABLET ORAL at 03:11

## 2021-01-01 RX ADMIN — SUCRALFATE 1 G: 1 TABLET ORAL at 16:00

## 2021-01-01 RX ADMIN — Medication 1 TABLET: at 22:09

## 2021-01-01 RX ADMIN — MORPHINE SULFATE 2 MG: 2 INJECTION, SOLUTION INTRAMUSCULAR; INTRAVENOUS at 05:07

## 2021-01-01 RX ADMIN — MINERAL OIL AND PETROLATUM: 150; 830 OINTMENT OPHTHALMIC at 21:23

## 2021-01-01 RX ADMIN — OXYCODONE 10 MG: 5 TABLET ORAL at 14:54

## 2021-01-01 RX ADMIN — POTASSIUM CHLORIDE, DEXTROSE MONOHYDRATE AND SODIUM CHLORIDE: 150; 5; 450 INJECTION, SOLUTION INTRAVENOUS at 06:14

## 2021-01-01 RX ADMIN — POTASSIUM CHLORIDE: 2 INJECTION, SOLUTION, CONCENTRATE INTRAVENOUS at 00:40

## 2021-01-01 RX ADMIN — OXYCODONE 10 MG: 5 TABLET ORAL at 11:42

## 2021-01-01 RX ADMIN — OXYCODONE 10 MG: 5 TABLET ORAL at 07:01

## 2021-01-01 RX ADMIN — PREDNISONE 10 MG: 10 TABLET ORAL at 10:07

## 2021-01-01 RX ADMIN — ENOXAPARIN SODIUM 50 MG: 60 INJECTION SUBCUTANEOUS at 09:09

## 2021-01-01 RX ADMIN — POTASSIUM CHLORIDE, DEXTROSE MONOHYDRATE AND SODIUM CHLORIDE: 150; 5; 450 INJECTION, SOLUTION INTRAVENOUS at 06:03

## 2021-01-01 RX ADMIN — POTASSIUM CHLORIDE 10 MEQ: 10 INJECTION, SOLUTION INTRAVENOUS at 13:50

## 2021-01-01 RX ADMIN — PROPOFOL 15 MCG/KG/MIN: 10 INJECTION, EMULSION INTRAVENOUS at 00:10

## 2021-01-01 RX ADMIN — POTASSIUM CHLORIDE, DEXTROSE MONOHYDRATE AND SODIUM CHLORIDE: 150; 5; 450 INJECTION, SOLUTION INTRAVENOUS at 14:13

## 2021-01-01 RX ADMIN — ATORVASTATIN CALCIUM 80 MG: 40 TABLET, FILM COATED ORAL at 23:25

## 2021-01-01 RX ADMIN — SODIUM CHLORIDE, SODIUM LACTATE, POTASSIUM CHLORIDE, CALCIUM CHLORIDE AND DEXTROSE MONOHYDRATE: 5; 600; 310; 30; 20 INJECTION, SOLUTION INTRAVENOUS at 09:48

## 2021-01-01 RX ADMIN — Medication 12 MCG/MIN: at 09:10

## 2021-01-01 RX ADMIN — CYANOCOBALAMIN TAB 1000 MCG 1000 MCG: 1000 TAB at 07:40

## 2021-01-01 RX ADMIN — METOPROLOL SUCCINATE 50 MG: 50 TABLET, EXTENDED RELEASE ORAL at 09:23

## 2021-01-01 RX ADMIN — OXYCODONE 10 MG: 5 TABLET ORAL at 18:55

## 2021-01-01 RX ADMIN — SODIUM CHLORIDE, PRESERVATIVE FREE 10 ML: 5 INJECTION INTRAVENOUS at 08:44

## 2021-01-01 RX ADMIN — OXYCODONE 10 MG: 5 TABLET ORAL at 16:37

## 2021-01-01 RX ADMIN — POTASSIUM CHLORIDE, DEXTROSE MONOHYDRATE AND SODIUM CHLORIDE: 150; 5; 450 INJECTION, SOLUTION INTRAVENOUS at 17:02

## 2021-01-01 RX ADMIN — PIPERACILLIN AND TAZOBACTAM 3375 MG: 3; .375 INJECTION, POWDER, LYOPHILIZED, FOR SOLUTION INTRAVENOUS at 23:17

## 2021-01-01 RX ADMIN — PIPERACILLIN AND TAZOBACTAM 3375 MG: 3; .375 INJECTION, POWDER, LYOPHILIZED, FOR SOLUTION INTRAVENOUS at 22:51

## 2021-01-01 RX ADMIN — DEXTRAN 70, GLYCERIN, HYPROMELLOSE 1 DROP: 1; 2; 3 SOLUTION/ DROPS OPHTHALMIC at 11:02

## 2021-01-01 RX ADMIN — MORPHINE SULFATE 2 MG: 2 INJECTION, SOLUTION INTRAMUSCULAR; INTRAVENOUS at 10:21

## 2021-01-01 RX ADMIN — HEPARIN SODIUM 5000 UNITS: 5000 INJECTION INTRAVENOUS; SUBCUTANEOUS at 13:32

## 2021-01-01 RX ADMIN — CYANOCOBALAMIN TAB 1000 MCG 1000 MCG: 1000 TAB at 08:29

## 2021-01-01 RX ADMIN — PANTOPRAZOLE SODIUM 40 MG: 40 INJECTION, POWDER, FOR SOLUTION INTRAVENOUS at 23:26

## 2021-01-01 RX ADMIN — METOPROLOL TARTRATE 50 MG: 50 TABLET, FILM COATED ORAL at 10:33

## 2021-01-01 RX ADMIN — ENOXAPARIN SODIUM 50 MG: 60 INJECTION SUBCUTANEOUS at 23:35

## 2021-01-01 RX ADMIN — SODIUM CHLORIDE, PRESERVATIVE FREE 20 ML: 5 INJECTION INTRAVENOUS at 11:50

## 2021-01-01 RX ADMIN — SODIUM CHLORIDE, PRESERVATIVE FREE 10 ML: 5 INJECTION INTRAVENOUS at 23:35

## 2021-01-01 RX ADMIN — POTASSIUM CHLORIDE 10 MEQ: 10 INJECTION, SOLUTION INTRAVENOUS at 12:52

## 2021-01-01 RX ADMIN — PANTOPRAZOLE SODIUM 40 MG: 40 INJECTION, POWDER, FOR SOLUTION INTRAVENOUS at 09:08

## 2021-01-01 RX ADMIN — MORPHINE SULFATE 2 MG: 2 INJECTION, SOLUTION INTRAMUSCULAR; INTRAVENOUS at 21:21

## 2021-01-01 RX ADMIN — METHYLPREDNISOLONE SODIUM SUCCINATE 40 MG: 40 INJECTION, POWDER, FOR SOLUTION INTRAMUSCULAR; INTRAVENOUS at 10:03

## 2021-01-01 RX ADMIN — Medication 140 MG: at 16:30

## 2021-01-01 RX ADMIN — TRIAMCINOLONE ACETONIDE 0.1 G: 1 CREAM TOPICAL at 16:44

## 2021-01-01 RX ADMIN — MORPHINE SULFATE 2 MG: 2 INJECTION, SOLUTION INTRAMUSCULAR; INTRAVENOUS at 09:50

## 2021-01-01 RX ADMIN — METRONIDAZOLE 500 MG: 500 INJECTION, SOLUTION INTRAVENOUS at 04:18

## 2021-01-01 RX ADMIN — SODIUM CHLORIDE: 9 INJECTION, SOLUTION INTRAVENOUS at 15:22

## 2021-01-01 RX ADMIN — CEFEPIME HYDROCHLORIDE 1000 MG: 1 INJECTION, POWDER, FOR SOLUTION INTRAMUSCULAR; INTRAVENOUS at 23:19

## 2021-01-01 RX ADMIN — ATORVASTATIN CALCIUM 80 MG: 40 TABLET, FILM COATED ORAL at 22:07

## 2021-01-01 RX ADMIN — FENTANYL CITRATE 100 MCG: 50 INJECTION, SOLUTION INTRAMUSCULAR; INTRAVENOUS at 16:17

## 2021-01-01 RX ADMIN — OXYCODONE 10 MG: 5 TABLET ORAL at 17:25

## 2021-01-01 RX ADMIN — POTASSIUM CHLORIDE, DEXTROSE MONOHYDRATE AND SODIUM CHLORIDE: 150; 5; 450 INJECTION, SOLUTION INTRAVENOUS at 02:11

## 2021-01-01 RX ADMIN — BARIUM SULFATE 45 G: 0.81 POWDER, FOR SUSPENSION ORAL at 14:57

## 2021-01-01 RX ADMIN — CALCIUM GLUCONATE 3000 MG: 98 INJECTION, SOLUTION INTRAVENOUS at 15:57

## 2021-01-01 RX ADMIN — POTASSIUM CHLORIDE 10 MEQ: 10 INJECTION, SOLUTION INTRAVENOUS at 11:50

## 2021-01-01 RX ADMIN — HEPARIN SODIUM 24.02 UNITS/KG/HR: 10000 INJECTION, SOLUTION INTRAVENOUS at 10:59

## 2021-01-01 RX ADMIN — LEVETIRACETAM 250 MG: 5 INJECTION INTRAVENOUS at 09:48

## 2021-01-01 RX ADMIN — METHYLPREDNISOLONE SODIUM SUCCINATE 40 MG: 40 INJECTION, POWDER, FOR SOLUTION INTRAMUSCULAR; INTRAVENOUS at 17:36

## 2021-01-01 RX ADMIN — SUCRALFATE 1 G: 1 TABLET ORAL at 16:16

## 2021-01-01 RX ADMIN — ONDANSETRON 4 MG: 2 INJECTION INTRAMUSCULAR; INTRAVENOUS at 22:37

## 2021-01-01 RX ADMIN — TRIAMCINOLONE ACETONIDE 0.1 G: 1 CREAM TOPICAL at 08:47

## 2021-01-01 RX ADMIN — OXYCODONE 10 MG: 5 TABLET ORAL at 15:23

## 2021-01-01 RX ADMIN — OXYCODONE 10 MG: 5 TABLET ORAL at 13:51

## 2021-01-01 RX ADMIN — METOPROLOL SUCCINATE 50 MG: 50 TABLET, EXTENDED RELEASE ORAL at 11:40

## 2021-01-01 RX ADMIN — ATORVASTATIN CALCIUM 80 MG: 40 TABLET, FILM COATED ORAL at 20:27

## 2021-01-01 RX ADMIN — SODIUM CHLORIDE, PRESERVATIVE FREE 10 ML: 5 INJECTION INTRAVENOUS at 10:04

## 2021-01-01 RX ADMIN — SODIUM CHLORIDE 125 MG: 9 INJECTION, SOLUTION INTRAVENOUS at 10:56

## 2021-01-01 RX ADMIN — SUCRALFATE 1 G: 1 TABLET ORAL at 20:02

## 2021-01-01 RX ADMIN — SODIUM CHLORIDE 25 ML: 9 INJECTION, SOLUTION INTRAVENOUS at 03:02

## 2021-01-01 RX ADMIN — SODIUM BICARBONATE 50 MEQ: 84 INJECTION, SOLUTION INTRAVENOUS at 05:13

## 2021-01-01 RX ADMIN — CHLORHEXIDINE GLUCONATE 15 ML: 1.2 RINSE ORAL at 21:26

## 2021-01-01 RX ADMIN — MINERAL OIL AND PETROLATUM: 150; 830 OINTMENT OPHTHALMIC at 06:00

## 2021-01-01 RX ADMIN — HEPARIN SODIUM 29.92 UNITS/KG/HR: 10000 INJECTION, SOLUTION INTRAVENOUS at 17:19

## 2021-01-01 RX ADMIN — MIRTAZAPINE 15 MG: 15 TABLET, FILM COATED ORAL at 21:12

## 2021-01-01 RX ADMIN — PHENYLEPHRINE HYDROCHLORIDE 300 MCG: 10 INJECTION INTRAVENOUS at 16:40

## 2021-01-01 RX ADMIN — METOPROLOL SUCCINATE 50 MG: 50 TABLET, EXTENDED RELEASE ORAL at 08:29

## 2021-01-01 RX ADMIN — Medication 50 MEQ: at 05:13

## 2021-01-01 RX ADMIN — APIXABAN 5 MG: 5 TABLET, FILM COATED ORAL at 08:49

## 2021-01-01 RX ADMIN — SODIUM CHLORIDE, POTASSIUM CHLORIDE, SODIUM LACTATE AND CALCIUM CHLORIDE: 600; 310; 30; 20 INJECTION, SOLUTION INTRAVENOUS at 16:58

## 2021-01-01 RX ADMIN — LEVETIRACETAM 500 MG: 500 SOLUTION ORAL at 22:43

## 2021-01-01 RX ADMIN — OXYCODONE 10 MG: 5 TABLET ORAL at 06:41

## 2021-01-01 RX ADMIN — METOPROLOL TARTRATE 50 MG: 50 TABLET, FILM COATED ORAL at 21:27

## 2021-01-01 RX ADMIN — ENOXAPARIN SODIUM 50 MG: 60 INJECTION SUBCUTANEOUS at 09:06

## 2021-01-01 RX ADMIN — BACLOFEN 10 MG: 10 TABLET ORAL at 09:08

## 2021-01-01 RX ADMIN — SODIUM CHLORIDE, POTASSIUM CHLORIDE, SODIUM LACTATE AND CALCIUM CHLORIDE 1000 ML: 600; 310; 30; 20 INJECTION, SOLUTION INTRAVENOUS at 03:15

## 2021-01-01 RX ADMIN — ONDANSETRON 4 MG: 2 INJECTION INTRAMUSCULAR; INTRAVENOUS at 22:52

## 2021-01-01 RX ADMIN — IOPAMIDOL 80 ML: 755 INJECTION, SOLUTION INTRAVENOUS at 06:50

## 2021-01-01 RX ADMIN — METOPROLOL SUCCINATE 25 MG: 25 TABLET, EXTENDED RELEASE ORAL at 08:42

## 2021-01-01 RX ADMIN — PIPERACILLIN AND TAZOBACTAM 3375 MG: 3; .375 INJECTION, POWDER, LYOPHILIZED, FOR SOLUTION INTRAVENOUS at 14:34

## 2021-01-01 RX ADMIN — CALCIUM CHLORIDE 0.5 G: 100 INJECTION, SOLUTION INTRAVENOUS at 16:43

## 2021-01-01 RX ADMIN — PANTOPRAZOLE SODIUM 40 MG: 40 TABLET, DELAYED RELEASE ORAL at 05:26

## 2021-01-01 RX ADMIN — LEVETIRACETAM 250 MG: 100 INJECTION, SOLUTION INTRAVENOUS at 09:43

## 2021-01-01 RX ADMIN — ACETAMINOPHEN 650 MG: 325 TABLET ORAL at 19:02

## 2021-01-01 RX ADMIN — LIDOCAINE HYDROCHLORIDE 30 MG: 20 INJECTION, SOLUTION INTRAVENOUS at 16:30

## 2021-01-01 RX ADMIN — HEPARIN SODIUM 18 UNITS/KG/HR: 10000 INJECTION, SOLUTION INTRAVENOUS at 07:58

## 2021-01-01 RX ADMIN — WATER 1000 MG: 1 INJECTION INTRAMUSCULAR; INTRAVENOUS; SUBCUTANEOUS at 13:28

## 2021-01-01 RX ADMIN — SODIUM CHLORIDE, POTASSIUM CHLORIDE, SODIUM LACTATE AND CALCIUM CHLORIDE: 600; 310; 30; 20 INJECTION, SOLUTION INTRAVENOUS at 15:25

## 2021-01-01 RX ADMIN — BACLOFEN 10 MG: 10 TABLET ORAL at 21:12

## 2021-01-01 RX ADMIN — SODIUM CHLORIDE: 9 INJECTION, SOLUTION INTRAVENOUS at 02:37

## 2021-01-01 RX ADMIN — OXYBUTYNIN CHLORIDE 5 MG: 5 TABLET ORAL at 19:54

## 2021-01-01 RX ADMIN — HYDROCORTISONE SODIUM SUCCINATE 100 MG: 100 INJECTION, POWDER, FOR SOLUTION INTRAMUSCULAR; INTRAVENOUS at 00:56

## 2021-01-01 RX ADMIN — POTASSIUM CHLORIDE: 2 INJECTION, SOLUTION, CONCENTRATE INTRAVENOUS at 15:47

## 2021-01-01 RX ADMIN — OXYCODONE 10 MG: 5 TABLET ORAL at 05:57

## 2021-01-01 RX ADMIN — SUCRALFATE 1 G: 1 TABLET ORAL at 10:29

## 2021-01-01 RX ADMIN — ATORVASTATIN CALCIUM 80 MG: 40 TABLET, FILM COATED ORAL at 20:01

## 2021-01-01 RX ADMIN — MORPHINE SULFATE 4 MG: 4 INJECTION, SOLUTION INTRAMUSCULAR; INTRAVENOUS at 08:17

## 2021-01-01 RX ADMIN — MINERAL OIL AND PETROLATUM: 150; 830 OINTMENT OPHTHALMIC at 05:15

## 2021-01-01 RX ADMIN — MORPHINE SULFATE 2 MG: 2 INJECTION, SOLUTION INTRAMUSCULAR; INTRAVENOUS at 14:35

## 2021-01-01 RX ADMIN — POTASSIUM CHLORIDE 10 MEQ: 7.46 INJECTION, SOLUTION INTRAVENOUS at 13:50

## 2021-01-01 RX ADMIN — PROMETHAZINE HYDROCHLORIDE 25 MG: 25 INJECTION, SOLUTION INTRAMUSCULAR; INTRAVENOUS at 13:06

## 2021-01-01 RX ADMIN — ENOXAPARIN SODIUM 50 MG: 60 INJECTION SUBCUTANEOUS at 21:14

## 2021-01-01 RX ADMIN — MIRTAZAPINE 15 MG: 15 TABLET, FILM COATED ORAL at 20:46

## 2021-01-01 RX ADMIN — CYANOCOBALAMIN TAB 1000 MCG 1000 MCG: 1000 TAB at 08:00

## 2021-01-01 RX ADMIN — Medication 10 ML: at 21:12

## 2021-01-01 RX ADMIN — SUCRALFATE 1 G: 1 TABLET ORAL at 10:47

## 2021-01-01 RX ADMIN — SODIUM CHLORIDE, POTASSIUM CHLORIDE, SODIUM LACTATE AND CALCIUM CHLORIDE 1000 ML: 600; 310; 30; 20 INJECTION, SOLUTION INTRAVENOUS at 06:56

## 2021-01-01 RX ADMIN — LISINOPRIL 2.5 MG: 5 TABLET ORAL at 09:47

## 2021-01-01 RX ADMIN — MEPERIDINE HYDROCHLORIDE 12.5 MG: 25 INJECTION, SOLUTION INTRAMUSCULAR; INTRAVENOUS; SUBCUTANEOUS at 18:02

## 2021-01-01 RX ADMIN — APIXABAN 5 MG: 5 TABLET, FILM COATED ORAL at 21:39

## 2021-01-01 RX ADMIN — PANTOPRAZOLE SODIUM 40 MG: 40 INJECTION, POWDER, FOR SOLUTION INTRAVENOUS at 08:44

## 2021-01-01 RX ADMIN — APIXABAN 5 MG: 5 TABLET, FILM COATED ORAL at 10:07

## 2021-01-01 RX ADMIN — MINERAL OIL AND PETROLATUM: 150; 830 OINTMENT OPHTHALMIC at 08:56

## 2021-01-01 RX ADMIN — Medication 8 MCG/MIN: at 22:40

## 2021-01-01 RX ADMIN — CHLORHEXIDINE GLUCONATE 15 ML: 1.2 RINSE ORAL at 08:50

## 2021-01-01 RX ADMIN — BACLOFEN 10 MG: 10 TABLET ORAL at 20:46

## 2021-01-01 RX ADMIN — SODIUM CHLORIDE: 9 INJECTION, SOLUTION INTRAVENOUS at 10:28

## 2021-01-01 RX ADMIN — METHYLPREDNISOLONE SODIUM SUCCINATE 40 MG: 40 INJECTION, POWDER, FOR SOLUTION INTRAMUSCULAR; INTRAVENOUS at 10:56

## 2021-01-01 RX ADMIN — SODIUM CHLORIDE, PRESERVATIVE FREE 10 ML: 5 INJECTION INTRAVENOUS at 09:22

## 2021-01-01 RX ADMIN — PANTOPRAZOLE SODIUM 40 MG: 40 TABLET, DELAYED RELEASE ORAL at 08:56

## 2021-01-01 RX ADMIN — APIXABAN 5 MG: 5 TABLET, FILM COATED ORAL at 22:08

## 2021-01-01 RX ADMIN — OXYCODONE 10 MG: 5 TABLET ORAL at 00:56

## 2021-01-01 RX ADMIN — SODIUM CHLORIDE, PRESERVATIVE FREE 10 ML: 5 INJECTION INTRAVENOUS at 08:34

## 2021-01-01 RX ADMIN — ENOXAPARIN SODIUM 50 MG: 60 INJECTION SUBCUTANEOUS at 19:57

## 2021-01-01 RX ADMIN — FLUCONAZOLE 200 MG: 200 INJECTION, SOLUTION INTRAVENOUS at 11:08

## 2021-01-01 RX ADMIN — DEXTROSE MONOHYDRATE 12.5 G: 25 INJECTION, SOLUTION INTRAVENOUS at 01:30

## 2021-01-01 RX ADMIN — PANTOPRAZOLE SODIUM 40 MG: 40 TABLET, DELAYED RELEASE ORAL at 06:41

## 2021-01-01 RX ADMIN — HEPARIN SODIUM 5000 UNITS: 5000 INJECTION INTRAVENOUS; SUBCUTANEOUS at 05:25

## 2021-01-01 RX ADMIN — POTASSIUM CHLORIDE 10 MEQ: 7.46 INJECTION, SOLUTION INTRAVENOUS at 22:07

## 2021-01-01 RX ADMIN — MORPHINE SULFATE 2 MG: 2 INJECTION, SOLUTION INTRAMUSCULAR; INTRAVENOUS at 09:39

## 2021-01-01 RX ADMIN — DEXTRAN 70, GLYCERIN, HYPROMELLOSE 1 DROP: 1; 2; 3 SOLUTION/ DROPS OPHTHALMIC at 19:41

## 2021-01-01 RX ADMIN — SUCRALFATE 1 G: 1 TABLET ORAL at 00:56

## 2021-01-01 RX ADMIN — SODIUM CHLORIDE 1000 ML: 9 INJECTION, SOLUTION INTRAVENOUS at 14:52

## 2021-01-01 RX ADMIN — ENOXAPARIN SODIUM 50 MG: 60 INJECTION SUBCUTANEOUS at 08:33

## 2021-01-01 RX ADMIN — OXYCODONE 10 MG: 5 TABLET ORAL at 22:12

## 2021-01-01 RX ADMIN — SUCRALFATE 1 G: 1 TABLET ORAL at 21:21

## 2021-01-01 RX ADMIN — SUCRALFATE 1 G: 1 TABLET ORAL at 18:06

## 2021-01-01 RX ADMIN — HEPARIN 300 UNITS: 100 SYRINGE at 08:51

## 2021-01-01 RX ADMIN — IOPAMIDOL 75 ML: 755 INJECTION, SOLUTION INTRAVENOUS at 16:02

## 2021-01-01 RX ADMIN — OXYCODONE 10 MG: 5 TABLET ORAL at 20:57

## 2021-01-01 RX ADMIN — MORPHINE SULFATE 2 MG: 2 INJECTION, SOLUTION INTRAMUSCULAR; INTRAVENOUS at 15:27

## 2021-01-01 RX ADMIN — SODIUM CHLORIDE 1000 MG: 9 INJECTION, SOLUTION INTRAVENOUS at 12:10

## 2021-01-01 RX ADMIN — OXYCODONE 10 MG: 5 TABLET ORAL at 11:31

## 2021-01-01 RX ADMIN — METOPROLOL TARTRATE 50 MG: 50 TABLET, FILM COATED ORAL at 10:28

## 2021-01-01 RX ADMIN — PANTOPRAZOLE SODIUM 40 MG: 40 TABLET, DELAYED RELEASE ORAL at 17:25

## 2021-01-01 RX ADMIN — OXYCODONE 10 MG: 5 TABLET ORAL at 21:17

## 2021-01-01 RX ADMIN — LEVETIRACETAM 500 MG: 500 SOLUTION ORAL at 21:27

## 2021-01-01 RX ADMIN — MORPHINE SULFATE 4 MG: 4 INJECTION, SOLUTION INTRAMUSCULAR; INTRAVENOUS at 00:30

## 2021-01-01 RX ADMIN — METOPROLOL TARTRATE 50 MG: 50 TABLET, FILM COATED ORAL at 22:29

## 2021-01-01 RX ADMIN — POTASSIUM CHLORIDE: 149 INJECTION, SOLUTION, CONCENTRATE INTRAVENOUS at 11:39

## 2021-01-01 RX ADMIN — ENOXAPARIN SODIUM 50 MG: 60 INJECTION SUBCUTANEOUS at 09:48

## 2021-01-01 RX ADMIN — HEPARIN SODIUM 4060 UNITS: 1000 INJECTION INTRAVENOUS; SUBCUTANEOUS at 15:18

## 2021-01-01 RX ADMIN — LORAZEPAM 2 MG: 2 INJECTION INTRAMUSCULAR; INTRAVENOUS at 19:10

## 2021-01-01 RX ADMIN — Medication 15 MCG/MIN: at 15:28

## 2021-01-01 RX ADMIN — OXYCODONE 10 MG: 5 TABLET ORAL at 04:58

## 2021-01-01 RX ADMIN — MORPHINE SULFATE 2 MG: 2 INJECTION, SOLUTION INTRAMUSCULAR; INTRAVENOUS at 20:53

## 2021-01-01 RX ADMIN — ATORVASTATIN CALCIUM 80 MG: 40 TABLET, FILM COATED ORAL at 20:46

## 2021-01-01 RX ADMIN — Medication 1 TABLET: at 08:49

## 2021-01-01 RX ADMIN — OXYBUTYNIN CHLORIDE 5 MG: 5 TABLET ORAL at 08:08

## 2021-01-01 RX ADMIN — LEVETIRACETAM 250 MG: 500 SOLUTION ORAL at 10:42

## 2021-01-01 RX ADMIN — SUCRALFATE 1 G: 1 TABLET ORAL at 14:18

## 2021-01-01 RX ADMIN — PIPERACILLIN AND TAZOBACTAM 3375 MG: 3; .375 INJECTION, POWDER, LYOPHILIZED, FOR SOLUTION INTRAVENOUS at 14:39

## 2021-01-01 RX ADMIN — ATORVASTATIN CALCIUM 80 MG: 40 TABLET, FILM COATED ORAL at 23:35

## 2021-01-01 RX ADMIN — LEVETIRACETAM 250 MG: 500 TABLET ORAL at 11:39

## 2021-01-01 RX ADMIN — SODIUM CHLORIDE: 9 INJECTION, SOLUTION INTRAVENOUS at 19:45

## 2021-01-01 RX ADMIN — Medication 10 ML: at 20:16

## 2021-01-01 RX ADMIN — LEVETIRACETAM 500 MG: 500 SOLUTION ORAL at 22:11

## 2021-01-01 RX ADMIN — ALBUMIN HUMAN 12.5 G: 0.05 INJECTION, SOLUTION INTRAVENOUS at 16:59

## 2021-01-01 RX ADMIN — SODIUM CHLORIDE: 9 INJECTION, SOLUTION INTRAVENOUS at 23:26

## 2021-01-01 RX ADMIN — CALCIUM GLUCONATE 2000 MG: 98 INJECTION, SOLUTION INTRAVENOUS at 08:54

## 2021-01-01 RX ADMIN — Medication 10 ML: at 20:27

## 2021-01-01 RX ADMIN — PANTOPRAZOLE SODIUM 40 MG: 40 TABLET, DELAYED RELEASE ORAL at 17:14

## 2021-01-01 RX ADMIN — Medication 1 TABLET: at 22:29

## 2021-01-01 RX ADMIN — SODIUM CHLORIDE 1000 ML: 9 INJECTION, SOLUTION INTRAVENOUS at 16:16

## 2021-01-01 RX ADMIN — SUCRALFATE 1 G: 1 TABLET ORAL at 13:49

## 2021-01-01 RX ADMIN — PANTOPRAZOLE SODIUM 40 MG: 40 INJECTION, POWDER, FOR SOLUTION INTRAVENOUS at 08:33

## 2021-01-01 RX ADMIN — Medication 1 TABLET: at 08:56

## 2021-01-01 RX ADMIN — SODIUM CHLORIDE, SODIUM LACTATE, POTASSIUM CHLORIDE, CALCIUM CHLORIDE AND DEXTROSE MONOHYDRATE: 5; 600; 310; 30; 20 INJECTION, SOLUTION INTRAVENOUS at 16:08

## 2021-01-01 RX ADMIN — METOPROLOL SUCCINATE 50 MG: 50 TABLET, EXTENDED RELEASE ORAL at 10:04

## 2021-01-01 RX ADMIN — Medication 25 MCG/HR: at 00:54

## 2021-01-01 RX ADMIN — BACLOFEN 10 MG: 10 TABLET ORAL at 08:35

## 2021-01-01 RX ADMIN — MORPHINE SULFATE 4 MG: 4 INJECTION, SOLUTION INTRAMUSCULAR; INTRAVENOUS at 22:09

## 2021-01-01 RX ADMIN — PANTOPRAZOLE SODIUM 40 MG: 40 INJECTION, POWDER, FOR SOLUTION INTRAVENOUS at 01:15

## 2021-01-01 RX ADMIN — METOPROLOL SUCCINATE 50 MG: 50 TABLET, EXTENDED RELEASE ORAL at 09:22

## 2021-01-01 RX ADMIN — SODIUM CHLORIDE, POTASSIUM CHLORIDE, SODIUM LACTATE AND CALCIUM CHLORIDE: 600; 310; 30; 20 INJECTION, SOLUTION INTRAVENOUS at 12:24

## 2021-01-01 RX ADMIN — Medication 10 ML: at 19:53

## 2021-01-01 RX ADMIN — ENOXAPARIN SODIUM 40 MG: 40 INJECTION SUBCUTANEOUS at 21:16

## 2021-01-01 RX ADMIN — PIPERACILLIN AND TAZOBACTAM 3375 MG: 3; .375 INJECTION, POWDER, LYOPHILIZED, FOR SOLUTION INTRAVENOUS at 23:31

## 2021-01-01 RX ADMIN — Medication 1 TABLET: at 11:45

## 2021-01-01 RX ADMIN — PANTOPRAZOLE SODIUM 20 MG: 40 TABLET, DELAYED RELEASE ORAL at 05:04

## 2021-01-01 RX ADMIN — PIPERACILLIN AND TAZOBACTAM 3375 MG: 3; .375 INJECTION, POWDER, LYOPHILIZED, FOR SOLUTION INTRAVENOUS at 15:21

## 2021-01-01 RX ADMIN — HEPARIN 300 UNITS: 100 SYRINGE at 09:49

## 2021-01-01 RX ADMIN — SUCRALFATE 1 G: 1 TABLET ORAL at 23:30

## 2021-01-01 RX ADMIN — METRONIDAZOLE 500 MG: 500 INJECTION, SOLUTION INTRAVENOUS at 19:47

## 2021-01-01 RX ADMIN — Medication 2000 UNITS: at 08:00

## 2021-01-01 RX ADMIN — ATORVASTATIN CALCIUM 80 MG: 40 TABLET, FILM COATED ORAL at 21:18

## 2021-01-01 RX ADMIN — Medication 500 UNITS: at 12:49

## 2021-01-01 RX ADMIN — OXYCODONE 10 MG: 5 TABLET ORAL at 15:50

## 2021-01-01 RX ADMIN — METOPROLOL SUCCINATE 25 MG: 25 TABLET, EXTENDED RELEASE ORAL at 08:35

## 2021-01-01 RX ADMIN — ONDANSETRON 4 MG: 2 INJECTION INTRAMUSCULAR; INTRAVENOUS at 17:06

## 2021-01-01 RX ADMIN — MAGNESIUM SULFATE HEPTAHYDRATE 2000 MG: 40 INJECTION, SOLUTION INTRAVENOUS at 22:58

## 2021-01-01 RX ADMIN — SODIUM CHLORIDE: 9 INJECTION, SOLUTION INTRAVENOUS at 03:30

## 2021-01-01 RX ADMIN — LEVETIRACETAM 500 MG: 100 INJECTION, SOLUTION INTRAVENOUS at 22:13

## 2021-01-01 RX ADMIN — OXYBUTYNIN CHLORIDE 5 MG: 5 TABLET ORAL at 08:33

## 2021-01-01 RX ADMIN — PROPOFOL 15 MCG/KG/MIN: 10 INJECTION, EMULSION INTRAVENOUS at 00:49

## 2021-01-01 RX ADMIN — OXYCODONE 10 MG: 5 TABLET ORAL at 20:01

## 2021-01-01 RX ADMIN — PIPERACILLIN AND TAZOBACTAM 3375 MG: 3; .375 INJECTION, POWDER, LYOPHILIZED, FOR SOLUTION INTRAVENOUS at 06:43

## 2021-01-01 RX ADMIN — LEVETIRACETAM 500 MG: 100 INJECTION, SOLUTION INTRAVENOUS at 19:53

## 2021-01-01 RX ADMIN — LEVETIRACETAM 500 MG: 5 INJECTION INTRAVENOUS at 21:58

## 2021-01-01 RX ADMIN — ASPIRIN 81 MG: 81 TABLET, CHEWABLE ORAL at 08:48

## 2021-01-01 RX ADMIN — MORPHINE SULFATE 2 MG: 2 INJECTION, SOLUTION INTRAMUSCULAR; INTRAVENOUS at 09:00

## 2021-01-01 RX ADMIN — SODIUM CHLORIDE, PRESERVATIVE FREE 10 ML: 5 INJECTION INTRAVENOUS at 12:13

## 2021-01-01 RX ADMIN — PANTOPRAZOLE SODIUM 40 MG: 40 TABLET, DELAYED RELEASE ORAL at 10:50

## 2021-01-01 RX ADMIN — DEXTRAN 70, GLYCERIN, HYPROMELLOSE 1 DROP: 1; 2; 3 SOLUTION/ DROPS OPHTHALMIC at 15:22

## 2021-01-01 RX ADMIN — Medication 1 TABLET: at 08:33

## 2021-01-01 RX ADMIN — POTASSIUM CHLORIDE 20 MEQ: 400 INJECTION, SOLUTION INTRAVENOUS at 03:44

## 2021-01-01 RX ADMIN — MIRTAZAPINE 15 MG: 15 TABLET, FILM COATED ORAL at 21:21

## 2021-01-01 RX ADMIN — HYDROXYZINE HYDROCHLORIDE 25 MG: 10 TABLET ORAL at 13:51

## 2021-01-01 RX ADMIN — METOPROLOL TARTRATE 5 MG: 1 INJECTION, SOLUTION INTRAVENOUS at 16:30

## 2021-01-01 RX ADMIN — DEXTROSE AND SODIUM CHLORIDE: 5; 450 INJECTION, SOLUTION INTRAVENOUS at 22:37

## 2021-01-01 RX ADMIN — TRIAMCINOLONE ACETONIDE 0.1 G: 1 CREAM TOPICAL at 21:59

## 2021-01-01 RX ADMIN — BACLOFEN 10 MG: 10 TABLET ORAL at 09:16

## 2021-01-01 RX ADMIN — ENOXAPARIN SODIUM 50 MG: 60 INJECTION SUBCUTANEOUS at 09:22

## 2021-01-01 RX ADMIN — OXYCODONE 10 MG: 5 TABLET ORAL at 05:08

## 2021-01-01 RX ADMIN — OXYCODONE 10 MG: 5 TABLET ORAL at 09:08

## 2021-01-01 RX ADMIN — SODIUM CHLORIDE, POTASSIUM CHLORIDE, SODIUM LACTATE AND CALCIUM CHLORIDE: 600; 310; 30; 20 INJECTION, SOLUTION INTRAVENOUS at 17:38

## 2021-01-01 RX ADMIN — Medication 1 TABLET: at 22:07

## 2021-01-01 RX ADMIN — OXYCODONE 10 MG: 5 TABLET ORAL at 19:54

## 2021-01-01 RX ADMIN — OXYBUTYNIN CHLORIDE 5 MG: 5 TABLET ORAL at 11:41

## 2021-01-01 RX ADMIN — SUCRALFATE 1 G: 1 TABLET ORAL at 22:29

## 2021-01-01 RX ADMIN — VASOPRESSIN 0.04 UNITS/MIN: 20 INJECTION INTRAVENOUS at 15:24

## 2021-01-01 RX ADMIN — ASPIRIN 81 MG: 81 TABLET, CHEWABLE ORAL at 09:22

## 2021-01-01 RX ADMIN — MORPHINE SULFATE 2 MG: 2 INJECTION, SOLUTION INTRAMUSCULAR; INTRAVENOUS at 12:11

## 2021-01-01 RX ADMIN — HEPARIN SODIUM 5000 UNITS: 5000 INJECTION INTRAVENOUS; SUBCUTANEOUS at 20:15

## 2021-01-01 RX ADMIN — WATER 1000 MG: 1 INJECTION INTRAMUSCULAR; INTRAVENOUS; SUBCUTANEOUS at 14:51

## 2021-01-01 RX ADMIN — Medication 2000 UNITS: at 10:03

## 2021-01-01 RX ADMIN — HEPARIN SODIUM 4060 UNITS: 1000 INJECTION INTRAVENOUS; SUBCUTANEOUS at 10:14

## 2021-01-01 RX ADMIN — OXYBUTYNIN CHLORIDE 5 MG: 5 TABLET ORAL at 21:17

## 2021-01-01 RX ADMIN — BACLOFEN 10 MG: 10 TABLET ORAL at 14:13

## 2021-01-01 RX ADMIN — ENOXAPARIN SODIUM 50 MG: 60 INJECTION SUBCUTANEOUS at 08:30

## 2021-01-01 RX ADMIN — MORPHINE SULFATE 2 MG: 2 INJECTION, SOLUTION INTRAMUSCULAR; INTRAVENOUS at 23:27

## 2021-01-01 RX ADMIN — BACLOFEN 10 MG: 10 TABLET ORAL at 20:49

## 2021-01-01 RX ADMIN — CALCIUM GLUCONATE 2000 MG: 98 INJECTION, SOLUTION INTRAVENOUS at 19:34

## 2021-01-01 RX ADMIN — Medication 10 ML: at 21:00

## 2021-01-01 RX ADMIN — CALCIUM GLUCONATE 2000 MG: 98 INJECTION, SOLUTION INTRAVENOUS at 09:46

## 2021-01-01 RX ADMIN — SUCRALFATE 1 G: 1 TABLET ORAL at 14:14

## 2021-01-01 RX ADMIN — METOPROLOL SUCCINATE 50 MG: 50 TABLET, EXTENDED RELEASE ORAL at 09:08

## 2021-01-01 RX ADMIN — MORPHINE SULFATE 2 MG: 2 INJECTION, SOLUTION INTRAMUSCULAR; INTRAVENOUS at 19:31

## 2021-01-01 RX ADMIN — PANTOPRAZOLE SODIUM 80 MG: 40 INJECTION, POWDER, FOR SOLUTION INTRAVENOUS at 15:07

## 2021-01-01 RX ADMIN — ATORVASTATIN CALCIUM 80 MG: 40 TABLET, FILM COATED ORAL at 22:29

## 2021-01-01 RX ADMIN — SODIUM CHLORIDE: 9 INJECTION, SOLUTION INTRAVENOUS at 03:20

## 2021-01-01 RX ADMIN — MIRTAZAPINE 15 MG: 15 TABLET, FILM COATED ORAL at 19:53

## 2021-01-01 RX ADMIN — SODIUM BICARBONATE 50 MEQ: 84 INJECTION, SOLUTION INTRAVENOUS at 23:21

## 2021-01-01 RX ADMIN — CLOTRIMAZOLE AND BETAMETHASONE DIPROPIONATE 1 EACH: 10; .5 CREAM TOPICAL at 16:44

## 2021-01-01 RX ADMIN — SODIUM CHLORIDE, PRESERVATIVE FREE 10 ML: 5 INJECTION INTRAVENOUS at 09:08

## 2021-01-01 RX ADMIN — OXYCODONE 10 MG: 5 TABLET ORAL at 15:33

## 2021-01-01 RX ADMIN — MORPHINE SULFATE 2 MG: 2 INJECTION, SOLUTION INTRAMUSCULAR; INTRAVENOUS at 11:34

## 2021-01-01 RX ADMIN — OXYBUTYNIN CHLORIDE 5 MG: 5 TABLET ORAL at 08:56

## 2021-01-01 RX ADMIN — MAGNESIUM SULFATE HEPTAHYDRATE 4000 MG: 40 INJECTION, SOLUTION INTRAVENOUS at 12:21

## 2021-01-01 RX ADMIN — SODIUM CHLORIDE 1000 MG: 9 INJECTION, SOLUTION INTRAVENOUS at 12:12

## 2021-01-01 RX ADMIN — HEPARIN SODIUM 12 UNITS/KG/HR: 10000 INJECTION, SOLUTION INTRAVENOUS at 13:53

## 2021-01-01 RX ADMIN — DEXTRAN 70, GLYCERIN, HYPROMELLOSE 1 DROP: 1; 2; 3 SOLUTION/ DROPS OPHTHALMIC at 15:48

## 2021-01-01 RX ADMIN — SUCRALFATE 1 G: 1 TABLET ORAL at 12:09

## 2021-01-01 RX ADMIN — ONDANSETRON 4 MG: 2 INJECTION INTRAMUSCULAR; INTRAVENOUS at 05:55

## 2021-01-01 ASSESSMENT — PAIN DESCRIPTION - DESCRIPTORS
DESCRIPTORS: CONSTANT
DESCRIPTORS: DISCOMFORT;SORE
DESCRIPTORS: DISCOMFORT;STABBING
DESCRIPTORS: ACHING;DISCOMFORT
DESCRIPTORS: THROBBING;ACHING
DESCRIPTORS: CONSTANT;STABBING
DESCRIPTORS: ACHING
DESCRIPTORS: ACHING;CONSTANT;DISCOMFORT
DESCRIPTORS: ACHING
DESCRIPTORS: PRESSURE;DISCOMFORT
DESCRIPTORS: ACHING
DESCRIPTORS: BURNING
DESCRIPTORS: ACHING;DISCOMFORT
DESCRIPTORS: DISCOMFORT;SORE
DESCRIPTORS: ACHING
DESCRIPTORS: ACHING;CONSTANT;DISCOMFORT
DESCRIPTORS: ACHING;SHARP
DESCRIPTORS: DISCOMFORT;DULL
DESCRIPTORS: DISCOMFORT;DULL
DESCRIPTORS: ACHING;CONSTANT;DISCOMFORT;SORE;TENDER
DESCRIPTORS: ACHING;DISCOMFORT

## 2021-01-01 ASSESSMENT — PULMONARY FUNCTION TESTS
PIF_VALUE: 28
PIF_VALUE: 33
PIF_VALUE: 1
PIF_VALUE: 24
PIF_VALUE: 24
PIF_VALUE: 34
PIF_VALUE: 26
PIF_VALUE: 16
PIF_VALUE: 32
PIF_VALUE: 26
PIF_VALUE: 34
PIF_VALUE: 30
PIF_VALUE: 33
PIF_VALUE: 16
PIF_VALUE: 28
PIF_VALUE: 33
PIF_VALUE: 20
PIF_VALUE: 31
PIF_VALUE: 26
PIF_VALUE: 5
PIF_VALUE: 32
PIF_VALUE: 26
PIF_VALUE: 25
PIF_VALUE: 45
PIF_VALUE: 7
PIF_VALUE: 22
PIF_VALUE: 34
PIF_VALUE: 32
PIF_VALUE: 45
PIF_VALUE: 14
PIF_VALUE: 25
PIF_VALUE: 2
PIF_VALUE: 22
PIF_VALUE: 3
PIF_VALUE: 22
PIF_VALUE: 24
PIF_VALUE: 14
PIF_VALUE: 1
PIF_VALUE: 32
PIF_VALUE: 34
PIF_VALUE: 46
PIF_VALUE: 30
PIF_VALUE: 33
PIF_VALUE: 34
PIF_VALUE: 32
PIF_VALUE: 33
PIF_VALUE: 26
PIF_VALUE: 34
PIF_VALUE: 13
PIF_VALUE: 31
PIF_VALUE: 37
PIF_VALUE: 27
PIF_VALUE: 31
PIF_VALUE: 4
PIF_VALUE: 3
PIF_VALUE: 29
PIF_VALUE: 21
PIF_VALUE: 21
PIF_VALUE: 26
PIF_VALUE: 16
PIF_VALUE: 27
PIF_VALUE: 23
PIF_VALUE: 18
PIF_VALUE: 27
PIF_VALUE: 13
PIF_VALUE: 33
PIF_VALUE: 27
PIF_VALUE: 28
PIF_VALUE: 31
PIF_VALUE: 34
PIF_VALUE: 31
PIF_VALUE: 20
PIF_VALUE: 3
PIF_VALUE: 26
PIF_VALUE: 32
PIF_VALUE: 33
PIF_VALUE: 26
PIF_VALUE: 27
PIF_VALUE: 2
PIF_VALUE: 32
PIF_VALUE: 20
PIF_VALUE: 17
PIF_VALUE: 25
PIF_VALUE: 34
PIF_VALUE: 34
PIF_VALUE: 25
PIF_VALUE: 33
PIF_VALUE: 22
PIF_VALUE: 33
PIF_VALUE: 36
PIF_VALUE: 17
PIF_VALUE: 32
PIF_VALUE: 26
PIF_VALUE: 30
PIF_VALUE: 25
PIF_VALUE: 33
PIF_VALUE: 1
PIF_VALUE: 27
PIF_VALUE: 34
PIF_VALUE: 34
PIF_VALUE: 32
PIF_VALUE: 33
PIF_VALUE: 44
PIF_VALUE: 33
PIF_VALUE: 32
PIF_VALUE: 30
PIF_VALUE: 33
PIF_VALUE: 3
PIF_VALUE: 33
PIF_VALUE: 22
PIF_VALUE: 26
PIF_VALUE: 33
PIF_VALUE: 14
PIF_VALUE: 31
PIF_VALUE: 26
PIF_VALUE: 6
PIF_VALUE: 32
PIF_VALUE: 26
PIF_VALUE: 33
PIF_VALUE: 33
PIF_VALUE: 25
PIF_VALUE: 23
PIF_VALUE: 33
PIF_VALUE: 25
PIF_VALUE: 32
PIF_VALUE: 33
PIF_VALUE: 46
PIF_VALUE: 34
PIF_VALUE: 34
PIF_VALUE: 32
PIF_VALUE: 20
PIF_VALUE: 17
PIF_VALUE: 25
PIF_VALUE: 22
PIF_VALUE: 23
PIF_VALUE: 14
PIF_VALUE: 26
PIF_VALUE: 26
PIF_VALUE: 32
PIF_VALUE: 26
PIF_VALUE: 2
PIF_VALUE: 27
PIF_VALUE: 26
PIF_VALUE: 23
PIF_VALUE: 23
PIF_VALUE: 33
PIF_VALUE: 33
PIF_VALUE: 22
PIF_VALUE: 12
PIF_VALUE: 26
PIF_VALUE: 14
PIF_VALUE: 27
PIF_VALUE: 33
PIF_VALUE: 2
PIF_VALUE: 28
PIF_VALUE: 22
PIF_VALUE: 32
PIF_VALUE: 32
PIF_VALUE: 4
PIF_VALUE: 27
PIF_VALUE: 32
PIF_VALUE: 34
PIF_VALUE: 45
PIF_VALUE: 27
PIF_VALUE: 27
PIF_VALUE: 41
PIF_VALUE: 34
PIF_VALUE: 34
PIF_VALUE: 24
PIF_VALUE: 31
PIF_VALUE: 45
PIF_VALUE: 46
PIF_VALUE: 33
PIF_VALUE: 25
PIF_VALUE: 45
PIF_VALUE: 32
PIF_VALUE: 44
PIF_VALUE: 26
PIF_VALUE: 34
PIF_VALUE: 32
PIF_VALUE: 16
PIF_VALUE: 26
PIF_VALUE: 15
PIF_VALUE: 32
PIF_VALUE: 30
PIF_VALUE: 25
PIF_VALUE: 31
PIF_VALUE: 25
PIF_VALUE: 45
PIF_VALUE: 27
PIF_VALUE: 45
PIF_VALUE: 27
PIF_VALUE: 32
PIF_VALUE: 2
PIF_VALUE: 23
PIF_VALUE: 31
PIF_VALUE: 30
PIF_VALUE: 23
PIF_VALUE: 26
PIF_VALUE: 26
PIF_VALUE: 1
PIF_VALUE: 31
PIF_VALUE: 36
PIF_VALUE: 31
PIF_VALUE: 34
PIF_VALUE: 33
PIF_VALUE: 15
PIF_VALUE: 24
PIF_VALUE: 6
PIF_VALUE: 22
PIF_VALUE: 22
PIF_VALUE: 27
PIF_VALUE: 32
PIF_VALUE: 14
PIF_VALUE: 8
PIF_VALUE: 3
PIF_VALUE: 33
PIF_VALUE: 30
PIF_VALUE: 23
PIF_VALUE: 31
PIF_VALUE: 27
PIF_VALUE: 3
PIF_VALUE: 26
PIF_VALUE: 45
PIF_VALUE: 21

## 2021-01-01 ASSESSMENT — PAIN SCALES - GENERAL
PAINLEVEL_OUTOF10: 10
PAINLEVEL_OUTOF10: 8
PAINLEVEL_OUTOF10: 10
PAINLEVEL_OUTOF10: 10
PAINLEVEL_OUTOF10: 0
PAINLEVEL_OUTOF10: 10
PAINLEVEL_OUTOF10: 9
PAINLEVEL_OUTOF10: 10
PAINLEVEL_OUTOF10: 3
PAINLEVEL_OUTOF10: 4
PAINLEVEL_OUTOF10: 10
PAINLEVEL_OUTOF10: 0
PAINLEVEL_OUTOF10: 10
PAINLEVEL_OUTOF10: 7
PAINLEVEL_OUTOF10: 6
PAINLEVEL_OUTOF10: 7
PAINLEVEL_OUTOF10: 10
PAINLEVEL_OUTOF10: 7
PAINLEVEL_OUTOF10: 6
PAINLEVEL_OUTOF10: 9
PAINLEVEL_OUTOF10: 8
PAINLEVEL_OUTOF10: 6
PAINLEVEL_OUTOF10: 10
PAINLEVEL_OUTOF10: 0
PAINLEVEL_OUTOF10: 10
PAINLEVEL_OUTOF10: 9
PAINLEVEL_OUTOF10: 10
PAINLEVEL_OUTOF10: 10
PAINLEVEL_OUTOF10: 6
PAINLEVEL_OUTOF10: 0
PAINLEVEL_OUTOF10: 10
PAINLEVEL_OUTOF10: 10
PAINLEVEL_OUTOF10: 9
PAINLEVEL_OUTOF10: 10
PAINLEVEL_OUTOF10: 9
PAINLEVEL_OUTOF10: 0
PAINLEVEL_OUTOF10: 5
PAINLEVEL_OUTOF10: 10
PAINLEVEL_OUTOF10: 5
PAINLEVEL_OUTOF10: 10
PAINLEVEL_OUTOF10: 10
PAINLEVEL_OUTOF10: 7
PAINLEVEL_OUTOF10: 10
PAINLEVEL_OUTOF10: 3
PAINLEVEL_OUTOF10: 10
PAINLEVEL_OUTOF10: 9
PAINLEVEL_OUTOF10: 7
PAINLEVEL_OUTOF10: 0
PAINLEVEL_OUTOF10: 10
PAINLEVEL_OUTOF10: 8
PAINLEVEL_OUTOF10: 0
PAINLEVEL_OUTOF10: 0
PAINLEVEL_OUTOF10: 10
PAINLEVEL_OUTOF10: 6
PAINLEVEL_OUTOF10: 5
PAINLEVEL_OUTOF10: 0
PAINLEVEL_OUTOF10: 10
PAINLEVEL_OUTOF10: 0
PAINLEVEL_OUTOF10: 10
PAINLEVEL_OUTOF10: 6
PAINLEVEL_OUTOF10: 8
PAINLEVEL_OUTOF10: 9
PAINLEVEL_OUTOF10: 0
PAINLEVEL_OUTOF10: 8
PAINLEVEL_OUTOF10: 10
PAINLEVEL_OUTOF10: 4
PAINLEVEL_OUTOF10: 10
PAINLEVEL_OUTOF10: 0
PAINLEVEL_OUTOF10: 7
PAINLEVEL_OUTOF10: 0
PAINLEVEL_OUTOF10: 10
PAINLEVEL_OUTOF10: 0
PAINLEVEL_OUTOF10: 0
PAINLEVEL_OUTOF10: 9
PAINLEVEL_OUTOF10: 9
PAINLEVEL_OUTOF10: 0
PAINLEVEL_OUTOF10: 9
PAINLEVEL_OUTOF10: 8
PAINLEVEL_OUTOF10: 10
PAINLEVEL_OUTOF10: 10
PAINLEVEL_OUTOF10: 8
PAINLEVEL_OUTOF10: 7
PAINLEVEL_OUTOF10: 6
PAINLEVEL_OUTOF10: 0
PAINLEVEL_OUTOF10: 5
PAINLEVEL_OUTOF10: 2
PAINLEVEL_OUTOF10: 9
PAINLEVEL_OUTOF10: 10
PAINLEVEL_OUTOF10: 10
PAINLEVEL_OUTOF10: 9
PAINLEVEL_OUTOF10: 8
PAINLEVEL_OUTOF10: 10
PAINLEVEL_OUTOF10: 8
PAINLEVEL_OUTOF10: 8
PAINLEVEL_OUTOF10: 4
PAINLEVEL_OUTOF10: 6
PAINLEVEL_OUTOF10: 10
PAINLEVEL_OUTOF10: 0
PAINLEVEL_OUTOF10: 10
PAINLEVEL_OUTOF10: 10
PAINLEVEL_OUTOF10: 7
PAINLEVEL_OUTOF10: 3
PAINLEVEL_OUTOF10: 0
PAINLEVEL_OUTOF10: 5
PAINLEVEL_OUTOF10: 3
PAINLEVEL_OUTOF10: 8
PAINLEVEL_OUTOF10: 4
PAINLEVEL_OUTOF10: 7
PAINLEVEL_OUTOF10: 9
PAINLEVEL_OUTOF10: 9
PAINLEVEL_OUTOF10: 8
PAINLEVEL_OUTOF10: 4
PAINLEVEL_OUTOF10: 2
PAINLEVEL_OUTOF10: 5
PAINLEVEL_OUTOF10: 0
PAINLEVEL_OUTOF10: 10
PAINLEVEL_OUTOF10: 5
PAINLEVEL_OUTOF10: 0
PAINLEVEL_OUTOF10: 0
PAINLEVEL_OUTOF10: 7
PAINLEVEL_OUTOF10: 5
PAINLEVEL_OUTOF10: 8
PAINLEVEL_OUTOF10: 1
PAINLEVEL_OUTOF10: 10
PAINLEVEL_OUTOF10: 7
PAINLEVEL_OUTOF10: 0
PAINLEVEL_OUTOF10: 6
PAINLEVEL_OUTOF10: 10
PAINLEVEL_OUTOF10: 0
PAINLEVEL_OUTOF10: 7
PAINLEVEL_OUTOF10: 10
PAINLEVEL_OUTOF10: 10
PAINLEVEL_OUTOF10: 7
PAINLEVEL_OUTOF10: 5
PAINLEVEL_OUTOF10: 0
PAINLEVEL_OUTOF10: 10
PAINLEVEL_OUTOF10: 10
PAINLEVEL_OUTOF10: 6
PAINLEVEL_OUTOF10: 0
PAINLEVEL_OUTOF10: 0
PAINLEVEL_OUTOF10: 10

## 2021-01-01 ASSESSMENT — ENCOUNTER SYMPTOMS
COUGH: 0
SHORTNESS OF BREATH: 1
BACK PAIN: 0
COUGH: 0
SINUS PRESSURE: 0
SHORTNESS OF BREATH: 0
EYE DISCHARGE: 0
DIARRHEA: 1
SHORTNESS OF BREATH: 0
ABDOMINAL PAIN: 1
COLOR CHANGE: 1
NAUSEA: 1
ABDOMINAL PAIN: 0
SHORTNESS OF BREATH: 1
BACK PAIN: 0
COUGH: 0
DIARRHEA: 0
COUGH: 0
NAUSEA: 0
BLOOD IN STOOL: 0
COUGH: 0
DIARRHEA: 0
SHORTNESS OF BREATH: 0
EYE REDNESS: 0
SINUS PRESSURE: 0
DIARRHEA: 0
COUGH: 0
SORE THROAT: 0
SHORTNESS OF BREATH: 0
WHEEZING: 0
COUGH: 1
ABDOMINAL PAIN: 0
WHEEZING: 0
RHINORRHEA: 0
COUGH: 0
SHORTNESS OF BREATH: 1
CHEST TIGHTNESS: 0
COUGH: 0
CONSTIPATION: 0
DIARRHEA: 0
VOMITING: 0
SORE THROAT: 0
DYSPNEA ACTIVITY LEVEL: AFTER AMBULATING 1 FLIGHT OF STAIRS
ABDOMINAL PAIN: 0
SHORTNESS OF BREATH: 0
BLOOD IN STOOL: 0
VOMITING: 0
COUGH: 0
COUGH: 0
SHORTNESS OF BREATH: 1
DYSPNEA ACTIVITY LEVEL: AFTER AMBULATING 1 FLIGHT OF STAIRS
DIARRHEA: 1
VOMITING: 0
COUGH: 0
DIARRHEA: 0
DIARRHEA: 0
BACK PAIN: 0
SHORTNESS OF BREATH: 1
SHORTNESS OF BREATH: 1
COUGH: 0
SHORTNESS OF BREATH: 1
NAUSEA: 1
SHORTNESS OF BREATH: 1
EYE PAIN: 0
COUGH: 0
DIARRHEA: 0
DIARRHEA: 0
COUGH: 0
SHORTNESS OF BREATH: 1
DIFFICULTY BREATHING: 0
DYSPNEA ACTIVITY LEVEL: AFTER AMBULATING 1 FLIGHT OF STAIRS
ABDOMINAL DISTENTION: 0
VISUAL CHANGE: 0
NAUSEA: 1
DIARRHEA: 0
SHORTNESS OF BREATH: 0
ABDOMINAL PAIN: 0
SHORTNESS OF BREATH: 0
BACK PAIN: 0
COUGH: 0
SINUS PAIN: 0
RHINORRHEA: 0
PHOTOPHOBIA: 0
COUGH: 0
SHORTNESS OF BREATH: 1
COUGH: 0
SHORTNESS OF BREATH: 1
DIARRHEA: 0
SORE THROAT: 0
DIARRHEA: 0
VOMITING: 1

## 2021-01-01 ASSESSMENT — PAIN - FUNCTIONAL ASSESSMENT

## 2021-01-01 ASSESSMENT — PAIN DESCRIPTION - ONSET
ONSET: ON-GOING
ONSET: GRADUAL
ONSET: GRADUAL
ONSET: PROGRESSIVE
ONSET: AWAKENED FROM SLEEP
ONSET: ON-GOING
ONSET: ON-GOING

## 2021-01-01 ASSESSMENT — PAIN DESCRIPTION - FREQUENCY
FREQUENCY: CONTINUOUS
FREQUENCY: CONTINUOUS
FREQUENCY: INTERMITTENT
FREQUENCY: CONTINUOUS
FREQUENCY: INTERMITTENT
FREQUENCY: CONTINUOUS
FREQUENCY: INTERMITTENT
FREQUENCY: CONTINUOUS
FREQUENCY: INTERMITTENT
FREQUENCY: CONTINUOUS
FREQUENCY: CONTINUOUS
FREQUENCY: INTERMITTENT

## 2021-01-01 ASSESSMENT — PAIN DESCRIPTION - LOCATION
LOCATION: ABDOMEN
LOCATION: ABDOMEN
LOCATION: GENERALIZED
LOCATION: GENERALIZED
LOCATION: LEG
LOCATION: ABDOMEN
LOCATION: BACK
LOCATION: GENERALIZED
LOCATION: ABDOMEN
LOCATION: ABDOMEN
LOCATION: GENERALIZED
LOCATION: ABDOMEN
LOCATION: ABDOMEN
LOCATION: GENERALIZED
LOCATION: HEAD
LOCATION: GENERALIZED
LOCATION: ABDOMEN
LOCATION: GENERALIZED
LOCATION: ABDOMEN
LOCATION: LEG
LOCATION: ABDOMEN
LOCATION: LEG
LOCATION: LEG
LOCATION: GENERALIZED
LOCATION: ABDOMEN
LOCATION: GENERALIZED
LOCATION: ABDOMEN

## 2021-01-01 ASSESSMENT — PAIN DESCRIPTION - ORIENTATION
ORIENTATION: MID
ORIENTATION: LEFT
ORIENTATION: LEFT
ORIENTATION: MID
ORIENTATION: RIGHT;LEFT
ORIENTATION: LEFT
ORIENTATION: MID
ORIENTATION: LEFT
ORIENTATION: MID
ORIENTATION: RIGHT;LEFT

## 2021-01-01 ASSESSMENT — PAIN DESCRIPTION - PROGRESSION
CLINICAL_PROGRESSION: NOT CHANGED
CLINICAL_PROGRESSION: OTHER (COMMENT)
CLINICAL_PROGRESSION: GRADUALLY WORSENING
CLINICAL_PROGRESSION: NOT CHANGED
CLINICAL_PROGRESSION: OTHER (COMMENT)
CLINICAL_PROGRESSION: NOT CHANGED
CLINICAL_PROGRESSION: OTHER (COMMENT)
CLINICAL_PROGRESSION: GRADUALLY WORSENING
CLINICAL_PROGRESSION: NOT CHANGED

## 2021-01-01 ASSESSMENT — PAIN DESCRIPTION - PAIN TYPE
TYPE: ACUTE PAIN
TYPE: ACUTE PAIN
TYPE: SURGICAL PAIN
TYPE: ACUTE PAIN
TYPE: SURGICAL PAIN
TYPE: CHRONIC PAIN
TYPE: ACUTE PAIN
TYPE: CHRONIC PAIN
TYPE: SURGICAL PAIN
TYPE: CHRONIC PAIN
TYPE: ACUTE PAIN
TYPE: CHRONIC PAIN
TYPE: CHRONIC PAIN
TYPE: ACUTE PAIN
TYPE: ACUTE PAIN
TYPE: CHRONIC PAIN
TYPE: ACUTE PAIN
TYPE: ACUTE PAIN
TYPE: CHRONIC PAIN
TYPE: CHRONIC PAIN
TYPE: ACUTE PAIN
TYPE: CHRONIC PAIN
TYPE: ACUTE PAIN
TYPE: CHRONIC PAIN
TYPE: ACUTE PAIN
TYPE: ACUTE PAIN
TYPE: CHRONIC PAIN
TYPE: ACUTE PAIN
TYPE: CHRONIC PAIN
TYPE: ACUTE PAIN
TYPE: CHRONIC PAIN
TYPE: CHRONIC PAIN
TYPE: ACUTE PAIN

## 2021-01-01 ASSESSMENT — PAIN SCALES - WONG BAKER
WONGBAKER_NUMERICALRESPONSE: 8
WONGBAKER_NUMERICALRESPONSE: 0
WONGBAKER_NUMERICALRESPONSE: 8
WONGBAKER_NUMERICALRESPONSE: 2
WONGBAKER_NUMERICALRESPONSE: 8
WONGBAKER_NUMERICALRESPONSE: 8

## 2021-01-01 ASSESSMENT — LIFESTYLE VARIABLES
SMOKING_STATUS: 0

## 2021-01-01 ASSESSMENT — COPD QUESTIONNAIRES
CAT_SEVERITY: MODERATE

## 2021-01-06 PROBLEM — G35 MULTIPLE SCLEROSIS (HCC): Status: ACTIVE | Noted: 2021-01-01

## 2021-01-10 NOTE — ED PROVIDER NOTES
This is a 63year old male with PMH of MS as well as DVTs who presents to the ED for evaluation of a fall. Patient states that with his MS and age, he has to use a walker to get around. He states that about 4 days ago he was walking and lost his footing without his cane and fell to the ground. Patient states that he landed on his left head and knee. He states that he never passed out but then has been having gradually worsening pain to his left knee and foot. He states that he is still able to walk but it does seem to be more painful. He is not on any blood thinners. He has no leg swelling but does have some brusing to his foot. He has no neck pain or back pain. He has no alleviating factors. The history is provided by the patient. No  was used. Review of Systems   Constitutional: Negative for fever. HENT: Negative for congestion. Eyes: Negative for visual disturbance. Respiratory: Negative for cough. Cardiovascular: Negative for chest pain. Gastrointestinal: Negative for abdominal pain. Endocrine: Negative for polyuria. Genitourinary: Negative for dysuria. Musculoskeletal: Negative for back pain and neck pain. Skin: Positive for color change. Negative for rash. Neurological: Negative for headaches. Hematological: Does not bruise/bleed easily. Psychiatric/Behavioral: Negative for confusion. Physical Exam  Vitals signs and nursing note reviewed. Constitutional:       General: He is not in acute distress. Appearance: He is well-developed. He is not ill-appearing. HENT:      Head: Normocephalic and atraumatic. Mouth/Throat:      Mouth: Mucous membranes are moist.   Eyes:      Extraocular Movements: Extraocular movements intact. Pupils: Pupils are equal, round, and reactive to light. Neck:      Musculoskeletal: Normal range of motion and neck supple. No neck rigidity or muscular tenderness. Vascular: No JVD.    Cardiovascular: Rate and Rhythm: Regular rhythm. Tachycardia present. Pulmonary:      Effort: Pulmonary effort is normal.   Abdominal:      General: There is no distension. Palpations: Abdomen is soft. Tenderness: There is no abdominal tenderness. There is no rebound. Hernia: No hernia is present. Musculoskeletal:      Comments: No midline c-spine, t-spine or l-spine tenderness to palpation or stepoffs. No hip tenderness to palpation bilaterally or instability. Full ROM of bilateral upper and lower extremities. Full ROM of knee and ankle. No crepitus. Old areas of ecchymosis of dorsal aspecto f foot. No calf edema. Able to ambulate   Lymphadenopathy:      Cervical: No cervical adenopathy. Skin:     General: Skin is warm and dry. Capillary Refill: Capillary refill takes less than 2 seconds. Neurological:      General: No focal deficit present. Mental Status: He is alert and oriented to person, place, and time. Cranial Nerves: No cranial nerve deficit. Psychiatric:         Mood and Affect: Mood normal.         Behavior: Behavior normal.          Procedures     MDM  Number of Diagnoses or Management Options  Acute pain of left knee  Fall, initial encounter  Diagnosis management comments: Patient presented to the ED for evaluation of pain to his left knee and foot after he had a mechanical fall several days ago. Patient was nontoxic, very pleasant and able to ambulate on his own. He was able to flex both his hip and knees as well as bear weight. There was no signs of compartment syndrome and he had old bruising to his left foot. Patient was initially tachycardic in triage but without intervention, had his HR in the 90's with clear lung sounds. Patient had no signs of a DVT and offered medication but refused. He had a lucency on CXR concernign for bowel loops but denied any abdominal pain, nausea or constipation. He was given strict return precautions. --------------------------------------------- PAST HISTORY ---------------------------------------------  Past Medical History:  has a past medical history of Crohn's disease ([facility] Hx of blood clots, and Multiple sclerosis ([facility] Past Surgical History:  has a past surgical history that includes Cholecystectomy; Appendectomy; Colonoscopy; and Endoscopy, colon, diagnostic. Social History:  reports that he has been smoking. He has never used smokeless tobacco. He reports that he does not drink alcohol or use drugs. Family History: family history is not on file. The patients home medications have been reviewed. Allergies: Neurontin [gabapentin] and Lyrica [pregabalin]    -------------------------------------------------- RESULTS -------------------------------------------------  Labs:  No results found for this visit on 01/10/21. Radiology:  CT HEAD WO CONTRAST   Final Result   No acute intracranial abnormality. No intracranial hemorrhage. XR FOOT LEFT (MIN 3 VIEWS)   Final Result   No acute fractures or dislocations in the left foot. XR CHEST 1 VIEW   Final Result   COPD with scarring and atelectasis in lung bases. Lucency under the left hemidiaphragm likely dilated bowel loops. Consider CT   if clinically indicated. XR KNEE LEFT (3 VIEWS)   Final Result   No acute fractures or dislocations of left knee.          ------------------------- NURSING NOTES AND VITALS REVIEWED ---------------------------  Date / Time Roomed:  1/10/2021 11:57 AM  ED Bed Assignment:  14/14    The nursing notes within the ED encounter and vital signs as below have been reviewed.    BP (!) 140/81   Pulse 94   Temp 98.4 °F (36.9 °C) (Oral)   Resp 18   Ht 5' (1.524 m)   Wt 118 lb (53.5 kg)   SpO2 97%   BMI 23.05 kg/m²   Oxygen Saturation Interpretation: Normal      ------------------------------------------ PROGRESS NOTES ------------------------------------------  1:40 PM EST  I have spoken with the patient and discussed todays results, in addition to providing specific details for the plan of care and counseling regarding the diagnosis and prognosis. Their questions are answered at this time and they are agreeable with the plan. I discussed at length with them reasons for immediate return here for re evaluation. They will followup with their PCP.      --------------------------------- ADDITIONAL PROVIDER NOTES ---------------------------------  At this time the patient is without objective evidence of an acute process requiring hospitalization or inpatient management. They have remained hemodynamically stable throughout their entire ED visit and are stable for discharge with outpatient follow-up. The plan has been discussed in detail and they are aware of the specific conditions for emergent return, as well as the importance of follow-up. New Prescriptions    No medications on file       Diagnosis:  1. Fall, initial encounter    2. Acute pain of left knee        Disposition:  Patient's disposition: Discharge to home  Patient's condition is stable.      Mason Betancourt DO  01/10/21 1344

## 2021-01-27 PROBLEM — I26.99 BILATERAL PULMONARY EMBOLISM (HCC): Status: ACTIVE | Noted: 2021-01-01

## 2021-01-27 NOTE — ED NOTES
Name: Bebe Officer  : 1955  MRN: [id number]    Date: 2021    Benefits of immediately proceeding with Radiology exam outweigh the risks and therefore the following is being waived:      [] Pregnancy test    [] Protocol for Iodine allergy    [] MRI questionnaire    [x] BUN/Creatinine        Kira Wilson[address],   21 [id number]

## 2021-01-27 NOTE — ED PROVIDER NOTES
Patient is a 61-year-old male who presents via EMS with a chief complaint of right-sided rib pain. The patient has a history of MS and does use a walker for ambulation. He states that he has been tripping and falling. He was seen in the emergency department on 1/10/2021 following a fall and had imaging done that showed no acute fractures. Patient received saw his PCP and had an MRI done and showed no acute abnormalities. The patient states that the past couple days he has noticed pain of his right ribs that is worse with taking a deep breath in. He does not recall if he fell and landed on his right side recently. He did not hit his head or lose conscious. He does have a known history of DVT years ago and is not currently on any blood thinners. The patient otherwise denies any lightheadedness, dizziness, fevers, chills, shortness of breath, abdominal pain, nausea, vomiting. Per EMS the patient was found to be saturating at 90% on room air, he does not normally wear any oxygen. The history is provided by the patient. No  was used. Review of Systems   Constitutional: Negative for chills, fatigue and fever. HENT: Negative for congestion, rhinorrhea, sinus pressure, sinus pain, sneezing, sore throat and tinnitus. Eyes: Negative for photophobia and visual disturbance. Respiratory: Negative for cough, shortness of breath and wheezing. Cardiovascular: Positive for chest pain (Right-sided rib pain). Negative for palpitations. Gastrointestinal: Negative for abdominal pain, constipation, diarrhea, nausea and vomiting. Genitourinary: Negative for dysuria, frequency and hematuria. Musculoskeletal: Negative for back pain, neck pain and neck stiffness. Skin: Negative for rash and wound. Neurological: Negative for dizziness, light-headedness and headaches. Psychiatric/Behavioral: Negative for agitation, behavioral problems and confusion.         Physical Exam Constitutional:       General: He is not in acute distress. Appearance: He is not toxic-appearing. Comments: Patient is thin in appearance. HENT:      Head: Normocephalic. Mouth/Throat:      Mouth: Mucous membranes are moist.   Eyes:      General: No scleral icterus. Pupils: Pupils are equal, round, and reactive to light. Neck:      Musculoskeletal: Normal range of motion. No neck rigidity. Cardiovascular:      Rate and Rhythm: Normal rate. Heart sounds: No murmur. No gallop. Pulmonary:      Effort: No respiratory distress. Breath sounds: Normal breath sounds. No wheezing. Comments: No chest wall tenderness to palpation. No crepitance noted. No obvious deformity. Patient otherwise has diminished breath sounds in the lung bases. No acute respiratory distress. Pulse ox is 99% on oxygen which she does not normally wear. Chest:      Chest wall: No tenderness. Abdominal:      General: There is no distension. Palpations: Abdomen is soft. Tenderness: There is no abdominal tenderness. Musculoskeletal: Normal range of motion. General: No swelling or deformity. Lymphadenopathy:      Cervical: No cervical adenopathy. Skin:     General: Skin is warm. Capillary Refill: Capillary refill takes less than 2 seconds. Coloration: Skin is not jaundiced. Findings: No bruising. Neurological:      Mental Status: He is alert and oriented to person, place, and time. Cranial Nerves: No cranial nerve deficit. Motor: No weakness. Comments: Patient is awake, alert, oriented x3. No focal neurological deficit. Psychiatric:         Mood and Affect: Mood normal.         Thought Content:  Thought content normal.          Procedures     MDM  Number of Diagnoses or Management Options  Bilateral pulmonary embolism (HCC)  Elevated troponin Diagnosis management comments: Patient is a 82-year-old male who presents with a chief complaint of right-sided rib pain. Patient states that he has been having this pain and is not improving. He has seen his PCP for this. He does have a history of MS and attributed to pain after his frequent falls. On exam he does not have any tenderness to palpation of the right chest wall. He was found to be hypoxic by EMS and required supplemental oxygen which is new for him. He is also mildly tachycardic on arrival.  My concern was for a pulmonary embolism so CTA was done, I did put in a waiver for his creatinine. CTA does show extensive bilateral pulmonary emboli mostly of the right middle and lower lobes. The patient was started on heparin and will be admitted to the hospital.  His troponin is also 0.9 with new T wave inversions likely from the extensive pulmonary emboli. The patient does understand that he has been admitted and is agreeable, his wife is at bedside. ED Course as of Jan 27 0727   Wed Jan 27, 2021   0720 I did update the patient and his wife at bedside about the results. The patient does have extensive pulmonary emboli bilaterally. [MS]      ED Course User Index  [MS] Vielka Antoine, DO      --------------------------------------------- PAST HISTORY ---------------------------------------------  Past Medical History:  has a past medical history of Crohn's disease ([facility] Hx of blood clots, and Multiple sclerosis ([facility] Past Surgical History:  has a past surgical history that includes Cholecystectomy; Appendectomy; Colonoscopy; and Endoscopy, colon, diagnostic. Social History:  reports that he has been smoking. He has never used smokeless tobacco. He reports that he does not drink alcohol or use drugs. Family History: family history is not on file. The patients home medications have been reviewed.     Allergies: Neurontin [gabapentin] and Lyrica [pregabalin] -------------------------------------------------- RESULTS -------------------------------------------------    LABS:  Results for orders placed or performed during the hospital encounter of 01/27/21   CBC Auto Differential   Result Value Ref Range    WBC 28.5 (H) 4.5 - 11.5 E9/L    RBC 4.46 3.80 - 5.80 E12/L    Hemoglobin 12.3 (L) 12.5 - 16.5 g/dL    Hematocrit 40.3 37.0 - 54.0 %    MCV 90.4 80.0 - 99.9 fL    MCH 27.6 26.0 - 35.0 pg    MCHC 30.5 (L) 32.0 - 34.5 %    RDW 16.7 (H) 11.5 - 15.0 fL    Platelets 175 601 - 625 E9/L    MPV 10.7 7.0 - 12.0 fL    Neutrophils % 78.1 43.0 - 80.0 %    Lymphocytes % 12.3 (L) 20.0 - 42.0 %    Monocytes % 7.9 2.0 - 12.0 %    Eosinophils % 0.2 0.0 - 6.0 %    Basophils % 0.9 0.0 - 2.0 %    Neutrophils Absolute 22.52 (H) 1.80 - 7.30 E9/L    Lymphocytes Absolute 3.42 1.50 - 4.00 E9/L    Monocytes Absolute 2.28 (H) 0.10 - 0.95 E9/L    Eosinophils Absolute 0.00 (L) 0.05 - 0.50 E9/L    Basophils Absolute 0.26 (H) 0.00 - 0.20 E9/L    Myelocyte Percent 0.9 0 - 0 %    Anisocytosis 1+     Polychromasia 1+     Poikilocytes 1+     Ovalocytes 1+    Basic metabolic panel   Result Value Ref Range    Sodium 136 132 - 146 mmol/L    Potassium 5.4 (H) 3.5 - 5.0 mmol/L    Chloride 99 98 - 107 mmol/L    CO2 22 22 - 29 mmol/L    Anion Gap 15 7 - 16 mmol/L    Glucose 163 (H) 74 - 99 mg/dL    BUN 19 8 - 23 mg/dL    CREATININE 1.8 (H) 0.7 - 1.2 mg/dL    GFR Non-African American 38 >=60 mL/min/1.73    GFR African American 46     Calcium 9.2 8.6 - 10.2 mg/dL   Troponin   Result Value Ref Range    Troponin 0.90 (H) 0.00 - 0.03 ng/mL   EKG 12 Lead   Result Value Ref Range    Ventricular Rate 95 BPM    Atrial Rate 95 BPM    P-R Interval 112 ms    QRS Duration 80 ms    Q-T Interval 404 ms    QTc Calculation (Bazett) 507 ms    P Axis 62 degrees    R Axis 68 degrees    T Axis 52 degrees       RADIOLOGY:  Xr Knee Left (3 Views)    Result Date: 1/10/2021 EXAMINATION: THREE XRAY VIEWS OF THE LEFT KNEE 1/10/2021 12:50 pm COMPARISON: None. HISTORY: ORDERING SYSTEM PROVIDED HISTORY: Fall, left knee pain TECHNOLOGIST PROVIDED HISTORY: Reason for exam:->Fall, left knee pain FINDINGS: No evidence of acute fracture or dislocation. No focal osseous lesion. No patellar tilt. No evidence of joint effusion. No focal soft tissue abnormality. No acute fractures or dislocations of left knee. Xr Foot Left (min 3 Views)    Result Date: 1/10/2021  EXAMINATION: THREE XRAY VIEWS OF THE LEFT FOOT 1/10/2021 12:51 pm COMPARISON: None. HISTORY: ORDERING SYSTEM PROVIDED HISTORY: Fall, brusing TECHNOLOGIST PROVIDED HISTORY: Reason for exam:->Fall, brusing FINDINGS: There is no evidence of acute fracture. There is normal alignment of the tarsometatarsal joints. No acute joint abnormality. No focal osseous lesion. No focal soft tissue abnormality. No acute fractures or dislocations in the left foot.     Ct Head Wo Contrast    Result Date: 1/10/2021 EXAMINATION: CT OF THE HEAD WITHOUT CONTRAST  1/10/2021 1:00 pm TECHNIQUE: CT of the head was performed without the administration of intravenous contrast. Dose modulation, iterative reconstruction, and/or weight based adjustment of the mA/kV was utilized to reduce the radiation dose to as low as reasonably achievable. COMPARISON: MRI brain December 6, 2017 HISTORY: ORDERING SYSTEM PROVIDED HISTORY: Fall, abrasion to left forhead TECHNOLOGIST PROVIDED HISTORY: Has a \"code stroke\" or \"stroke alert\" been called? ->No Reason for exam:->Fall, abrasion to left forhead FINDINGS: BRAIN/VENTRICLES: There is no acute intracranial hemorrhage, mass effect or midline shift. No abnormal extra-axial fluid collection. The gray-white differentiation is maintained without evidence of an acute infarct. Hypoattenuation of the cerebral white matter. There is no evidence of hydrocephalus. ORBITS: The visualized portion of the orbits demonstrate no acute abnormality. SINUSES: The visualized paranasal sinuses and mastoid air cells demonstrate no acute abnormality. SOFT TISSUES/SKULL:  No acute abnormality of the visualized skull or soft tissues. No acute intracranial abnormality. No intracranial hemorrhage. Xr Chest 1 View    Result Date: 1/10/2021  EXAMINATION: ONE XRAY VIEW OF THE CHEST 1/10/2021 12:50 pm COMPARISON: September 11, 2012 HISTORY: ORDERING SYSTEM PROVIDED HISTORY: Fall TECHNOLOGIST PROVIDED HISTORY: Reason for exam:->Fall FINDINGS: Heart size is normal.  There is COPD with hyperinflated lungs. Calcified granulomas are identified in the perihilar region. There is minimal scarring and atelectasis in the left base. There is no focal consolidation or pleural effusion. A lucency is identified under the left hemidiaphragm probably dilated bowel loops. If the patient has significant abdomen pain, consider CT of the abdomen and pelvis. Bilateral acute pulmonary emboli, fairly extensive on the right involving majority of the right lower and middle lobes. There are bilateral ground-glass and patchy infiltrate, right more than left. This could reflect pulmonary infarcts and/or pneumonia such as COVID-19 pneumonia. EKG: This EKG is signed and interpreted by me. Rate: 95  Rhythm: Sinus  Interpretation: Sinus rhythm, new T wave inversions in V1 through V4. This is new from comparison EKG on 10/13/2017.      ------------------------- NURSING NOTES AND VITALS REVIEWED ---------------------------  The nursing notes within the ED encounter and vital signs as below have been reviewed. Patient Vitals for the past 24 hrs:   BP Temp Temp src Pulse Resp SpO2   01/27/21 0630 116/72    20 98 %   01/27/21 0600 117/75   78 20 100 %   01/27/21 0522 106/70   94 20 (!) 3 %   01/27/21 0452 128/77 98.3 °F (36.8 °C) Oral 100 20 99 %       Oxygen Saturation Interpretation: Abnormal and Improved after treatment    ------------------------------------------ PROGRESS NOTES ------------------------------------------  Re-evaluation(s):  Please see ED course. Counseling:  I have spoken with the patient and discussed todays results, in addition to providing specific details for the plan of care and counseling regarding the diagnosis and prognosis. Their questions are answered at this time and they are agreeable with the plan of admission.    --------------------------------- ADDITIONAL PROVIDER NOTES ---------------------------------  Consultations:  Spoke with Dr. Sania Price. Discussed case. They will admit the patient. This patient's ED course included: a personal history and physicial examination, re-evaluation prior to disposition, cardiac monitoring and continuous pulse oximetry    This patient has remained hemodynamically stable during their ED course. Diagnosis:  1.  Bilateral pulmonary embolism (HCC)    2. Elevated troponin        Disposition: Patient's disposition: Admit to telemetry  Patient's condition is stable.            Lupillo Veloz DO  01/27/21 0730

## 2021-01-27 NOTE — CONSULTS
INPATIENT CARDIOLOGY CONSULT    Name: Bebe Officer    Age: 63 y.o. Date of Service: 1/27/2021    History of Present Illness:  72-year-old male presents to the emergency department with constant chest pain for the last 1 to 2 weeks. He did not have any shortness of breath or any other cardiac symptoms. He was found to have pulmonary embolism of the right main pulmonary artery extending into segmental arteries. He was started on anticoagulation. Labs were also significant for elevated troponin 0.9 trending down afterwards and white BC 29,000. Patient also has medical history of DVT, chronic kidney disease baseline creatinine 1.8, multiple sclerosis uses wheelchair for ambulation on infusion therapy. Patient was also found to be hypoxic requiring 3 L nasal cannula. Review of Systems:  Cardiac: As per HPI  General: No fever, chills  Pulmonary: As per HPI  HEENT: No visual disturbances, difficult swallowing  GI: No nausea, vomiting  Endocrine: No thyroid disease or DM  Musculoskeletal: MADRID x 4, no focal motor deficits  Skin: Intact, no rashes  Neuro/Psych: No headache or seizures    Past Medical History:  Past Medical History:   Diagnosis Date    [facility] disease ([facility] [address]     Hx of blood clots 2012    liver, lung    Multiple sclerosis ([facility] [address]        Past Surgical History:  Past Surgical History:   Procedure Laterality Date    APPENDECTOMY      CHOLECYSTECTOMY      COLONOSCOPY      ENDOSCOPY, COLON, DIAGNOSTIC         Family History:  History reviewed. No pertinent family history.     Social History:  Social History     Socioeconomic History    Marital status:      Spouse name: Not on file    Number of children: Not on file    Years of education: Not on file    Highest education level: Not on file   Occupational History    Not on file   Social Needs    Financial resource strain: Not on file    Food insecurity     Worry: Not on file     Inability: Not on file   Inogen needs Medical: Not on file     Non-medical: Not on file   Tobacco Use    Smoking status: Current Every Day Smoker    Smokeless tobacco: Never Used   Substance and Sexual Activity    Alcohol use: No    Drug use: No    Sexual activity: Not Currently   Lifestyle    Physical activity     Days per week: Not on file     Minutes per session: Not on file    Stress: Not on file   Relationships    Social connections     Talks on phone: Not on file     Gets together: Not on file     Attends Orthodox service: Not on file     Active member of club or organization: Not on file     Attends meetings of clubs or organizations: Not on file     Relationship status: Not on file    Intimate partner violence     Fear of current or ex partner: Not on file     Emotionally abused: Not on file     Physically abused: Not on file     Forced sexual activity: Not on file   Other Topics Concern    Not on file   Social History Narrative    Not on file       Allergies:   Allergies   Allergen Reactions    Neurontin [Gabapentin] Palpitations    Lyrica [Pregabalin] Other (See Comments)     photophobia       Current Medications:  Current Facility-Administered Medications   Medication Dose Route Frequency Provider Last Rate Last Admin    heparin (porcine) injection 4,060 Units  80 Units/kg Intravenous PRN Aleene Salinas, DO   4,060 Units at 01/27/21 1518    heparin (porcine) injection 2,030 Units  40 Units/kg Intravenous PRN Aleene Magdiel, DO        heparin 25,000 units in dextrose 5% 250 mL (premix) infusion  5-30 Units/kg/hr Intravenous Continuous Aleene Salinas, DO 9.1 mL/hr at 01/27/21 0758 18 Units/kg/hr at 01/27/21 0758    sodium chloride flush 0.9 % injection 10 mL  10 mL Intravenous 2 times per day Ale Alcaraz MD        sodium chloride flush 0.9 % injection 10 mL  10 mL Intravenous PRN Ale Alcaraz MD        acetaminophen (TYLENOL) tablet 650 mg  650 mg Oral Q4H PRN Ale Alcaraz MD  perflutren lipid microspheres (DEFINITY) injection 1.65 mg  1.5 mL Intravenous ONCE PRN Honorio Juarez MD        baclofen (LIORESAL) tablet 10 mg  10 mg Oral BID Kal Bermudez MD   10 mg at 01/27/21 1413    clotrimazole-betamethasone (LOTRISONE) cream 1 each  1 each Topical Daily Kal Bermudez MD        metoprolol succinate (TOPROL XL) extended release tablet 50 mg  50 mg Oral Daily Kal Bermudez MD   50 mg at 01/27/21 1413    mirtazapine (REMERON) tablet 15 mg  15 mg Oral Nightly Kal Bermudez MD        ondansetron (ZOFRAN-ODT) disintegrating tablet 4 mg  4 mg Oral Q8H PRN Kal Bermudez MD        oxyCODONE (ROXICODONE) immediate release tablet 10 mg  10 mg Oral Q4H Kal Bermudez MD   10 mg at 01/27/21 1413    triamcinolone (KENALOG) 0.1 % cream 0.1 g  1 each Topical BID Kal Bermudez MD        aspirin chewable tablet 81 mg  81 mg Oral Daily Honorio Juarez MD        atorvastatin (LIPITOR) tablet 80 mg  80 mg Oral Nightly Honorio Juarez MD        lisinopril (PRINIVIL;ZESTRIL) tablet 2.5 mg  2.5 mg Oral Daily Honorio Juarez MD           Physical Exam:  /72   Pulse 77   Temp 97.5 °F (36.4 °C) (Oral)   Resp 18   Wt 112 lb (50.8 kg)   SpO2 96%   BMI 21.87 kg/m²   Wt Readings from Last 3 Encounters:   01/27/21 112 lb (50.8 kg)   01/10/21 118 lb (53.5 kg)   03/03/18 107 lb (48.5 kg)     Appearance: Awake, alert, no acute respiratory distress  Skin: Intact, no rash  Head: Normocephalic, atraumatic  Eyes: EOMI, no conjunctival erythema  Neck: Supple, no elevated JVP, no carotid bruits  Lungs: Clear to auscultation bilaterally. No wheezes, rales, or rhonchi.   Cardiac: Regular rate and rhythm, +S1S2, no murmurs apparent  Abdomen: Soft, nontender, +bowel sounds  Extremities: Moves all extremities x 4, no lower extremity edema  Neurologic: No focal motor deficits apparent, normal mood and affect  Peripheral Pulses: Intact posterior tibial pulses bilaterally Laboratory Tests:  Lab Results   Component Value Date    CREATININE 1.8 (H) 01/27/2021    BUN 19 01/27/2021     01/27/2021    K 5.4 (H) 01/27/2021    CL 99 01/27/2021    CO2 22 01/27/2021     Lab Results   Component Value Date    MG 1.6 09/06/2012     Lab Results   Component Value Date    WBC 28.5 (H) 01/27/2021    HGB 11.4 (L) 01/27/2021    HCT 37.9 01/27/2021    MCV 91.5 01/27/2021     01/27/2021     Lab Results   Component Value Date    ALT 25 12/28/2020    AST 29 12/28/2020    ALKPHOS 96 12/28/2020    BILITOT 0.4 12/28/2020     Lab Results   Component Value Date    TROPONINI 0.80 (H) 01/27/2021    TROPONINI 0.90 (H) 01/27/2021     Lab Results   Component Value Date    INR 1.4 09/14/2012    INR 1.1 09/13/2012    INR 1.1 09/12/2012    PROTIME 14.3 (H) 09/14/2012    PROTIME 12.7 09/13/2012    PROTIME 12.8 09/12/2012     Lab Results   Component Value Date    TSH 0.788 09/25/2020     No results found for: LABA1C  No results found for: EAG  Lab Results   Component Value Date    CHOL 206 (H) 09/25/2020    CHOL 187 08/02/2019    CHOL 218 (H) 02/26/2018     Lab Results   Component Value Date    TRIG 173 (H) 09/25/2020    TRIG 181 (H) 08/02/2019    TRIG 221 (H) 02/26/2018     Lab Results   Component Value Date    HDL 46 09/25/2020    HDL 36 08/02/2019    HDL 68 02/26/2018     Lab Results   Component Value Date    LDLCALC 125 (H) 09/25/2020    LDLCALC 115 (H) 08/02/2019    LDLCALC 106 (H) 02/26/2018     Lab Results   Component Value Date    LABVLDL 35 09/25/2020    LABVLDL 36 08/02/2019    LABVLDL 44 02/26/2018     No results found for: CHOLHDLRATIO  No results for input(s): PROBNP in the last 72 hours. Cardiac Tests:  ECG: Normal sinus rhythm. Nonspecific T wave abnormalities.     ASSESSMENT / PLAN: 63-year-old male with past medical history of multiple sclerosis wheelchair-bound, chronic kidney disease baseline creatinine 1.8 presents with pulmonary embolism right pulmonary artery found to be hypoxic 3 L nasal cannula. We are consulted for elevated troponin peaked 0.9. Recommendations  Echocardiogram shows normal right ventricle function and size. Elevated troponin most likely due to demand ischemia in the setting of significant underlying coronary artery disease and undiagnosed ischemic cardiomyopathy. Echocardiogram showed new heart failure reduced ejection fraction 40 to 45% with segmental wall motion abnormalities of the anterior and septal walls. Start aspirin and atorvastatin in addition to anticoagulation. Start small lisinopril in addition to metoprolol. We will pursue left heart catheterization 3 to 6 months after PE unless there is an emergent indication. Thank you for allowing me to participate in your patient's care. Please feel free to contact me if you have any questions or concerns.     Ambika Valentine MD  [facility]) Cardiology

## 2021-01-27 NOTE — ED NOTES
GFR low. CT tech. Hesitant to do CTA. Dr. Davin Armenta talking with tech.      Suzy Diaz, RN  01/27/21 0698

## 2021-01-28 NOTE — PLAN OF CARE
Problem: Falls - Risk of:  Goal: Will remain free from falls  Description: Will remain free from falls  Outcome: Met This Shift     Problem: Falls - Risk of:  Goal: Absence of physical injury  Description: Absence of physical injury  Outcome: Met This Shift     Problem: Skin Integrity:  Goal: Will show no infection signs and symptoms  Description: Will show no infection signs and symptoms  Outcome: Met This Shift

## 2021-01-28 NOTE — PROGRESS NOTES
INPATIENT CARDIOLOGY FOLLOW-UP    Name: Ozzie Spaulding    Age: 63 y.o. Date of Admission: 1/27/2021  4:43 AM    Date of Service: 1/28/2021    Interim History:  Did not tolerate HF medicine. Developed hypotension. Review of Systems:   Cardiac: As per HPI  General: No fever, chills  Pulmonary: As per HPI  HEENT: No visual disturbances, difficult swallowing  GI: No nausea, vomiting  Endocrine: No thyroid disease or DM  Musculoskeletal: MADRID x 4, no focal motor deficits  Skin: Intact, no rashes  Neuro/Psych: No headache or seizures    Problem List:  Patient Active Problem List   Diagnosis    Malnutrition ([facility] [address]    Multiple sclerosis ([facility] [address]    Bilateral pulmonary embolism ([facility] [address]    Crohn's disease ([facility] [address]    COPD (chronic obstructive pulmonary disease) ([facility] [address]    Tobacco abuse    Hypertension    Hyperlipidemia       Allergies:   Allergies   Allergen Reactions    Neurontin [Gabapentin] Palpitations    Lyrica [Pregabalin] Other (See Comments)     photophobia       Current Medications:  Current Facility-Administered Medications   Medication Dose Route Frequency Provider Last Rate Last Admin    heparin (porcine) injection 4,060 Units  80 Units/kg Intravenous PRN Placido Leeks, DO   4,060 Units at 01/27/21 2220    heparin (porcine) injection 2,030 Units  40 Units/kg Intravenous PRN Placido Leeks, DO        heparin 25,000 units in dextrose 5% 250 mL (premix) infusion  5-30 Units/kg/hr Intravenous Continuous Placido Leeks, DO 13.2 mL/hr at 01/28/21 0919 25.984 Units/kg/hr at 01/28/21 0919    sodium chloride flush 0.9 % injection 10 mL  10 mL Intravenous 2 times per day Dorita Leo MD   10 mL at 01/28/21 1013    sodium chloride flush 0.9 % injection 10 mL  10 mL Intravenous PRN Dorita Leo MD        acetaminophen (TYLENOL) tablet 650 mg  650 mg Oral Q4H PRN Dorita Leo MD Extremities: Moves all extremities x 4, no lower extremity edema  Neurologic: No focal motor deficits apparent, normal mood and affect  Peripheral Pulses: Intact posterior tibial pulses bilaterally    Intake/Output:    Intake/Output Summary (Last 24 hours) at 1/28/2021 1225  Last data filed at 1/28/2021 1013  Gross per 24 hour   Intake 10 ml   Output 225 ml   Net -215 ml     I/O this shift:  In: 10 [I.V.:10]  Out: -     Laboratory Tests:  Recent Labs     01/27/21  0500      K 5.4*   CL 99   CO2 22   BUN 19   CREATININE 1.8*   GLUCOSE 163*   CALCIUM 9.2     Lab Results   Component Value Date    MG 1.6 09/06/2012     No results for input(s): ALKPHOS, ALT, AST, PROT, BILITOT, BILIDIR, LABALBU in the last 72 hours.   Recent Labs     01/27/21  0500 01/27/21  1040   WBC 28.5* 28.5*   RBC 4.46 4.14   HGB 12.3* 11.4*   HCT 40.3 37.9   MCV 90.4 91.5   MCH 27.6 27.5   MCHC 30.5* 30.1*   RDW 16.7* 16.9*    238   MPV 10.7 10.4     Lab Results   Component Value Date    TROPONINI 0.84 (H) 01/28/2021    TROPONINI 0.82 (H) 01/27/2021    TROPONINI 0.80 (H) 01/27/2021     Lab Results   Component Value Date    INR 1.4 09/14/2012    INR 1.1 09/13/2012    INR 1.1 09/12/2012    PROTIME 14.3 (H) 09/14/2012    PROTIME 12.7 09/13/2012    PROTIME 12.8 09/12/2012     Lab Results   Component Value Date    TSH 0.788 09/25/2020     No results found for: LABA1C  No results found for: EAG  Lab Results   Component Value Date    CHOL 206 (H) 09/25/2020    CHOL 187 08/02/2019    CHOL 218 (H) 02/26/2018     Lab Results   Component Value Date    TRIG 173 (H) 09/25/2020    TRIG 181 (H) 08/02/2019    TRIG 221 (H) 02/26/2018     Lab Results   Component Value Date    HDL 46 09/25/2020    HDL 36 08/02/2019    HDL 68 02/26/2018     Lab Results   Component Value Date    LDLCALC 125 (H) 09/25/2020    LDLCALC 115 (H) 08/02/2019    LDLCALC 106 (H) 02/26/2018     Lab Results   Component Value Date    LABVLDL 35 09/25/2020    LABVLDL 36 08/02/2019 LABVLDL 44 02/26/2018     No results found for: CHOLHDLRATIO  No results for input(s): PROBNP in the last 72 hours. ASSESSMENT / PLAN:  81-year-old male with past medical history of multiple sclerosis wheelchair-bound, chronic kidney disease baseline creatinine 1.8 presents with pulmonary embolism right pulmonary artery found to be hypoxic 3 L nasal cannula. We are consulted for elevated troponin peaked 0.9. Echocardiogram shows normal right ventricle function and size. Echocardiogram showed new heart failure reduced ejection fraction 40 to 45% with segmental wall motion abnormalities of the anterior and septal walls. Elevated troponin most likely due to demand ischemia in the setting of significant underlying coronary artery disease and undiagnosed ischemic cardiomyopathy. Started ASA and atorvastatin. Developed hypotension on metoprolol and lisinopril.      Recommendations  Continue aspirin and atorvastatin in addition to anticoagulation. Continue small lisinopril   Decrease metoprolol dose. We will pursue left heart catheterization 3 to 6 months after PE unless there is an emergent indication. Follow up in cardiology clinic after discharge.      Abbie Sarabia MD  [facility]) Cardiology

## 2021-01-28 NOTE — CARE COORDINATION
1/28/2021 1201 CM note: NEGATIVE COVID 1/27/21. Met with patient and his wife Ingris Thomas at bedside for transition of care needs. Pt resides with his wife. He has hx M.S. and ambulates short distances. DME includes:ww,rollator and w/c. Pt wearing o2, does not have home o2. IF o2 is needed at discharge, no DME preference. PCP is Dr Scot Santos, uses [facility]. Per  notes, pt will be transitioned to eliquis. Free 30 day trial card for eliquis  explained and given to patient/wife. Patient/wife instructed to take this card with  prescription to retail pharmacy to obtain 30 day supply for free. Eliquis teaching initiated. Patient/wife verbalized understanding and gratitude. Plan is home, wife will provide ride. CM/SW to follow for possible home o2 needs.  Nancy MAXWELL

## 2021-01-28 NOTE — H&P
Department of Internal Medicine            CHIEF COMPLAINT:  SOB    HISTORY OF PRESENT ILLNESS:      This is a PT HAS BEEN FALLING A LOT. HE HAS CROHN AND MS AND HAS BEEN TREATING AT [facility] FOR HIS MS. PT ALSO HAVE HX OF PE IN PAST. HE FELL AT HOME AND HURT HIS RIBS. PT IS GETTING PROGRESSIVELY WORSE ON HIS SOB. PT WAS ADVISED TO GO TO ER AND HE WAS FOUND TO HAVE ACUTE PE.  HE ALSO HAS POSITIVE TROPONIN. HIS RISK FACTORS ARE HYPERLIPIDEMIA, AND TOBACCO DEPENDENT. PAST MEDICAL Hx:  Past Medical History:   Diagnosis Date    [facility] obstructive [facility] disease) ([facility] [address]     Crohn's disease ([facility] [address]     Hx of blood clots 2012    liver, lung    Hyperlipidemia     Hypertension     Multiple sclerosis ([facility] [address]        PAST SURGICAL Hx:   Past Surgical History:   Procedure Laterality Date    APPENDECTOMY      CHOLECYSTECTOMY      COLONOSCOPY      ENDOSCOPY, COLON, DIAGNOSTIC         FAMILY Hx:  History reviewed. No pertinent family history. HOME MEDICATIONS:  Prior to Admission medications    Medication Sig Start Date End Date Taking? Authorizing Provider   oxyCODONE HCl (OXY-IR) 10 MG immediate release tablet Take 10 mg by mouth every 4 hours.    Yes Historical Provider, MD   baclofen (LIORESAL) 10 MG tablet Take 10 mg by mouth 2 times daily   Yes Historical Provider, MD   ondansetron (ZOFRAN-ODT) 4 MG disintegrating tablet Take 4 mg by mouth every 8 hours as needed for Nausea or Vomiting   Yes Historical Provider, MD   influenza virus trivalent vaccine (FLUZONE) injection Inject 0.5 mLs into the muscle once Given 10/2020   Yes Historical Provider, MD   clotrimazole-betamethasone ([employer]) 1-0.05 % cream Apply 1 each topically daily   Yes Historical Provider, MD   Ocrelizumab (OCREVUS IV) Infuse intravenously every 6 months Last given Dec 9 2020, been on for 2 years   Yes Historical Provider, MD metoprolol succinate (TOPROL XL) 50 MG extended release tablet Take 50 mg by mouth daily   Yes Historical Provider, MD   mirtazapine (REMERON) 15 MG tablet Take 15 mg by mouth nightly   Yes Historical Provider, MD   triamcinolone (KENALOG) 0.1 % cream Apply 1 each topically 2 times daily    Yes Historical Provider, MD       ALLERGIES:  Neurontin [gabapentin] and Lyrica [pregabalin]    SOCIAL Hx:  Social History     Socioeconomic History    Marital status:      Spouse name: Not on file    Number of children: Not on file    Years of education: Not on file    Highest education level: Not on file   Occupational History    Not on file   Social Needs    Financial resource strain: Not on file    Food insecurity     Worry: Not on file     Inability: Not on file    Transportation needs     Medical: Not on file     Non-medical: Not on file   Tobacco Use    Smoking status: Current Every Day Smoker    Smokeless tobacco: Never Used   Substance and Sexual Activity    Alcohol use: No    Drug use: No    Sexual activity: Not Currently   Lifestyle    Physical activity     Days per week: Not on file     Minutes per session: Not on file    Stress: Not on file   Relationships    Social connections     Talks on phone: Not on file     Gets together: Not on file     Attends Buddhist service: Not on file     Active member of club or organization: Not on file     Attends meetings of clubs or organizations: Not on file     Relationship status: Not on file    Intimate partner violence     Fear of current or ex partner: Not on file     Emotionally abused: Not on file     Physically abused: Not on file     Forced sexual activity: Not on file   Other Topics Concern    Not on file   Social History Narrative    Not on file       ROS:  General:   HAS chills, fatigue, fever, malaise, night sweats AND weight loss    Psychological:   Denies anxiety, disorientation or hallucinations    ENT: Denies epistaxis, headaches, vertigo or visual changes    Cardiovascular:   Denies any chest pain, irregular heartbeats, or palpitations. No paroxysmal nocturnal dyspnea. Respiratory:   HAS shortness of breath, coughing, sputum production, NO hemoptysis, or wheezing. No orthopnea. Gastrointestinal:   Denies nausea, vomiting, diarrhea, or constipation. Denies any abdominal pain. Denies change in bowel habits or stools. Genito-Urinary:    Denies any urgency, frequency, hematuria. Voiding without difficulty. Musculoskeletal:   Denies joint pain, joint stiffness, joint swelling or muscle pain    Neurology:    Denies any headache or focal neurological deficits. No weakness or paresthesia. Derm:    Denies any rashes, ulcers, or excoriations. Denies bruising. Extremities:   Denies any lower extremity swelling or edema. PHYSICAL EXAM:  VITALS:  Vitals:    01/28/21 0750   BP: (!) 99/56   Pulse: 78   Resp: 18   Temp: 98.4 °F (36.9 °C)   SpO2: 97%         CONSTITUTIONAL:    Awake, alert, cooperative, no apparent distress, and appears stated age    EYES:    PERRL, EOMI, sclera clear, conjunctiva normal    ENT:    Normocephalic, atraumatic, sinuses nontender on palpation. External ears without lesions. Oral pharynx with moist mucus membranes. Tonsils without erythema or exudates.     NECK:    Supple, symmetrical, trachea midline, no adenopathy, thyroid symmetric, not enlarged and no tenderness, skin normal, no bruits, no JVD    HEMATOLOGIC/LYMPHATICS:    No cervical lymphadenopathy and no supraclavicular lymphadenopathy    LUNGS:    DECREASE BREATH SOUNDS    CARDIOVASCULAR:    Normal apical impulse, regular rate and rhythm, normal S1 and S2, no S3 or S4, and no murmur noted    ABDOMEN:    No scars, normal bowel sounds, soft, non-distended, non-tender, no masses palpated, no hepatosplenomegaly, no rebound or guarding elicited on palpation     MUSCULOSKELETAL: There is no redness, warmth, or swelling of the joints. Full range of motion noted. Motor strength is 5 out of 5 all extremities bilaterally. Tone is normal.    NEUROLOGIC:    Awake, alert, oriented to name, place and time. Cranial nerves II-XII are grossly intact. Motor is 5 out of 5 bilaterally. SKIN:    No bruising or bleeding. No redness, warmth, or swelling    EXTREMITIES:    Peripheral pulses present. No edema, cyanosis, or swelling. LABORATORY DATA:  CBC:   Lab Results   Component Value Date    WBC 28.5 01/27/2021    RBC 4.14 01/27/2021    HGB 11.4 01/27/2021    HCT 37.9 01/27/2021    MCV 91.5 01/27/2021    MCH 27.5 01/27/2021    MCHC 30.1 01/27/2021    RDW 16.9 01/27/2021     01/27/2021    MPV 10.4 01/27/2021     CMP:    Lab Results   Component Value Date     01/27/2021    K 5.4 01/27/2021    CL 99 01/27/2021    CO2 22 01/27/2021    BUN 19 01/27/2021    CREATININE 1.8 01/27/2021    GFRAA 46 01/27/2021    LABGLOM 38 01/27/2021    GLUCOSE 163 01/27/2021    PROT 6.8 12/28/2020    LABALBU 3.5 12/28/2020    CALCIUM 9.2 01/27/2021    BILITOT 0.4 12/28/2020    ALKPHOS 96 12/28/2020    AST 29 12/28/2020    ALT 25 12/28/2020       ASSESSMENT:  Patient Active Problem List   Diagnosis    Malnutrition ([address]    Multiple sclerosis ([address]    Bilateral pulmonary embolism ([facility])    Crohn's disease ([address]    COPD (chronic obstructive pulmonary disease) ([address]    Tobacco abuse    Hypertension    Hyperlipidemia       PLAN:  IV HEPARIN. WE WILL TRANSITION TO ELIQUIS ONCE HE IS DISCHARGE HOME.  WE WILL CONSULT CARDIOOGY FOR WORK UP. HIS WBC IS HIGH DUE TO RECENT STEROID DRIP FOR EXAC OF MSYazan Olvera M.D.  8:13 AM  1/28/2021

## 2021-01-29 PROBLEM — N18.32 STAGE 3B CHRONIC KIDNEY DISEASE (HCC): Status: ACTIVE | Noted: 2021-01-01

## 2021-01-29 PROBLEM — E87.5 HYPERKALEMIA: Status: ACTIVE | Noted: 2021-01-01

## 2021-01-29 PROBLEM — D72.829 LEUKOCYTOSIS: Status: ACTIVE | Noted: 2021-01-01

## 2021-01-29 NOTE — CARE COORDINATION
1/29/2021 1132 CM note:NEGATIVE COVID 1/27/21. Pt resides with his wife. He has hx M.S. and ambulates short distances. DME includes:ww,rollator and w/c. Pt wearing o2 3L NC, does NOT have home o2. IF o2 is needed at discharge, no DME preference. Free 30 day trial card for eliquis  explained and given to patient/wife. Plan is home, wife will provide ride. CM/SW to follow for possible home o2 needs.  Nancy MAXWELL

## 2021-01-29 NOTE — PROGRESS NOTES
Progress Note  Date:2021       Room:[facility]2/0432-01  Patient Name:Davian Zhang     YOB: 1955     Age:65 y.o. Subjective    Subjective:  Symptoms:  Stable. He reports shortness of breath, malaise, weakness and anxiety. No cough, chest pain, headache, chest pressure, anorexia or diarrhea. Diet:  Adequate intake. Activity level: Impaired due to weakness. Pain:  He complains of pain that is moderate. pt is doing ok. Sob is better. Cardiology input appreciated. Review of Systems   Respiratory: Positive for shortness of breath. Negative for cough. Cardiovascular: Negative for chest pain. Gastrointestinal: Negative for anorexia and diarrhea. Neurological: Positive for weakness. Objective         Vitals Last 24 Hours:  TEMPERATURE:  Temp  Av.8 °F (37.1 °C)  Min: 98.6 °F (37 °C)  Max: 98.9 °F (37.2 °C)  RESPIRATIONS RANGE: Resp  Av  Min: 18  Max: 20  PULSE OXIMETRY RANGE: SpO2  Av %  Min: 95 %  Max: 97 %  PULSE RANGE: Pulse  Av.6  Min: 80  Max: 105  BLOOD PRESSURE RANGE: Systolic (54TOF), QQD:27 , Min:88 , WAN:336   ; Diastolic (81MGV), KZD:34, Min:50, Max:59    I/O (24Hr): Intake/Output Summary (Last 24 hours) at 2021 0814  Last data filed at 2021 0726  Gross per 24 hour   Intake 895 ml   Output 300 ml   Net 595 ml     Objective:  General Appearance:  Comfortable. Vital signs: (most recent): Blood pressure (!) 107/59, pulse 105, temperature 98.9 °F (37.2 °C), temperature source Oral, resp. rate 20, weight 112 lb (50.8 kg), SpO2 95 %. Vital signs are normal.    Output: Producing urine. HEENT: Normal HEENT exam.    Lungs:  Normal respiratory rate. He is not in respiratory distress. There are rales and decreased breath sounds. Heart: Regular rhythm. S1 normal and S2 normal.    Abdomen: Abdomen is soft. Bowel sounds are normal.     Extremities: Normal range of motion. Neurological: Patient is alert and oriented to person, place and time. Pupils:  Pupils are equal, round, and reactive to light. Skin:  Warm and dry. Labs/Imaging/Diagnostics    Labs:  CBC:  Recent Labs     01/27/21  0500 01/27/21  1040 01/28/21  1334   WBC 28.5* 28.5* 23.0*   RBC 4.46 4.14 3.70*   HGB 12.3* 11.4* 10.3*   HCT 40.3 37.9 33.0*   MCV 90.4 91.5 89.2   RDW 16.7* 16.9* 16.8*    238 270     CHEMISTRIES:  Recent Labs     01/27/21  0500 01/28/21  1334    138   K 5.4* 4.1   CL 99 103   CO2 22 23   BUN 19 18   CREATININE 1.8* 1.6*   GLUCOSE 163* 122*     PT/INR:No results for input(s): PROTIME, INR in the last 72 hours.   APTT:  Recent Labs     01/27/21  1401 01/27/21  2121 01/28/21  0512   APTT 23.7* 48.5* 79.6*     LIVER PROFILE:  Recent Labs     01/28/21  1334   AST 20   ALT 23   BILITOT 0.4   ALKPHOS 122       Imaging Last 24 Hours:  Echo Complete    Result Date: 1/27/2021 Transthoracic Echocardiography Report (TTE)  Demographics   Patient Name   Mateo Segura      Gender            Male                 [address] [facility] [id number]      Room Number       5536  Number   Account #      [de-identified]     Procedure Date    01/27/2021   Corporate ID                 Ordering          Cristina Ramsay MD                               Physician   Accession      [id number]    Referring  Number                       Physician   Date of Birth  1955    [address] [place]   Age            63 year(s)    Interpreting      [address] 66                               Physician         Physician Cardiology                                                 Cristina Ramsay MD                                Any Other  Procedure Type of Study   TTE procedure  Procedure Date Date: 01/27/2021 Start: 12:27 PM Study Location: Echo Lab Technical Quality: Limited visualization due to lung interference. Indications:Pulmonary embolus and Abnormal cardiac enzymes. Patient Status: Routine Height: 60 inches Weight: 112 pounds BSA: 1.46 m^2 BMI: 21.87 kg/m^2 Rhythm: Within normal limits HR: 85 bpm  Findings   Left Ventricle  Non dilated left ventricle. Mild left ventricular systolic dysfunction. Visually estimated LVEF is 40-45%. Hypokinesis of the anterior and septal wall. Right Ventricle  Normal right ventricle structure and function. Mitral Valve  Normal mitral valve structure and function. No mitral valve regurgitation. Tricuspid Valve  Normal tricuspid valve structure and function. Mild tricuspid valve regurgitation. Aortic Valve  Normal leaflets structure and mobility. No aortic regurgitation. Pericardial Effusion  No pericardial effusion. Conclusions   Summary  Non dilated left ventricle. Mild left ventricular systolic dysfunction. Visually estimated LVEF is 40-45%. Hypokinesis of the anterior and septal wall. Normal right ventricle structure and function.   No significant valvular abnormalities.    Signature   ----------------------------------------------------------------  Electronically signed by Teodora FRANCISCOInterpreting  physician) on 01/27/2021 01:38 PM  ----------------------------------------------------------------  M-Mode/2D Measurements & Calculations   LV Diastolic    LV Systolic Dimension: 3.1   AV Cusp Separation: 1.6 cmLA  Dimension: 3.9  cm                           Dimension: 2.9 cmAO Root  cm              LV Volume Diastolic: 10.0 ml Dimension: 3.3 cm  LV FS:20.5 %    LV Volume Systolic: 01.8 ml  LV PW           LV EDV/LV EDV Index: 67.6  Diastolic: 1 cm LN/57 VY/A^4CQ ESV/LV ESV  Septum          Index: 37.7 ml/26ml/ m^2     RV Diastolic Dimension: 3.5  Diastolic: 1.1  EF Calculated: 44.2 %        cm  cm              LV Mass Index: 90 l/min*m^2  CO: 3.74 l/min  CI: 2.56                                     LA volume/Index: 25.5 ml  l/m*m^2         LVOT: 2 cm  LV Mass: 130.96  g  Doppler Measurements & Calculations   MV Peak E-Wave: 0.48 AV Peak Velocity:     LVOT Peak Velocity: 0.88 m/s  m/s                  0.94 m/s              LVOT Mean Velocity: 0.62 m/s  MV Peak A-Wave: 0.61 AV Peak Gradient:     LVOT Peak Gradient: 3.1  m/s                  3.53 mmHg             mmHgLVOT Mean Gradient: 1.8  MV E/A Ratio: 0.79   AV Mean Velocity:     mmHg  MV Peak Gradient:    0.73 m/s              Estimated RVSP: 38.3 mmHg  2.1 mmHg             AV Mean Gradient: 2.3 Estimated RAP:5 mmHg  MV Mean Gradient:    mmHg  0.8 mmHg             AV VTI: 16 cm  MV Mean Velocity:    AV Area               TR Velocity:2.89 m/s  0.43 m/s             (Continuity):2.75     TR Gradient:33.34 mmHg  MV Deceleration      cm^2                  PV Peak Velocity: 0.83 m/s  Time: 268.9 msec                           PV Peak Gradient: 2.78 mmHg  MV P1/2t: 46.9 msec  LVOT VTI: 14 cm       PV Mean Velocity: 0.58 m/s  MVA by PHT:4.69 cm^2 IVRT: 107.3 msec      PV Mean Gradient: 1.5 mmHg  MV Area              Estimated PASP: 38.34  (continuity): 3 cm^2 mmHg  [email or web address] [id number]    Assessment//Plan           Hospital Problems           Last Modified POA    Bilateral pulmonary embolism ([address] 1/27/2021 Yes    Crohn's disease ([address] 1/28/2021 Yes    COPD (chronic obstructive pulmonary disease) ([address] 1/28/2021 Yes    Tobacco abuse 1/28/2021 Yes    Hypertension 1/28/2021 Yes    Hyperlipidemia 1/28/2021 Yes        Assessment:  (Problem List as of 1/29/2021 Never Reviewed    Malnutrition ([address]    Multiple sclerosis ([address]    Bilateral pulmonary embolism (HCC)    Crohn's disease ([address]    COPD (chronic obstructive pulmonary disease) ([address]    Tobacco abuse    Hypertension    Hyperlipidemia    ). Plan:   (Pe is recurrent he has to be on anticoagulation for life. Plan to transition pt to [facility] once he is discharge home. Pt was counseled to quit smoking many times. Kidneys functions improving).        Electronically signed by Chandler Vincent MD on 1/29/21 at 8:14 AM EST

## 2021-01-30 NOTE — PROGRESS NOTES
Patient ID:  Evelyn Powers  [id number]  63 y.o.  1955    HPI:  Patient feels slightly better today, no dizziness, denies chest pain or shortness of breath, patient recently has cut back on his smoking. Patient on IV heparin. Patient has elevated white count. No fever. . Questions, answers, and tests reviewed. ROS:  Cardiovascular:   Denies any chest pain, irregular heartbeats, or palpitations. Respiratory:   Denies shortness of breath, coughing, sputum production, hemoptysis, or wheezing. Gastrointestinal:   Denies nausea, vomiting, diarrhea, or constipation. Denies any abdominal pain. Extremities:   Denies any lower extremity swelling or edema. Neurology:    Denies any headache or focal neurological deficits. No weakness or paresthesia. Derm:    Denies any rashes, ulcers, or excoriations. Denies bruising. Genitourinary:    Denies any urgency, frequency, hematuria. Voiding without difficulty. Physical Exam:    Vitals:    01/30/21 1523   BP: 94/61   Pulse:    Resp:    Temp:    SpO2:        HEENT:  PERRLA. EOMI. Sclera clear. Buccal mucosa moist.  Chronically ill looking cachectic    Neck:  Supple. Trachea midline. No thyromegaly. No JVD. No bruits. Heart:  Rhythm regular, rate controlled. No murmurs. Lungs:  Symmetrical. Clear to auscultation bilaterally. No wheezes. No rhonchi. No rales. Abdomen: Soft. Non-tender. Non-distended. Bowel sounds positive. No organomegaly or masses. No pain on palpation    Extremities:  Peripheral pulses present. No peripheral edema. No ulcers. Neurologic:  Alert x 3. No focal deficit. Cranial nerves grossly intact. Skin:  No petechia. No hemorrhage. No wounds.     Labs:  CBC:   Lab Results   Component Value Date    WBC 23.1 01/29/2021    RBC 4.08 01/29/2021    HGB 11.3 01/29/2021    HCT 37.4 01/29/2021    MCV 91.7 01/29/2021    MCH 27.7 01/29/2021    MCHC 30.2 01/29/2021    RDW 17.0 01/29/2021     01/29/2021 MPV 10.8 01/29/2021     CMP:    Lab Results   Component Value Date     01/28/2021    K 4.1 01/28/2021     01/28/2021    CO2 23 01/28/2021    BUN 18 01/28/2021    CREATININE 1.6 01/28/2021    GFRAA 53 01/28/2021    LABGLOM 44 01/28/2021    GLUCOSE 122 01/28/2021    PROT 5.6 01/28/2021    LABALBU 2.4 01/28/2021    CALCIUM 8.3 01/28/2021    BILITOT 0.4 01/28/2021    ALKPHOS 122 01/28/2021    AST 20 01/28/2021    ALT 23 01/28/2021     PT/INR:    Lab Results   Component Value Date    PROTIME 14.3 09/14/2012    INR 1.4 09/14/2012         CTA CHEST W CONTRAST   Final Result   Addendum 1 of 1   ADDENDUM:   I discussed findings by phone with Dr. Tadeo Mcfadden at 7:10 a.m. on 01/27/2021.       Final      Stress/Rest NM Myocardial SPECT Exercise or RX    (Results Pending)       Other Data:      Intake/Output Summary (Last 24 hours) at 1/30/2021 1836  Last data filed at 1/30/2021 1420  Gross per 24 hour   Intake 480 ml   Output 650 ml   Net -170 ml         Scheduled Medications:   [START ON 1/31/2021] lisinopril  5 mg Oral Daily    pantoprazole  40 mg Oral QAM AC    metoprolol succinate  25 mg Oral Daily    sodium chloride flush  10 mL Intravenous 2 times per day    baclofen  10 mg Oral BID    clotrimazole-betamethasone  1 each Topical Daily    mirtazapine  15 mg Oral Nightly    oxyCODONE HCl  10 mg Oral Q4H    triamcinolone  1 each Topical BID    aspirin  81 mg Oral Daily    atorvastatin  80 mg Oral Nightly         Infusion Medications:   heparin (PORCINE) Infusion 29.921 Units/kg/hr (01/30/21 1719)       Assessment:   Patient Active Problem List    Diagnosis Date Noted    Stage 3b chronic kidney disease 01/29/2021    Hyperkalemia 01/29/2021    Leukocytosis 01/29/2021    Crohn's disease ([address]     [facility]     Tobacco abuse     Hypertension     Hyperlipidemia     Bilateral pulmonary embolism ([facility] 01/27/2021    Multiple sclerosis ([facility] 01/06/2021  Malnutrition ([facility] 09/10/2012         Plan: 1. Patient with acute pulmonary embolism bilateral patient on IV heparin. 2.  Malnutrition oral supplement. 3.  Known history of multiple sclerosis. 4.  Known history of Crohn's disease. 5.  Stage IIIb chronic renal insufficiency diuretics have been adjusted by cardiologist.  6.  COPD continues to smoke counseled to quit smoking. Continue current therapy. See orders for further plan of care.     Completed By:  Dr. Cherry Olivo MD, [address].  6:36 PM  1/30/2021      Electronically signed by Dylon Murphy MD on 1/30/21 at 6:36 PM EST

## 2021-01-30 NOTE — PROGRESS NOTES
INPATIENT CARDIOLOGY FOLLOW-UP    Name: Ra Kennedy    Age: 63 y.o. Date of Admission: 1/27/2021  4:43 AM    Date of Service: 1/30/2021    Interim History:  Blood pressure low normal on low-dose heart failure guideline directed medical therapy. Review of Systems:   Cardiac: As per HPI  General: No fever, chills  Pulmonary: As per HPI  HEENT: No visual disturbances, difficult swallowing  GI: No nausea, vomiting  Endocrine: No thyroid disease or DM  Musculoskeletal: MADRID x 4, no focal motor deficits  Skin: Intact, no rashes  Neuro/Psych: No headache or seizures    Problem List:  Patient Active Problem List   Diagnosis    Mal[facility]    Multiple sclerosis ([facility]    Bilateral pulmonary embolism ([facility]    Crohn's disease ([facility]    COPD (chronic obstructive pulmonary disease) ([facility]    Tobacco abuse    Hypertension    Hyperlipidemia    Stage 3b chronic kidney disease    Hyperkalemia    Leukocytosis       Allergies:   Allergies   Allergen Reactions    Neurontin [Gabapentin] Palpitations    Lyrica [Pregabalin] Other (See Comments)     photophobia       Current Medications:  Current Facility-Administered Medications   Medication Dose Route Frequency Provider Last Rate Last Admin    [START ON 1/31/2021] lisinopril (PRINIVIL;ZESTRIL) tablet 5 mg  5 mg Oral Daily Luis F Izquierdo MD        pantoprazole (PROTONIX) tablet 40 mg  40 mg Oral QAM AC Divina Grande MD   40 mg at 01/30/21 1391    metoprolol succinate (TOPROL XL) extended release tablet 25 mg  25 mg Oral Daily Luis F Izquierdo MD   25 mg at 01/30/21 0842    heparin (porcine) injection 4,060 Units  80 Units/kg Intravenous PRN Romana Arrieta DO   4,060 Units at 01/29/21 1014    heparin (porcine) injection 2,030 Units  40 Units/kg Intravenous PRN Romana Arrieta DO HDL 36 08/02/2019    HDL 68 02/26/2018     Lab Results   Component Value Date    LDLCALC 125 (H) 09/25/2020    LDLCALC 115 (H) 08/02/2019    LDLCALC 106 (H) 02/26/2018     Lab Results   Component Value Date    LABVLDL 35 09/25/2020    LABVLDL 36 08/02/2019    LABVLDL 44 02/26/2018     No results found for: CHOLHDLRATIO  No results for input(s): PROBNP in the last 72 hours. ASSESSMENT / PLAN:  62-year-old male with past medical history of multiple sclerosis wheelchair-bound, chronic kidney disease baseline creatinine 1.8 presents with pulmonary embolism right pulmonary artery found to be hypoxic 3 L nasal cannula. We are consulted for elevated troponin peaked 0.9. Echocardiogram shows normal right ventricle function and size. Echocardiogram showed new heart failure reduced ejection fraction 40 to 45% with segmental wall motion abnormalities of the anterior and septal walls. Elevated troponin most likely due to demand ischemia in the setting of significant underlying coronary artery disease and undiagnosed ischemic cardiomyopathy. Started ASA and atorvastatin. Developed hypotension on metoprolol and lisinopril.      Recommendations  Continue aspirin and atorvastatin in addition to anticoagulation. Increase dose of lisinopril lisinopril   Continue metoprolol  We will assess for viability of myocardium using a nuclear stress imaging tomorrow. N.p.o. after midnight. Follow up in cardiology clinic after discharge.      Philipp Lino MD  [facility]) Cardiology

## 2021-01-31 NOTE — PROCEDURES
[place] Nuclear Stress Test:    Cardiologist: Dr. Meahgan Persaud    Date: 1/31/2021    Indications for study: Chest pain    1. No chest pain  2. No new arrhythmias  3. No EKG changes suggestive of stress induced ischemia  4.  Nuclear images pending    Meaghan Persaud MD  [facility]) Cardiology

## 2021-01-31 NOTE — PROCEDURES
Dr. Linh Briceño present, Completed Lexiscan Protocol 0.4 mg IV per Right antecubital vein. Patient  Experienced mild increased breathing, lessened post sips of regular Pepsi.

## 2021-01-31 NOTE — PROGRESS NOTES
Patient ID:  Enmanuel Montemayor  [id number]  52 y.o.  1955    HPI:  Patient for stress test today. Denies chest pain palpitation. On IV heparin. . Questions, answers, and tests reviewed. ROS:  Cardiovascular:   Denies any chest pain, irregular heartbeats, or palpitations. Respiratory:   Denies shortness of breath, coughing, sputum production, hemoptysis, or wheezing. Gastrointestinal:   Denies nausea, vomiting, diarrhea, or constipation. Denies any abdominal pain. Extremities:   Denies any lower extremity swelling or edema. Neurology:    Denies any headache or focal neurological deficits. No weakness or paresthesia. Derm:    Denies any rashes, ulcers, or excoriations. Denies bruising. Genitourinary:    Denies any urgency, frequency, hematuria. Voiding without difficulty. Physical Exam:    Vitals:    01/31/21 1126   BP: 99/63   Pulse: 105   Resp: 18   Temp: 97.7 °F (36.5 °C)   SpO2: 95%       HEENT:  PERRLA. EOMI. Sclera clear. Buccal mucosa moist.    Neck:  Supple. Trachea midline. No thyromegaly. No JVD. No bruits. Heart:  Rhythm regular, rate controlled. No murmurs. Lungs:  Symmetrical. Clear to auscultation bilaterally. No wheezes. No rhonchi. No rales. Abdomen: Soft. Non-tender. Non-distended. Bowel sounds positive. No organomegaly or masses. No pain on palpation    Extremities:  Peripheral pulses present. No peripheral edema. No ulcers. Neurologic:  Alert x 3. No focal deficit. Cranial nerves grossly intact. Skin:  No petechia. No hemorrhage. No wounds.     Labs:  CBC:   Lab Results   Component Value Date    WBC 16.3 01/31/2021    RBC 3.74 01/31/2021    HGB 10.2 01/31/2021    HCT 33.4 01/31/2021    MCV 89.3 01/31/2021    MCH 27.3 01/31/2021    MCHC 30.5 01/31/2021    RDW 17.2 01/31/2021     01/31/2021    MPV 10.5 01/31/2021     CMP:    Lab Results   Component Value Date     01/28/2021    K 4.1 01/28/2021     01/28/2021    CO2 23 01/28/2021 BUN 18 01/28/2021    CREATININE 1.6 01/28/2021    GFRAA 53 01/28/2021    LABGLOM 44 01/28/2021    GLUCOSE 122 01/28/2021    PROT 5.6 01/28/2021    LABALBU 2.4 01/28/2021    CALCIUM 8.3 01/28/2021    BILITOT 0.4 01/28/2021    ALKPHOS 122 01/28/2021    AST 20 01/28/2021    ALT 23 01/28/2021     PT/INR:    Lab Results   Component Value Date    PROTIME 14.3 09/14/2012    INR 1.4 09/14/2012         CTA CHEST W CONTRAST   Final Result   Addendum 1 of 1   ADDENDUM:   I discussed findings by phone with Dr. Rosio Crouch at 7:10 a.m. on 01/27/2021.       Final      Stress/Rest NM Myocardial SPECT Exercise or RX    (Results Pending)       Other Data:      Intake/Output Summary (Last 24 hours) at 1/31/2021 1214  Last data filed at 1/31/2021 0954  Gross per 24 hour   Intake 560 ml   Output 700 ml   Net -140 ml         Scheduled Medications:   lisinopril  5 mg Oral Daily    pantoprazole  40 mg Oral QAM AC    metoprolol succinate  25 mg Oral Daily    sodium chloride flush  10 mL Intravenous 2 times per day    baclofen  10 mg Oral BID    clotrimazole-betamethasone  1 each Topical Daily    mirtazapine  15 mg Oral Nightly    oxyCODONE HCl  10 mg Oral Q4H    triamcinolone  1 each Topical BID    aspirin  81 mg Oral Daily    atorvastatin  80 mg Oral Nightly         Infusion Medications:   heparin (PORCINE) Infusion 29.921 Units/kg/hr (01/31/21 1123)       Assessment:   Patient Active Problem List    Diagnosis Date Noted    Stage 3b chronic kidney disease 01/29/2021    Hyperkalemia 01/29/2021    Leukocytosis 01/29/2021    Crohn's disease ([address]     [facility]     Tobacco abuse     Hypertension     Hyperlipidemia     Bilateral pulmonary embolism ([facility] 01/27/2021    Multiple sclerosis ([facility] 01/06/2021    Malnutrition ([facility] 09/10/2012 Plan: Acute pulmonary embolism bilateral patient on IV heparin needs plan for Coumadin given renal function in case plan Eliquis would need dose adjustment. 2.  Known history of chronic heart failure adjustment medication per cardiology. 3.  COPD continue present home medication. 4.  Hyperlipidemia continue Lipitor. 5.  Tobacco abuse and COPD counseled to quit smoking. 6.  Known history of multiple sclerosis. 7.  Known history of Crohn's disease. Continue current therapy. See orders for further plan of care.     Completed By:  Dr. Konrad Ganser, MD, FACP, [facility].  12:14 PM  1/31/2021      Electronically signed by Saige Lutz MD on 1/31/21 at 12:14 PM EST

## 2021-02-01 NOTE — PROGRESS NOTES
Pulse ox was 84% at rest.  Applied 3  liters of oxygen while at rest.  Oxygen saturation was 99% at rest on oxygen. Patient tolerated fairly well.

## 2021-02-01 NOTE — PROGRESS NOTES
P Quality Flow/Interdisciplinary Rounds Progress Note        Quality Flow Rounds held on February 1, 2021    Disciplines Attending:  Bedside Nurse, ,  and Nursing Unit Leadership    Brigette Wild was admitted on 1/27/2021  4:43 AM    Anticipated Discharge Date:  Expected Discharge Date: 01/31/21    Disposition:    Pop Score:  Pop Scale Score: 18    Readmission Risk              Risk of Unplanned Readmission:        15           Discussed patient goal for the day, patient clinical progression, and barriers to discharge.   The following Goal(s) of the Day/Commitment(s) have been identified:  discharged      [facility]  February 1, 2021

## 2021-02-03 NOTE — DISCHARGE SUMMARY
Admission on 01/27/2021, Discharged on 02/01/2021WBC                                           Date: 01/27/2021Value: 28.5*       Ref range: 4.5 - 11.5 E9/L    Status: FinalRBC                                           Date: 01/27/2021Value: 4.46        Ref range: 3.80 - 5.80 E12/L  Status: FinalHemoglobin                                    Date: 01/27/2021Value: 12.3*       Ref range: 12.5 - 16.5 g/dL   Status: FinalHematocrit                                    Date: 01/27/2021Value: 40.3        Ref range: 37.0 - 54.0 %      Status: FinalMCV                                           Date: 01/27/2021Value: 90.4        Ref range: 80.0 - 99.9 fL     Status: [address] [place] [address]                                           Date: 01/27/2021Value: 27.6        Ref range: 26.0 - 35.0 pg     Status: [address]                                          Date: 01/27/2021Value: 30.5*       Ref range: 32.0 - 34.5 %      Status: FinalRDW                                           Date: 01/27/2021Value: 16.7*       Ref range: 11.5 - 15.0 fL     Status: FinalPlatelets                                     Date: 01/27/2021Value: 273         Ref range: 130 - 450 E9/L     Status: FinalMPV                                           Date: 01/27/2021Value: 10.7        Ref range: 7.0 - 12.0 fL      Status: FinalNeutrophils %                                 Date: 01/27/2021Value: 78.1        Ref range: 43.0 - 80.0 %      Status: FinalLymphocytes %                                 Date: 01/27/2021Value: 12.3*       Ref range: 20.0 - 42.0 %      Status: FinalMonocytes %                                   Date: 01/27/2021Value: 7.9         Ref range: 2.0 - 12.0 %       Status: FinalEosinophils %                                 Date: 01/27/2021Value: 0.2         Ref range: 0.0 - 6.0 %        Status: FinalBasophils %                                   Date: 01/27/2021Value: 0.9         Ref range: 0.0 - 2.0 %        Status: FinalNeutrophils Absolute Date: 01/27/2021Value: 22.52*      Ref range: 1.80 - 7.30 E9/L   Status: FinalLymphocytes Absolute                          Date: 01/27/2021Value: 3.42        Ref range: 1.50 - 4.00 E9/L   Status: FinalMonocytes Absolute                            Date: 01/27/2021Value: 2.28*       Ref range: 0.10 - 0.95 E9/L   Status: FinalEosinophils Absolute                          Date: 01/27/2021Value: 0.00*       Ref range: 0.05 - 0.50 E9/L   Status: FinalBasophils Absolute                            Date: 01/27/2021Value: 0.26*       Ref range: 0.00 - 0.20 E9/L   Status: FinalMyelocyte Percent                             Date: 01/27/2021Value: 0.9         Ref range: 0 - 0 %            Status: FinalAnisocytosis                                  Date: 01/27/2021Value: 1+            Status: FinalPolychromasia                                 Date: 01/27/2021Value: 1+            Status: FinalPoikilocytes                                  Date: 01/27/2021Value: 1+            Status: FinalOvalocytes                                    Date: 01/27/2021Value: 1+            Status: FinalSodium                                        Date: 01/27/2021Value: 136         Ref range: 132 - 146 mmol/L   Status: FinalPotassium                                     Date: 01/27/2021Value: 5.4*        Ref range: 3.5 - 5.0 mmol/L   Status: Final              Comment: Specimen is moderately Hemolyzed. Result may be artificially increased. Chloride                                      Date: 01/27/2021Value: 99          Ref range: 98 - 107 mmol/L    Status: FinalCO2                                           Date: 01/27/2021Value: 22          Ref range: 22 - 29 mmol/L     Status: FinalAnion Gap                                     Date: 01/27/2021Value: 15          Ref range: 7 - 16 mmol/L      Status: FinalGlucose                                       Date: 01/27/2021Value: 163*        Ref range: 74 - 99 mg/dL      Status: FinalBUN with clinical signs and symptoms or necessary forpatient management, should be tested with an alternative molecularassay. Negative results do not preclude SARS-CoV-2 infection andshould not be used as the sole basis for patient management decisions. This test has been authorized by the FDA under an Emergency UseAuthorization (EUA) for use by authorized laboratories. Fact sheet for Healthcare [employer] sheet for Patients: Aleksandr.dk: Isothermal Nucleic Acid AmplificationTroponin                                      Date: 01/27/2021Value: 0.80*       Ref range: 0.00 - 0.03 ng/mL  Status: Final              Comment: TROPONIN T BLOOD LEVELS:       0.03 ng/mL     Upper Reference Limit0. 04 - 0.09 ng/mL     Possible myocardial injury    >= 0.10 ng/mL     Myocardial injuryTroponin                                      Date: 01/27/2021Value: 0.82*       Ref range: 0.00 - 0.03 ng/mL  Status: Final              Comment: TROPONIN T BLOOD LEVELS:       0.03 ng/mL     Upper Reference Limit0. 04 - 0.09 ng/mL     Possible myocardial injury    >= 0.10 ng/mL     Myocardial injuryTroponin                                      Date: 01/28/2021Value: 0.84*       Ref range: 0.00 - 0.03 ng/mL  Status: Final              Comment: TROPONIN T BLOOD LEVELS:       0.03 ng/mL     Upper Reference Limit0. 04 - 0.09 ng/mL     Possible myocardial injury    >= 0.10 ng/mL     Myocardial injuryaPTT                                          Date: 01/27/2021Value: 48.5*       Ref range: 24.5 - 35.1 sec    Status: FinalaPTT                                          Date: 01/28/2021Value: 79.6*       Ref range: 24.5 - 35.1 sec    Status: [address] [facility]                                           Date: 01/28/2021Value: 23.0*       Ref range: 4.5 - 11.5 E9/L    [facility]                                           Date: 01/28/2021Value: 3.70*       Ref range: 3.80 - 5.80 E12/L Status: FinalHemoglobin                                    Date: 01/28/2021Value: 10.3*       Ref range: 12.5 - 16.5 g/dL   Status: FinalHematocrit                                    Date: 01/28/2021Value: 33.0*       Ref range: 37.0 - 54.0 %      Status: FinalMCV                                           Date: 01/28/2021Value: 89.2        Ref range: 80.0 - 99.9 fL     Status: [address]                                           Date: 01/28/2021Value: 27.8        Ref range: 26.0 - 35.0 pg     Status: [address]                                          Date: 01/28/2021Value: 31.2*       Ref range: 32.0 - 34.5 %      Status: FinalRDW                                           Date: 01/28/2021Value: 16.8*       Ref range: 11.5 - 15.0 fL     Status: FinalPlatelets                                     Date: 01/28/2021Value: 270         Ref range: 130 - 450 E9/L     Status: FinalMPV                                           Date: 01/28/2021Value: 11.1        Ref range: 7.0 - 12.0 fL      Status: FinalSodium                                        Date: 01/28/2021Value: 138         Ref range: 132 - 146 mmol/L   Status: FinalPotassium                                     Date: 01/28/2021Value: 4.1         Ref range: 3.5 - 5.0 mmol/L   Status: FinalChloride                                      Date: 01/28/2021Value: 103         Ref range: 98 - 107 mmol/L    Status: FinalCO2                                           Date: 01/28/2021Value: 23          Ref range: 22 - 29 mmol/L     Status: FinalAnion Gap                                     Date: 01/28/2021Value: 12          Ref range: 7 - 16 mmol/L      Status: FinalGlucose                                       Date: 01/28/2021Value: 122*        Ref range: 74 - 99 mg/dL      Status: FinalBUN                                           Date: 01/28/2021Value: 18          Ref range: 8 - 23 mg/dL       Status: FinalCREATININE                                    Date: 01/28/2021Value: 1.6* Ref range: 0.7 - 1.2 mg/dL    Status: FinalGFR Non-                      Date: 01/28/2021Value: 44          Ref range: >=60 mL/min/1.73   Status: Final              Comment: Chronic Kidney Disease: less than 60 ml/min/1.73 sq.m. Kidney Failure: less than 15 ml/min/1.73 sq. m. Results valid for patients 18 years and older. GFR                           Date: 01/28/2021Value: 53            Status: FinalCalcium                                       Date: 01/28/2021Value: 8.3*        Ref range: 8.6 - 10.2 mg/dL   Status: FinalTotal Protein                                 Date: 01/28/2021Value: 5.6*        Ref range: 6.4 - 8.3 g/dL     Status: FinalAlbumin                                       Date: 01/28/2021Value: 2.4*        Ref range: 3.5 - 5.2 g/dL     Status: FinalTotal Bilirubin                               Date: 01/28/2021Value: 0.4         Ref range: 0.0 - 1.2 mg/dL    Status: FinalAlkaline Phosphatase                          Date: 01/28/2021Value: 122         Ref range: 40 - 129 U/L       Status: FinalALT                                           Date: 01/28/2021Value: 23          Ref range: 0 - 40 U/L         Status: FinalAST                                           Date: 01/28/2021Value: 20          Ref range: 0 - 39 U/L         Status: [address] [facility] [address]                                           Date: 01/29/2021Value: 23.1*       Ref range: 4.5 - 11.5 E9/L    Status: FinalRBC                                           Date: 01/29/2021Value: 4.08        Ref range: 3.80 - 5.80 E12/L  Status: FinalHemoglobin                                    Date: 01/29/2021Value: 11.3*       Ref range: 12.5 - 16.5 g/dL   Status: FinalHematocrit                                    Date: 01/29/2021Value: 37.4        Ref range: 37.0 - 54.0 %      Status: FinalMCV                                           Date: 01/29/2021Value: 91.7        Ref range: 80.0 - 99.9 fL     Status: [address] [facility] Date: 01/29/2021Value: 27.7        Ref range: 26.0 - 35.0 pg     Status: [address]                                          Date: 01/29/2021Value: 30.2*       Ref range: 32.0 - 34.5 %      Status: FinalRDW                                           Date: 01/29/2021Value: 17.0*       Ref range: 11.5 - 15.0 fL     Status: FinalPlatelets                                     Date: 01/29/2021Value: 322         Ref range: 130 - 450 E9/L     Status: FinalMPV                                           Date: 01/29/2021Value: 10.8        Ref range: 7.0 - 12.0 fL      Status: FinalaPTT                                          Date: 01/29/2021Value: 42.9*       Ref range: 24.5 - 35.1 sec    Status: FinalaPTT                                          Date: 01/29/2021Value: 89.3*       Ref range: 24.5 - 35.1 sec    Status: FinalaPTT                                          Date: 01/30/2021Value: 92.4*       Ref range: 24.5 - 35.1 sec    Status: [address] [place] [address]                                           Date: 01/31/2021Value: 16.3*       Ref range: 4.5 - 11.5 E9/L    Status: FinalRBC                                           Date: 01/31/2021Value: 3.74*       Ref range: 3.80 - 5.80 E12/L  Status: FinalHemoglobin                                    Date: 01/31/2021Value: 10.2*       Ref range: 12.5 - 16.5 g/dL   Status: FinalHematocrit                                    Date: 01/31/2021Value: 33.4*       Ref range: 37.0 - 54.0 %      Status: FinalMCV                                           Date: 01/31/2021Value: 89.3        Ref range: 80.0 - 99.9 fL     Status: [address] [place]                                           Date: 01/31/2021Value: 27.3        Ref range: 26.0 - 35.0 pg     Status: [address]                                          Date: 01/31/2021Value: 30.5*       Ref range: 32.0 - 34.5 %      Status: FinalRDW                                           Date: 01/31/2021Value: 17.2*       Ref range: 11.5 - 15.0 fL Discharge Medication List as of 2/1/2021 12:03 PMSTART taking these medicationsaspirin 81 MG chewable tabletTake 1 tablet by mouth daily, Disp-30 tablet, R-3Normalatorvastatin (LIPITOR) 80 MG tabletTake 1 tablet by mouth nightly, Disp-30 tablet, R-3Normallisinopril (PRINIVIL;ZESTRIL) 5 MG tabletTake 1 tablet by mouth daily, Disp-30 tablet, R-3Normalpantoprazole (PROTONIX) 40 MG tabletTake 1 tablet by mouth every morning (before breakfast), Disp-30 tablet, R-3Normalapixaban (ELIQUIS) 5 MG TABS tabletTake 1 tablet by mouth 2 times daily for 30 doses 2 tabs po bid x 7 days then 1 tab po bid, Disp-60 tablet, R-1Normal    Discharge Medication List as of 2/1/2021 12:03 PM    Discharge Medication List as of 2/1/2021 12:03 PMCONTINUE these medications which have NOT CHANGEDoxyCODONE HCl (OXY-IR) 10 MG immediate release tabletTake 10 mg by mouth every 4 hours. Historical Medbaclofen (LIORESAL) 10 MG tabletTake 10 mg by mouth 2 times dailyHistorical Medclotrimazole-betamethasone (LOTRISONE) 1-0.05 % creamApply 1 each topically daily, Topical, DAILY, Historical MedOcrelizumab (OCREVUS IV)Infuse intravenously every 6 months Last given Dec 9 2020, been on for 2 yearsHistorical Medmetoprolol succinate (TOPROL XL) 50 MG extended release tabletTake 50 mg by mouth dailyHistorical Medmirtazapine (REMERON) 15 MG tabletTake 15 mg by mouth nightlyHistorical Medtriamcinolone (KENALOG) 0.1 % creamApply 1 each topically 2 times daily , Topical, 2 TIMES DAILY, Historical Med    Discharge Medication List as of 2/1/2021 12:03 PMSTOP taking these medicationsondansetron (ZOFRAN-ODT) 4 MG disintegrating tabletComments:Reason for Stopping:influenza virus trivalent vaccine (FLUZONE) injectionComments:Reason for Stopping:    Time Spent on Discharge:E] minutes were spent in patient examination, evaluation, counseling as well as medication reconciliation, prescriptions for required medications, discharge plan, and follow up. Electronically signed by Flavia Jain MD on 2/2/21 at 11:32 PM EST

## 2021-02-07 PROBLEM — K56.609 SMALL BOWEL OBSTRUCTION (HCC): Status: ACTIVE | Noted: 2021-01-01

## 2021-02-07 NOTE — ED PROVIDER NOTES
Patient presents after an episode of syncope using the bathroom. Patient states that he has also had some accompanying nausea, vomiting, and diarrhea this morning. He says that all began this morning. He has had 2 episodes of emesis. He states that he has had similar episode however this was a long time ago. Patient was recently discharged from the hospital after having numerous PEs. He does have a history of MS for which he recently had an Ocrevus infusion. Patient states he currently feels weak and fatigued. He denies hitting his head and states that his wife says he was down for a few minutes at most.  His only pain at this time is in his abdomen. He denies any chest or head pain. The history is provided by the patient. No  was used. Loss of Consciousness  Episode history:  Single  Most recent episode: Today  Duration:  5 minutes  Timing:  Rare  Progression:  Resolved  Chronicity:  New  Witnessed: no    Relieved by:  Nothing  Worsened by:  Nothing  Associated symptoms: malaise/fatigue, nausea, recent fall, vomiting and weakness    Associated symptoms: no chest pain, no difficulty breathing, no fever, no headaches, no palpitations, no shortness of breath and no visual change         Review of Systems   Constitutional: Positive for malaise/fatigue. Negative for chills and fever. HENT: Negative for ear pain, sinus pressure and sore throat. Eyes: Negative for pain, discharge and redness. Respiratory: Negative for cough, shortness of breath and wheezing. Cardiovascular: Positive for syncope. Negative for chest pain and palpitations. Gastrointestinal: Positive for abdominal pain, nausea and vomiting. Negative for abdominal distention, blood in stool and diarrhea. Genitourinary: Negative for dysuria and frequency. Musculoskeletal: Negative for arthralgias and back pain. Skin: Negative for rash and wound. Neurological: Positive for syncope and weakness.  Negative however is again at his baseline. EKG did not show any ST elevations. Glucose was appropriate. Chest x-ray did show right lower lobe infiltrate which is new. Rocephin and azithromycin given. Head CT did not show any acute intracranial pathology. CT of abdomen was ordered and did show a small bowel obstruction. Patient continued to be hypotensive after 2 L of fluid. Central line was placed in case of need of pressors. Spoke with GEN surgery who will see the patient. Patient will be admitted for further evaluation and treatment. Informed the results and plan and is agreeable. Amount and/or Complexity of Data Reviewed  Clinical lab tests: reviewed  Tests in the radiology section of CPT®: reviewed  Tests in the medicine section of CPT®: reviewed  Decide to obtain previous medical records or to obtain history from someone other than the patient: yes         ED Course as of Feb 07 1944   Sun Feb 07, 2021   1245 Repeat blood pressure not significantly changed, improving slightly up into the 73N systolic. No change to clinical exam.    [SO]   1302 EKG Interpretation    Interpreted by emergency department physician    Rhythm: normal sinus   Rate: 83  Axis: normal  Ectopy: none  Conduction:QT Prolonged  ST Segments: normal  T Waves: inversion in  v3 and v4  Q Waves: nonspecific    Clinical Impression: non-specific EKG comparable to previous EKG on 1/27/21    Dominik Aceves      [BB]   4999 Spoke with Dr. Otoniel Sandoval he will admit the patient. [BB]   1 Spoke with Dr. Rosalee Grant he will review the patient. [BB]      ED Course User Index  [BB] Dominik Aceves DO  [SO] Rona Dennison DO      ED Course as of Feb 07 1944   Rosalba Noble Feb 07, 2021   1245 Repeat blood pressure not significantly changed, improving slightly up into the 56O systolic.   No change to clinical exam.    [SO]   1302 EKG Interpretation    Interpreted by emergency department physician    Rhythm: normal sinus   Rate: 83  Axis: normal  Ectopy: none  Conduction:QT Prolonged  ST Segments: normal  T Waves: inversion in  v3 and v4  Q Waves: nonspecific    Clinical Impression: non-specific EKG comparable to previous EKG on 1/27/21    Mary Cantu      [BB]   7813 Spoke with Dr. Meghan Fuentes he will admit the patient. [BB]   1 Spoke with Dr. Sharyn Vinson he will review the patient. [BB]      ED Course User Index  [BB] Mary Cantu,   [SO] Nicole Coles DO       --------------------------------------------- PAST HISTORY ---------------------------------------------  Past Medical History:  has a past medical history of COPD (chronic obstructive pulmonary disease) ([facility] Crohn's disease ([facility] Hx of blood clots, Hyperlipidemia, Hypertension, Multiple sclerosis ([facility] and Stage 3b chronic kidney disease. Past Surgical History:  has a past surgical history that includes Cholecystectomy; Appendectomy; Colonoscopy; and Endoscopy, colon, diagnostic. Social History:  reports that he has been smoking. He has never used smokeless tobacco. He reports that he does not drink alcohol or use drugs. Family History: family history is not on file. The patients home medications have been reviewed.     Allergies: Neurontin [gabapentin] and Lyrica [pregabalin]    -------------------------------------------------- RESULTS -------------------------------------------------    LABS:  Results for orders placed or performed during the hospital encounter of 02/07/21   CBC Auto Differential   Result Value Ref Range    WBC 25.1 (H) 4.5 - 11.5 E9/L    RBC 4.73 3.80 - 5.80 E12/L    Hemoglobin 12.8 12.5 - 16.5 g/dL    Hematocrit 41.5 37.0 - 54.0 %    MCV 87.7 80.0 - 99.9 fL    MCH 27.1 26.0 - 35.0 pg    MCHC 30.8 (L) 32.0 - 34.5 %    RDW 17.9 (H) 11.5 - 15.0 fL    Platelets 681 (H) 208 - 450 E9/L    MPV 10.1 7.0 - 12.0 fL    Neutrophils % 78.8 43.0 - 80.0 %    Lymphocytes % 10.2 (L) 20.0 - 42.0 %    Monocytes % 5.9 2.0 - 12.0 %    Eosinophils % 0.4 0.0 - 6.0 %    Basophils % 0.2 0.0 - 2.0 %    Neutrophils Absolute 21.08 (H) 1.80 - 7.30 E9/L    Lymphocytes Absolute 2.51 1.50 - 4.00 E9/L    Monocytes Absolute 1.51 (H) 0.10 - 0.95 E9/L    Eosinophils Absolute 0.00 (L) 0.05 - 0.50 E9/L    Basophils Absolute 0.00 0.00 - 0.20 E9/L    Metamyelocytes Relative 5.1 (H) 0.0 - 1.0 %    Anisocytosis 1+     Polychromasia 1+     Poikilocytes 1+     Ramo Cells 1+    Comprehensive Metabolic Panel w/ Reflex to MG   Result Value Ref Range    Sodium 137 132 - 146 mmol/L    Potassium reflex Magnesium 4.8 3.5 - 5.0 mmol/L    Chloride 98 98 - 107 mmol/L    CO2 24 22 - 29 mmol/L    Anion Gap 15 7 - 16 mmol/L    Glucose 144 (H) 74 - 99 mg/dL    BUN 32 (H) 8 - 23 mg/dL    CREATININE 2.5 (H) 0.7 - 1.2 mg/dL    GFR Non-African American 26 >=60 mL/min/1.73    GFR African American 32     Calcium 9.5 8.6 - 10.2 mg/dL    Total Protein 6.9 6.4 - 8.3 g/dL    Albumin 3.1 (L) 3.5 - 5.2 g/dL    Total Bilirubin 0.3 0.0 - 1.2 mg/dL    Alkaline Phosphatase 161 (H) 40 - 129 U/L    ALT 20 0 - 40 U/L    AST 25 0 - 39 U/L   Brain Natriuretic Peptide   Result Value Ref Range    Pro-BNP 2,592 (H) 0 - 125 pg/mL   Troponin   Result Value Ref Range    Troponin 0.21 (H) 0.00 - 0.03 ng/mL   Protime-INR   Result Value Ref Range    Protime 27.5 (H) 9.3 - 12.4 sec    INR 2.4    APTT   Result Value Ref Range    aPTT 40.1 (H) 24.5 - 35.1 sec   Ammonia   Result Value Ref Range    Ammonia 29.0 16.0 - 60.0 umol/L   Blood Gas, Arterial   Result Value Ref Range    Date Analyzed 20210207     Time Analyzed 1305     Source: Blood Arterial     pH, Blood Gas 7.443 7.350 - 7.450    PCO2 26.7 (L) 35.0 - 45.0 mmHg    PO2 77.4 75.0 - 100.0 mmHg    HCO3 17.9 (L) 22.0 - 26.0 mmol/L    B.E. -4.9 (L) -3.0 - 3.0 mmol/L    O2 Sat 95.0 92.0 - 98.5 %    O2Hb 94.8 94.0 - 97.0 %    COHb 0.0 0.0 - 1.5 %    MetHb 0.2 0.0 - 1.5 %    O2 Content 16.2 mL/dL    HHb 5.0 0.0 - 5.0 %    tHb (est) 12.1 11.5 - 16.5 g/dL    Mode RA     Date Of Collection      Time Collected      Pt Temp 37      ID Nicko Wagner     Critical(s) Notified . No Critical Values    COVID-19   Result Value Ref Range    SARS-CoV-2, NAAT Not Detected Not Detected   Lactate, Sepsis   Result Value Ref Range    Lactic Acid, Sepsis 1.7 0.5 - 1.9 mmol/L   POCT Glucose   Result Value Ref Range    Glucose 109 mg/dL    QC OK? yes    POCT Glucose   Result Value Ref Range    Meter Glucose 109 (H) 74 - 99 mg/dL   EKG 12 Lead   Result Value Ref Range    Ventricular Rate 83 BPM    Atrial Rate 83 BPM    P-R Interval 118 ms    QRS Duration 68 ms    Q-T Interval 420 ms    QTc Calculation (Bazett) 493 ms    P Axis 76 degrees    R Axis 82 degrees    T Axis 74 degrees       RADIOLOGY:  CT ABDOMEN PELVIS W IV CONTRAST   Final Result   Addendum 1 of 1   ADDENDUM:   The left kidney is severely atrophic. Final      CTA PULMONARY W CONTRAST   Final Result   Addendum 1 of 1   ADDENDUM:   The left kidney is severely atrophic. Final      CT Head WO Contrast   Final Result   1. No acute intracranial abnormality. 2. Severe chronic microvascular ischemic changes in the brain, the extent of   which is significantly greater than is typical for age. 3. Small chronic lacunar infarction in the left cerebellar hemisphere. XR CHEST PORTABLE   Final Result   Right lower lobe infiltrate/atelectasis new since the prior exam              ------------------------- NURSING NOTES AND VITALS REVIEWED ---------------------------  Date / Time Roomed:  2/7/2021 11:43 AM  ED Bed Assignment:  09/09    The nursing notes within the ED encounter and vital signs as below have been reviewed.      Patient Vitals for the past 24 hrs:   BP Temp Temp src Pulse Resp SpO2 Height Weight   02/07/21 1847 95/68 97.5 °F (36.4 °C) Oral 86 12      02/07/21 1803 102/71   98 18      02/07/21 1628 102/65   92       02/07/21 1543 95/71   91 20      02/07/21 1532 98/66   84 18 97 %     02/07/21 1423 85/70   88 20 97 %     02/07/21 [place]   02/07/21 1255 101/61   85 22      02/07/21 1200 (!) 74/52 97.5 °F (36.4 °C) Oral 88 20 99 % 5' 1\" (1.549 m) 109 lb 7 oz (49.6 kg)       Oxygen Saturation Interpretation: Normal    ------------------------------------------ PROGRESS NOTES ------------------------------------------  Re-evaluation(s):  Time: 1902  Patients symptoms show no change  Repeat physical examination is not changed    Counseling:  I have spoken with the patient and discussed todays results, in addition to providing specific details for the plan of care and counseling regarding the diagnosis and prognosis. Their questions are answered at this time and they are agreeable with the plan of admission.    --------------------------------- ADDITIONAL PROVIDER NOTES ---------------------------------  Consultations:  Time: 1708. Spoke with Dr. Jc Gilliland. Discussed case. They will admit the patient. This patient's ED course included: a personal history and physicial examination, multiple bedside re-evaluations, IV medications, cardiac monitoring, continuous pulse oximetry and complex medical decision making and emergency management    This patient has remained hemodynamically stable during their ED course. Diagnosis:  1. Small bowel obstruction ([address]    2. Hypovolemia        Disposition:  Patient's disposition: Admit to med/surg floor  Patient's condition is stable.     Patient was seen and evaluated by both myself and Kevin Barnett, DO  Resident  02/07/21 7833

## 2021-02-07 NOTE — ED NOTES
Name: Yolande Vega  : 1955  MRN: [id number]    Date: 2021     Benefits of immediately proceeding with Radiology exam outweigh the risks and therefore the following is being waived:      [] Pregnancy test    [] Protocol for Iodine allergy    [] MRI questionnaire    [x] BUN/Creatinine        DO Francie Rice DO  Resident  21 [id number]

## 2021-02-07 NOTE — ED NOTES
Attempted to insert NG Tube. Pt pushed me away and yelled \" you aint sticking that thing in my nose\" . Dr Tawanda Iqbal was informed and discussed risks with patient. Patient continues to refuse NG insertion. Refusal signed by patient and placed in paper chart.       Harish Long RN  02/07/21 0028

## 2021-02-07 NOTE — ED NOTES
Attempted to call report to [facility] for pt. Nurse stated during report that they can not take patients that have Central Femoral Line. Waiting for new bed assignment.       Wei Mendez RN  02/07/21 5830

## 2021-02-07 NOTE — ED NOTES
The patient had a large emesis of orange brown fluid. This was gastroccult positive.  Dr ni Gonzalez, RN  02/07/21 9669

## 2021-02-07 NOTE — [facility] REMOTE MONITORING
Attempted to contact RN regarding sepsis bundle.     Darien Hernandez RN, [address] Sepsis Nurse  [phone]

## 2021-02-08 PROBLEM — J18.9 HCAP (HEALTHCARE-ASSOCIATED PNEUMONIA): Status: ACTIVE | Noted: 2021-01-01

## 2021-02-08 PROBLEM — N17.9 ACUTE ON CHRONIC RENAL FAILURE (HCC): Status: ACTIVE | Noted: 2021-01-01

## 2021-02-08 PROBLEM — N18.9 ACUTE ON CHRONIC RENAL FAILURE (HCC): Status: ACTIVE | Noted: 2021-01-01

## 2021-02-08 NOTE — PROGRESS NOTES
GENERAL SURGERY  DAILY PROGRESS NOTE  2/8/2021    CHIEF COMPLAINT:  Chief Complaint   Patient presents with    Loss of Consciousness     to er via ems from home after passing out going to the bathroom. has been having vomiting and diarrhea for unknown amount of time now. Hx of crohns. SUBJECTIVE:  No complaints this AM. Denies any nausea, vomiting, abdominal pain. Passing flatus. States had BM overnight. OBJECTIVE:  BP (!) 90/55   Pulse 113   Temp 100.7 °F (38.2 °C)   Resp 16   Ht 5' 1\" (1.549 m)   Wt 109 lb 7 oz (49.6 kg)   SpO2 94%   BMI 20.68 kg/m²     GENERAL:  NAD. A&Ox3. LUNGS:  No increased work of breathing. CARDIOVASCULAR: RR  ABDOMEN:  Soft, non-distended, non-tender. No guarding, rigidity, rebound. ASSESSMENT/PLAN:  63 y.o. male with possible partial SBO  Hx Crohn's Disease, MS. Admitted for HCAP.     No distention on exam and no current symptoms to suggest ongoing SBO  SBFT pending    Yamile Nagy DO  Resident, PGY-3  2/8/2021  2:34 PM      General Surgery Progress Note  Jacoby Baltazar MD, MS    Patient's Name/Date of Birth: Carolina Mack / 1955    Date: February 8, 2021     Surgeon: Augie Roe MD    Chief Complaint: SBO, PE    Patient Active Problem List   Diagnosis    Malnutrition ([address]    Multiple sclerosis ([address]    Bilateral pulmonary embolism ([address]    Crohn's disease ([address]    COPD (chronic obstructive pulmonary disease) ([address]    Tobacco abuse    Hypertension    Hyperlipidemia    Stage 3b chronic kidney disease    Hyperkalemia    Leukocytosis    Elevated troponin    Small bowel obstruction ([address]    HCAP (healthcare-associated pneumonia)    Acute on chronic renal failure ([address]       Subjective: Patient is poor historian but really denies any abdominal pain nausea or vomiting    Objective:  /60   Pulse 84   Temp 98.1 °F (36.7 °C)   Resp 16   Ht 5' 1\" (1.549 m)   Wt 109 lb 7 oz (49.6 kg)   SpO2 96%   BMI 20.68 kg/m²   Labs:  Recent Labs     02/07/21  1220 02/08/21  0510   WBC 25.1* 10.7   HGB 12.8 14.3   HCT 41.5 47.4     Lab Results   Component Value Date    CREATININE 2.0 (H) 02/08/2021    BUN 36 (H) 02/08/2021     02/08/2021    K 4.6 02/08/2021     (H) 02/08/2021    CO2 21 (L) 02/08/2021     No results for input(s): LIPASE, AMYLASE in the last 72 hours.       General appearance:  NAD  Head: NCAT, PERRLA, EOMI, red conjunctiva  Neck: supple, no masses  Lungs: CTAB, equal chest rise bilateral  Heart: Reg rate  Abdomen: soft, nondistended nontender  Skin; no lesions  Gu: no cva tenderness  Extremities: extremities normal, atraumatic, no cyanosis or edema      Assessment/Plan:  Ambika Erazo is a 63 y.o. male with small bowel obstruction recent pulmonary embolism severe dehydration      Small bowel follow-through to evaluate for resolution of bowel obstruction as patient states he was having bowel movements prior to arrival in the emergency department  Wife is at the bedside I discussed with her again patient appears very comfortable his abdomen appears benign  Will await contrasted study  Poor surgical candidate due to coagulopathy recent pulmonary embolism elevated troponins  Patient is refusing nasogastric decompression multiple attempts to convince him to to have it placed which he refuses  I advised surgical intervention and coagulopathic state in addition to recent pulmonary embolism is high risk for mortality and would not likely be offered    Physician Signature: Electronically signed by Dr. Mina Mccabe  [phone] (p)  2/8/2021  4:31 PM

## 2021-02-08 NOTE — CONSULTS
Kelton Nelson MD, MS    Patient's Name/Date of Birth: Toshia Ruff / 1955    Date: February 7, 2021     Consulting Surgeon: Evelyn Canseco MD    PCP: Rasta Herr MD     Chief Complaint: sepsis, nausea, vomiting    HPI:   Toshia Ruff is a 63 y.o. male who presents for evaluation of sepsis, nausea, vomiting. Timing is constant, radiation to none, alleviated by vomiting and started today and severity is 7/10. Hx of recent PE, discharged on Eloquis. Hx of crohns with bowel resection in [facility] several years ago. Patient presented confused, hypotensive and per wife at  Bedside he passed out at home. Per wife hx of MS with recent weight loss and had some diarrhea today with a few bowel movements that were nonbloody. Denies SOB, chest pain.        Patient Active Problem List   Diagnosis    Malnutrition ([address]    Multiple sclerosis ([address]    Bilateral pulmonary embolism (HCC)    Crohn's disease ([address]    COPD (chronic obstructive pulmonary disease) ([address]    Tobacco abuse    Hypertension    Hyperlipidemia    Stage 3b chronic kidney disease    Hyperkalemia    Leukocytosis    Elevated troponin    Small bowel obstruction (HCC)       Allergies   Allergen Reactions    Neurontin [Gabapentin] Palpitations    Lyrica [Pregabalin] Other (See Comments)     photophobia       Past Medical History:   Diagnosis Date    COPD (chronic obstructive pulmonary disease) ([address]     Crohn's disease ([address]     Hx of blood clots 2012    liver, lung    Hyperlipidemia     Hypertension     Multiple sclerosis ([address]     Stage 3b chronic kidney disease 1/29/2021       Past Surgical History:   Procedure Laterality Date    APPENDECTOMY      CHOLECYSTECTOMY      COLONOSCOPY      ENDOSCOPY, COLON, DIAGNOSTIC         Social History     Socioeconomic History    Marital status:      Spouse name: Not on file    Number of children: Not on file    Years of education: Not on file    Highest education level: Not on file   Occupational History    Not on file   Social Needs    Financial resource strain: Not on file    Food insecurity     Worry: Not on file     Inability: Not on file    Transportation needs     Medical: Not on file     Non-medical: Not on file   Tobacco Use    Smoking status: Current Every Day Smoker    Smokeless tobacco: Never Used   Substance and Sexual Activity    Alcohol use: No    Drug use: No    Sexual activity: Not Currently   Lifestyle    Physical activity     Days per week: Not on file     Minutes per session: Not on file    Stress: Not on file   Relationships    Social connections     Talks on phone: Not on file     Gets together: Not on file     Attends [employer] service: Not on file     Active member of club or organization: Not on file     Attends meetings of clubs or organizations: Not on file     Relationship status: Not on file    Intimate partner violence     Fear of current or ex partner: Not on file     Emotionally abused: Not on file     Physically abused: Not on file     Forced sexual activity: Not on file   Other Topics Concern    Not on file   Social History Narrative    Not on file       The patient has a family history that is negative for severe cardiovascular or respiratory issues, negative for reaction to anesthesia. Recent Labs     02/07/21  1220   WBC 25.1*   HGB 12.8   HCT 41.5   MCV 87.7   *       Recent Labs     02/07/21  1220      K 4.8   CO2 24   BUN 32*   CREATININE 2.5*       Recent Labs     02/07/21  1220   PROT 6.9   INR 2.4       No intake or output data in the 24 hours ending 02/07/21 2221    I have reviewed relevant labs from this admission and interpretation is included in my assessment and plan      Review of Systems  A complete 10 system review was performed and are otherwise negative unless mentioned in the above HPI.  Specific negatives are listed below but may not include all those reviewed. General ROS: negative obtundation, AMS  ENT ROS: negative rhinorrhea, epistaxis  Allergy and Immunology ROS: negative itchy/watery eyes or nasal congestion  Hematological and Lymphatic ROS: negative spontaneous bleeding or bruising  Endocrine ROS: negative  lethargy, mood swings, palpitations or polydipsia/polyuria  Respiratory ROS: negative sputum changes, stridor, tachypnea or wheezing  Cardiovascular ROS: negative for - loss of consciousness, murmur or orthopnea  Gastrointestinal ROS: negative for - hematochezia or hematemesis  Genito-Urinary ROS: negative for -  genital discharge or hematuria  Musculoskeletal ROS: negative for - focal weakness, gangrene  Psych/Neuro ROS: negative for - visual or auditory hallucinations, suicidal ideation      Physical exam:   BP (!) 92/51   Pulse 90   Temp 98.1 °F (36.7 °C) (Infrared)   Resp 18   Ht 5' 1\" (1.549 m)   Wt 109 lb 7 oz (49.6 kg)   SpO2 96%   BMI 20.68 kg/m²   General appearance:  NAD, pale and ill appearing, comfortable  Head: NCAT, PERRLA, EOMI, pink conjunctiva  Neck: supple, no masses, trachea midline  Lungs: Equal chest rise bilateral, no retractions, no wheezing  Heart: Reg rate  Abdomen: soft, nontender, nondistended  Skin; warm and dry, no cyanosis  Gu: no cva tenderness  Extremities: atraumatic, no focal motor deficits, no open wounds  Psych: No tremor, visual hallucinations      Radiology: I reviewed relevant abdominal imaging from this admission and that available in the EMR including CT abd/pel from admission.  My assessment is PSBO    Assessment:  Daya Pederson is a 63 y.o. male with sepsis, Recent PE, elev trop, nausea, vomiting, Crohns, PSBO, cosgulopathy    Patient Active Problem List   Diagnosis    Malnutrition ([address]    Multiple sclerosis ([address]    Bilateral pulmonary embolism ([address]    Crohn's disease ([address]    COPD (chronic obstructive pulmonary disease) ([address]    Tobacco abuse    Hypertension    Hyperlipidemia  Stage 3b chronic kidney disease    Hyperkalemia    Leukocytosis    Elevated troponin    Small bowel obstruction (HCC)       Plan:  Leukocytosis last admission seems similar  NG based on CT  Having bowel movements so likely partial SBO  Repeat CT with oral contrast after decompression and IVF resuscitation  NO signs of inflammatory changes on abd CT so not likely source of leukocytosis,  IV abx per medicine  Recommend GI consult for Crohns flare  Trend lactate if elevated  ER workup for cardiac insuff seems incomplete at this time- would be poor surgical candidate if not responsive to ng decompression and repeat contrast imaging reveals obstruction  Time spent reviewing past medical, surgical, social and family history, vitals, nursing assessment and images. Time spent face to face with patient and family counciling and discussing care exceeded 50% of the time of the consult. Additional time spent reviewing images and labs, discussing case with nursing, support staff and other physicians; as well as coordinating care.         Physician Signature: Electronically signed by Dr. Jami Stoll  [phone] (p)

## 2021-02-08 NOTE — ED NOTES
PT report called to [facility].  Pt ready for transfer to unit        Mal Li RN  02/07/21 5201

## 2021-02-08 NOTE — PROGRESS NOTES
Called Dr. Connor Yoon to inform him that patient refused NG tube placement in ER. Refusal signed in soft chart.

## 2021-02-08 NOTE — H&P
Department of Internal Medicine            CHIEF COMPLAINT:  Passed out    HISTORY OF PRESENT ILLNESS:      This is a hx of recent pe. Pt was sent home. Has has been having nausea and vomiting. He passed out and wife brought him in. cta showed pe and penumonia and sbo. Pt has been taking his eliquis and is compliance with his medication. Pt is dehydrated and kidneys function worsen. PAST MEDICAL Hx:  Past Medical History:   Diagnosis Date    COPD (chronic obstructive pulmonary disease) ([facility] [place] [address]     Crohn's disease ([facility] [place] [address]     Hx of blood clots 2012    liver, lung    Hyperlipidemia     Hypertension     Multiple sclerosis ([facility] [place] [address]     Stage 3b chronic kidney disease 1/29/2021       PAST SURGICAL Hx:   Past Surgical History:   Procedure Laterality Date    APPENDECTOMY      CHOLECYSTECTOMY      COLONOSCOPY      ENDOSCOPY, COLON, DIAGNOSTIC         FAMILY Hx:  No family history on file. HOME MEDICATIONS:  Prior to Admission medications    Medication Sig Start Date End Date Taking?  Authorizing Provider   OXYGEN Inhale 3 L into the lungs daily as needed (Shortness of Breath)   Yes Historical Provider, MD   influenza virus trivalent vaccine (FLUZONE) injection Inject 0.5 mLs into the muscle once Given 10/2020   Yes Historical Provider, MD   apixaban starter pack (ELIQUIS DVT/PE STARTER PACK) 5 MG TBPK tablet Take 5 mg by mouth See Admin Instructions Take 2 tablets PO BID x7 days, then take 1 tablet PO BID 2/1/21  Yes Historical Provider, MD   aspirin 81 MG chewable tablet Take 1 tablet by mouth daily 2/1/21  Yes Shiv Curtis MD   atorvastatin (LIPITOR) 80 MG tablet Take 1 tablet by mouth nightly 2/1/21  Yes Shiv Curtis MD   lisinopril (PRINIVIL;ZESTRIL) 5 MG tablet Take 1 tablet by mouth daily 2/1/21  Yes Shiv Curtis MD   pantoprazole (PROTONIX) 40 MG tablet Take 1 tablet by mouth every morning (before breakfast) 2/2/21  Yes Shiv Curtis MD Physically abused: Not on file     Forced sexual activity: Not on file   Other Topics Concern    Not on file   Social History Narrative    Not on file       ROS:  General:   Has chills, fatigue, fever, malaise, and weight loss    Psychological:   Denies anxiety, disorientation or hallucinations    ENT:    Denies epistaxis, headaches, vertigo or visual changes    Cardiovascular:   Denies any chest pain, irregular heartbeats, or palpitations. No paroxysmal nocturnal dyspnea. Respiratory:   Sob and wheezing. Gastrointestinal:   Denies nausea, vomiting, diarrhea, or constipation. Denies any abdominal pain. Denies change in bowel habits or stools. Genito-Urinary:    Denies any urgency, frequency, hematuria. Voiding without difficulty. Musculoskeletal:   Denies joint pain, joint stiffness, joint swelling or muscle pain    Neurology:    Denies any headache or focal neurological deficits. No weakness or paresthesia. Derm:    Denies any rashes, ulcers, or excoriations. Denies bruising. Extremities:   Denies any lower extremity swelling or edema. PHYSICAL EXAM:  VITALS:  Vitals:    02/08/21 0730   BP: (!) 90/55   Pulse: 113   Resp: 16   Temp: 100.7 °F (38.2 °C)   SpO2: 94%         CONSTITUTIONAL:    Awake, alert, cooperative, no apparent distress, and appears older than stated age    EYES:    PERRL, EOMI, sclera clear, conjunctiva normal    ENT:    Normocephalic, atraumatic, sinuses nontender on palpation. External ears without lesions. Oral pharynx with moist mucus membranes. Tonsils without erythema or exudates.     NECK:    Supple, symmetrical, trachea midline, no adenopathy, thyroid symmetric, not enlarged and no tenderness, skin normal, no bruits, no JVD    HEMATOLOGIC/LYMPHATICS:    No cervical lymphadenopathy and no supraclavicular lymphadenopathy    LUNGS:    Decrease bs and crackles     CARDIOVASCULAR: Normal apical impulse, regular rate and rhythm, normal S1 and S2, no S3 or S4, and no murmur noted    ABDOMEN:    No scars, normal bowel sounds, soft, non-distended, non-tender, no masses palpated, no hepatosplenomegaly, no rebound or guarding elicited on palpation     MUSCULOSKELETAL:    There is no redness, warmth, or swelling of the joints. Full range of motion noted. Motor strength is 5 out of 5 all extremities bilaterally. Tone is normal.    NEUROLOGIC:    Awake, alert, oriented to name, place and time. Cranial nerves II-XII are grossly intact. Motor is 5 out of 5 bilaterally. SKIN:    No bruising or bleeding. No redness, warmth, or swelling    EXTREMITIES:    Peripheral pulses present. No edema, cyanosis, or swelling.       LABORATORY DATA:  CBC:   Lab Results   Component Value Date    WBC 10.7 02/08/2021    RBC 5.36 02/08/2021    HGB 14.3 02/08/2021    HCT 47.4 02/08/2021    MCV 88.4 02/08/2021    MCH 26.7 02/08/2021    MCHC 30.2 02/08/2021    RDW 18.5 02/08/2021     02/08/2021    MPV 9.6 02/08/2021     CMP:    Lab Results   Component Value Date     02/08/2021    K 4.6 02/08/2021    K 4.8 02/07/2021     02/08/2021    CO2 21 02/08/2021    BUN 36 02/08/2021    CREATININE 2.0 02/08/2021    GFRAA 41 02/08/2021    LABGLOM 34 02/08/2021    GLUCOSE 111 02/08/2021    PROT 4.9 02/08/2021    LABALBU 2.3 02/08/2021    CALCIUM 7.8 02/08/2021    BILITOT <0.2 02/08/2021    ALKPHOS 107 02/08/2021    AST 23 02/08/2021    ALT 15 02/08/2021       ASSESSMENT:  Patient Active Problem List   Diagnosis    Malnutrition ([facility] [address]    Multiple sclerosis ([facility] [address]    Bilateral pulmonary embolism (HCC)    Crohn's disease ([facility] [address]    COPD (chronic obstructive pulmonary disease) ([facility] [address]    Tobacco abuse    Hypertension    Hyperlipidemia    Stage 3b chronic kidney disease    Hyperkalemia    Leukocytosis    Elevated troponin    Small bowel obstruction ([facility] [address]    HCAP (healthcare-associated pneumonia)  Acute on chronic renal failure (HCC)       PLAN:  Iv abx started for hcap.  lovenox since pt is npo. Wbc is normal now and kidneys function is improving.     Chandler Vincent M.D.  8:25 AM  2/8/2021

## 2021-02-09 NOTE — PROGRESS NOTES
GENERAL SURGERY  DAILY PROGRESS NOTE  2/9/2021    CHIEF COMPLAINT:  Chief Complaint   Patient presents with    Loss of Consciousness     to er via ems from home after passing out going to the bathroom. has been having vomiting and diarrhea for unknown amount of time now. Hx of crohns. SUBJECTIVE:  No complaints this AM. Denies any nausea, vomiting, abdominal pain. Passing flatus, no BM yet  Refused completion of SBFT yesterday    OBJECTIVE:  /62   Pulse 75   Temp 98 °F (36.7 °C) (Oral)   Resp 16   Ht 5' 1\" (1.549 m)   Wt 109 lb 7 oz (49.6 kg)   SpO2 96%   BMI 20.68 kg/m²     GENERAL:  NAD. A&Ox3. LUNGS:  No increased work of breathing. CARDIOVASCULAR: RR  ABDOMEN:  Soft, non-distended, non-tender. No guarding, rigidity, rebound. ASSESSMENT/PLAN:  63 y.o. male with possible partial SBO  Hx Crohn's Disease, MS. Admitted for HCAP.     No distention on exam and no current symptoms to suggest ongoing SBO  SBFT stopped early by patient, also refused NGT  Passing more flatus and feels well   High risk for any surgical intervention  Continue non-operative management for now    Cyn Hills DO  Resident, PGY-3  2/9/2021  7:12 AM     General Surgery Progress Note  Hunter Vanegas MD, MS    Patient's Name/Date of Birth: Celia Mejia / 1955    Date: February 9, 2021     Surgeon: Ayaka Dawson MD    Chief Complaint: sbo    Patient Active Problem List   Diagnosis    Malnutrition ([address]    Multiple sclerosis ([address]    Bilateral pulmonary embolism ([address]    Crohn's disease ([address]    COPD (chronic obstructive pulmonary disease) ([address]    Tobacco abuse    Hypertension    Hyperlipidemia    Stage 3b chronic kidney disease    Hyperkalemia    Leukocytosis    Elevated troponin    Small bowel obstruction ([address]    HCAP (healthcare-associated pneumonia)    Acute on chronic renal failure ([address]    Sepsis ([address]       Subjective: Patient refused small bowel

## 2021-02-09 NOTE — CARE COORDINATION
SOCIAL WORK / DISCHARGE PLANNING:  COVID neg 2/7. Sw spoke with wife today via phone re: transition to care. Pt defers to her as well as his recall is in question. She manages most of his care, ambulates short distance but mainly has been pushing him in rollater around home due to w/c is too large for him but does have one. Home O2 with [employer] recently obtained and per wife he was using it but currently is on room air. Readmission assessment completed with wife. Currently she plans for him to return home as previous, has used [facility] in the past but she states pt did not find home therapy beneficial, currently declines [address] as a dc need but can rediscuss as needed. She does feel pt would benefit from therapy in hospital, complaining about his heels. PT/OT will need ordered, will discuss with charge nurse.                  Electronically signed by BRENT March on 2/9/2021 at 1:27 PM

## 2021-02-09 NOTE — PROGRESS NOTES
Physician Progress Note      PATIENTDenelida Levin  [facility]                  [id number]  :                       1955  ADMIT DATE:       2021 11:43 AM  DISCH DATE:  RESPONDING  PROVIDER #:        Scooby Grimes MD          QUERY TEXT:    Dear Attending Provider,    Pt admitted with SBO. Pt noted to have elevated Temp, elevated WBC's and   tachycardia. If possible, please document in the progress notes and discharge   summary if you are evaluating and /or treating any of the following: The medical record reflects the following:  Risk Factors: pneumonia, MS  Clinical Indicators: -wbc:25.1, :T-100.7, P-113, 90/55, AMS,  Treatment: IVF bolus, maxipime iv,  Cont IVF, Rocephin iv, and lab monitoring. Thank you,  Colonel Florentino RN, BSN, CCDS  [phone]  Options provided:  -- Sepsis, present on admission  -- Sepsis, present on admission, now resolved  -- Sepsis was ruled out  -- Other - I will add my own diagnosis  -- Disagree - Not applicable / Not valid  -- Disagree - Clinically unable to determine / Unknown  -- Refer to Clinical Documentation Reviewer    PROVIDER RESPONSE TEXT:    This patient has sepsis which was present on admission. Query created by: Simuel Gilford on 2021 3:07 PM      QUERY TEXT:    Dear Attending Provider,    Pt admitted with Pneumonia, PE and SBO. Pt noted to have AMS. If possible,   please document in the progress notes and discharge summary if you are   evaluating and / or treating any of the following: The medical record reflects the following:  Risk Factors: pneumonia, MS, hypotension  Clinical Indicators: Per surgical consult note: Patient presented confused,   hypotensive and per wife at  Bedside he passed out at home.   Treatment: Ct scan of the head, neuro checks and lab monitoring    Thank you,  Colonel Mishel RN, BSN, CCDS  [phone]  Options provided:  -- Metabolic encephalopathy  -- Septic encephalopathy  -- Delirium due to, Please specify cause  -- Delirium  -- Other - I will add my own diagnosis  -- Disagree - Not applicable / Not valid  -- Disagree - Clinically unable to determine / Unknown  -- Refer to Clinical Documentation Reviewer    PROVIDER RESPONSE TEXT:    This patient has septic encephalopathy. Query created by:  Jorge Serrano on 2/8/2021 3:21 PM      Electronically signed by:  Gama Jones MD 2/9/2021 8:33 AM

## 2021-02-09 NOTE — PROGRESS NOTES
Pt labs sent, hematology called to inform RN that pts hgb had changed from 14.3 to 7.9. Pt has a central line from which labs were drawn. This RN has placed orders for redraw, to ensure there was no hemodilution in previous draw. Redraw remains hgb at 7.9. Attempted to call Dr Edie Cruz to let him know of the change. No answer on his phone, will try again later.

## 2021-02-09 NOTE — CONSULTS
Patient seen evaluated the sixth floor this AM.  Patient denies any abdominal pain he states he is passing flatus at this time states his abdominal pain is significantly improved patient himself is a very poor historian and defers history taking to patient's wife. Patient's wife Ingris Thomas was contacted at [phone] patient's history was discussed. Patient's history is Crohn's disease diagnosed in 2012 status post terminal ileum resection around that time at [facility]. She states patient follows with Dr. Bart Cota  in the outpatient setting and underwent recent colonoscopy 1 year prior. She denies any previous medications with patient's Crohn's disease she denies any use of biologicals or oral medications. She denies any hematochezia or melena but does state patient has increasing amount of diarrhea over the last several months. REVIEW OF SYSTEMS:   Constitutional: Denies fever, chills, or unintentional weight loss. HEENT: Denies double or blurry vision, headaches, ear pain or ringing in the ears. No drainage from the ears, nose or throat. Cardiovascular: Denies any chest pain, irregular heartbeats, or palpitations. Respiratory: Denies shortness of breath, coughing, sputum production, hemoptysis, or wheezing. Gastrointestinal: Denies nausea, vomiting, diarrhea, or constipation. Denies any abdominal pain, black or bloody stools. Genitourinary: Denies any urinary urgency, frequency, hematuria. Voiding without difficulty. Endocrine: Denies sensitivity to heat or cold. Denies changes in hair, skin or nails. Denies excessive thirst, hunger or going to the bathroom more frequently than usual.  Extremities: Denies swelling or calf pain. Musculoskeletal: Denies muscle or joint pain, stiffness, arthritis, gout, or instability. Dermatology / Skin: Denies any rashes, ulcers, or excoriations. Denies bruising. Neurology: Denies any bowel or bladder incontinence, headache or focal neurological deficits. No weakness or paresthesia. Denies numbness or tingling in the hands or feet. Psychiatric: Denies nervousness/anxiety, depression or memory loss. Past Medical History:  Past Medical History:   Diagnosis Date    [facility] obstructive [facility]     [facility]     Hx of blood clots 2012    liver, lung    Hyperlipidemia     Hypertension     Multiple sclerosis ([facility]     Stage 3b chronic kidney disease 1/29/2021       Past Surgical History:  Past Surgical History:   Procedure Laterality Date    APPENDECTOMY      CHOLECYSTECTOMY      COLONOSCOPY      ENDOSCOPY, COLON, DIAGNOSTIC         Home Medications:  Prior to Admission medications    Medication Sig Start Date End Date Taking? Authorizing Provider   OXYGEN Inhale 3 L into the lungs daily as needed (Shortness of Breath)   Yes Historical Provider, MD   influenza virus trivalent vaccine (FLUZONE) injection Inject 0.5 mLs into the muscle once Given 10/2020   Yes Historical Provider, MD   apixaban starter pack (ELIQUIS DVT/PE STARTER PACK) 5 MG TBPK tablet Take 5 mg by mouth See Admin Instructions Take 2 tablets PO BID x7 days, then take 1 tablet PO BID 2/1/21  Yes Historical Provider, MD   aspirin 81 MG chewable tablet Take 1 tablet by mouth daily 2/1/21  Yes Suresh Singleton MD   atorvastatin (LIPITOR) 80 MG tablet Take 1 tablet by mouth nightly 2/1/21  Yes Suresh Singleton MD   lisinopril (PRINIVIL;ZESTRIL) 5 MG tablet Take 1 tablet by mouth daily 2/1/21  Yes Suresh Singleton MD   pantoprazole (PROTONIX) 40 MG tablet Take 1 tablet by mouth every morning (before breakfast) 2/2/21  Yes Suresh Singleton MD   oxyCODONE HCl (OXY-IR) 10 MG immediate release tablet Take 10 mg by mouth every 4 hours.    Yes Historical Provider, MD   baclofen (LIORESAL) 10 MG tablet Take 10 mg by mouth 2 times daily   Yes Historical Provider, MD Ocrelizumab (OCREVUS IV) Infuse intravenously every 6 months Last Given: 12/9/2020  Next Dose: 6/9/2021   Yes Historical Provider, MD   metoprolol succinate (TOPROL XL) 50 MG extended release tablet Take 50 mg by mouth daily   Yes Historical Provider, MD   mirtazapine (REMERON) 15 MG tablet Take 15 mg by mouth nightly   Yes Historical Provider, MD       Allergies: Neurontin [gabapentin] and Lyrica [pregabalin]    Social History:  Social History     Socioeconomic History    Marital status:      Spouse name: Not on file    Number of children: Not on file    Years of education: Not on file    Highest education level: Not on file   Occupational History    Not on file   Social Needs    Financial resource strain: Not on file    Food insecurity     Worry: Not on file     Inability: Not on file    Transportation needs     Medical: Not on file     Non-medical: Not on file   Tobacco Use    Smoking status: Current Every Day Smoker    Smokeless tobacco: Never Used   Substance and Sexual Activity    Alcohol use: No    Drug use: No    Sexual activity: Not Currently   Lifestyle    Physical activity     Days per week: Not on file     Minutes per session: Not on file    Stress: Not on file   Relationships    Social connections     Talks on phone: Not on file     Gets together: Not on file     Attends Adventism service: Not on file     Active member of club or organization: Not on file     Attends meetings of clubs or organizations: Not on file     Relationship status: Not on file    Intimate partner violence     Fear of current or ex partner: Not on file     Emotionally abused: Not on file     Physically abused: Not on file     Forced sexual activity: Not on file   Other Topics Concern    Not on file   Social History Narrative    Not on file       Family History:  No family history on file.       PHYSICAL EXAM: Vital Signs: /62   Pulse 75   Temp 98 °F (36.7 °C) (Oral)   Resp 16   Ht 5' 1\" (1.549 m)   Wt 109 lb 7 oz (49.6 kg)   SpO2 96%   BMI 20.68 kg/m²   GENERAL APPEARANCE:  awake, alert, oriented, cooperative, and in no acute distress  EYES:  Lids and lashes normal, PERRLA, EOMI, sclera clear, conjunctiva normal  HENT:  Normocephalic, without obvious abnormality, atramatic, oral pharynx with moist mucus membranes, tonsils without erythema or exudates  NECK:  Supple with no carotid bruits, JVD or thyromegaly. No cervical adenopathy  LUNGS:  Clear to auscultation bilaterally with no wheezes, rales or rhonchi. No increased work of breathing, good air exchange. CARDIOVASCULAR: Regular rate and rhythm, no murmur  ABDOMEN:  normal bowel sounds in all 4 quadrants, soft, non-distended, non-tender  MUSCULOSKELETAL:  There is no redness, warmth, or swelling of the joints. Full range of motion noted. Motor strength is 5 out of 5 all extremities bilaterally. Tone is normal.  EXTREMITIES: No edema, 2+ pulses bilaterally (radial and dorsalis pedis)  NEUROLOGIC:  Awake, alert, oriented to name, place and time. Cranial nerves II-XII are grossly intact. Motor is 5 out of 5 bilaterally. SKIN: Normal skin color, texture, and turgor. There is no redness, warmth, or swelling. No bruising or bleeding, no mottling. PSYCH: Affect, behavior and insight are all within normal limits.       DATA:  Results for orders placed or performed during the hospital encounter of 02/07/21   Culture, Blood 1    Specimen: Blood   Result Value Ref Range    Blood Culture, Routine 24 Hours no growth    Culture, Blood 2    Specimen: Blood   Result Value Ref Range    Culture, Blood 2 24 Hours no growth    CBC Auto Differential   Result Value Ref Range    WBC 25.1 (H) 4.5 - 11.5 E9/L    RBC 4.73 3.80 - 5.80 E12/L    Hemoglobin 12.8 12.5 - 16.5 g/dL    Hematocrit 41.5 37.0 - 54.0 %    MCV 87.7 80.0 - 99.9 fL    MCH 27.1 26.0 - 35.0 pg MCHC 30.8 (L) 32.0 - 34.5 %    RDW 17.9 (H) 11.5 - 15.0 fL    Platelets 809 (H) 456 - 450 E9/L    MPV 10.1 7.0 - 12.0 fL    Neutrophils % 78.8 43.0 - 80.0 %    Lymphocytes % 10.2 (L) 20.0 - 42.0 %    Monocytes % 5.9 2.0 - 12.0 %    Eosinophils % 0.4 0.0 - 6.0 %    Basophils % 0.2 0.0 - 2.0 %    Neutrophils Absolute 21.08 (H) 1.80 - 7.30 E9/L    Lymphocytes Absolute 2.51 1.50 - 4.00 E9/L    Monocytes Absolute 1.51 (H) 0.10 - 0.95 E9/L    Eosinophils Absolute 0.00 (L) 0.05 - 0.50 E9/L    Basophils Absolute 0.00 0.00 - 0.20 E9/L    Metamyelocytes Relative 5.1 (H) 0.0 - 1.0 %    Anisocytosis 1+     Polychromasia 1+     Poikilocytes 1+     Ramo Cells 1+    Comprehensive Metabolic Panel w/ Reflex to MG   Result Value Ref Range    Sodium 137 132 - 146 mmol/L    Potassium reflex Magnesium 4.8 3.5 - 5.0 mmol/L    Chloride 98 98 - 107 mmol/L    CO2 24 22 - 29 mmol/L    Anion Gap 15 7 - 16 mmol/L    Glucose 144 (H) 74 - 99 mg/dL    BUN 32 (H) 8 - 23 mg/dL    CREATININE 2.5 (H) 0.7 - 1.2 mg/dL    GFR Non-African American 26 >=60 mL/min/1.73    GFR African American 32     Calcium 9.5 8.6 - 10.2 mg/dL    Total Protein 6.9 6.4 - 8.3 g/dL    Albumin 3.1 (L) 3.5 - 5.2 g/dL    Total Bilirubin 0.3 0.0 - 1.2 mg/dL    Alkaline Phosphatase 161 (H) 40 - 129 U/L    ALT 20 0 - 40 U/L    AST 25 0 - 39 U/L   Brain Natriuretic Peptide   Result Value Ref Range    Pro-BNP 2,592 (H) 0 - 125 pg/mL   Troponin   Result Value Ref Range    Troponin 0.21 (H) 0.00 - 0.03 ng/mL   Protime-INR   Result Value Ref Range    Protime 27.5 (H) 9.3 - 12.4 sec    INR 2.4    APTT   Result Value Ref Range    aPTT 40.1 (H) 24.5 - 35.1 sec   Ammonia   Result Value Ref Range    Ammonia 29.0 16.0 - 60.0 umol/L   Blood Gas, Arterial   Result Value Ref Range    Date Analyzed 20210207     Time Analyzed 1305     Source: Blood Arterial     pH, Blood Gas 7.443 7.350 - 7.450    PCO2 26.7 (L) 35.0 - 45.0 mmHg    PO2 77.4 75.0 - 100.0 mmHg    HCO3 17.9 (L) 22.0 - 26.0 mmol/L B.E. -4.9 (L) -3.0 - 3.0 mmol/L    O2 Sat 95.0 92.0 - 98.5 %    O2Hb 94.8 94.0 - 97.0 %    COHb 0.0 0.0 - 1.5 %    MetHb 0.2 0.0 - 1.5 %    O2 Content 16.2 mL/dL    HHb 5.0 0.0 - 5.0 %    tHb (est) 12.1 11.5 - 16.5 g/dL    Mode RA     Date Of Collection      Time Collected      Pt Temp 37           Lab [id number]     Critical(s) Notified .  No Critical Values    COVID-19   Result Value Ref Range    SARS-CoV-2, NAAT Not Detected Not Detected   Lactate, Sepsis   Result Value Ref Range    Lactic Acid, Sepsis 1.7 0.5 - 1.9 mmol/L   Lactate, Sepsis   Result Value Ref Range    Lactic Acid, Sepsis 1.2 0.5 - 1.9 mmol/L   CBC   Result Value Ref Range    WBC 10.7 4.5 - 11.5 E9/L    RBC 5.36 3.80 - 5.80 E12/L    Hemoglobin 14.3 12.5 - 16.5 g/dL    Hematocrit 47.4 37.0 - 54.0 %    MCV 88.4 80.0 - 99.9 fL    MCH 26.7 26.0 - 35.0 pg    MCHC 30.2 (L) 32.0 - 34.5 %    RDW 18.5 (H) 11.5 - 15.0 fL    Platelets 860 295 - 564 E9/L    MPV 9.6 7.0 - 12.0 fL   Comprehensive metabolic panel   Result Value Ref Range    Sodium 141 132 - 146 mmol/L    Potassium 4.6 3.5 - 5.0 mmol/L    Chloride 109 (H) 98 - 107 mmol/L    CO2 21 (L) 22 - 29 mmol/L    Anion Gap 11 7 - 16 mmol/L    Glucose 111 (H) 74 - 99 mg/dL    BUN 36 (H) 8 - 23 mg/dL    CREATININE 2.0 (H) 0.7 - 1.2 mg/dL    GFR Non-African American 34 >=60 mL/min/1.73    GFR African American 41     Calcium 7.8 (L) 8.6 - 10.2 mg/dL    Total Protein 4.9 (L) 6.4 - 8.3 g/dL    Albumin 2.3 (L) 3.5 - 5.2 g/dL    Total Bilirubin <0.2 0.0 - 1.2 mg/dL    Alkaline Phosphatase 107 40 - 129 U/L    ALT 15 0 - 40 U/L    AST 23 0 - 39 U/L   SPECIMEN REJECTION   Result Value Ref Range    Rejected Test CBCND CMP     Reason for Rejection see below    CBC WITH AUTO DIFFERENTIAL   Result Value Ref Range    WBC 9.2 4.5 - 11.5 E9/L    RBC 2.93 (L) 3.80 - 5.80 E12/L    Hemoglobin 7.9 (L) 12.5 - 16.5 g/dL    Hematocrit 26.0 (L) 37.0 - 54.0 %    MCV 88.7 80.0 - 99.9 fL    MCH 27.0 26.0 - 35.0 pg MCHC 30.4 (L) 32.0 - 34.5 %    RDW 17.8 (H) 11.5 - 15.0 fL    Platelets 954 611 - 611 E9/L    MPV 9.4 7.0 - 12.0 fL    Neutrophils % 74.0 43.0 - 80.0 %    Lymphocytes % 10.0 (L) 20.0 - 42.0 %    Monocytes % 7.0 2.0 - 12.0 %    Eosinophils % 3.0 0.0 - 6.0 %    Basophils % 1.0 0.0 - 2.0 %    Neutrophils Absolute 7.27 1.80 - 7.30 E9/L    Lymphocytes Absolute 0.92 (L) 1.50 - 4.00 E9/L    Monocytes Absolute 0.64 0.10 - 0.95 E9/L    Eosinophils Absolute 0.28 0.05 - 0.50 E9/L    Basophils Absolute 0.09 0.00 - 0.20 E9/L    Myelocyte Percent 5.0 0 - 0 %    Polychromasia 1+     Hypochromia 1+    Comprehensive metabolic panel   Result Value Ref Range    Sodium 139 132 - 146 mmol/L    Potassium 4.3 3.5 - 5.0 mmol/L    Chloride 111 (H) 98 - 107 mmol/L    CO2 21 (L) 22 - 29 mmol/L    Anion Gap 7 7 - 16 mmol/L    Glucose 113 (H) 74 - 99 mg/dL    BUN 25 (H) 8 - 23 mg/dL    CREATININE 1.7 (H) 0.7 - 1.2 mg/dL    GFR Non-African American 41 >=60 mL/min/1.73    GFR African American 49     Calcium 7.8 (L) 8.6 - 10.2 mg/dL    Total Protein 4.6 (L) 6.4 - 8.3 g/dL    Albumin 2.2 (L) 3.5 - 5.2 g/dL    Total Bilirubin <0.2 0.0 - 1.2 mg/dL    Alkaline Phosphatase 93 40 - 129 U/L    ALT 12 0 - 40 U/L    AST 20 0 - 39 U/L   POCT Glucose   Result Value Ref Range    Glucose 109 mg/dL    QC OK?  yes    POCT Glucose   Result Value Ref Range    Meter Glucose 109 (H) 74 - 99 mg/dL   EKG 12 Lead   Result Value Ref Range    Ventricular Rate 83 BPM    Atrial Rate 83 BPM    P-R Interval 118 ms    QRS Duration 68 ms    Q-T Interval 420 ms    QTc Calculation (Bazett) 493 ms    P Axis 76 degrees    R Axis 82 degrees    T Axis 74 degrees         IMAGING:  Echo Complete    Result Date: 1/27/2021 Transthoracic Echocardiography Report (TTE)  Demographics   Patient Name   Marlen Avila      Gender            Male                 [address] [facility] [id number]      Room Number       0038  Number   Account #      [de-identified]     Procedure Date    01/27/2021   Corporate ID                 Ordering          Anival Connell MD                               Physician   Accession      [id number]    Referring  Number                       Physician   Date of Birth  1955    [address] [place]   Age            63 year(s)    [facility]preting      [facility]                               Physician         Physician Cardiology                                                 Anival Connell MD                                Any Other  Procedure Type of Study   TTE procedure  Procedure Date Date: 01/27/2021 Start: 12:27 PM Study Location: Echo Lab Technical Quality: Limited visualization due to lung interference. Indications:Pulmonary embolus and Abnormal cardiac enzymes. Patient Status: Routine Height: 60 inches Weight: 112 pounds BSA: 1.46 m^2 BMI: 21.87 kg/m^2 Rhythm: Within normal limits HR: 85 bpm  Findings   Left Ventricle  Non dilated left ventricle. Mild left ventricular systolic dysfunction. Visually estimated LVEF is 40-45%. Hypokinesis of the anterior and septal wall. Right Ventricle  Normal right ventricle structure and function. Mitral Valve  Normal mitral valve structure and function. No mitral valve regurgitation. Tricuspid Valve  Normal tricuspid valve structure and function. Mild tricuspid valve regurgitation. Aortic Valve  Normal leaflets structure and mobility. No aortic regurgitation. Pericardial Effusion  No pericardial effusion. Conclusions   Summary  Non dilated left ventricle. Mild left ventricular systolic dysfunction. Visually estimated LVEF is 40-45%. Hypokinesis of the anterior and septal wall. Normal right ventricle structure and function.   No significant valvular abnormalities.    Signature   ----------------------------------------------------------------  Electronically signed by Jacqui Deshpande MD(Interpreting  physician) on 01/27/2021 01:38 PM  ----------------------------------------------------------------  M-Mode/2D Measurements & Calculations   LV Diastolic    LV Systolic Dimension: 3.1   AV Cusp Separation: 1.6 cmLA  Dimension: 3.9  cm                           Dimension: 2.9 cmAO Root  cm              LV Volume Diastolic: 71.5 ml Dimension: 3.3 cm  LV FS:20.5 %    LV Volume Systolic: 05.2 ml  LV PW           LV EDV/LV EDV Index: 40.3  Diastolic: 1 cm FJ/35 RD/M^7JI ESV/LV ESV  Septum          Index: 37.7 ml/26ml/ m^2     RV Diastolic Dimension: 3.5  Diastolic: 1.1  EF Calculated: 44.2 %        cm  cm              LV Mass Index: 90 l/min*m^2  CO: 3.74 l/min  CI: 2.56                                     LA volume/Index: 25.5 ml  l/m*m^2         LVOT: 2 cm  LV Mass: 130.96  g  Doppler Measurements & Calculations   MV Peak E-Wave: 0.48 AV Peak Velocity:     LVOT Peak Velocity: 0.88 m/s  m/s                  0.94 m/s              LVOT Mean Velocity: 0.62 m/s  MV Peak A-Wave: 0.61 AV Peak Gradient:     LVOT Peak Gradient: 3.1  m/s                  3.53 mmHg             mmHgLVOT Mean Gradient: 1.8  MV E/A Ratio: 0.79   AV Mean Velocity:     mmHg  MV Peak Gradient:    0.73 m/s              Estimated RVSP: 38.3 mmHg  2.1 mmHg             AV Mean Gradient: 2.3 Estimated RAP:5 mmHg  MV Mean Gradient:    mmHg  0.8 mmHg             AV VTI: 16 cm  MV Mean Velocity:    AV Area               TR Velocity:2.89 m/s  0.43 m/s             (Continuity):2.75     TR Gradient:33.34 mmHg  MV Deceleration      cm^2                  PV Peak Velocity: 0.83 m/s  Time: 268.9 msec                           PV Peak Gradient: 2.78 mmHg  MV P1/2t: 46.9 msec  LVOT VTI: 14 cm       PV Mean Velocity: 0.58 m/s  MVA by PHT:4.69 cm^2 IVRT: 107.3 msec      PV Mean Gradient: 1.5 mmHg  MV EXAMINATION: CT OF THE HEAD WITHOUT CONTRAST  2/7/2021 1:42 pm TECHNIQUE: CT of the head was performed without the administration of intravenous contrast. Dose modulation, iterative reconstruction, and/or weight based adjustment of the mA/kV was utilized to reduce the radiation dose to as low as reasonably achievable. COMPARISON: CT head without contrast, 01/10/2021 HISTORY: ORDERING SYSTEM PROVIDED HISTORY: Syncope TECHNOLOGIST PROVIDED HISTORY: Reason for exam:->Syncope Has a \"code stroke\" or \"stroke alert\" been called? ->No Decision Support Exception->Emergency Medical Condition (MA) FINDINGS: BRAIN/VENTRICLES: No mass effect, edema or hemorrhage is seen. Mild-to-moderate volume loss is seen in the brain. Severe chronic microvascular ischemic changes are noted, the extent of which is significantly greater than is typical for age. A small chronic lacunar infarction is seen in the left superior cerebellar hemisphere. No hydrocephalus or extra-axial fluid is seen. ORBITS: The visualized portion of the orbits demonstrate no acute abnormality. SINUSES: The visualized paranasal sinuses and mastoid air cells demonstrate no acute abnormality. SOFT TISSUES/SKULL:  No acute abnormality of the visualized skull or soft tissues. 1.  No acute intracranial abnormality. 2. Severe chronic microvascular ischemic changes in the brain, the extent of which is significantly greater than is typical for age. 3. Small chronic lacunar infarction in the left cerebellar hemisphere.     Ct Head Wo Contrast    Result Date: 1/10/2021 EXAMINATION: CT OF THE HEAD WITHOUT CONTRAST  1/10/2021 1:00 pm TECHNIQUE: CT of the head was performed without the administration of intravenous contrast. Dose modulation, iterative reconstruction, and/or weight based adjustment of the mA/kV was utilized to reduce the radiation dose to as low as reasonably achievable. COMPARISON: MRI brain December 6, 2017 HISTORY: ORDERING SYSTEM PROVIDED HISTORY: Fall, abrasion to left forhead TECHNOLOGIST PROVIDED HISTORY: Has a \"code stroke\" or \"stroke alert\" been called? ->No Reason for exam:->Fall, abrasion to left forhead FINDINGS: BRAIN/VENTRICLES: There is no acute intracranial hemorrhage, mass effect or midline shift. No abnormal extra-axial fluid collection. The gray-white differentiation is maintained without evidence of an acute infarct. Hypoattenuation of the cerebral white matter. There is no evidence of hydrocephalus. ORBITS: The visualized portion of the orbits demonstrate no acute abnormality. SINUSES: The visualized paranasal sinuses and mastoid air cells demonstrate no acute abnormality. SOFT TISSUES/SKULL:  No acute abnormality of the visualized skull or soft tissues. No acute intracranial abnormality. No intracranial hemorrhage. Ct Abdomen Pelvis W Iv Contrast    Addendum Date: 2/7/2021    ADDENDUM: The left kidney is severely atrophic.     Result Date: 2/7/2021 EXAMINATION: CTA OF THE CHEST; CT OF THE ABDOMEN AND PELVIS WITH CONTRAST 2/7/2021 3:48 pm TECHNIQUE: CTA of the chest was performed after the administration of intravenous contrast.  Multiplanar reformatted images are provided for review. MIP images are provided for review. Dose modulation, iterative reconstruction, and/or weight based adjustment of the mA/kV was utilized to reduce the radiation dose to as low as reasonably achievable.; CT of the abdomen and pelvis was performed with the administration of intravenous contrast. Multiplanar reformatted images are provided for review. Dose modulation, iterative reconstruction, and/or weight based adjustment of the mA/kV was utilized to reduce the radiation dose to as low as reasonably achievable. COMPARISON: CTA 01/27/2021. HISTORY: ORDERING SYSTEM PROVIDED HISTORY: Emergency TECHNOLOGIST PROVIDED HISTORY: Reason for exam:->Emergency Decision Support Exception->Emergency Medical Condition (MA) FINDINGS: CTA chest. Heart size is normal.  Tiny pericardial effusion is present. There is coronary artery calcification. Re demonstrated is extensive bilateral pulmonary embolism with filling defects in the right and left main pulmonary arteries extending to the segmental upper and lower lobe branches without significant change. There is extensive bilateral patchy and ground-glass infiltrates with slight improvement. Masslike infiltrates are noted in the lung bases right more than left. This may represent a combination of pneumonia and developing pulmonary infarcts. The liver is of normal architecture. Impression Persistent extensive bilateral pulmonary embolism with involvement of the right and left main pulmonary artery extending to the upper and lower lobes without significant change.  Persistent patchy and multifocal ground-glass infiltrates with slight improvement ground-glass opacities with consolidations and wedge-shaped infiltrates in the lung bases which may represent pneumonia including viral infection along with developing pulmonary infarcts. CT abdomen and pelvis. Findings The liver is of normal architecture with punctate subcentimeter hypodense hepatic lesions. There is previous cholecystectomy. Spleen, pancreas, the adrenals and the kidneys are normal except for 5.5 cm cystic lesion in the right kidney. Mild abdominal aortic aneurysm measuring 2.9 x 2.7 cm are noted with peripheral clot. Degenerative changes are noted in the lumbar spine. Diffusely dilated fluid-filled proximal small bowel loops are noted with proximal small bowel measuring up to 5.3 cm. The distal neoterminal ileum appears thickened and edematous which may be causing obstruction. Pelvis. Bladder is distended. There is diffusely dilated and somewhat thickened colon with some areas of wall thickening/narrowing and edema especially on the left side. Diverticulosis are identified in the sigmoid colon. High-grade small-bowel obstruction with significantly dilated proximal small bowel loops and a short segment of thickened edematous distal andrea terminal ileum which may be causing obstruction. Recurrent Crohn's disease is considered causing obstruction. Dilated fluid-filled and thickened colon which may be due to colitis secondary to Crohn's disease with some areas of narrowing. Consider colonoscopy when feasible. Abdominal aortic aneurysm is noted. 5 year surveillance recommended. . Findings were telephoned to licensed caregiver    Xr Chest Portable    Result Date: 2/7/2021 Cta Pulmonary W Contrast    Addendum Date: 2/7/2021    ADDENDUM: The left kidney is severely atrophic.     Result Date: 2/7/2021 represent pneumonia including viral infection along with developing pulmonary infarcts. CT abdomen and pelvis. Findings The liver is of normal architecture with punctate subcentimeter hypodense hepatic lesions. There is previous cholecystectomy. Spleen, pancreas, the adrenals and the kidneys are normal except for 5.5 cm cystic lesion in the right kidney. Mild abdominal aortic aneurysm measuring 2.9 x 2.7 cm are noted with peripheral clot. Degenerative changes are noted in the lumbar spine. Diffusely dilated fluid-filled proximal small bowel loops are noted with proximal small bowel measuring up to 5.3 cm. The distal neoterminal ileum appears thickened and edematous which may be causing obstruction. Pelvis. Bladder is distended. There is diffusely dilated and somewhat thickened colon with some areas of wall thickening/narrowing and edema especially on the left side. Diverticulosis are identified in the sigmoid colon. High-grade small-bowel obstruction with significantly dilated proximal small bowel loops and a short segment of thickened edematous distal andrea terminal ileum which may be causing obstruction. Recurrent Crohn's disease is considered causing obstruction. Dilated fluid-filled and thickened colon which may be due to colitis secondary to Crohn's disease with some areas of narrowing. Consider colonoscopy when feasible. Abdominal aortic aneurysm is noted. 5 year surveillance recommended. . Findings were telephoned to licensed caregiver    Fl Small Bowel Follow Through Only    Result Date: 2/8/2021 EXAMINATION: SMALL BOWEL FOLLOW THROUGH SERIES 2/8/2021 TECHNIQUE: Small bowel follow through series was performed with overhead images and spot images. FLUOROSCOPY DOSE AND TYPE OR TIME AND EXPOSURES: The patient refused to proceed with the examination and only a  film following 4 ounces of barium was obtained. COMPARISON: CT of the abdomen and pelvis of 02/07/2021 HISTORY: ORDERING SYSTEM PROVIDED HISTORY: eval SBO TECHNOLOGIST PROVIDED HISTORY: Reason for exam:->eval SBO FINDINGS: A  film of the abdomen was obtained. The patient then was administered barium. After the patient drank approximately 4 ounces of barium the patient stated that he did not want to proceed with any further with the exam given his discomfort and nausea. A single image was then obtained which demonstrates contrast within a nondilated stomach and within a nondilated duodenum. There are dilated loops of small bowel seen throughout the abdomen. Review with the patient's previous CT scan obtained 21 hours prior to the attempted small-bowel series it is noted that there are multiple dilated loops of small bowel with a definite transition point involving the terminal ileum. 1. The patient refused the small-bowel series after ingestion of 4 ounces of barium. 2. There are multiple dilated loops of small bowel seen throughout the abdomen. In review with the patient's CT scan obtained earlier today there is a definite transition point involving the terminal ileum.     Stress/rest Nm Myocardial Spect Exercise Or Rx    Result Date: 1/31/2021 Indication:  New heart failure. Clinical History:   Patient has no history of coronary artery disease. IMAGING: Myocardial perfusion imaging was performed at rest 30-35 minutes following the intravenous injection of 11 mCi of (Tc-Sestamibi) followed by 10 ml of Normal Saline. At peak exercise, the patient was injected intravenously with 32.8 mCi of (Tc-Sestamibi) followed by 10 ml of Normal Saline. Gated post-stress tomographic imaging was performed 20-25 minutes after stress. FINDINGS: The overall quality of the study was fair. The gated SPECT stress imaging in the short, vertical long, and horizontal long axis demonstrated normal homogeneous tracer distribution throughout the myocardium. Left ventricular cavity size was noted to be normal. There is a fixed perfusion defect in the basal to mid anteroseptal, inferoseptal and apical septal walls. There is a perfusion defect in the inferior wall consistent with ramping artifact due to high liver tracer uptake. Gated SPECT left ventricular ejection fraction was calculated to be 84% but visually around 40% with akinesis of the septal wall. There is evidence of large severe myocardial infarction of the  basal to mid anteroseptal, inferoseptal and apical septal walls with minimal ischemia. There is a perfusion defect in the inferior wall consistent with ramping artifact due to high liver tracer uptake. The probability of ischemia due to obstructive coronary artery disease is low. ASSESSMENT/PLAN:        1.  Moderate-Severe Stricturing Crohns Disease   - Diagnosed in 2012 hx of Terminal ileum resection  - No previous biological or medication   - Ct abdomen with high-grade obstruction with significantly dilated proximal small bowel loops with thickened and edematous distal neoterminal ileum  - Refused SBFT   - Colonoscopy within last year in office will review   - Obtain CRP/ESR   - Obtain C diff to rule out infectious etiology

## 2021-02-09 NOTE — PROGRESS NOTES
Progress Note  Date:2021       Room:0635/0635-01  Patient Name:Davian Zhang     YOB: 1955     Age:65 y.o. Subjective    Subjective:  Symptoms:  Stable. He reports shortness of breath, malaise, weakness and anxiety. No cough, chest pain, headache, chest pressure, anorexia or diarrhea. Diet:  NPO. Activity level: Impaired due to weakness. Pain:  He complains of pain that is moderate. pt is doing ok. Still have not move bowel. H/h dropped from 14 to 7. Labs repeated. Review of Systems   Respiratory: Positive for shortness of breath. Negative for cough. Cardiovascular: Negative for chest pain. Gastrointestinal: Negative for anorexia and diarrhea. Neurological: Positive for weakness. Objective         Vitals Last 24 Hours:  TEMPERATURE:  Temp  Av °F (36.7 °C)  Min: 97.9 °F (36.6 °C)  Max: 98.1 °F (36.7 °C)  RESPIRATIONS RANGE: Resp  Av  Min: 16  Max: 16  PULSE OXIMETRY RANGE: SpO2  Av.6 %  Min: 95 %  Max: 96 %  PULSE RANGE: Pulse  Av.5  Min: 72  Max: 84  BLOOD PRESSURE RANGE: Systolic (59RDC), RAB:260 , Min:112 , VJB:870   ; Diastolic (38RKL), EKN:92, Min:60, Max:64    I/O (24Hr): Intake/Output Summary (Last 24 hours) at 2021 0838  Last data filed at 2021 0543  Gross per 24 hour   Intake 1995 ml   Output 1450 ml   Net 545 ml     Objective:  General Appearance:  Comfortable. Vital signs: (most recent): Blood pressure 112/62, pulse 75, temperature 98 °F (36.7 °C), temperature source Oral, resp. rate 16, height 5' 1\" (1.549 m), weight 109 lb 7 oz (49.6 kg), SpO2 96 %. Vital signs are normal.    Output: Producing urine. Lungs:  Normal respiratory rate. He is not in respiratory distress. Heart: Regular rhythm. S1 normal and S2 normal.    Abdomen: Abdomen is soft. There is no abdominal tenderness. Extremities: Normal range of motion. Neurological: Patient is alert and oriented to person, place and time. Normal strength. ORBITS: The visualized portion of the orbits demonstrate no acute abnormality. SINUSES: The visualized paranasal sinuses and mastoid air cells demonstrate no acute abnormality. SOFT TISSUES/SKULL:  No acute abnormality of the visualized skull or soft tissues. 1.  No acute intracranial abnormality. 2. Severe chronic microvascular ischemic changes in the brain, the extent of which is significantly greater than is typical for age. 3. Small chronic lacunar infarction in the left cerebellar hemisphere. Ct Abdomen Pelvis W Iv Contrast    Addendum Date: 2/7/2021    ADDENDUM: The left kidney is severely atrophic. Result Date: 2/7/2021  EXAMINATION: CTA OF THE CHEST; CT OF THE ABDOMEN AND PELVIS WITH CONTRAST 2/7/2021 3:48 pm TECHNIQUE: CTA of the chest was performed after the administration of intravenous contrast.  Multiplanar reformatted images are provided for review. MIP images are provided for review. Dose modulation, iterative reconstruction, and/or weight based adjustment of the mA/kV was utilized to reduce the radiation dose to as low as reasonably achievable.; CT of the abdomen and pelvis was performed with the administration of intravenous contrast. Multiplanar reformatted images are provided for review. Dose modulation, iterative reconstruction, and/or weight based adjustment of the mA/kV was utilized to reduce the radiation dose to as low as reasonably achievable. COMPARISON: CTA 01/27/2021. HISTORY: ORDERING SYSTEM PROVIDED HISTORY: Emergency TECHNOLOGIST PROVIDED HISTORY: Reason for exam:->Emergency Decision Support Exception->Emergency Medical Condition (MA) FINDINGS: CTA chest. Heart size is normal.  Tiny pericardial effusion is present. There is coronary artery calcification. Re demonstrated is extensive bilateral pulmonary embolism with filling defects in the right and left main pulmonary arteries extending to the segmental upper and lower lobe branches without significant change.   There is extensive bilateral patchy and ground-glass infiltrates with slight improvement. Masslike infiltrates are noted in the lung bases right more than left. This may represent a combination of pneumonia and developing pulmonary infarcts. The liver is of normal architecture. Impression Persistent extensive bilateral pulmonary embolism with involvement of the right and left main pulmonary artery extending to the upper and lower lobes without significant change. Persistent patchy and multifocal ground-glass infiltrates with slight improvement ground-glass opacities with consolidations and wedge-shaped infiltrates in the lung bases which may represent pneumonia including viral infection along with developing pulmonary infarcts. CT abdomen and pelvis. Findings The liver is of normal architecture with punctate subcentimeter hypodense hepatic lesions. There is previous cholecystectomy. Spleen, pancreas, the adrenals and the kidneys are normal except for 5.5 cm cystic lesion in the right kidney. Mild abdominal aortic aneurysm measuring 2.9 x 2.7 cm are noted with peripheral clot. Degenerative changes are noted in the lumbar spine. Diffusely dilated fluid-filled proximal small bowel loops are noted with proximal small bowel measuring up to 5.3 cm. The distal neoterminal ileum appears thickened and edematous which may be causing obstruction. Pelvis. Bladder is distended. There is diffusely dilated and somewhat thickened colon with some areas of wall thickening/narrowing and edema especially on the left side. Diverticulosis are identified in the sigmoid colon. High-grade small-bowel obstruction with significantly dilated proximal small bowel loops and a short segment of thickened edematous distal andrea terminal ileum which may be causing obstruction. Recurrent Crohn's disease is considered causing obstruction.  Dilated fluid-filled and thickened colon which may be due to colitis secondary to Crohn's disease with some areas of narrowing. Consider colonoscopy when feasible. Abdominal aortic aneurysm is noted. 5 year surveillance recommended. . Findings were telephoned to licensed caregiver    Xr Chest Portable    Result Date: 2/7/2021  EXAMINATION: ONE XRAY VIEW OF THE CHEST 2/7/2021 1:33 pm COMPARISON: Comparison is dated January 10, 2021 HISTORY: ORDERING SYSTEM PROVIDED HISTORY: Syncope TECHNOLOGIST PROVIDED HISTORY: Reason for exam:->Syncope FINDINGS: Focal increased density is present within the right lower lobe new since the prior exam.  It is more confluent peripheral E. There is elevation of the right hemidiaphragm new since the prior study. Left lung is unremarkable. Cardiac and mediastinal contours are unremarkable. No pleural fluid collection identified. No evidence for pneumothorax    Right lower lobe infiltrate/atelectasis new since the prior exam    Cta Pulmonary W Contrast    Addendum Date: 2/7/2021    ADDENDUM: The left kidney is severely atrophic. Result Date: 2/7/2021  EXAMINATION: CTA OF THE CHEST; CT OF THE ABDOMEN AND PELVIS WITH CONTRAST 2/7/2021 3:48 pm TECHNIQUE: CTA of the chest was performed after the administration of intravenous contrast.  Multiplanar reformatted images are provided for review. MIP images are provided for review. Dose modulation, iterative reconstruction, and/or weight based adjustment of the mA/kV was utilized to reduce the radiation dose to as low as reasonably achievable.; CT of the abdomen and pelvis was performed with the administration of intravenous contrast. Multiplanar reformatted images are provided for review. Dose modulation, iterative reconstruction, and/or weight based adjustment of the mA/kV was utilized to reduce the radiation dose to as low as reasonably achievable. COMPARISON: CTA 01/27/2021.  HISTORY: ORDERING SYSTEM PROVIDED HISTORY: Emergency TECHNOLOGIST PROVIDED HISTORY: Reason for exam:->Emergency Decision Support Exception->Emergency Medical Condition small-bowel obstruction with significantly dilated proximal small bowel loops and a short segment of thickened edematous distal andrea terminal ileum which may be causing obstruction. Recurrent Crohn's disease is considered causing obstruction. Dilated fluid-filled and thickened colon which may be due to colitis secondary to Crohn's disease with some areas of narrowing. Consider colonoscopy when feasible. Abdominal aortic aneurysm is noted. 5 year surveillance recommended. . Findings were telephoned to licensed caregiver    Fl Small Bowel Follow Through Only    Result Date: 2/8/2021  EXAMINATION: SMALL BOWEL FOLLOW THROUGH SERIES 2/8/2021 TECHNIQUE: Small bowel follow through series was performed with overhead images and spot images. FLUOROSCOPY DOSE AND TYPE OR TIME AND EXPOSURES: The patient refused to proceed with the examination and only a  film following 4 ounces of barium was obtained. COMPARISON: CT of the abdomen and pelvis of 02/07/2021 HISTORY: ORDERING SYSTEM PROVIDED HISTORY: eval SBO TECHNOLOGIST PROVIDED HISTORY: Reason for exam:->eval SBO FINDINGS: A  film of the abdomen was obtained. The patient then was administered barium. After the patient drank approximately 4 ounces of barium the patient stated that he did not want to proceed with any further with the exam given his discomfort and nausea. A single image was then obtained which demonstrates contrast within a nondilated stomach and within a nondilated duodenum. There are dilated loops of small bowel seen throughout the abdomen. Review with the patient's previous CT scan obtained 21 hours prior to the attempted small-bowel series it is noted that there are multiple dilated loops of small bowel with a definite transition point involving the terminal ileum. 1. The patient refused the small-bowel series after ingestion of 4 ounces of barium. 2. There are multiple dilated loops of small bowel seen throughout the abdomen.   In review with the patient's CT scan obtained earlier today there is a definite transition point involving the terminal ileum. Assessment//Plan           Hospital Problems           Last Modified POA    Malnutrition ([address] 2/8/2021 Yes    Multiple sclerosis ([address] 2/8/2021 Yes    Overview Signed 1/6/2021 11:51 AM by Leticia Conte [facility]     This Diagnosis was added to the Problem List based on transcribed orders from Dr. Tana Spencer            Bilateral pulmonary embolism ([address] 2/8/2021 Yes    Crohn's disease ([address] 2/8/2021 Yes    COPD (chronic obstructive pulmonary disease) ([address] 2/8/2021 Yes    Tobacco abuse 2/8/2021 Yes    Hypertension 2/8/2021 Yes    Hyperlipidemia 2/8/2021 Yes    Stage 3b chronic kidney disease 2/8/2021 Yes    Small bowel obstruction ([address] 2/7/2021 Yes    HCAP (healthcare-associated pneumonia) 2/8/2021 Yes    Acute on chronic renal failure ([address] 2/8/2021 Yes    Sepsis ([address] 2/8/2021 Yes        Assessment:  (Problem List as of 2/9/2021 Never Reviewed    Malnutrition ([address]    Multiple sclerosis ([address]    Bilateral pulmonary embolism ([address]    Crohn's disease ([address]    COPD (chronic obstructive pulmonary disease) ([address]    Tobacco abuse    Hypertension    Hyperlipidemia    Stage 3b chronic kidney disease    Hyperkalemia    Leukocytosis    Elevated troponin    Small bowel obstruction ([address]    HCAP (healthcare-associated pneumonia)    Acute on chronic renal failure ([address]    Sepsis ([address]    ). Plan:   (Cont with tx. Gi consult . Iv protonix).        Electronically signed by Eddie Johnson MD on 2/9/21 at 8:38 AM EST

## 2021-02-10 NOTE — PROGRESS NOTES
Physical Therapy    Physical Therapy Initial Evaluation    Room #:  7714/1650-26  Patient Name: Flora Bishop  YOB: 1955  MRN: [id number]    Referring Provider:    Lexii Weems MD       Date of Service: 2/10/2021    Evaluating Physical Therapist: Carlos Manuel Mark, PT  #[id number]       Diagnosis:   Small bowel obstruction ([address] [H23.461]   Admitted with  small bowel obstruction , pneumonia and pulmonary embolism  ; syncope at home with vomiting and diarrhea    Patient Active Problem List   Diagnosis    Malnutrition ([address]    Multiple sclerosis ([address]    Bilateral pulmonary embolism ([address]    Crohn's disease ([address]    COPD (chronic obstructive pulmonary disease) ([address]    Tobacco abuse    Hypertension    Hyperlipidemia    Stage 3b chronic kidney disease    Hyperkalemia    Leukocytosis    Elevated troponin    Small bowel obstruction ([address]    HCAP (healthcare-associated pneumonia)    Acute on chronic renal failure (HCC)    Sepsis ([address]        Tentative placement recommendation: Home    Equipment recommendation: Patient has needed equipment       Prior Level of Function: Patient ambulated with wheeled walker short distance  Rehab Potential: good    for baseline    Past medical history:   Past Medical History:   Diagnosis Date    COPD (chronic obstructive pulmonary disease) ([address]     Crohn's disease ([address]     Hx of blood clots 2012    liver, lung    Hyperlipidemia     Hypertension     Multiple sclerosis ([address]     Stage 3b chronic kidney disease 1/29/2021     Past Surgical History:   Procedure Laterality Date    APPENDECTOMY      CHOLECYSTECTOMY      COLONOSCOPY      ENDOSCOPY, COLON, DIAGNOSTIC         Precautions: , falls, alarm and O2 , Short term memory deficit     SUBJECTIVE:    Social history: Patient lives with spouse    in a ranch home  with 1 step  to enter without [address] owned: Wheeled Walker and O2,       [address] [facility] Mobility Inpatient   How much difficulty turning over in bed?: A Little  How much difficulty sitting down on / standing up from a chair with arms?: A Little  How much difficulty moving from lying on back to sitting on side of bed?: A Little  How much help from another person moving to and from a bed to a chair?: A Little  How much help from another person needed to walk in hospital room?: A Little  How much help from another person for climbing 3-5 steps with a railing?: A Lot  AM-PAC Inpatient Mobility Raw Score : 17  AM-PAC Inpatient T-Scale Score : 42.13  Mobility Inpatient CMS 0-100% Score: 50.57  Mobility Inpatient CMS G-Code Modifier : CK    Nursing cleared patient for PT evaluation. The admitting diagnosis and active problem list as listed above have been reviewed prior to the initiation of this evaluation. OBJECTIVE;   Initial Evaluation  Date: 2/10/2021 Treatment Date:     Short Term/ Long Term   Goals   Was pt agreeable to Eval/treatment? Yes    To be met in 3 days   Pain level   0/10        Bed Mobility    Rolling: Minimal assist of 1    Supine to sit: Minimal assist of 1    Sit to supine: Minimal assist of 1    Scooting: Minimal assist of 1    Rolling: Independent    Supine to sit:  Independent    Sit to supine: Independent    Scooting: Independent     Transfers Sit to stand: Minimal assist of 1    Sit to stand: Supervision      Ambulation    30 feet using  wheeled walker with Minimal assist of 1   for balance and walker approximation d/t decreased step length on Right lower extremity    50 feet using  wheeled walker with Supervision     ROM Within functional limits        Strength BUE:  refer to OT eval  RLE:  3+/5  LLE:  4/5   Increase strength in affected mm groups by 1/3 grade   Balance Sitting EOB:  good    Dynamic Standing:  fair with wheeled walker   Sitting EOB:  good    Dynamic Standing: good Patient is Alert & Oriented x person, place, time and situation and follows directions    Sensation:  Patient  denies numbness and tingling     Edema:  no   Endurance: fair          Patient education  Patient educated on role of Physical Therapy, risks of immobility, safety and plan of care,  importance of mobility while in hospital  and importance and purpose of adaptive device and adjusted to proper height for the patient. Patient response to education:   Pt verbalized understanding Pt demonstrated skill Pt requires further education in this area   Yes Partial Yes      Treatment:  Patient practiced and was instructed/facilitated in the following treatment: Patient   perormed gait and trf training       Therapeutic Exercises:  not performed       At end of session, patient in chair with    call light and phone within reach,   all lines and tubes intact, nursing notified. Patient would benefit from continued skilled Physical Therapy to improve functional independence and quality of life. Patient's/ family goals   get stronger        ASSESSMENT: Patient exhibits decreased strength, balance and endurance impairing transfers, gait , gait distance and tolerance to activity       Plan of Care:     -Transfers: Provide instruction on proper hand and foot position for adequate transfer of weight onto lower extremities and use of gait device, Cues for hand placement, technique and safety and Provide stabilization to prevent fall   -Gait: Gait training, Standing activities to improve: base of support, weight shift, weight bearing  and Exercises to improve hip and knee control   -Endurance: Utilize Supervised activities to increase level of endurance to allow for safe functional mobility including transfers and gait     Patient and or family understand(s) diagnosis, prognosis, and plan of care. Frequency of treatments: Patient will be seen  daily.        Time in  330  Time out  340 Total Treatment Time  0 minutes    Evaluation time includes thorough review of current medical information, gathering information on past medical history/social history and prior level of function, completion of standardized testing/informal observation of tasks, assessment of data, and development of Plan of care and goals.      CPT codes:  Low Complexity PT evaluation (79724)  No treatment    Gay Cristobal, PT

## 2021-02-10 NOTE — PROGRESS NOTES
PROGRESS NOTE    By Rosa Malcolm D.O GI Fellow    [facility]  Dr. Ashly Nielsen MD, Dr. Ashley Anne MD, Dr Rosita Stroud, Dr. Sandhya Beck MD, Dr. Cristina Amanda, DO      Deandra Pay  63 y.o.  male    SUBJECTIVE:    Patient resting company this AM.  Patient denies any melena hematochezia or hematemesis. He states he is passing flatus and tolerating oral intake. OBJECTIVE:    /87   Pulse 89   Temp 98.6 °F (37 °C) (Infrared)   Resp 16   Ht 5' 1\" (1.549 m)   Wt 109 lb 7 oz (49.6 kg)   SpO2 96%   BMI 20.68 kg/m²     Gen: NAD, AAO x 3  HEENT:PEERL, no icterus  Heart: RRR, no M/R/G  Lungs: CTAB  Abd.: soft, NT, ND, BS +, no G/R, no HSM  Extr.: no C/C/E, no bruising      Stool (measured) : 0 mL  Lab Results   Component Value Date    WBC 12.8 02/10/2021    WBC 9.7 02/09/2021    WBC 9.2 02/09/2021    HGB 8.6 02/10/2021    HGB 7.9 02/09/2021    HGB 7.9 02/09/2021    HCT 29.2 02/10/2021    MCV 89.3 02/10/2021    RDW 17.4 02/10/2021     02/10/2021     02/09/2021     02/09/2021     Lab Results   Component Value Date     02/10/2021    K 4.4 02/10/2021    K 4.8 02/07/2021     02/10/2021    CO2 22 02/10/2021    BUN 17 02/10/2021    CREATININE 1.7 02/10/2021    CALCIUM 7.9 02/10/2021    PROT 5.0 02/10/2021    LABALBU 2.5 02/10/2021    BILITOT <0.2 02/10/2021    BILITOT <0.2 02/09/2021    BILITOT <0.2 02/08/2021    ALKPHOS 103 02/10/2021    ALKPHOS 93 02/09/2021    ALKPHOS 107 02/08/2021    AST 22 02/10/2021    AST 20 02/09/2021    AST 23 02/08/2021    ALT 14 02/10/2021    ALT 12 02/09/2021    ALT 15 02/08/2021     No results found for: LIPASE  No results found for: AMYLASE      ASSESSMENT/PLAN:    1.  Moderate-Severe Stricturing Crohns Disease   - Diagnosed in 2012 hx of Terminal ileum resection  - No previous biological or medication - Ct abdomen with high-grade obstruction with significantly dilated proximal small bowel loops with thickened and edematous distal neoterminal ileum  - Refused SBFT   -Colonoscopy 4/2019 with moderate stricture in the very distal terminal ileum unable to pass the colonoscope  -Sed rate elevated at 70  -No further diarrhea patient unable to provide stool sample for calprotectin and C. difficile EIA  - Place on 40mg IV Solumedrol   - Patient with  neoterminal ilium stricuture high risk surgical candidate secondary to recent pulmonary embolism  -Could consider possible retrograde balloon with dilation however patient also high risk secondary to recent pulmonary embolism and requiring anticoagulation  -Recommend conservative treatment at this time     2. Acute normocytic anemia  - VSS NO overt GI Bleed on exam   - Baseline Hgb 10.2-->14.3   - On therapeutic Lovenox for PE  -Hemoglobin 7.9 this a.m. with no overt signs of GI bleed  -Placed on Protonix 40 mg twice daily  -Type and cross for 2 units on hold/elevate head of bed/every 6 hemoglobin for 24 hours  -Consider possible EGD if patient would develop overt signs of GI bleed or become hypotensive however this would be high risk procedure secondary to patient's pulmonary embolism     3.  Bilateral acute pulmonary embolisms diagnosed on 1/27/2021  -Therapeutic Lovenox per admitting      Patient no signs of infection on exam not having gross diarrhea at this time will attempt to place patient on 40 mg of IV Solu-Medrol daily for possible exacerbation of Crohn's disease with stricture in the andrea terminal ileum. Pt was discussed with   Dr. Melisa Garcia DO  GI Fellow   2/10/2021  10:32 AM      Pt seen and independently examined. Pertinent notes and lab work reviewed. Monitor reviewed showing sinus rhythm. D/w Dr. Keith Richard with physical exam and A&P.     Reza Ruiz MD  2/10/2021  10:59 AM

## 2021-02-10 NOTE — CARE COORDINATION
SOCIAL WORK / DISCHARGE PLANNING:  COVID neg 2/7. PT/OT ordered today, await evals that will aide with dc planning. Wife manages most of his care and initially does not want [facility] but may reconsider if needed, has used [facility]. Has home O2 ( [employer]) but has been room air here. No surgical plans, no GI plans due to anticoagulation, Eliquis PTA.                    Electronically signed by BRENT Galindo on 2/10/2021 at 2:41 PM

## 2021-02-10 NOTE — PROGRESS NOTES
Occupational Therapy  OCCUPATIONAL THERAPY INITIAL EVALUATION      Date:2/10/2021  Patient Name: Marnie Franz  MRN: [id number]  : 1955  Room: [address] [place] [address]    Referring Provider: Sandi Villalba    Evaluating OT: Susan Coleman OTR/L #[id number]    AM-PAC Daily Activity Raw Score: 1624    Recommended Placement: Subacute  Recommended Adaptive Equipment: TBD     Diagnosis:   1. Small bowel obstruction ([facility]    2. Hypovolemia    3. Pneumonia due to organism        Surgery: N/A      Pertinent Medical History:    Past Medical History:   Diagnosis Date    COPD (chronic obstructive pulmonary disease) ([facility]     [facility]     Hx of blood clots     liver, lung    Hyperlipidemia     Hypertension     Multiple sclerosis ([facility]     Stage 3b chronic kidney disease 2021      Precautions:  Falls, alarm     Home Living: Pt lives with wife and son in a 1 story home with 1 OSBALDO with 1 rail(s). Bathroom setup: tub/shower with grab bars   Equipment owned: w/c, rollator, O2, shower chair, [employer]    Prior Level of Function: Assist with ADLs , Assist with IADLs; ambulated with rollator short distances  Driving: No  Occupation: Not reported    Pain Level: Pain in LLE, did not quantify. Cognition: A&O: 4/4; Follows 1 step directions   Memory:  fair   Sequencing:  fair   Problem solving:  fair   Judgement/safety:  fair     Functional Assessment:   Initial Eval Status  Date: 2/10/21 Treatment Status  Date: STGs = LTGs  Time frame: 5-7 days   Feeding Independent        Grooming Minimal Assist     Independent    UB Dressing Minimal Assist     Independent    LB Dressing Maximal Assist   Don pants at EOB, assist for threading BLE, steadying assist and assist to pull waistband up  Minimal Assist    Bathing Maximal Assist    Minimal Assist    Toileting Maximal Assist     Minimal Assist    Bed Mobility  Supine to sit: Minimal Assist   Sit to supine: NT  Pt up in chair     Supine to sit:  Independent Sit to supine: Independent    Functional Transfers Minimal Assist   Sit<>stand  Bed, chair  Cuing on hand placement, body mechanics and safety  Supervision   Functional Mobility Moderate Assist   Using [employer], several steps from bed to chair  Cuing on body mechanics, AD management, and safety  Minimal Assist    Balance Sitting:     Static:  Fair+    Dynamic:fair+  Standing: fair  Sitting:     Static:  good    Dynamic:good  Standing: fair+   Activity Tolerance Fair  Fair+   Visual/  Perceptual Glasses: Yes   WFL       Hand Dominance Right     Strength ROM Additional Info:    RUE  4/5  WFL good  and wfl FMC/dexterity noted during ADL tasks       LUE 4/5  WFL good  and wfl FMC/dexterity noted during ADL tasks       Hearing: WFL   Sensation:  No c/o numbness or tingling   Tone: WFL   Edema: None noted    Comments:   Nursing approved therapy session. Upon arrival, patient supine in bed and agreeable to OT session. Therapist educated pt on role of OT. At end of session, patient up in chair with call light and phone within reach, all lines and tubes intact; nursing aware. Pt demonstrated fair+ understanding of education/techniques and decreased independence and safety during completion of ADL/functional transfer/mobility tasks. Pt would benefit from continued skilled OT to increase safety and independence with completion of ADL/IADL tasks for functional independence and quality of life. Treatment:   Skilled occupational therapy services provided include instruction/training on safety and adapted techniques for completion of therapeutic activities. Skilled monitoring of O2 sats, HR, and pt response throughout treatment. Prior to and at the end of session, environmental modifications  completed for patients safety and efficiency of treatment session. Patient and/or family were instructed on functional diagnosis, prognosis/goals and OT plan of care. Demonstrated fair+ understanding. Eval Complexity: Low      Time In: 1512  Time Out: 1525  Total Treatment Time: 8    Min Units   OT Eval Low 97165  X  1   OT Eval Medium 63708      OT Eval High 21953       OT Re-Eval [id number]       Therapeutic Ex 28132       Therapeutic Activities 29636  8 1    ADL/Self Care 49584       Orthotic Management 66214       Neuro Re-Ed 81256       Non-Billable Time          Evaluation Time includes thorough review of current medical information, gathering information on past medical history/social history and prior level of function, completion of standardized testing/informal observation of tasks, assessment of data and education on plan of care and goals.     Lachelle Ross, OTR/L #[id number]

## 2021-02-10 NOTE — PROGRESS NOTES
Pt noted to have 1 small bm overnight. Greenish in color. Pt was incontinent, and no collectable amount was present. All output saturated into ultrasorb pad beneath pt. Will continue to monitor.

## 2021-02-10 NOTE — PLAN OF CARE
Problem: Falls - Risk of:  Goal: Will remain free from falls  Description: Will remain free from falls  Outcome: Met This Shift     Problem: Pain:  Goal: Pain level will decrease  Description: Pain level will decrease  Outcome: Met This Shift  Goal: Control of acute pain  Description: Control of acute pain  Outcome: Met This Shift     Problem: Skin Integrity:  Goal: Will show no infection signs and symptoms  Description: Will show no infection signs and symptoms  Outcome: Met This Shift

## 2021-02-11 NOTE — PROGRESS NOTES
Pt TLC left groin DSD. Blue port does not flush, Red port with + blood return and being used for medication. Pt confused. Bed alarm on.

## 2021-02-11 NOTE — PROGRESS NOTES
Progress Note  Date:2/10/2021       Room:35/0635-01  Patient Name:Davian Zhang     YOB: 1955     Age:65 y.o. Subjective    Subjective:  Symptoms:  Stable. He reports shortness of breath, malaise, weakness and anxiety. No cough, chest pain, headache, chest pressure, anorexia or diarrhea. Diet:  Adequate intake. Activity level: Impaired due to weakness. Pain:  He complains of pain that is moderate. pt is doing ok. Tolerating po intake. Review of Systems   Respiratory: Positive for shortness of breath. Negative for cough. Cardiovascular: Negative for chest pain. Gastrointestinal: Negative for anorexia and diarrhea. Neurological: Positive for weakness. Objective         Vitals Last 24 Hours:  TEMPERATURE:  Temp  Av.2 °F (36.8 °C)  Min: 97.8 °F (36.6 °C)  Max: 98.6 °F (37 °C)  RESPIRATIONS RANGE: Resp  Av.3  Min: 16  Max: 18  PULSE OXIMETRY RANGE: SpO2  Av %  Min: 94 %  Max: 98 %  PULSE RANGE: Pulse  Av.6  Min: 62  Max: 89  BLOOD PRESSURE RANGE: Systolic (32OKL), WSQ:412 , Min:112 , OTW:872   ; Diastolic (64EPR), SCZ:16, Min:56, Max:87    I/O (24Hr): Intake/Output Summary (Last 24 hours) at 2/10/2021 2151  Last data filed at 2/10/2021 1810  Gross per 24 hour   Intake 1222 ml   Output 450 ml   Net 772 ml     Objective:  General Appearance:  Comfortable. Vital signs: (most recent): Blood pressure (!) 112/56, pulse 64, temperature 98.4 °F (36.9 °C), temperature source Infrared, resp. rate 16, height 5' 1\" (1.549 m), weight 109 lb 7 oz (49.6 kg), SpO2 98 %. Vital signs are normal.    Output: Producing urine. HEENT: Normal HEENT exam.    Lungs:  Normal respiratory rate. He is not in respiratory distress. Heart: Regular rhythm. S1 normal and S2 normal.    Abdomen: Abdomen is soft. Bowel sounds are normal.     Extremities: Normal range of motion. Pulses: Distal pulses are intact.       Labs/Imaging/Diagnostics    Labs:  CBC:  Recent Labs 02/09/21  0617 02/09/21  1222 02/10/21  0515   WBC 9.2 9.7 12.8*   RBC 2.93* 2.94* 3.27*   HGB 7.9* 7.9* 8.6*   HCT 26.0* 26.0* 29.2*   MCV 88.7 88.4 89.3   RDW 17.8* 17.5* 17.4*    383 449     CHEMISTRIES:  Recent Labs     02/08/21  0510 02/09/21  0617 02/10/21  0515    139 138   K 4.6 4.3 4.4   * 111* 106   CO2 21* 21* 22   BUN 36* 25* 17   CREATININE 2.0* 1.7* 1.7*   GLUCOSE 111* 113* 104*     PT/INR:No results for input(s): PROTIME, INR in the last 72 hours. APTT:No results for input(s): APTT in the last 72 hours. LIVER PROFILE:  Recent Labs     02/08/21  0510 02/09/21  0617 02/10/21  0515   AST 23 20 22   ALT 15 12 14   BILITOT <0.2 <0.2 <0.2   ALKPHOS 107 93 103       Imaging Last 24 Hours:  Xr Abdomen (kub) (single Ap View)    Result Date: 2/9/2021  EXAMINATION: ONE SUPINE XRAY VIEW(S) OF THE ABDOMEN 2/9/2021 9:57 am COMPARISON: 02/08/2021 HISTORY: ORDERING SYSTEM PROVIDED HISTORY: eval; contrast advancement TECHNOLOGIST PROVIDED HISTORY: Reason for exam:->eval; contrast advancement FINDINGS: A vascular catheter extends from the left groin into the iliac region. Oral contrast has traversed the small intestine and reach the colon, excluding mechanical obstruction. Gas lies in nondilated loops of large and small intestine. There are no masses or organomegaly. Persistent small bowel dilatation, contrast has traversed the small intestine reaching the colon, excluding significant mechanical obstruction. Suspect adynamic ileus. Femoral vascular catheter appears unchanged in satisfactory.     Assessment//Plan           Hospital Problems           Last Modified POA    Malnutrition ([address] 2/8/2021 Yes    Multiple sclerosis ([address] 2/8/2021 Yes    Overview Signed 1/6/2021 11:51 AM by Helder Charles [facility]     This Diagnosis was added to the Problem List based on transcribed orders from Dr. Claudette Dys            Bilateral pulmonary embolism ([facility] 2/8/2021 Yes    Crohn's disease ([facility] 2/8/2021 Yes    COPD (chronic obstructive pulmonary disease) ([address] 2/8/2021 Yes    Tobacco abuse 2/8/2021 Yes    Hypertension 2/8/2021 Yes    Hyperlipidemia 2/8/2021 Yes    Stage 3b chronic kidney disease 2/8/2021 Yes    Small bowel obstruction ([address] 2/7/2021 Yes    HCAP (healthcare-associated pneumonia) 2/8/2021 Yes    Acute on chronic renal failure ([address] 2/8/2021 Yes    Sepsis ([address] 2/8/2021 Yes        Assessment:  (Problem List as of 2/10/2021 Never Reviewed    Malnutrition ([address]    Multiple sclerosis ([address]    Bilateral pulmonary embolism (HCC)    Crohn's disease ([address]    COPD (chronic obstructive pulmonary disease) ([address]    Tobacco abuse    Hypertension    Hyperlipidemia    Stage 3b chronic kidney disease    Hyperkalemia    Leukocytosis    Elevated troponin    Small bowel obstruction ([address]    HCAP (healthcare-associated pneumonia)    Acute on chronic renal failure ([address]    Sepsis ([address]    ). Plan:   (Possibly home tomorow).        Electronically signed by Lexii Weems MD on 2/10/21 at 9:51 PM EST

## 2021-02-11 NOTE — PLAN OF CARE
Problem: Falls - Risk of:  Goal: Will remain free from falls  Outcome: Ongoing  Goal: Absence of physical injury  Outcome: Ongoing     Problem: Pain:  Goal: Pain level will decrease  Outcome: Ongoing  Goal: Control of acute pain  Outcome: Ongoing  Goal: Control of chronic pain  Outcome: Ongoing     Problem: Skin Integrity:  Goal: Will show no infection signs and symptoms  Outcome: Ongoing  Goal: Absence of new skin breakdown  Outcome: Ongoing

## 2021-02-11 NOTE — PROGRESS NOTES
PROGRESS NOTE    By Rosa Malcolm D.O GI Fellow    [facility]  Dr. Ashly Nielsen MD, Dr. Ashley Anne MD, Dr Rosita Stroud, Dr. Sandhya Beck MD, Dr. Cristina Amanda, DO      Deandra Pay  63 y.o.  male    SUBJECTIVE:    Patient resting company this AM.  Patient denies any melena hematochezia or hematemesis. He states he is passing flatus and tolerating oral intake. OBJECTIVE:    /60   Pulse 60   Temp 98 °F (36.7 °C) (Infrared)   Resp 18   Ht 5' 1\" (1.549 m)   Wt 109 lb 7 oz (49.6 kg)   SpO2 98%   BMI 20.68 kg/m²     Gen: NAD, AAO x 3  HEENT:PEERL, no icterus  Heart: RRR, no M/R/G  Lungs: CTAB  Abd.: soft, NT, ND, BS +, no G/R, no HSM  Extr.: no C/C/E, no bruising      Stool (measured) : 0 mL  Lab Results   Component Value Date    WBC 16.0 02/11/2021    WBC 12.8 02/10/2021    WBC 9.7 02/09/2021    HGB 8.0 02/11/2021    HGB 8.6 02/10/2021    HGB 7.9 02/09/2021    HCT 25.9 02/11/2021    MCV 88.4 02/11/2021    RDW 17.4 02/11/2021     02/11/2021     02/10/2021     02/09/2021     Lab Results   Component Value Date     02/11/2021    K 4.5 02/11/2021    K 4.8 02/07/2021     02/11/2021    CO2 21 02/11/2021    BUN 16 02/11/2021    CREATININE 1.7 02/11/2021    CALCIUM 7.6 02/11/2021    PROT 4.7 02/11/2021    LABALBU 2.4 02/11/2021    BILITOT <0.2 02/11/2021    BILITOT <0.2 02/10/2021    BILITOT <0.2 02/09/2021    ALKPHOS 93 02/11/2021    ALKPHOS 103 02/10/2021    ALKPHOS 93 02/09/2021    AST 16 02/11/2021    AST 22 02/10/2021    AST 20 02/09/2021    ALT 13 02/11/2021    ALT 14 02/10/2021    ALT 12 02/09/2021     No results found for: LIPASE  No results found for: AMYLASE      ASSESSMENT/PLAN:    1.  Moderate-Severe Stricturing Crohns Disease   - Diagnosed in 2012 hx of Terminal ileum resection  - No previous biological or medication - Ct abdomen with high-grade obstruction with significantly dilated proximal small bowel loops with thickened and edematous distal neoterminal ileum  - Refused SBFT   -Colonoscopy 4/2019 with moderate stricture in the very distal terminal ileum unable to pass the colonoscope  -Sed rate elevated at 70  -No further diarrhea patient unable to provide stool sample for calprotectin and C. difficile EIA  - Place on 40mg IV Solumedrol   - Patient with  neoterminal ilium stricuture high risk surgical candidate secondary to recent pulmonary embolism  -Could consider possible retrograde balloon with dilation however patient also high risk secondary to recent pulmonary embolism and requiring anticoagulation  -Recommend conservative treatment at this time     2. Acute normocytic anemia  - VSS NO overt GI Bleed on exam   - Baseline Hgb 10.2-->14.3   - On therapeutic Lovenox for PE  -Hemoglobin stable this a.m. at 8 stable at 8.0 this a.m. with no overt signs of GI bleed  -Placed on Protonix 40 mg twice daily  -Type and cross for 2 units on hold/elevate head of bed/every 6 hemoglobin for 24 hours  -Consider possible EGD if patient would develop overt signs of GI bleed or become hypotensive however this would be high risk procedure secondary to patient's pulmonary embolism     3.  Bilateral acute pulmonary embolisms diagnosed on 1/27/2021  -Therapeutic Lovenox per admitting      Tolerating 40 mg of IV Solu-Medrol daily for possible exacerbation of Crohn's disease with stricture in the andrea terminal ileum. Pt was discussed with   Dr. Melisa Garcia DO  GI Fellow   2/11/2021  10:22 AM      Pt seen and independently examined. Pertinent notes and lab work reviewed. D/w Dr. Keith Richard with physical exam and A&P. Discussed with patient - all questions answered - agreeable with the plan as delineated.     Reza Ruiz MD  2/11/2021  12:05 PM

## 2021-02-11 NOTE — PROGRESS NOTES
Physical Therapy    Physical Therapy Treatment Note    Room #:  0635/0635-01  Patient Name: Brigette Wild  YOB: 1955  MRN: [id number]    Referring Provider:    Shiv Curtis MD       Date of Service: 2/11/2021    Evaluating Physical Therapist: Neha Delgado, PT  #[id number]       Diagnosis:   Small bowel obstruction ([address] [A93.410]   Admitted with  small bowel obstruction , pneumonia and pulmonary embolism  ; syncope at home with vomiting and diarrhea    Patient Active Problem List   Diagnosis    Malnutrition ([address]    Multiple sclerosis ([address]    Bilateral pulmonary embolism ([address]    Crohn's disease ([address]    COPD (chronic obstructive pulmonary disease) ([address]    Tobacco abuse    Hypertension    Hyperlipidemia    Stage 3b chronic kidney disease    Hyperkalemia    Leukocytosis    Elevated troponin    Small bowel obstruction ([address]    HCAP (healthcare-associated pneumonia)    Acute on chronic renal failure (HCC)    Sepsis ([address]        Tentative placement recommendation: Home    Equipment recommendation: Patient has needed equipment       Prior Level of Function: Patient ambulated with wheeled walker short distance  Rehab Potential: good    for baseline    Past medical history:   Past Medical History:   Diagnosis Date    COPD (chronic obstructive pulmonary disease) ([address]     Crohn's disease ([address]     Hx of blood clots 2012    liver, lung    Hyperlipidemia     Hypertension     Multiple sclerosis ([address]     Stage 3b chronic kidney disease 1/29/2021     Past Surgical History:   Procedure Laterality Date    APPENDECTOMY      CHOLECYSTECTOMY      COLONOSCOPY      ENDOSCOPY, COLON, DIAGNOSTIC         Precautions: , falls, alarm and O2 , Short term memory deficit     SUBJECTIVE:    Social history: Patient lives with spouse    in a ranch home  with 1 step  to enter without [address]: Wheeled Walker and O2,       [id number] [facility] Mobility Inpatient How much difficulty turning over in bed?: None  How much difficulty sitting down on / standing up from a chair with arms?: None  How much difficulty moving from lying on back to sitting on side of bed?: None  How much help from another person moving to and from a bed to a chair?: A Little  How much help from another person needed to walk in hospital room?: A Little  How much help from another person for climbing 3-5 steps with a railing?: A Little  AM-PAC Inpatient Mobility Raw Score : 21  AM-PAC Inpatient T-Scale Score : 50.25  Mobility Inpatient CMS 0-100% Score: 28.97  Mobility Inpatient CMS G-Code Modifier : [place] cleared patient for PT treatment. OBJECTIVE;   Initial Evaluation  Date: 2/10/2021 Treatment Date:  2/11/2021       Short Term/ Long Term   Goals   Was pt agreeable to Eval/treatment? Yes   yes To be met in 3 days   Pain level   0/10    No number given  Left leg    Bed Mobility    Rolling: Minimal assist of 1    Supine to sit: Minimal assist of 1    Sit to supine: Minimal assist of 1    Scooting: Minimal assist of 1   Rolling: Independent   Supine to sit: Independent   Sit to supine: Not assessed    Scooting: Independent    Rolling: Independent    Supine to sit: Independent    Sit to supine: Independent    Scooting: Independent     Transfers Sit to stand: Minimal assist of 1   Sit to stand: Supervision       Sit to stand: Supervision      Ambulation    30 feet using  wheeled walker with Minimal assist of 1   for balance and walker approximation d/t decreased step length on Right lower extremity  2 x 25 feet using  wheeled walker with Minimal assist of 1   V/C for walker approximation, increased NOHEMI/step length, pacing, and safety.     50 feet using  wheeled walker with Supervision     ROM Within functional limits        Strength BUE:  refer to OT eval  RLE:  3+/5  LLE:  4/5   Increase strength in affected mm groups by 1/3 grade   Balance Sitting EOB:  good Dynamic Standing:  fair with wheeled walker  Sitting EOB: good   Dynamic Standing: fair wheeled walker   Sitting EOB:  good    Dynamic Standing: good       Patient is Alert & Oriented x person, place, time and situation and follows directions    Sensation:  Patient  denies numbness and tingling     Edema:  no   Endurance: fair          Patient education  Patient educated on role of Physical Therapy, risks of immobility, safety and plan of care,  importance of mobility while in hospital  and importance and purpose of adaptive device and adjusted to proper height for the patient. Patient response to education:   Pt verbalized understanding Pt demonstrated skill Pt requires further education in this area   Yes Partial Yes      Treatment:  Patient practiced and was instructed/facilitated in the following treatment: Patient transferred to edge of the bed. pt  Sat edge of bed 10 minutes with Supervision  to increase dynamic sitting balance and activity tolerance. Pt stood, ambulated in the hallway and back to the chair. Therapeutic Exercises:  not performed       At end of session, patient in chair with  call light and phone within reach,   all lines and tubes intact, nursing notified. Patient would benefit from continued skilled Physical Therapy to improve functional independence and quality of life. Patient's/ family goals   get stronger        ASSESSMENT: Patient exhibits decreased strength, balance and endurance impairing transfers, gait , gait distance and tolerance to activity . Pt displays a narrow NOHEMI and small step length with cueing to correct. Pt's c/o pain with his left leg limited his distance.       Plan of Care:     -Transfers: Provide instruction on proper hand and foot position for adequate transfer of weight onto lower extremities and use of gait device, Cues for hand placement, technique and safety and Provide stabilization to prevent fall -Gait: Gait training, Standing activities to improve: base of support, weight shift, weight bearing  and Exercises to improve hip and knee control   -Endurance: Utilize Supervised activities to increase level of endurance to allow for safe functional mobility including transfers and gait     Patient and or family understand(s) diagnosis, prognosis, and plan of care. Frequency of treatments: Patient will be seen  daily. Time in 2:28  Time out 2:43    Total Treatment Time  15 minutes         CPT codes:    Therapeutic activities (95214)   15 minutes  1 unit(s)    Saige Gilbert  [facility]  LIC # [id number]

## 2021-02-11 NOTE — CARE COORDINATION
SOCIAL WORK / DISCHARGE PLANNING:  COVID neg 2/7. Sw spoke with pt's wife via phone, reviewed therapy evals and she was pleased that he has remained at baseline. Dc plan return with wife as previous, discussed Anders Myers she said it is up to pt but he declines. Has home O2 but has remained room air while here. She will bring portable for transport home anticipated next date in case it is needed. Dc held today due to labs. No other dc needs identified or requested at this time.                  Electronically signed by BRENT Valdez on 2/11/2021 at 2:46 PM

## 2021-02-12 NOTE — DISCHARGE INSTR - DIET

## 2021-02-12 NOTE — DISCHARGE INSTR - OTHER ORDERS
Call for follow up appointment in 2-3 weeks with Dr. Angeli Mckeon. [phone]. Keep all appointments for follow up and lab work.

## 2021-02-12 NOTE — PROGRESS NOTES
Progress Note  Date:2021       Room:35/0635-01  Patient Name:Davian Zhang     YOB: 1955     Age:65 y.o. Subjective    Subjective:  Symptoms:  Stable. He reports shortness of breath, malaise, weakness, anorexia and anxiety. No cough, chest pain, headache, chest pressure or diarrhea. Diet:  Adequate intake. Activity level: Impaired due to weakness. Pain:  He complains of pain that is moderate. pt is doing better. H/h is 8.0. tolerate po intake. Review of Systems   Respiratory: Positive for shortness of breath. Negative for cough. Cardiovascular: Negative for chest pain. Gastrointestinal: Positive for anorexia. Negative for diarrhea. Neurological: Positive for weakness. Objective         Vitals Last 24 Hours:  TEMPERATURE:  Temp  Av.8 °F (36.6 °C)  Min: 97.4 °F (36.3 °C)  Max: 98.4 °F (36.9 °C)  RESPIRATIONS RANGE: Resp  Av.5  Min: 16  Max: 18  PULSE OXIMETRY RANGE: SpO2  Av.5 %  Min: 95 %  Max: 98 %  PULSE RANGE: Pulse  Av.4  Min: 60  Max: 66  BLOOD PRESSURE RANGE: Systolic (04VDI), YQJ:740 , Min:103 , NTD:312   ; Diastolic (87AGA), KWC:45, Min:59, Max:65    I/O (24Hr): Intake/Output Summary (Last 24 hours) at 2021 2333  Last data filed at 2021 2220  Gross per 24 hour   Intake 2553 ml   Output 1375 ml   Net 1178 ml     Objective:  General Appearance:  Comfortable. Vital signs: (most recent): Blood pressure 112/65, pulse 62, temperature 97.4 °F (36.3 °C), temperature source Infrared, resp. rate 18, height 5' 1\" (1.549 m), weight 109 lb 7 oz (49.6 kg), SpO2 95 %. Output: Producing urine. HEENT: Normal HEENT exam.    Lungs:  Normal respiratory rate. He is in respiratory distress. Heart: Regular rhythm. S1 normal and S2 normal.    Abdomen: Abdomen is soft. Bowel sounds are normal.     Extremities: Normal range of motion. Pulses: Distal pulses are intact.     Neurological: Patient is alert and oriented to person, place and time. Pupils:  Pupils are equal, round, and reactive to light. Labs/Imaging/Diagnostics    Labs:  CBC:  Recent Labs     02/09/21  1222 02/10/21  0515 02/11/21  0600   WBC 9.7 12.8* 16.0*   RBC 2.94* 3.27* 2.93*   HGB 7.9* 8.6* 8.0*   HCT 26.0* 29.2* 25.9*   MCV 88.4 89.3 88.4   RDW 17.5* 17.4* 17.4*    449 365     CHEMISTRIES:  Recent Labs     02/09/21  0617 02/10/21  0515 02/11/21  0600    138 140   K 4.3 4.4 4.5   * 106 109*   CO2 21* 22 21*   BUN 25* 17 16   CREATININE 1.7* 1.7* 1.7*   GLUCOSE 113* 104* 117*     PT/INR:No results for input(s): PROTIME, INR in the last 72 hours. APTT:No results for input(s): APTT in the last 72 hours. LIVER PROFILE:  Recent Labs     02/09/21  0617 02/10/21  0515 02/11/21  0600   AST 20 22 16   ALT 12 14 13   BILITOT <0.2 <0.2 <0.2   ALKPHOS 93 103 93       Imaging Last 24 Hours:  No results found.   Assessment//Plan           Hospital Problems           Last Modified POA    Malnutrition ([facility] 2/8/2021 Yes    Multiple sclerosis ([facility] 2/8/2021 Yes    Overview Signed 1/6/2021 11:51 AM by Darrin Rhodes [facility]     This Diagnosis was added to the Problem List based on transcribed orders from Dr. Madalny Beck            Bilateral pulmonary embolism ([address] 2/8/2021 Yes    Crohn's disease ([address] 2/8/2021 Yes    COPD (chronic obstructive pulmonary disease) ([address] 2/8/2021 Yes    Tobacco abuse 2/8/2021 Yes    Hypertension 2/8/2021 Yes    Hyperlipidemia 2/8/2021 Yes    Stage 3b chronic kidney disease 2/8/2021 Yes    Small bowel obstruction ([address] 2/7/2021 Yes    HCAP (healthcare-associated pneumonia) 2/8/2021 Yes    Acute on chronic renal failure ([facility] 2/8/2021 Yes    Sepsis ([facility] 2/8/2021 Yes        Assessment:  (Problem List as of 2/11/2021 Never Reviewed    Malnutrition ([facility]    Multiple sclerosis ([facility]    Bilateral pulmonary embolism ([facility]    Crohn's disease ([facility]    COPD (chronic obstructive pulmonary disease) ([facility]    Tobacco abuse    Hypertension Hyperlipidemia    Stage 3b chronic kidney disease    Hyperkalemia    Leukocytosis    Elevated troponin    Small bowel obstruction ([address]    HCAP (healthcare-associated pneumonia)    Acute on chronic renal failure ([address]    Sepsis ([address]    ). Plan:   (Monitor h/h. Cont with abx for hcap).        Electronically signed by Dorita Leo MD on 2/11/21 at 11:33 PM EST

## 2021-02-12 NOTE — PROGRESS NOTES
PROGRESS NOTE  By Yasir Heredia M.D. [facility]  Dr. Dinorah Duron M.D.,  Dr. Wesley Byers M.D.,   Dr. Renetta Wiseman D.O.,  Dr. Pineda Lam M.D.,  Dr. Dalton Wallace D.O.,  Maegan Hebert D.O.         Milli Fat  63 y.o.  male    SUBJECTIVE:  Reports bowel movement and able to tolerate diet without nausea or vomiting    OBJECTIVE:    BP (!) 101/58   Pulse 58   Temp 97.4 °F (36.3 °C)   Resp 14   Ht 5' 1\" (1.549 m)   Wt 109 lb 7 oz (49.6 kg)   SpO2 99%   BMI 20.68 kg/m²     General: NAD/ male  HEENT: Anicteric sclera/moist oral mucosa  Neck: Supple  Chest: CTA B  Cor: Regular  Abd.: Soft and nondistended  Extr.:  Decreased muscle tone of all throughout  Skin: Warm and dry      DATA:  Stool (measured) : 0 mL  Lab Results   Component Value Date    WBC 17.2 02/12/2021    RBC 3.00 02/12/2021    HGB 8.1 02/12/2021    HCT 26.5 02/12/2021    MCV 88.3 02/12/2021    MCH 27.0 02/12/2021    MCHC 30.6 02/12/2021    RDW 17.6 02/12/2021     02/12/2021    MPV 10.3 02/12/2021     Lab Results   Component Value Date     02/12/2021    K 4.2 02/12/2021    K 4.8 02/07/2021     02/12/2021    CO2 21 02/12/2021    BUN 17 02/12/2021    CREATININE 1.7 02/12/2021    CALCIUM 7.8 02/12/2021    PROT 4.8 02/12/2021    LABALBU 2.4 02/12/2021    BILITOT <0.2 02/12/2021    ALKPHOS 87 02/12/2021    AST 16 02/12/2021    ALT 11 02/12/2021     No results found for: LIPASE  No results found for: AMYLASE      ASSESSMENT/PLAN:       1. Moderate-Severe Stricturing Crohns Disease   - Diagnosed in 2012 hx of Terminal ileum resection  - No previous biological or medication   - Ct abdomen with high-grade obstruction with significantly dilated proximal small bowel loops with thickened and edematous distal neoterminal ileum  - Refused SBFT   -Colonoscopy 4/2019 with moderate stricture in the very distal terminal ileum unable to pass the colonoscope  -Sed rate elevated at 70 -No further diarrhea patient unable to provide stool sample for calprotectin and C. difficile EIA  -Change steroids to oral  - Patient with  neoterminal ilium stricuture high risk surgical candidate secondary to recent pulmonary embolism  -Could consider possible retrograde balloon with dilation however patient also high risk secondary to recent pulmonary embolism and requiring anticoagulation  -Recommend conservative treatment at this time     2.  Acute normocytic anemia  - VSS NO overt GI Bleed on exam   - Baseline Hgb 10.2-->14.3   - On therapeutic Lovenox for PE  -Hemoglobin stable this a.m. at 8 stable at 8.0 this a.m. with no overt signs of GI bleed  -Placed on Protonix 40 mg twice daily  -Type and cross for 2 units on hold/elevate head of bed/every 6 hemoglobin for 24 hours  -Consider possible EGD if patient would develop overt signs of GI bleed or become hypotensive however this would be high risk procedure secondary to patient's pulmonary embolism     3.  Bilateral acute pulmonary embolisms diagnosed on 1/27/2021  -Therapeutic Lovenox per admitting     Okay to be discharged from GI POV with follow-up in the office in 2 to 3 weeks. Patient to call for appointment and with questions/concerns at [phone]. Above has been discussed with the patient all questions answered to his satisfaction. He is agreeable with the plan as delineated. Thank you for the opportunity to participate in the care of Mr. Roderick Elkins. Reza Ruiz MD  2/12/2021  11:05 AM    NOTE:  This report was transcribed using voice recognition software. Every effort was made to ensure accuracy; however, inadvertent computerized transcription errors may be present.

## 2021-02-17 NOTE — PATIENT INSTRUCTIONS
1. Continue current cardiac medications     2. Will obtain records from [facility] ( had left heart cath at [facility])    3. Follow up with Dr. Carrol Yeh in 1-2 months - sooner if you have any changes in symptoms    4. Weigh yourself daily    -Stay Hydrated    -Diet should sodium restricted to 2 grams    -Again watch your daily weight trends and if you gain water weight please follow below instructions.    -If at any time you feel that you are retaining fluid, your medications are not working, or you feel ill in anyway, then please call us for either same day appointment or the next day, and for instructions. Our goal is to keep you out of the emergency room and the hospital and we have ways to do it. You just need to call us in a timely manner.     -If you become sick for other reasons, and notice that you are not urinating as much, the urine is very dark, you have significant diarrhea or vomiting, then please DO NOT take your water pill and CALL US immediately.

## 2021-02-17 NOTE — PROGRESS NOTES
pneumonia) 02/08/2021    Acute on chronic renal failure (HCC) 02/08/2021    Seps[facility]     Small bowel obstruction (HCC) 02/07/2021    Elevated troponin     Stage 3b chronic kidney disease 01/29/2021    Hyperkalemia 01/29/2021    Leukocytosis 01/29/2021    Crohn's disease ([facility]     COPD (chronic obstructive pulmonary disease) (HCC)     Tobacco abuse     Hypertension     Hyperlipidemia     Bilateral pulmonary embolism ([address] 01/27/2021    Multiple sclerosis ([facility] [facility] 01/06/2021     This Diagnosis was added to the Problem List based on transcribed orders from Dr. Tee Bro         [facility]) 09/10/2012       Past Medical History:   Diagnosis Date    COPD (chronic obstructive pulmonary disease) ([facility]     Crohn's disease ([facility] [facility]     Hx of blood clots 2012    liver, lung    Hyperlipidemia     Hypertension     Multiple sclerosis ([facility]     Stage 3b chronic kidney disease 1/29/2021       Past Surgical History:   Procedure Laterality Date    APPENDECTOMY      CHOLECYSTECTOMY      COLONOSCOPY      ENDOSCOPY, COLON, DIAGNOSTIC           Allergies   Allergen Reactions    Neurontin [Gabapentin] Palpitations    Lyrica [Pregabalin] Other (See Comments)     photophobia         Outpatient Medications Marked as Taking for the 2/17/21 encounter (Office Visit) with PALMA Link CNP   Medication Sig Dispense Refill    predniSONE (DELTASONE) 10 MG tablet Take 4 tabs p.o. daily x7 days then 3 tabs p.o. daily x7 days then 2 tabs p.o. daily x7 days then 1 tab p.o. daily then stop 70 tablet 0    levoFLOXacin (LEVAQUIN) 750 MG tablet Take 1 tablet by mouth daily for 10 days 10 tablet 0    OXYGEN Inhale 3 L into the lungs daily as needed (Shortness of Breath)      apixaban starter pack (ELIQUIS DVT/PE STARTER PACK) 5 MG TBPK tablet Take 5 mg by mouth See Admin Instructions Take 2 tablets PO BID x7 days, then take 1 tablet PO BID      aspirin 81 MG chewable tablet Take 1 tablet by mouth daily 30 tablet 3    atorvastatin (LIPITOR) 80 MG tablet Take 1 tablet by mouth nightly 30 tablet 3    lisinopril (PRINIVIL;ZESTRIL) 5 MG tablet Take 1 tablet by mouth daily 30 tablet 3    pantoprazole (PROTONIX) 40 MG tablet Take 1 tablet by mouth every morning (before breakfast) 30 tablet 3    oxyCODONE HCl (OXY-IR) 10 MG immediate release tablet Take 10 mg by mouth every 4 hours.  baclofen (LIORESAL) 10 MG tablet Take 10 mg by mouth 2 times daily      Ocrelizumab (OCREVUS IV) Infuse intravenously every 6 months Last Given: 12/9/2020  Next Dose: 6/9/2021      metoprolol succinate (TOPROL XL) 50 MG extended release tablet Take 50 mg by mouth daily      mirtazapine (REMERON) 15 MG tablet Take 15 mg by mouth nightly           Guideline directed medical/device therapy:  ARNI/ACE I/ARB: Yes  Beta blocker: Yes  Aldosterone antagonist:  No  ICD/CRT-P/-D:  EF > 35%  QRS interval on recent ECG (personally reviewed/interpreted): <120 ms  Percentage RV pacing (personally reviewed/interpreted): %/NA        Review of Systems:   Cardiac: As per HPI  General: No fever, chills, rigors  Pulmonary: As per HPI  HEENT: No visual disturbances, difficult swallowing  GI: No nausea, vomiting, abdominal pain  : No dysuria or hematuria  Endocrine: No thyroid disease or diabetes  Musculoskeletal: MADRID x 4, no focal motor deficits  Skin: Intact, no rashes  Neuro/Psych: No headache or seizures          Weights: Wt Readings from Last 3 Encounters:   02/17/21 112 lb (50.8 kg)   02/07/21 109 lb 7 oz (49.6 kg)   01/27/21 112 lb (50.8 kg)           Physical Examination:     BP (!) 105/55   Pulse 59   Resp 16   Ht 5' 1\" (1.549 m)   Wt 112 lb (50.8 kg)   SpO2 100%   BMI 21.16 kg/m²     CONSTITUTIONAL: Alert and oriented times 3, no acute distress and cooperative to examination with proper mood and affect. SKIN: Skin color, texture, turgor normal. No rashes or lesions.   LYMPH: no cervical nodes, no inguinal nodes  HEENT: Head is normocephalic, atraumatic. EOMI, PERRLA. NECK: Supple, symmetrical, trachea midline, no adenopathy, thyroid symmetric, not enlarged and no tenderness, skin normal. Unable to assess JVD while in wheelchair. CHEST/LUNGS: chest symmetric with normal A/P diameter, normal respiratory rate and rhythm, lungs clear to auscultation without wheezes, rales or rhonchi. No accessory muscle use. Scars None   CARDIOVASCULAR: Heart sounds are normal.  Regular rate and rhythm without murmur, gallop or rub. Normal S1 and S2. . Carotid and femoral pulses 2+/4 and equal bilaterally. ABDOMEN: Normal shape. No and Laparoscopic scar(s) present. Normal bowel sounds. No bruits. soft, nondistended, no masses or organomegaly. no evidence of hernia. Percussion: Normal without hepatosplenomegally. Tenderness: absent. RECTAL: deferred, not clinically indicated  NEUROLOGIC: There are no focalizing motor or sensory deficits. CN II-XII are grossly intact. Abebe Hugo EXTREMITIES: no cyanosis, no clubbing and no edema. Warm and well perfused. All the following diagnostics were personally reviewed and interpreted by me. LAB DATA:     2/12/2021 06:00   Sodium 138   Potassium 4.2   Chloride 109 (H)   CO2 21 (L)   BUN 17   Creatinine 1.7 (H)   Anion Gap 8   GFR Non- 41   GFR African American 49   Glucose 104 (H)   Calcium 7.8 (L)   Total Protein 4.8 (L)   Albumin 2.4 (L)   Alk Phos 87   ALT 11   AST 16   Bilirubin <0.2   WBC 17.2 (H)   RBC 3.00 (L)   Hemoglobin Quant 8.1 (L)   Hematocrit 26.5 (L)   MCV 88.3   MCH 27.0   MCHC 30.6 (L)   MPV 10.3   RDW 17.6 (H)   Platelet Count 127       IMAGING:    CTA pulmonary (2/7/2021)  Impression:  Persistent extensive bilateral pulmonary embolism with involvement of the right and left main pulmonary artery extending to the upper and lower lobes without significant change.   Persistent patchy and multifocal ground-glass infiltrates with slight improvement ground-glass opacities with consolidations and wedge-shaped infiltrates in the lung bases which may represent pneumonia including viral infection along with developing pulmonary infarcts. CARDIAC TESTING:    NM Stress (1/31/2021)  Impression:  There is evidence of large severe myocardial infarction of the basal to mid anteroseptal, inferoseptal and apical septal walls with minimal ischemia. There is a perfusion defect in the inferior wall consistent with ramping artifact due to high liver tracer uptake. The probability of ischemia due to obstructive coronary artery disease is low. TTE (1/27/2021)   Summary:   Non dilated left ventricle. Mild left ventricular systolic dysfunction. Visually estimated LVEF is 40-45%. Hypokinesis of the anterior and septal wall. Normal right ventricle structure and function. No significant valvular abnormalities. EKG  Sinus Bradycardia   Negative T-waves         ASSESSMENT:  1. CAD / Ischemic cardiomyopathy  2. LVEF 40-45%, LVEDD 3.9, LVMI 90  3. Chronic HFrEF ( CCC stage C / NYHA class III)  4. Euvolemic  5. Borderline hypotension limiting GDMT titration  6. Hx of PE (1/27/2021) - on Eliquis   7. CKD  8. Multiple sclerosis - limited mobility ( uses wheelchair/walker)  9. COPD with former tobacco abuse (quit 1/2021)  10. Crohn's       PLAN:  1. Continue current cardiac medications, will continue to monitor ability to titrate GDMT however limited given hypotension. 2. Will obtain records from [facility]    3. Follow up with Dr. Barbara Moss in 1-2 months - sooner if you have any changes in symptoms    4.  Weigh yourself daily    -Stay Hydrated    -Diet should sodium restricted to 2 grams    -Again watch your daily weight trends and if you gain water weight please follow below instructions.    -If at any time you feel that you are retaining fluid, your medications are not working, or you feel ill in anyway, then please call us for either same day appointment or the next day, and for instructions. Our goal is to keep you out of the emergency room and the hospital and we have ways to do it. You just need to call us in a timely manner.     -If you become sick for other reasons, and notice that you are not urinating as much, the urine is very dark, you have significant diarrhea or vomiting, then please DO NOT take your water pill and CALL US immediately.     Aashish WALDROP-CNP  [facility]

## 2021-02-17 NOTE — Clinical Note
Patient has left heart cath at [facility] -- several years ago. Please obtain these records. Thank you.

## 2021-02-19 NOTE — TELEPHONE ENCOUNTER
Sent the request to [facility] for the left heart cath records and they replied that they don't have cardiac records. I called them and there is a procedure done March 4,2011, but they needed another request to see what it is. It won't be available until next week. Called the pt and asked if he was somewhere else and left a message requesting a call back.

## 2021-02-26 NOTE — TELEPHONE ENCOUNTER
Got a fax back from [facility] saying they don't have a left heart cath from 2011. So I called the pt and his wife said that he had an angiogram on March 4, 20211. I sent another fax requesting the angiogram today.

## 2021-03-15 NOTE — DISCHARGE SUMMARY
Discharge Summary    Date: 3/14/2021  Patient Name: Tameka Mandujano YOB: 1955 Age: 63 y.o. Admit Date: 2/7/2021  Discharge Date: 2/12/2021  Discharge Condition: Stable    Admission Diagnosis  Small bowel obstruction ([facility] [address] (K56.609)     Discharge Diagnosis  Active Problems: Malnutrition ([facility] [address] [facility] Bilateral pulmonary embolism (HCC) Crohn's disease (HCC) COPD (chronic obstructive pulmonary disease) (HCC)  Tobacco abuse  Hypertension  Hyperlipidemia  Stage 3b chronic kidney disease  Small bowel obstruction ([facility] [address]  HCAP (healthcare-associated pneumonia)  Acute on chronic renal failure (HCC)  Sepsis (HCC)Resolved Problems:  * No resolved hospital problems. [facility] Stay  Narrative of Hospital Course:  Pt with ms came in with small bowel obstruction. He just developed pe on last admission. Pt was place on lovenox while npo. Pt refused a lot of test the surgery recommended. Consultants:  IP CONSULT TO GENERAL SURGERYIP CONSULT TO HOSPITALISTIP CONSULT TO GI    Surgeries/procedures Performed:       Treatments:    Anticoagulation and IV Hydration    LMW Heparin    Discharge Plan/Disposition:  Home    Hospital/Incidental Findings Requiring Follow Up:    Patient Instructions:    Diet: Regular Diet    Activity:Activity as Tolerated  For number of days (if applicable): Other Instructions:    Provider Follow-Up:   No follow-ups on file. Significant Diagnostic Studies:    Recent Labs:  Admission on 02/07/2021, Discharged on 02/12/2021No results displayed because visit has over 200 results. ------------    Radiology last 7 days:  No results found.      Pending Labs   Order Current Status  Arterial Blood Gas, Respiratory Only Collected (02/07/21 1309)      Discharge Medications    Discharge Medication List as of 2/12/2021  3:38 PMSTART taking these medicationspredniSONE (DELTASONE) 10 MG tabletTake 4 tabs p.o. daily x7 days then 3 tabs p.o. daily x7 days then 2 tabs p.o. daily x7 days then 1 tab p.o. daily then stop, Disp-70 tablet, R-0NormallevoFLOXacin (LEVAQUIN) 750 MG tabletTake 1 tablet by mouth daily for 10 days, Disp-10 tablet, R-0Normal    Discharge Medication List as of 2/12/2021  3:38 PM    Discharge Medication List as of 2/12/2021  3:38 PMCONTINUE these medications which have NOT CHANGEDOXYGENInhale 3 L into the lungs daily as needed (Shortness of Breath)Historical Medapixaban starter pack (ELIQUIS DVT/PE STARTER PACK) 5 MG TBPK tabletTake 5 mg by mouth See Admin Instructions Take 2 tablets PO BID x7 days, then take 1 tablet PO BIDHistorical Medaspirin 81 MG chewable tabletTake 1 tablet by mouth daily, Disp-30 tablet, R-3Normalatorvastatin (LIPITOR) 80 MG tabletTake 1 tablet by mouth nightly, Disp-30 tablet, R-3Normallisinopril (PRINIVIL;ZESTRIL) 5 MG tabletTake 1 tablet by mouth daily, Disp-30 tablet, R-3Normalpantoprazole (PROTONIX) 40 MG tabletTake 1 tablet by mouth every morning (before breakfast), Disp-30 tablet, R-3NormaloxyCODONE HCl (OXY-IR) 10 MG immediate release tabletTake 10 mg by mouth every 4 hours. Historical Medbaclofen (LIORESAL) 10 MG tabletTake 10 mg by mouth 2 times dailyHistorical MedOcrelizumab (OCREVUS IV)Infuse intravenously every 6 months Last Given: 12/9/2020Next Dose: 6/9/2021Historical Medmetoprolol succinate (TOPROL XL) 50 MG extended release tabletTake 50 mg by mouth dailyHistorical Medmirtazapine (REMERON) 15 MG tabletTake 15 mg by mouth nightlyHistorical Med    Discharge Medication List as of 2/12/2021  3:38 PMSTOP taking these medicationsinfluenza virus trivalent vaccine (FLUZONE) injectionComments:Reason for Stopping:triamcinolone (KENALOG) 0.1 % creamComments:Reason for Stopping:    Time Spent on Discharge:E] minutes were spent in patient examination, evaluation, counseling as well as medication reconciliation, prescriptions for required medications, discharge plan, and follow up.     Electronically signed by Theresia Canavan, MD on 3/14/21 at 9:30 PM EDT

## 2021-03-27 PROBLEM — K56.609 SBO (SMALL BOWEL OBSTRUCTION) (HCC): Status: ACTIVE | Noted: 2021-01-01

## 2021-03-27 NOTE — ED NOTES
Pt medicated per order, resting more comfortably in bed. Wife given hospital # to call for updates. Wife is leaving for the night.      Abraham Score, RN  03/27/21 [id number]

## 2021-03-27 NOTE — ED PROVIDER NOTES
[facility]  Department of Emergency Medicine     Written by: Nimco Carrasquillo DO  Patient Name: Ladonna Pan  Attending Provider: David Morrison MD  Admit Date: 3/27/2021 11:10 AM  MRN: [id number]                   : 1955        Chief Complaint   Patient presents with    Loss of Consciousness     while sitting and having bowel movement, no injuries/did not fall, wife was holding him    Diarrhea     hx crohns    - Chief complaint    HPI     Patient is a 53-year-old male with history of Crohn's disease, bilateral PE diagnosed on 2021, on Eliquis, who presents to the ED due to chief complaint of syncopal event. Complaint is severe in severity, occurred x2 episodes about 30 minutes prior to arrival, was witnessed by his wife, not made better or worse with anything in particular. Per EMS patient's pulse ox difficult to obtain so he was placed on 5 L NC O2 however he is 95% on room air on arrival to the ED. Patient states he does not use oxygen at home at baseline, he did in early February after his PEs but is no longer on it. According to EMS patient's wife was helping him take a shower when the patient started feeling lightheaded and nauseous, she sat him down on the toilet and he syncopized, she said she held him sitting up on the toilet and he did not fall or hit his head; he did have a loose bowel movement at this time; and he came to he was slightly confused, then he passed out again after that; loss of consciousness lasted for less than 1 minutes each time; patient states he has been feeling lightheaded, fatigued, and nauseous over the past few days. Denies any recent illnesses, fevers, headaches, confusion, cough or sore throat, chest pain or palpitations, shortness of breath, abdominal pain, vomiting, numbness or tingling anywhere; denies any injuries anywhere.     Patient's wife provided further history, states patient has been having diarrhea more often for the last few days and yesterday said he was nauseous; has also seemed more fatigued; has not had any black or bloody stools. Patient's wife also states that he has been having low systolic blood pressures in the 90s recently; states that he is on several antihypertensives per his PCP. Review of Systems   Constitutional: Positive for fatigue. Negative for chills and fever. HENT: Negative for rhinorrhea and sore throat. Eyes: Negative for visual disturbance. Respiratory: Negative for cough and shortness of breath. Cardiovascular: Negative for chest pain and palpitations. Gastrointestinal: Positive for diarrhea and nausea. Negative for abdominal pain, blood in stool and vomiting. Endocrine: Negative for polydipsia and polyuria. Genitourinary: Negative for dysuria and frequency. Musculoskeletal: Negative for back pain and myalgias. Skin: Negative for rash and wound. Neurological: Positive for syncope and light-headedness. Negative for headaches. Psychiatric/Behavioral: Negative for confusion. All other systems reviewed and are negative. Physical Exam  Vitals signs and nursing note reviewed. Constitutional:       General: He is not in acute distress. Appearance: He is ill-appearing. HENT:      Head: Normocephalic and atraumatic. Right Ear: External ear normal.      Left Ear: External ear normal.      Nose: Nose normal. No rhinorrhea. Mouth/Throat:      Mouth: Mucous membranes are moist.      Pharynx: Oropharynx is clear. Eyes:      Extraocular Movements: Extraocular movements intact. Pupils: Pupils are equal, round, and reactive to light. Comments: Mild conjunctival pallor bilaterally   Neck:      Musculoskeletal: Normal range of motion and neck supple. Cardiovascular:      Rate and Rhythm: Normal rate and regular rhythm. Pulses: Normal pulses. Heart sounds: Normal heart sounds.    Pulmonary:      Effort: Pulmonary effort is normal. No provider, please see attestation for final plan of care. ED Course as of Mar 27 2053   Sat Mar 27, 2021   1313 Patient receiving 1 L IV NS bolus.   BP(!): 72/52 [VG]   1325 Patient reassessed; BP improving, he is in no acute distress; remains alert; denies abdominal pain at this time; abdominal exam is unremarkable.     [VG]   1528 Patient reassessed, he is now having nausea. He was updated on results thus far and plan for admission. He is amenable. Zofran ordered. [VG]   8064 Patient now complaining of abdominal pain. [VG]   [id number] Spoke with Dr. Fito Tomas (), discussed case; they agree to provide consultation for this patient. [VG]   8176 Dr. Dipesh Bonilla spoke with Dr. Darold Litten, discussed case; he agrees to admit this patient on behalf of Dr. Jamarcus Melchor.     [VG]      ED Course User Index  [VG] Shahzad April, DO       --------------------------------------------- PAST HISTORY ---------------------------------------------  Past Medical History:  has a past medical history of COPD (chronic obstructive pulmonary disease) ([facility] [place] [address] Crohn's disease ([facility] [place] [address] Hx of blood clots, Hyperlipidemia, Hypertension, Multiple sclerosis ([facility] [place] [address] and Stage 3b chronic kidney disease. Past Surgical History:  has a past surgical history that includes Cholecystectomy; Appendectomy; Colonoscopy; and Endoscopy, colon, diagnostic. Social History:  reports that he quit smoking about 2 months ago. His smoking use included cigarettes. He has never used smokeless tobacco. He reports that he does not drink alcohol or use drugs. Family History: family history is not on file. The patients home medications have been reviewed.     Allergies: Neurontin [gabapentin] and Lyrica [pregabalin]    -------------------------------------------------- RESULTS -------------------------------------------------    LABS:  Results for orders placed or performed during the hospital encounter of 03/27/21   CBC Auto Differential   Result Value Ref Range    WBC 13.6 (H) 4.5 - 11.5 E9/L    RBC 4.76 3.80 - 5.80 E12/L    Hemoglobin 13.1 12.5 - 16.5 g/dL    Hematocrit 42.9 37.0 - 54.0 %    MCV 90.1 80.0 - 99.9 fL    MCH 27.5 26.0 - 35.0 pg    MCHC 30.5 (L) 32.0 - 34.5 %    RDW 18.3 (H) 11.5 - 15.0 fL    Platelets 276 582 - 698 E9/L    MPV 10.1 7.0 - 12.0 fL    Neutrophils % 79.0 43.0 - 80.0 %    Lymphocytes % 7.0 (L) 20.0 - 42.0 %    Monocytes % 11.0 2.0 - 12.0 %    Eosinophils % 0.0 0.0 - 6.0 %    Basophils % 0.0 0.0 - 2.0 %    Neutrophils Absolute 11.15 (H) 1.80 - 7.30 E9/L    Lymphocytes Absolute 0.95 (L) 1.50 - 4.00 E9/L    Monocytes Absolute 1.50 (H) 0.10 - 0.95 E9/L    Eosinophils Absolute 0.00 (L) 0.05 - 0.50 E9/L    Basophils Absolute 0.00 0.00 - 0.20 E9/L    Metamyelocytes Relative 1.0 0.0 - 1.0 %    Myelocyte Percent 2.0 0 - 0 %   Comprehensive Metabolic Panel w/ Reflex to MG   Result Value Ref Range    Sodium 139 132 - 146 mmol/L    Potassium reflex Magnesium 5.0 3.5 - 5.0 mmol/L    Chloride 104 98 - 107 mmol/L    CO2 17 (L) 22 - 29 mmol/L    Anion Gap 18 (H) 7 - 16 mmol/L    Glucose 148 (H) 74 - 99 mg/dL    BUN 43 (H) 8 - 23 mg/dL    CREATININE 3.0 (H) 0.7 - 1.2 mg/dL    GFR Non-African American 21 >=60 mL/min/1.73    GFR African American 26     Calcium 7.4 (L) 8.6 - 10.2 mg/dL    Total Protein 5.8 (L) 6.4 - 8.3 g/dL    Albumin 3.2 (L) 3.5 - 5.2 g/dL    Total Bilirubin 0.4 0.0 - 1.2 mg/dL    Alkaline Phosphatase 133 (H) 40 - 129 U/L    ALT 16 0 - 40 U/L    AST 23 0 - 39 U/L   Troponin   Result Value Ref Range    Troponin 0.03 0.00 - 0.03 ng/mL   Brain Natriuretic Peptide   Result Value Ref Range    Pro- (H) 0 - 125 pg/mL   Protime-INR   Result Value Ref Range    Protime 31.4 (H) 9.3 - 12.4 sec    INR 2.7    APTT   Result Value Ref Range    aPTT 31.8 24.5 - 35.1 sec   Lactate, Sepsis   Result Value Ref Range    Lactic Acid, Sepsis 2.1 (H) 0.5 - 1.9 mmol/L   Lactate, Sepsis   Result Value Ref Range    Lactic Acid, Sepsis 1.8 0.5 - 1.9 mmol/L   POCT glucose   Result Value Ref Range    Glucose 143 mg/dL    QC OK? yes    POCT Glucose   Result Value Ref Range    Meter Glucose 143 (H) 74 - 99 mg/dL   EKG 12 Lead   Result Value Ref Range    Ventricular Rate 93 BPM    Atrial Rate 93 BPM    P-R Interval 116 ms    QRS Duration 80 ms    Q-T Interval 366 ms    QTc Calculation (Bazett) 455 ms    P Axis 78 degrees    R Axis 72 degrees    T Axis -2 degrees       RADIOLOGY:  CT ABDOMEN PELVIS WO CONTRAST Additional Contrast? None   Final Result   Moderate to high-grade mechanical small bleb bowel obstruction. Obstruction   appears to be due to a thickened segment just upstream from the anastomosis   likely reflecting a stricture are focal enteritis. Alternatively, the   obstruction could be and the anastomosis itself. Unchanged right renal cyst, left renal atresia and sequela of chronic   pancreatitis. Improving right lower lobe aeration. CT HEAD WO CONTRAST   Final Result   No acute cerebral abnormalities. Moderate, unchanged cerebral volume loss and non-specific periventricular   white matter disease. XR CHEST PORTABLE   Final Result   There is improved aeration of the right lung base, with continued elevation   of the right hemidiaphragm. Cannot exclude a small right pleural effusion. EKG:  This EKG is signed and interpreted by the emergency department physician. Rate: 93  Rhythm: Sinus  Interpretation: Sinus, normal TN, normal QRS,  ms, no ST elevations  Comparison: stable as compared to patient's most recent EKG      ------------------------- NURSING NOTES AND VITALS REVIEWED ---------------------------  Date / Time Roomed:  3/27/2021 11:10 AM  ED Bed Assignment:  [id number]    The nursing notes within the ED encounter and vital signs as below have been reviewed.      Patient Vitals for the past 24 hrs:   BP Temp Temp src Pulse Resp SpO2 Height Weight   03/27/21 1815 101/67 97.8 °F (36.6 °C) Oral 90 16 93 % 5' (1.524 m) 121 lb (54.9 kg)   03/27/21 1615    85 14      03/27/21 1603     17 100 %     03/27/21 1602 (!) 105/59     97 %     03/27/21 1527 91/62   81 16 100 %     03/27/21 1410 99/61   68 14 100 %     03/27/21 1321 (!) 85/54   73 12 100 %     03/27/21 1305 (!) 72/52   90 14 93 %     03/27/21 1123 (!) 99/59          03/27/21 1120  98.1 °F (36.7 °C)  93 20 99 %  121 lb (54.9 kg)       Oxygen Saturation Interpretation: Normal    ------------------------------------------ PROGRESS NOTES ------------------------------------------  Re-evaluation(s):  Please see ED course    Counseling:  I have spoken with the patient and wife and discussed todays results, in addition to providing specific details for the plan of care and counseling regarding the diagnosis and prognosis. Their questions are answered at this time and they are agreeable with the plan of admission.    --------------------------------- ADDITIONAL PROVIDER NOTES ---------------------------------  Consultations:  Please see ED course    This patient's ED course included: a personal history and physicial examination, re-evaluation prior to disposition, multiple bedside re-evaluations, IV medications, cardiac monitoring, continuous pulse oximetry and complex medical decision making and emergency management    This patient has remained hemodynamically stable during their ED course. Diagnosis:  [facility]    2. Acute kidney injury superimposed on CKD (HCC)    3. Dehydration    4. Diarrhea, unspecified type    5. Crohn's disease with complication, unspecified gastrointestinal tract location ([place] [address].)    6. Syncope and collapse    7. Confusion        Disposition:  Patient's disposition: Admit to telemetry  Patient's condition is stable.        Radha Cummings DO  Resident  03/27/21 0338

## 2021-03-28 PROBLEM — N28.9 ACUTE ON CHRONIC RENAL INSUFFICIENCY: Status: ACTIVE | Noted: 2021-01-01

## 2021-03-28 PROBLEM — N18.9 ACUTE ON CHRONIC RENAL INSUFFICIENCY: Status: ACTIVE | Noted: 2021-01-01

## 2021-03-28 PROBLEM — Z86.711 HISTORY OF PULMONARY EMBOLISM: Chronic | Status: ACTIVE | Noted: 2021-01-01

## 2021-03-28 PROBLEM — I25.5 ISCHEMIC CARDIOMYOPATHY: Chronic | Status: ACTIVE | Noted: 2021-01-01

## 2021-03-28 NOTE — PLAN OF CARE
Problem: Falls - Risk of:  Goal: Will remain free from falls  Description: Will remain free from falls  3/28/2021 0442 by Ricci Bhatti RN  Outcome: Met This Shift  3/27/2021 2302 by Miguel Byers RN  Outcome: Met This Shift

## 2021-03-28 NOTE — PROGRESS NOTES
Patient refusing to have NG placed for small bowl obstruction  He stated last time he was here they were unable to advance Ng tube. I was unable to even attempt insert d/t patients admit refusal.  Dr Anne Camp notified.

## 2021-03-28 NOTE — H&P
Denies chest pain or edema   GI: Nausea, vomiting, abdominal pain, refusing NG placement. :  Denies dysuria, previous history of urogenital malignancy reviewing years old medical records. Musculoskeletal:  Denies back pain or joint pain   Integument:  Denies rash   Neurologic:  Denies headache, focal weakness or sensory changes   Endocrine:  Denies polyuria or polydipsia   Lymphatic:  Denies swollen glands   Psychiatric:  Denies depression or anxiety     Physical Exam:  BP (!) 140/71   Pulse 98   Temp 96.2 °F (35.7 °C) (Axillary)   Resp 20   Ht 5' (1.524 m)   Wt 121 lb (54.9 kg)   SpO2 95%   BMI 23.63 kg/m²   I/O last 3 completed shifts: In: 1553.8 [I.V.:553.8; IV Piggyback:1000]  Out: -     Constitutional:  Well developed, well nourished, no acute distress, non-toxic appearance   Eyes:  PERRL, conjunctiva normal   HENT:  Atraumatic, external ears normal, nose normal, oropharynx moist, no pharyngeal exudates. Neck- normal range of motion, no tenderness, supple   Respiratory:  No respiratory distress, normal breath sounds, no rales, no wheezing   Cardiovascular:  Normal rate, normal rhythm, no murmurs, no gallops, no rubs   GI: Distended abdomen hypoactive bowel sounds  : Denies hematuria  Musculoskeletal:  No edema, no tenderness, no deformities. Back- no tenderness  Integument:  Well hydrated, no rash   Lymphatic:  No lymphadenopathy noted   Neurologic:  Alert & oriented x 3, known history of multiple sclerosis left lower extremity weakness, few months ago patient had IV Solu-Medrol 1 g for exacerbation of his MS.   Psychiatric:  Speech and behavior appropriate   Opiate dependence for left lower extremity pain    Labs:  CBC:   Lab Results   Component Value Date    WBC 11.5 03/28/2021    RBC 4.52 03/28/2021    HGB 12.3 03/28/2021    HCT 41.3 03/28/2021    MCV 91.4 03/28/2021    MCH 27.2 03/28/2021    MCHC 29.8 03/28/2021    RDW 18.4 03/28/2021     03/28/2021    MPV 10.6 03/28/2021     CMP:    Lab Results   Component Value Date     03/28/2021    K 3.8 03/28/2021    K 5.0 03/27/2021     03/28/2021    CO2 22 03/28/2021    BUN 65 03/28/2021    CREATININE 3.8 03/28/2021    GFRAA 20 03/28/2021    LABGLOM 16 03/28/2021    GLUCOSE 174 03/28/2021    PROT 5.6 03/28/2021    LABALBU 2.9 03/28/2021    CALCIUM 7.2 03/28/2021    BILITOT 0.3 03/28/2021    ALKPHOS 125 03/28/2021    AST 16 03/28/2021    ALT 15 03/28/2021     PT/INR:    Lab Results   Component Value Date    PROTIME 31.4 03/27/2021    INR 2.7 03/27/2021       Ct Abdomen Pelvis Wo Contrast Additional Contrast? None    Result Date: 3/27/2021  EXAMINATION: CT OF THE ABDOMEN AND PELVIS WITHOUT CONTRAST 3/27/2021 1:22 pm TECHNIQUE: CT of the abdomen and pelvis was performed without the administration of intravenous contrast. Multiplanar reformatted images are provided for review. Dose modulation, iterative reconstruction, and/or weight based adjustment of the mA/kV was utilized to reduce the radiation dose to as low as reasonably achievable. COMPARISON: 02/07/2021 HISTORY: ORDERING SYSTEM PROVIDED HISTORY: nausea, diarrhea, hx crohn's TECHNOLOGIST PROVIDED HISTORY: Reason for exam:->nausea, diarrhea, hx crohn's Additional Contrast?->None Decision Support Exception->Emergency Medical Condition (MA) FINDINGS: Lower Chest: Right lower lobe consolidation has decreased leaving residual pleural scarring and subsegmental atelectasis. Organs: The adrenal glands are normal. Splenic morphology and attenuation/echotexture is normal. The pancreas has normal morphology and parenchymal attenuation aside from dystrophic calcifications suggestive of prior chronic pancreatitis. 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 There are multiple simple right renal cysts. The largest measures 5.8 cm a 10 mm  simple cyst lies in the liver. , unchanged. The remaining liver parenchyma appears normal.  There has been a previous cholecystectomy. GI/Bowel:  There is moderate to marked dilatation of the upstream small intestine. Dilated intestine extends 2 point 4 cm upstream from a small bowel anastomosis. The 4 cm segment has thickened walls. The findings suggest moderate to high-grade obstruction secondary to focal enteritis/stricture. The remainder of the GI tract has normal course, caliber, and morphology. Pelvis: The bladder has normal morphology and wall thickness. The prostate is mildly enlarged, the seminal vesicles appear normal. Peritoneum/Retroperitoneum: There are no signs of free intraperitoneal air / gas. There are no signs of free intraperitoneal fluid. There are no signs of pelvic or retro peritoneal lymphadenopathy. Vasculature: The abdominal aorta, IVC and their branches are normal aside from mild aortic atherosclerotic vascular disease. Bones/Soft Tissues: The hips, bony pelvis, and lumbar spine appear unremarkable. There is a benign bone island in the left iliac wing. The bones are osteopenic. The abdominal wall is intact. Moderate to high-grade mechanical small bleb bowel obstruction. Obstruction appears to be due to a thickened segment just upstream from the anastomosis likely reflecting a stricture are focal enteritis. Alternatively, the obstruction could be and the anastomosis itself. Unchanged right renal cyst, left renal atresia and sequela of chronic pancreatitis. Improving right lower lobe aeration. Xr Acute Abd Series Chest 1 Vw    Result Date: 3/28/2021  EXAMINATION: TWO XRAY VIEWS OF THE ABDOMEN AND SINGLE  XRAY VIEW OF THE CHEST 3/28/2021 9:45 am COMPARISON: 03/27/2021 HISTORY: ORDERING SYSTEM PROVIDED HISTORY: sbo TECHNOLOGIST PROVIDED HISTORY: Reason for exam:->sbo FINDINGS: There is moderate, nonspecific elevation of the right diaphragm. Volume loss and scarring in the lower right thorax appears unchanged. The left lung is normally aerated. The heart and mediastinum are normal for AP technique.  There is moderate dilation of small bowel loops, consistent with mechanical small bowel obstruction. The sigmoid colon is moderately distended. There are no signs of organomegaly or suspicious calcifications. Persistent small bowel dilatation consistent with mechanical obstruction. Chronic right basilar scar/atelectasis. Ct Head Wo Contrast    Result Date: 3/27/2021  EXAMINATION: CT OF THE HEAD WITHOUT CONTRAST  3/27/2021 11:38 am TECHNIQUE: CT of the head was performed without the administration of intravenous contrast. Dose modulation, iterative reconstruction, and/or weight based adjustment of the mA/kV was utilized to reduce the radiation dose to as low as reasonably achievable. COMPARISON: 02/07/2021 HISTORY: ORDERING SYSTEM PROVIDED HISTORY: syncope; confusion; (+) eliquis TECHNOLOGIST PROVIDED HISTORY: Reason for exam:->syncope; confusion; (+) eliquis Has a \"code stroke\" or \"stroke alert\" been called? ->No Decision Support Exception->Emergency Medical Condition (MA) FINDINGS: BRAIN/VENTRICLES: Brain volume is moderately decreased diffusely, morphology is normal.  There are no extra-axial fluid collections, mass effect, or hemorrhage. There is physiologic mineral deposition in the basal ganglia bilaterally. A small focus of low attenuation in the inferior left cerebellar hemisphere is unchanged, possibly an old lacunar infarct. Low attenuation in the periventricular white matter tracks reflects moderate non-specific white matter disease. ORBITS: The visualized portion of the orbits demonstrate no acute abnormality. SINUSES:  The paranasal sinuses are morphologically normal and free of fluid or mucoperiosteal thickening. The mastoid air cells are normally aerated. SOFT TISSUES/SKULL:  The skull and extracranial soft tissues are intact. A benign osteoma lies in the left skull base. No acute cerebral abnormalities. Moderate, unchanged cerebral volume loss and non-specific periventricular white matter disease.      Xr Chest Portable    Result 5. DVT prophylaxis SCDs.       Completed By:  Dr. Jose G Terrell MD, Cristina Fuentes.  6:07 PM  3/28/2021      Electronically signed by Anastacia Matson MD on 3/28/21 at 6:07 PM EDT

## 2021-03-28 NOTE — CONSULTS
Nephrology Consult Note  Patient's Name: Tam Blair  6:52 PM  3/28/2021    Nephrologist: Shukri Serrano    Reason for Consult: NEERAJ  Requesting Physician: Saintclair Frees, MD    Chief Complaint:  Syncope    History Obtained From:  patient and past medical records    History of Present Ilness:    Tam Blair is a 63 y.o. male with prior history of CKD G3B with a baseline serum cr 1.6-1.8mg/dl and an e-GFR=41-44ml/min with a Hx of  Crohn's Disease. Pt presented to the ED 3/27/21 after having a syncopal event x 2 with one  while taking a shower. He has increased diarrhea PTA. Pt in the ED had a BP as low as 72/52. CT Scan showed a high grade SBO. As an out pt, he was on lisinopril 5mg QD. He states minimal  abd pain at this time    Past Medical History:   Diagnosis Date    [facility]     Cro[facility]'s disease ([facility]     Hx of blood clots 2012    liver, lung    Hyperlipidemia     Hypertension     Multiple sclerosis ([facility]     Stage 3b chronic kidney disease 1/29/2021       Past Surgical History:   Procedure Laterality Date    APPENDECTOMY      CHOLECYSTECTOMY      COLONOSCOPY      ENDOSCOPY, COLON, DIAGNOSTIC         History reviewed. No pertinent family history. reports that he quit smoking about 2 months ago. His smoking use included cigarettes. He has never used smokeless tobacco. He reports that he does not drink alcohol or use drugs.     Allergies:  Neurontin [gabapentin] and Lyrica [pregabalin]    Current Medications:    morphine (PF) injection 2 mg, Q2H PRN    Or  morphine injection 4 mg, Q2H PRN  0.9 % sodium chloride infusion, Continuous  [Held by provider] apixaban (ELIQUIS) tablet 5 mg, See Admin Instructions  [Held by provider] aspirin chewable tablet 81 mg, Daily  [Held by provider] atorvastatin (LIPITOR) tablet 80 mg, Nightly  [Held by provider] baclofen (LIORESAL) tablet 10 mg, BID  [Held by provider] lisinopril (PRINIVIL;ZESTRIL) tablet 5 mg, Daily  metoprolol succinate (TOPROL XL) extended release tablet 50 mg, Daily  [Held by provider] mirtazapine (REMERON) tablet 15 mg, Nightly  [Held by provider] oxybutynin (DITROPAN) tablet 5 mg, BID  [Held by provider] oxyCODONE (ROXICODONE) immediate release tablet 10 mg, Q4H  pantoprazole (PROTONIX) injection 40 mg, Daily    And  sodium chloride (PF) 0.9 % injection 10 mL, Daily  heparin (porcine) injection 5,000 Units, 3 times per day  ondansetron (ZOFRAN) injection 4 mg, Q6H PRN        Review of Systems:   Pertinent items are noted in HPI.     Physical exam:   Constitutional:  ELderly male in NAD  Vitals:   VITALS:  BP (!) 140/71   Pulse 98   Temp 96.2 °F (35.7 °C) (Axillary)   Resp 20   Ht 5' (1.524 m)   Wt 121 lb (54.9 kg)   SpO2 95%   BMI 23.63 kg/m²   24HR INTAKE/OUTPUT:    Intake/Output Summary (Last 24 hours) at 3/28/2021 1853  Last data filed at 3/28/2021 0600  Gross per 24 hour   Intake 553.75 ml   Output    Net 553.75 ml     URINARY CATHETER OUTPUT (Stacy):     DRAIN/TUBE OUTPUT:     VENT SETTINGS:  Vent Information  SpO2: 95 %  Additional Respiratory  Assessments  Pulse: 98  Resp: 20  SpO2: 95 %    Skin: no rash, turgor wnl  Heent:  eomi, mmm  Neck: no bruits or jvd noted  Cardiovascular:  S1, S2 without m/r/g  Respiratory: CTA B without w/r/r  Abdomen:  +bs, soft, nt, nd  Ext: (-) bilat lower extremity edema  Psychiatric: mood and affect appropriate  Musculoskeletal:  Rom, muscular strength intact    Data:   Labs:  CBC:   Lab Results   Component Value Date    WBC 11.5 03/28/2021    RBC 4.52 03/28/2021    HGB 12.3 03/28/2021    HCT 41.3 03/28/2021    MCV 91.4 03/28/2021    MCH 27.2 03/28/2021    MCHC 29.8 03/28/2021    RDW 18.4 03/28/2021     03/28/2021    MPV 10.6 03/28/2021     CBC with Differential:    Lab Results   Component Value Date    WBC 11.5 03/28/2021    RBC 4.52 03/28/2021    HGB 12.3 03/28/2021    HCT 41.3 03/28/2021     03/28/2021    MCV 91.4 03/28/2021    MCH 27.2 03/28/2021    MCHC 29.8 03/28/2021    RDW 18.4 03/28/2021    SEGSPCT 62 07/11/2013    METASPCT 1.0 03/27/2021    LYMPHOPCT 7.0 03/27/2021    MONOPCT 11.0 03/27/2021    MYELOPCT 2.0 03/27/2021    BASOPCT 0.0 03/27/2021    MONOSABS 1.50 03/27/2021    LYMPHSABS 0.95 03/27/2021    EOSABS 0.00 03/27/2021    BASOSABS 0.00 03/27/2021     Hemoglobin/Hematocrit:    Lab Results   Component Value Date    HGB 12.3 03/28/2021    HCT 41.3 03/28/2021     CMP:    Lab Results   Component Value Date     03/28/2021    K 3.8 03/28/2021    K 5.0 03/27/2021     03/28/2021    CO2 22 03/28/2021    BUN 65 03/28/2021    CREATININE 3.8 03/28/2021    GFRAA 20 03/28/2021    LABGLOM 16 03/28/2021    GLUCOSE 174 03/28/2021    PROT 5.6 03/28/2021    LABALBU 2.9 03/28/2021    CALCIUM 7.2 03/28/2021    BILITOT 0.3 03/28/2021    ALKPHOS 125 03/28/2021    AST 16 03/28/2021    ALT 15 03/28/2021     BMP:    Lab Results   Component Value Date     03/28/2021    K 3.8 03/28/2021    K 5.0 03/27/2021     03/28/2021    CO2 22 03/28/2021    BUN 65 03/28/2021    LABALBU 2.9 03/28/2021    CREATININE 3.8 03/28/2021    CALCIUM 7.2 03/28/2021    GFRAA 20 03/28/2021    LABGLOM 16 03/28/2021    GLUCOSE 174 03/28/2021     BUN/Creatinine:    Lab Results   Component Value Date    BUN 65 03/28/2021    CREATININE 3.8 03/28/2021     Hepatic Function Panel:    Lab Results   Component Value Date    ALKPHOS 125 03/28/2021    ALT 15 03/28/2021    AST 16 03/28/2021    PROT 5.6 03/28/2021    BILITOT 0.3 03/28/2021    LABALBU 2.9 03/28/2021     Albumin:    Lab Results   Component Value Date    LABALBU 2.9 03/28/2021     Calcium:    Lab Results   Component Value Date    CALCIUM 7.2 03/28/2021     Ionized Calcium:  No results found for: IONCA  Magnesium:    Lab Results   Component Value Date    MG 1.6 09/06/2012     Phosphorus:    Lab Results   Component Value Date    PHOS 4.7 09/06/2012     LDH:  No results found for: LDH  Uric Acid:  No results found for: Laurinda June  PT/INR:    Lab Results   Component Value Date    PROTIME 31.4 03/27/2021    INR 2.7 03/27/2021     PTT:    Lab Results   Component Value Date    APTT 31.8 03/27/2021   [APTT}  Troponin:    Lab Results   Component Value Date    TROPONINI 0.03 03/27/2021     U/A:  No results found for: NITRITE, COLORU, PROTEINU, PHUR, LABCAST, WBCUA, RBCUA, MUCUS, TRICHOMONAS, YEAST, BACTERIA, CLARITYU, SPECGRAV, LEUKOCYTESUR, UROBILINOGEN, BILIRUBINUR, BLOODU, GLUCOSEU, AMORPHOUS  ABG:    Lab Results   Component Value Date    PH 7.443 02/07/2021    PCO2 26.7 02/07/2021    PO2 77.4 02/07/2021    HCO3 17.9 02/07/2021    BE -4.9 02/07/2021    O2SAT 95.0 02/07/2021     HgBA1c:  No results found for: LABA1C  Microalbumen/Creatinine ratio:  No components found for: RUCREAT  FLP:    Lab Results   Component Value Date    TRIG 173 09/25/2020    HDL 46 09/25/2020    LDLCALC 125 09/25/2020    LABVLDL 35 09/25/2020     TSH:    Lab Results   Component Value Date    TSH 0.788 09/25/2020     VITAMIN B12: No components found for: B12  FOLATE:    Lab Results   Component Value Date    FOLATE 12.6 12/28/2020     Iron Saturation:  No components found for: PERCENTFE  FERRITIN:    Lab Results   Component Value Date    FERRITIN 912 12/28/2020     AMYLASE:  No results found for: AMYLASE  LIPASE:  No results found for: LIPASE  Fibrinogen Level:  No components found for: FIB  Urine Toxicology:  No components found for: IAMMENTA, IBARBIT, IBENZO, ICOCAINE, IMARTHC, IOPIATES, IPHENCYC  24 Hour Urine for Protein:  No components found for: RAWUPRO, UHRS3, YAZK60DQ, UTV3  24 Hour Urine for Creatinine Clearance:  No components found for: CREAT4, UHRS10, UTV10  PSA:   Lab Results   Component Value Date    PSA 3.14 09/25/2020        Imaging:  EXAMINATION:   CT OF THE ABDOMEN AND PELVIS WITHOUT CONTRAST 3/27/2021 1:22 pm       TECHNIQUE:   CT of the abdomen and pelvis was performed without the administration of   intravenous contrast. Multiplanar their branches are normal aside from mild aortic   atherosclerotic vascular disease.       Bones/Soft Tissues:  The hips, bony pelvis, and lumbar spine appear   unremarkable. There is a benign bone island in the left iliac wing.  The   bones are osteopenic.  The abdominal wall is intact.           Impression   Moderate to high-grade mechanical small bleb bowel obstruction.  Obstruction   appears to be due to a thickened segment just upstream from the anastomosis   likely reflecting a stricture are focal enteritis.  Alternatively, the   obstruction could be and the anastomosis itself.       Unchanged right renal cyst, left renal atresia and sequela of chronic   pancreatitis.       Improving right lower lobe aeration. Assessment  1-Stage II NEERAJ- presumed sec to decreased effective renal perfusion from the hypotension exacerbated by the ACEI and the 3rd spacing of fluid into the bowel due to SBO  PLAN:1. Continue to hold ACEI    2-G3B with a baseline serum cr 1.6-1.8mg/dl and an e-GFR=41-44ml/min with a L Atrophic Kidney possibly a state of a  Unilat Functioning kidney  PLAN:1 Once the cr trends back to baseline would check a NM Renal Flow Scan to evaluate function of the L kidney    3-Hypocalcemia in the setting of Hypoalbuminemia and Sec HPTH of Renal Origin  PLAN:1. Await PTH and Vit D  2. Follow Ca++ and PO4    4- Anemia in CKD  HgB above goal >/=10  PLAN:1. Await  Fe++, B12, Folate    5- HTN with CKD I-IV with Hypotension on admission now improved  PLAN:1. Follow BP off the ACEI    6-SBO  PLAN:1. Defer to Gen. Surgery    7-Hypokalemia with Hypomagnesemia  Mg++ 1.2, K+ 3.4  PLAN:1. 2grams Mg++ IV  2. IV KCl(-)  3.  Recheck labs his PM    8- Anion Gap Met Acidosis  PLAN:1. Change to IV HCO3 based fluid    Thank you  for allowing us to participate in care of Zana Martell  6:52 PM  3/28/2021

## 2021-03-28 NOTE — CONSULTS
General Surgery Consult  [facility]    Patient's Name/Date of Birth: Nithya Quarles / 1955    Date: March 28, 2021     Consulting Surgeon: John Tobar MD    PCP: Carolina Jimenez MD     Chief Complaint: sepsis, nausea, vomiting    HPI:   Nithya Quarles is a 63 y.o. male who presents for evaluation of axel pain,, nausea, vomiting. He was seen for for similar symptoms 2 months ago and underwent nonoperative management. He has a Hx of crohns with bowel resection in [facility] several years ago. Patient presented after he had syncopal episode x2 at home. He was brought in by his wife for witnessed the episodes. According to EMS, the patient's wife was helping him take a shower when he started feeling lightheaded, nauseous. She sat him down and he lost consciousness. Per the ED notes, the patient's wife provided further history, states the patient has been having diarrhea more often than normal for the last 2 days. He has been nauseous.       Patient Active Problem List   Diagnosis    Malnutrition ([address]    Multiple sclerosis ([address]    Bilateral pulmonary embolism (HCC)    Crohn's disease ([address]    COPD (chronic obstructive pulmonary disease) ([address]    Tobacco abuse    Hypertension    Hyperlipidemia    Stage 3b chronic kidney disease    Hyperkalemia    Leukocytosis    Small bowel obstruction ([address]    HCAP (healthcare-associated pneumonia)    Acute on chronic renal failure (HCC)    Sepsis ([address]    SBO (small bowel obstruction) (HCC)       Allergies   Allergen Reactions    Neurontin [Gabapentin] Palpitations    Lyrica [Pregabalin] Other (See Comments)     photophobia       Past Medical History:   Diagnosis Date    COPD (chronic obstructive pulmonary disease) ([address]     Crohn's disease ([address]     Hx of blood clots 2012    liver, lung    Hyperlipidemia     Hypertension     Multiple sclerosis ([address]     Stage 3b chronic kidney disease 1/29/2021       Past Surgical History:   Procedure Laterality Date    APPENDECTOMY      CHOLECYSTECTOMY      COLONOSCOPY      ENDOSCOPY, COLON, DIAGNOSTIC         Social History     Socioeconomic History    Marital status:      Spouse name: Not on file    Number of children: Not on file    Years of education: Not on file    Highest education level: Not on file   Occupational History    Not on file   Social Needs    Financial resource strain: Not on file    Food insecurity     Worry: Not on file     Inability: Not on file    Transportation needs     Medical: Not on file     Non-medical: Not on file   Tobacco Use    Smoking status: Former Smoker     Types: Cigarettes     Quit date: 2021     Years since quittin.1    Smokeless tobacco: Never Used   Substance and Sexual Activity    Alcohol use: No    Drug use: No    Sexual activity: Not Currently   Lifestyle    Physical activity     Days per week: Not on file     Minutes per session: Not on file    Stress: Not on file   Relationships    Social connections     Talks on phone: Not on file     Gets together: Not on file     Attends Latter day service: Not on file     Active member of club or organization: Not on file     Attends meetings of clubs or organizations: Not on file     Relationship status: Not on file    Intimate partner violence     Fear of current or ex partner: Not on file     Emotionally abused: Not on file     Physically abused: Not on file     Forced sexual activity: Not on file   Other Topics Concern    Not on file   Social History Narrative    Not on file       The patient has a family history that is negative for severe cardiovascular or respiratory issues, negative for reaction to anesthesia.      Recent Labs     21  1214 21  0721   WBC 13.6* 11.5   HGB 13.1 12.3*   HCT 42.9 41.3   MCV 90.1 91.4    460*       Recent Labs     21  1214 21  0721    143   K 5.0 3.8   CO2 17* 22   BUN 43* 65*   CREATININE 3.0* 3.8* medical, surgical, social and family history, vitals, nursing assessment and images. Time spent face to face with patient and family counciling and discussing care exceeded 50% of the time of the consult. Additional time spent reviewing images and labs, discussing case with nursing, support staff and other physicians; as well as coordinating care.         Physician Signature: Electronically signed by Dr. Catracho Cho

## 2021-03-29 NOTE — PROGRESS NOTES
OCCUPATIONAL THERAPY  Initial Evaluation  Date:3/29/2021  Patient Name: Kodi Gibson  MRN: [id number]  : 1955  ROOM #: [id number]     Referring Provider: Elvie Julian MD    Evaluating OT: Shirley Jimenez OTR/L [id number]    Placement Recommendation: Subacute    Recommended Adaptive Equipment: TBD at rehab     [facility]   AM-PAC Daily Activity Inpatient   How much help for putting on and taking off regular lower body clothing?: A Lot  How much help for Bathing?: A Lot  How much help for Toileting?: A Lot  How much help for putting on and taking off regular upper body clothing?: A Little  How much help for taking care of personal grooming?: A Little  How much help for eating meals?: None  AM-PAC Inpatient Daily Activity Raw Score: 16  AM-PAC Inpatient ADL T-Scale Score : 35.96  ADL Inpatient CMS 0-100% Score: 53.32  ADL Inpatient CMS G-Code Modifier : CK    Based on patient's functional performance as stated below and their level of assistance needed prior to admission, this therapist believes that the patient would benefit from skilled Occupational Therapy following their hospital stay in an effort to increase safety and independence with completion of ADL/IADL tasks for functional independence and quality of life. Diagnosis:   1. SBO (small bowel obstruction) ([address]    2. Acute kidney injury superimposed on CKD (HCC)    3. Dehydration    4. Diarrhea, unspecified type    5. Crohn's disease with complication, unspecified gastrointestinal tract location ([address]    6. Syncope and collapse    7. Confusion      Pertinent Medical History:   Past Medical History:   Diagnosis Date    COPD (chronic obstructive pulmonary disease) ([address]     Crohn's disease ([address]     Hx of blood clots     liver, lung    Hyperlipidemia     Hypertension     Multiple sclerosis ([address]     Stage 3b chronic kidney disease 2021      Precautions:  Falls, MS  Pain Scale: Numeric Rate: 10/10 pain on L side; Nursing notified. Social history: mostly alone but he said his son comes and goes        Drive: yes   Home architecture: single family home, 1 story, 1 step to enter with rail, tub shower. PLOF: independent with BADL and independent with IADL, pt ambulated with rollator    Equipment owned: wheeled walker, wheelchair, shower chair, grab bars, rollator  Cognition: A&O x 4; follows 3 step directions. fair  Problem solving skills   fair  Memory    fair  Sequencing   fair  Judgement/safety  Communication: intact   Visual perceptual skills: intact     Glasses: yes    Edema: yes    Sensation: intact   Hand Dominance:  Left     X Right     Left Right Comment   Passive range of motion [facility]     Active range of motion [facility]     Muscle Grade 4/5 4/5    /pinch Strength Intact  Intact       Functional Assessment:   Initial Evaluation Status Date:   3/29/2021 Treatment Status  Date: STGs = LTGs  Time frame: 5 - 14 days   Feeding Independent   Independent    Grooming Minimal Assist   Independent    UB Dressing Minimal Assist   Independent    LB Dressing Moderate Assist   Independent    Bathing Moderate Assist  Independent    Toileting Moderate Assist   Independent    Bed Mobility  Facilitated Supine to sit: Minimal Assist   Scooting:Minimal Assist  Sit to supine: Minimal Assist    Rolling: Supervision  Supine to sit: Independent   Scooting:Independent  Sit to supine: Independent   Rolling: Independent    Functional Transfers Minimal Assist from EOB to wheeled walker. Transfer training with verbal cues for hand placement throughout session to improve safety. Independent    Functional Mobility Minimal assist with wheeled walker to improve balance to side step 4 feet towards head of bed, verbal cues for walker sequence and safety.    Modified [place]    Activity Tolerance Fair   Good      Balance:   Sitting balance at EOB to increase dynamic sitting balance and activities tolerance with Supervision     Standing fair with wheeled walker to improve balance   Endurance: fair     Comments: Upon arrival to the room the patient was supine. At end of the session, the patient was supine. Pt politely declined sitting up in chair. Call light and phone within reach. Pt required verbal cues and instruction as noted above for safe facilitation and completion of tasks. Therapist provided skilled monitoring of the patient's response during treatment session. Prior to and at the end of session, environmental modifications/line management completed for patients safety and efficiency of treatment session. OT services provided to include instruction/training on safety and adapted techniques for completion of transfers and ADL's. Overall, the patient demonstrates difficulties with completion of BADL's and IADL's. Factors contributing to these difficulties includes decreased endurance and generalized weakness. Pt would benefit from continued skilled OT to increase independence with BADL's and IADL's. Treatment:    Bed mobility: Facilitated bed mobility with cues for proper body mechanics and sequencing to prepare for ADL completion. Functional transfers: Facilitated transfers from various surfaces with cues for body alignment, safety and hand placement. Postural Balance: Sitting and standing balance retraining to improve righting reactions with postural changes during ADLs. Skilled positioning: Proper positioning to improve interaction with environment, overall functioning and decrease/prevent edema and contractures. Evaluation Complexity:   · Low Complexity  · History: Brief history including review of medical records relating to the problem  · Exam: 1-3 performance Deficits  · Assistance/Modification: No assistance or modifications required to perform tasks. No comorbities affecting occupational performance.     Assessment of current deficits:   Functional mobility [x]        ADLs [x]        Strength [x]      Cognition []  Functional transfers [x]       IADLs [x]        Safety Awareness []      Endurance [x]  Fine Motor Coordination []       Balance [x]       Vision/perception []     Sensation []   Gross Motor Coordination []       ROM []         Delirium []                          Motor Control []    Plan of Care: OT 1-3x/week for 5-7 days PRN   Instruction/training on adapted ADL techniques and AE recommendations to increased functional independence with precautions   Functional transfer/mobility training/DME recommendations for increased independence, safety, and fall prevention    Therapeutic activity to facilitate/challenge dynamic balance, standing tolerance, fine motor dexterity/in-hand manipulation for increased independence with ALD's    Functional Mobility Training   Training on energy conservation techniques/strategies to improve independence/tolerance for self care routine   Positioning to Improve Functional [place], Safety, and Skin Integrity   Patient/family education to increase follow through with safety techniques and functional independence   Therapeutic exercise to improve motor endurance, ROM, and functional strength for ALD's and functional transfers   Splinting/positioning for increased function, prevention of contractures, and improved skin integrity   Recommendation of environmental modifications for increased safety with functional transfers/mobility and ADL's          Rehab Potential: Good for established goals developed with patient and family. Patient / Family Goal: return home      Patient and/or family were instructed on functional diagnosis, prognosis/goals and OT plan of care. Demonstrated fair understanding. Evaluation time includes thorough review of current medical information, gathering information on past medical history/social history and prior level of function, completion of standardized testing/informal observation of tasks, assessment of data, and development of POC/Goals.     Time In:2:25pm   Time

## 2021-03-29 NOTE — CARE COORDINATION
3/29/21 1541 CM note: NO COVID TESTING. Hx MS. Met with patient at the bedside to discuss transition of care at discharge. Patient resides with his wife (and states his son and granddaughter occasionally stay with them) in a 1 floor 1 step to enter home. Patient is independent with ADLs, wife drives, and DME includes rollator, cane, and walker. Pts PCP is Dr Bishop Crouch and his pharmacy is [facility] in [place]. Patient has hx HHC through [facility]; no hx SNF. Discharge plan is home; patient is declining [facility] at this time. Pts wife will provide transportation home.  Electronically signed by Marleni Rodriguez RN on 3/29/2021 at 3:56 PM

## 2021-03-29 NOTE — PROGRESS NOTES
good    Dynamic Standing: fair + wheeled walker      Patient is Alert & Oriented x person, place, time and situation and follows directions    Sensation:  Patient  denies numbness and tingling     Edema:  no    Endurance: fair       Vitals: room air    Blood Pressure at rest   Blood Pressure during session     Heart Rate at rest 79 Heart Rate during session     SPO2 at rest 95%  SPO2 during session  %     Patient education  Patient educated on role of Physical Therapy, risks of immobility, safety and plan of care,  importance of mobility while in hospital  and positioning for skin integrity and comfort      Patient response to education:   Pt verbalized understanding Pt demonstrated skill Pt requires further education in this area   Yes Partial Yes      Treatment:  Patient practiced and was instructed/facilitated in the following treatment: Patient assisted to edge of bed  Sat edge of bed 15 minutes with Supervision  to increase dynamic sitting balance and activity tolerance. stood with side steps to Head of bed. Too fatigued for increased ambulation. Bowel incontinence, minimal assist for balance, hygiene performed and bed linen changed. Returned to bed. Therapeutic Exercises:  not performed       At end of session, patient in bed with alarm call light and phone within reach,   all lines and tubes intact, nursing notified. Patient would benefit from continued skilled Physical Therapy to improve functional independence and quality of life.           Patient's/ family goals   home if able      ASSESSMENT: Patient exhibits decreased strength, balance, endurance and pain chronic left side impairing functional mobility, transfers, gait , gait distance and tolerance to activity       Plan of Care:     -Bed Mobility: Lower extremity exercises   -Sitting Balance: Incorporate reaching activities to activate trunk muscles   -Standing Balance: Perform strengthening exercises in standing to promote motor control with or without upper extremity support   -Transfers: Provide instruction on proper hand and foot position for adequate transfer of weight onto lower extremities and use of gait device  -Gait: Gait training and Standing activities to improve: base of support, weight shift, weight bearing    -Endurance: Utilize Supervised activities to increase level of endurance to allow for safe functional mobility including transfers and gait     Patient and or family understand(s) diagnosis, prognosis, and plan of care. Frequency of treatments: Patient will be seen  daily. Time in  1109  Time out  1134    Total Treatment Time  10 minutes    Evaluation time includes thorough review of current medical information, gathering information on past medical history/social history and prior level of function, completion of standardized testing/informal observation of tasks, assessment of data, and development of Plan of care and goals.      CPT codes:  Low Complexity PT evaluation (49231)  Therapeutic activities (14439)   10 minutes  1 unit(s)    Zeynep Sofia, PT

## 2021-03-29 NOTE — CONSULTS
[facility]  Dr. Abby Davis M.D., Dr. Sony Sheets M.D., Dr. Venessa Wesley D.O., Dr. Edilma Jc M.D., Dr. Nik Jacinto D.O. Patient Name: Anjum Ya  MRN: [id number]  : 1955 (63 y.o. male)  Allergies: is allergic to neurontin [gabapentin] and lyrica [pregabalin]. Date of Service: 3/29/2021       Reason for Consultation: SBO, Crohn's Disease    HISTORY OF PRESENT ILLNESS:      The patient is a 63 y.o. male with history of severe, stricturing ileocolonic Crohn's disease s/p resection with neoterminal ileum who presents with nausea, vomiting and abdominal distention. Upon presentation, patient was found to have high-grade obstruction just proximal to his anastomosis. GI was consulted for further recommendations as there was inflammatory changes at the level of the obstruction. Per patient, he was feeling well until a few days ago when he started to experience the aforementioned symptoms. Did state he had some loose stools that have resolved. Denies passing flatus at this time. Review of his medical records reveals a similar presentation last month which resolved with supportive care and medical management. Patient states that he had an outpatient colonoscopy 1.5 years ago which was normal. Denies taking any medications for his Crohn's Disease. REVIEW OF SYSTEMS:   Constitutional: Denies fever, chills, or unintentional weight loss. HEENT: Denies double or blurry vision, headaches, ear pain or ringing in the ears. No drainage from the ears, nose or throat. Cardiovascular: Denies any chest pain, irregular heartbeats, or palpitations. Respiratory: Denies shortness of breath, coughing, sputum production, hemoptysis, or wheezing. Gastrointestinal: + nausea, vomiting and abdominal distention   Genitourinary: Denies any urinary urgency, frequency, hematuria. Voiding without difficulty. Endocrine: Denies sensitivity to heat or cold. Denies changes in hair, skin or nails. Denies excessive thirst, hunger or going to the bathroom more frequently than usual.  Extremities: Denies swelling or calf pain. Musculoskeletal: Denies muscle or joint pain, stiffness, arthritis, gout, or instability. Dermatology / Skin: Denies any rashes, ulcers, or excoriations. Denies bruising. Neurology: Denies any bowel or bladder incontinence, headache or focal neurological deficits. No weakness or paresthesia. Denies numbness or tingling in the hands or feet. Psychiatric: Denies nervousness/anxiety, depression or memory loss. Past Medical History:  Past Medical History:   Diagnosis Date    COPD [facility]chronic obstructive pulmonary disease) ([facility]     Crohn's disease ([facility]     Hx of blood clots 2012    liver, lung    Hyperlipidemia     Hypertension     Multiple sclerosis ([facility]     Stage 3b chronic kidney disease 1/29/2021       Past Surgical History:  Past Surgical History:   Procedure Laterality Date    APPENDECTOMY      CHOLECYSTECTOMY      COLONOSCOPY      ENDOSCOPY, COLON, DIAGNOSTIC         Home Medications:  Prior to Admission medications    Medication Sig Start Date End Date Taking? Authorizing Provider   oxybutynin (DITROPAN) 5 MG tablet Take 5 mg by mouth 2 times daily   Yes Historical Provider, MD   apixaban starter pack (ELIQUIS DVT/PE STARTER PACK) 5 MG TBPK tablet Take 5 mg by mouth See Admin Instructions Take 2 tablets PO BID x7 days, then take 1 tablet PO BID 2/1/21  Yes Historical Provider, MD   aspirin 81 MG chewable tablet Take 1 tablet by mouth daily 2/1/21  Yes Garfield Glez, MD   atorvastatin (LIPITOR) 80 MG tablet Take 1 tablet by mouth nightly 2/1/21  Yes Garfield Glez, MD   lisinopril (PRINIVIL;ZESTRIL) 5 MG tablet Take 1 tablet by mouth daily 2/1/21  Yes Garfield Glez, MD   oxyCODONE HCl (OXY-IR) 10 MG immediate release tablet Take 10 mg by mouth every 4 hours.    Yes Historical Provider, MD   baclofen (LIORESAL) 10 MG tablet Take 10 mg by mouth 2 times daily Physically abused: Not on file     Forced sexual activity: Not on file   Other Topics Concern    Not on file   Social History Narrative    Not on file       Family History:  History reviewed. No pertinent family history. PHYSICAL EXAM:  Vital Signs: /73   Pulse 76   Temp 97.9 °F (36.6 °C) (Oral)   Resp 16   Ht 5' (1.524 m)   Wt 121 lb (54.9 kg)   SpO2 96%   BMI 23.63 kg/m²   GENERAL APPEARANCE:  awake, alert, oriented, cooperative, and in no acute distress  EYES:  Lids and lashes normal, PERRLA, EOMI, sclera clear, conjunctiva normal  HENT:  Normocephalic, without obvious abnormality, atramatic, oral pharynx with moist mucus membranes, tonsils without erythema or exudates  NECK:  Supple with no carotid bruits, JVD or thyromegaly. No cervical adenopathy  LUNGS:  Clear to auscultation bilaterally with no wheezes, rales or rhonchi. No increased work of breathing, good air exchange. CARDIOVASCULAR: Regular rate and rhythm, no murmur  ABDOMEN:  Soft and minimally distended with tympany, mild diffuse TTP without g/r/r, hyperactive bowel sounds present  MUSCULOSKELETAL:  There is no redness, warmth, or swelling of the joints. Full range of motion noted. Motor strength is 5 out of 5 all extremities bilaterally. Tone is normal.  EXTREMITIES: No edema, 2+ pulses bilaterally (radial and dorsalis pedis)  NEUROLOGIC:  Awake, alert, oriented to name, place and time. SKIN: Normal skin color, texture, and turgor. There is no redness, warmth, or swelling. No bruising or bleeding, no mottling. PSYCH: Affect, behavior and insight are all within normal limits. DATA:  Results for orders placed or performed during the hospital encounter of 03/27/21   CLOSTRIDIUM DIFFICILE EIA    Specimen: Stool   Result Value Ref Range    C.diff Toxin/Antigen       C.  Difficile Toxin A and/or B NOT detected  Normal Range: Not detected     CBC Auto Differential   Result Value Ref Range    WBC 13.6 (H) 4.5 - 11.5 E9/L RBC 4.76 3.80 - 5.80 E12/L    Hemoglobin 13.1 12.5 - 16.5 g/dL    Hematocrit 42.9 37.0 - 54.0 %    MCV 90.1 80.0 - 99.9 fL    MCH 27.5 26.0 - 35.0 pg    MCHC 30.5 (L) 32.0 - 34.5 %    RDW 18.3 (H) 11.5 - 15.0 fL    Platelets 546 433 - 387 E9/L    MPV 10.1 7.0 - 12.0 fL    Neutrophils % 79.0 43.0 - 80.0 %    Lymphocytes % 7.0 (L) 20.0 - 42.0 %    Monocytes % 11.0 2.0 - 12.0 %    Eosinophils % 0.0 0.0 - 6.0 %    Basophils % 0.0 0.0 - 2.0 %    Neutrophils Absolute 11.15 (H) 1.80 - 7.30 E9/L    Lymphocytes Absolute 0.95 (L) 1.50 - 4.00 E9/L    Monocytes Absolute 1.50 (H) 0.10 - 0.95 E9/L    Eosinophils Absolute 0.00 (L) 0.05 - 0.50 E9/L    Basophils Absolute 0.00 0.00 - 0.20 E9/L    Metamyelocytes Relative 1.0 0.0 - 1.0 %    Myelocyte Percent 2.0 0 - 0 %   Comprehensive Metabolic Panel w/ Reflex to MG   Result Value Ref Range    Sodium 139 132 - 146 mmol/L    Potassium reflex Magnesium 5.0 3.5 - 5.0 mmol/L    Chloride 104 98 - 107 mmol/L    CO2 17 (L) 22 - 29 mmol/L    Anion Gap 18 (H) 7 - 16 mmol/L    Glucose 148 (H) 74 - 99 mg/dL    BUN 43 (H) 8 - 23 mg/dL    CREATININE 3.0 (H) 0.7 - 1.2 mg/dL    GFR Non-African American 21 >=60 mL/min/1.73    GFR African American 26     Calcium 7.4 (L) 8.6 - 10.2 mg/dL    Total Protein 5.8 (L) 6.4 - 8.3 g/dL    Albumin 3.2 (L) 3.5 - 5.2 g/dL    Total Bilirubin 0.4 0.0 - 1.2 mg/dL    Alkaline Phosphatase 133 (H) 40 - 129 U/L    ALT 16 0 - 40 U/L    AST 23 0 - 39 U/L   Troponin   Result Value Ref Range    Troponin 0.03 0.00 - 0.03 ng/mL   Brain Natriuretic Peptide   Result Value Ref Range    Pro- (H) 0 - 125 pg/mL   Protime-INR   Result Value Ref Range    Protime 31.4 (H) 9.3 - 12.4 sec    INR 2.7    APTT   Result Value Ref Range    aPTT 31.8 24.5 - 35.1 sec   Lactate, Sepsis   Result Value Ref Range    Lactic Acid, Sepsis 2.1 (H) 0.5 - 1.9 mmol/L   Lactate, Sepsis   Result Value Ref Range    Lactic Acid, Sepsis 1.8 0.5 - 1.9 mmol/L   CBC   Result Value Ref Range    WBC 11.5 4.5 - 11.5 E9/L    RBC 4.52 3.80 - 5.80 E12/L    Hemoglobin 12.3 (L) 12.5 - 16.5 g/dL    Hematocrit 41.3 37.0 - 54.0 %    MCV 91.4 80.0 - 99.9 fL    MCH 27.2 26.0 - 35.0 pg    MCHC 29.8 (L) 32.0 - 34.5 %    RDW 18.4 (H) 11.5 - 15.0 fL    Platelets 587 (H) 816 - 450 E9/L    MPV 10.6 7.0 - 12.0 fL   Comprehensive metabolic panel   Result Value Ref Range    Sodium 143 132 - 146 mmol/L    Potassium 3.8 3.5 - 5.0 mmol/L    Chloride 103 98 - 107 mmol/L    CO2 22 22 - 29 mmol/L    Anion Gap 18 (H) 7 - 16 mmol/L    Glucose 174 (H) 74 - 99 mg/dL    BUN 65 (H) 8 - 23 mg/dL    CREATININE 3.8 (H) 0.7 - 1.2 mg/dL    GFR Non-African American 16 >=60 mL/min/1.73    GFR African American 20     Calcium 7.2 (L) 8.6 - 10.2 mg/dL    Total Protein 5.6 (L) 6.4 - 8.3 g/dL    Albumin 2.9 (L) 3.5 - 5.2 g/dL    Total Bilirubin 0.3 0.0 - 1.2 mg/dL    Alkaline Phosphatase 125 40 - 129 U/L    ALT 15 0 - 40 U/L    AST 16 0 - 39 U/L   Lactic Acid, Plasma   Result Value Ref Range    Lactic Acid 2.4 (H) 0.5 - 2.2 mmol/L   Urine electrolytes   Result Value Ref Range    Sodium, Ur 47 Not Established mmol/L    Potassium, Ur 40.2 Not Established mmol/L    Chloride 33 Not Established mmol/L   Creatinine, Random Urine   Result Value Ref Range    Creatinine, Ur 131 40 - 278 mg/dL   Urea nitrogen, urine   Result Value Ref Range    Urea Nitrogen, Ur 800 800 - 1666 mg/dL   Osmolality, urine   Result Value Ref Range    Osmolality, Ur 526 300 - 900 mOsm/kg   Eosinophil smear urine   Result Value Ref Range    Eosinophil, Urine 0 0 - 1 %   Comprehensive Metabolic Panel   Result Value Ref Range    Sodium 143 132 - 146 mmol/L    Potassium 3.4 (L) 3.5 - 5.0 mmol/L    Chloride 111 (H) 98 - 107 mmol/L    CO2 17 (L) 22 - 29 mmol/L    Anion Gap 15 7 - 16 mmol/L    Glucose 84 74 - 99 mg/dL    BUN 67 (H) 8 - 23 mg/dL    CREATININE 3.3 (H) 0.7 - 1.2 mg/dL    GFR Non-African American 19 >=60 mL/min/1.73    GFR African American 23     Calcium 6.8 (L) 8.6 - 10.2 mg/dL    Total nausea, diarrhea, hx crohn's TECHNOLOGIST PROVIDED HISTORY: Reason for exam:->nausea, diarrhea, hx crohn's Additional Contrast?->None Decision Support Exception->Emergency Medical Condition (MA) FINDINGS: Lower Chest: Right lower lobe consolidation has decreased leaving residual pleural scarring and subsegmental atelectasis. Organs: The adrenal glands are normal. Splenic morphology and attenuation/echotexture is normal. The pancreas has normal morphology and parenchymal attenuation aside from dystrophic calcifications suggestive of prior chronic pancreatitis. 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 There are multiple simple right renal cysts. The largest measures 5.8 cm a 10 mm  simple cyst lies in the liver. , unchanged. The remaining liver parenchyma appears normal.  There has been a previous cholecystectomy. GI/Bowel: There is moderate to marked dilatation of the upstream small intestine. Dilated intestine extends 2 point 4 cm upstream from a small bowel anastomosis. The 4 cm segment has thickened walls. The findings suggest moderate to high-grade obstruction secondary to focal enteritis/stricture. The remainder of the GI tract has normal course, caliber, and morphology. Pelvis: The bladder has normal morphology and wall thickness. The prostate is mildly enlarged, the seminal vesicles appear normal. Peritoneum/Retroperitoneum: There are no signs of free intraperitoneal air / gas. There are no signs of free intraperitoneal fluid. There are no signs of pelvic or retro peritoneal lymphadenopathy. Vasculature: The abdominal aorta, IVC and their branches are normal aside from mild aortic atherosclerotic vascular disease. Bones/Soft Tissues: The hips, bony pelvis, and lumbar spine appear unremarkable. There is a benign bone island in the left iliac wing. The bones are osteopenic. The abdominal wall is intact. Moderate to high-grade mechanical small bleb bowel obstruction.   Obstruction appears to be due to a thickened segment just upstream from the anastomosis likely reflecting a stricture are focal enteritis. Alternatively, the obstruction could be and the anastomosis itself. Unchanged right renal cyst, left renal atresia and sequela of chronic pancreatitis. Improving right lower lobe aeration. Xr Acute Abd Series Chest 1 Vw    Result Date: 3/28/2021  EXAMINATION: TWO XRAY VIEWS OF THE ABDOMEN AND SINGLE  XRAY VIEW OF THE CHEST 3/28/2021 9:45 am COMPARISON: 03/27/2021 HISTORY: ORDERING SYSTEM PROVIDED HISTORY: sbo TECHNOLOGIST PROVIDED HISTORY: Reason for exam:->sbo FINDINGS: There is moderate, nonspecific elevation of the right diaphragm. Volume loss and scarring in the lower right thorax appears unchanged. The left lung is normally aerated. The heart and mediastinum are normal for AP technique. There is moderate dilation of small bowel loops, consistent with mechanical small bowel obstruction. The sigmoid colon is moderately distended. There are no signs of organomegaly or suspicious calcifications. Persistent small bowel dilatation consistent with mechanical obstruction. Chronic right basilar scar/atelectasis. Ct Head Wo Contrast    Result Date: 3/27/2021  EXAMINATION: CT OF THE HEAD WITHOUT CONTRAST  3/27/2021 11:38 am TECHNIQUE: CT of the head was performed without the administration of intravenous contrast. Dose modulation, iterative reconstruction, and/or weight based adjustment of the mA/kV was utilized to reduce the radiation dose to as low as reasonably achievable. COMPARISON: 02/07/2021 HISTORY: ORDERING SYSTEM PROVIDED HISTORY: syncope; confusion; (+) eliquis TECHNOLOGIST PROVIDED HISTORY: Reason for exam:->syncope; confusion; (+) eliquis Has a \"code stroke\" or \"stroke alert\" been called? ->No Decision Support Exception->Emergency Medical Condition (MA) FINDINGS: BRAIN/VENTRICLES: Brain volume is moderately decreased diffusely, morphology is normal.  There are no extra-axial fluid collections, mass effect, or hemorrhage. There is physiologic mineral deposition in the basal ganglia bilaterally. A small focus of low attenuation in the inferior left cerebellar hemisphere is unchanged, possibly an old lacunar infarct. Low attenuation in the periventricular white matter tracks reflects moderate non-specific white matter disease. ORBITS: The visualized portion of the orbits demonstrate no acute abnormality. SINUSES:  The paranasal sinuses are morphologically normal and free of fluid or mucoperiosteal thickening. The mastoid air cells are normally aerated. SOFT TISSUES/SKULL:  The skull and extracranial soft tissues are intact. A benign osteoma lies in the left skull base. No acute cerebral abnormalities. Moderate, unchanged cerebral volume loss and non-specific periventricular white matter disease. Xr Chest Portable    Result Date: 3/27/2021  EXAMINATION: ONE XRAY VIEW OF THE CHEST 3/27/2021 10:46 am COMPARISON: 02/07/2021 HISTORY: ORDERING SYSTEM PROVIDED HISTORY: syncope TECHNOLOGIST PROVIDED HISTORY: Reason for exam:->syncope FINDINGS: The cardiomediastinal silhouette is stable. There is elevation of the right hemidiaphragm again seen, with improved aeration of the right lateral lung base. No new infiltrate is seen. No pneumothorax. Cannot exclude a right pleural effusion. There is improved aeration of the right lung base, with continued elevation of the right hemidiaphragm. Cannot exclude a small right pleural effusion. Us Retroperitoneal Complete    Result Date: 3/28/2021  EXAMINATION: RETROPERITONEAL ULTRASOUND OF THE KIDNEYS AND URINARY BLADDER 3/28/2021 COMPARISON: None HISTORY: ORDERING SYSTEM PROVIDED HISTORY: renal, acute renal failure TECHNOLOGIST PROVIDED HISTORY: Reason for exam:->renal Reason for exam:->NEERAJ What reading provider will be dictating this exam?->CRC FINDINGS: Kidneys:  The right kidney measures  10.8 cm in length and the left kidney measures 6.6 cm in length. Kidneys demonstrate normal cortical echogenicity. No evidence of hydronephrosis or intrarenal stones. 5.8 cm cyst mid zone right kidney. 2.8 cm cyst inferior pole right kidney. Bladder: Unremarkable appearance of the bladder. Only a right ureteral jet was visualized. 5.8 cm cyst mid zone right kidney. 2.8 cm cyst inferior pole right kidney. Left renal atrophy. ASSESSMENT/PLAN:      1. SBO  - likely 2/2 inflammatory Crohn's stricture proximal to anastomosis, cannot r/o fibrostenotic or adhesions   - patient refusing NG tube despite long discussion about risks, benefits and alternatives  - will start Solumedrol 40mg daily  - maintain NPO  - follow electrolytes and replete as needed  - 2 view abdomen tomorrow  - if no improvement in the next few days, consider surgical resection    2. Ileocolonic Crohn's Disease, severe, stricturing  - glucocorticoids as above  - recommend Hep B and TB quant and outpatient biologic therapy      Abigail Vilchis D.O.   GI fellow  3/29/2021 at 12:14 PM    Pt seen and independently examined. Pertinent notes and lab work reviewed. D/w Dr. Radha Bergman with physical exam and A&P. Discussed with patient - all questions answered - agreeable with the plan as delineated. Thank you for the opportunity to see this patient in consultation.     Amadou Robbins MD  3/29/2021  2:11 PM

## 2021-03-29 NOTE — PLAN OF CARE
Problem: Falls - Risk of:  Goal: Will remain free from falls  Description: Will remain free from falls  Outcome: Met This Shift  Goal: Absence of physical injury  Description: Absence of physical injury  Outcome: Met This Shift     Problem: Pain:  Goal: Pain level will decrease  Description: Pain level will decrease  Outcome: Met This Shift     Problem: Pain:  Goal: Pain level will decrease  Description: Pain level will decrease  Outcome: Met This Shift  Goal: Control of acute pain  Description: Control of acute pain  Outcome: Met This Shift  Goal: Control of chronic pain  Description: Control of chronic pain  Outcome: Met This Shift

## 2021-03-30 NOTE — PROGRESS NOTES
Right foot slightly swollen compared to left foot. Charge nurse notified.   Electronically signed by Dulce Myers RN on 3/30/2021 at 2:38 PM

## 2021-03-30 NOTE — PROGRESS NOTES
Physical Therapy Treatment Note    Room #:  0421/0421-01  Patient Name: Sung Gottron  YOB: 1955  MRN: [id number]    Referring Provider:   Gustavo Hernandez MD      Date of Service: 3/30/2021    Evaluating Physical Therapist: Jake Daly, PT #[id number]       Diagnosis:   SBO (small bowel obstruction) ([address] [K56.609]   Loss of Consciousness while sitting and having bowel movement, no injuries/did not fall, wife was holding him      Patient Active Problem List   Diagnosis    Malnutrition ([address]    Multiple sclerosis ([address]    Bilateral pulmonary embolism ([address]    Crohn's disease ([address]    COPD (chronic obstructive pulmonary disease) ([address]    Tobacco abuse    Hypertension    Hyperlipidemia    Stage 3b chronic kidney disease    Hyperkalemia    Leukocytosis    Small bowel obstruction ([address]    HCAP (healthcare-associated pneumonia)    Acute on chronic renal failure ([address]    Sepsis ([address]    SBO (small bowel obstruction) ([address]    History of pulmonary embolism    Acute on chronic renal insufficiency    Ischemic cardiomyopathy        Tentative placement recommendation: Subacute rehab    Equipment recommendation:  To be determined      Prior Level of Function: Patient ambulated with rollator    Rehab Potential: good    for baseline    Past medical history:   Past Medical History:   Diagnosis Date    COPD (chronic obstructive pulmonary disease) ([address]     Crohn's disease ([address]     Hx of blood clots 2012    liver, lung    Hyperlipidemia     Hypertension     Multiple sclerosis ([address]     Stage 3b chronic kidney disease 1/29/2021     Past Surgical History:   Procedure Laterality Date    APPENDECTOMY      CHOLECYSTECTOMY      COLONOSCOPY      ENDOSCOPY, COLON, DIAGNOSTIC         Precautions:  , falls and contact isolation ,      SUBJECTIVE:    Social history: Patient lives with spouse   in a ranch home  with 1 step  to enter with Rail  Tub shower grab bars    Equipment owned: Wheelchair, [place] [employer]any Awan, Shower chair and O2,       AM-PAC Basic Mobility        AM-[facility] Mobility Inpatient   How much difficulty turning over in bed?: A Little  How much difficulty sitting down on / standing up from a chair with arms?: A Little  How much difficulty moving from lying on back to sitting on side of bed?: A Little  How much help from another person moving to and from a bed to a chair?: A Lot  How much help from another person needed to walk in hospital room?: A Lot  How much help from another person for climbing 3-5 steps with a railing?: A Lot  AM-PAC Inpatient Mobility Raw Score : 15  AM-PAC Inpatient T-Scale Score : 39.45  Mobility Inpatient CMS 0-100% Score: 57.7  Mobility Inpatient CMS G-Code Modifier : CK    Nursing cleared patient for PT treatment. OBJECTIVE;   Initial Evaluation  Date: 3/29/2021 Treatment Date:  3/30/2021     Short Term/ Long Term   Goals   Was pt agreeable to Eval/treatment? Yes   yes To be met in 3 days   Pain level   10/10  Chronic left side pain   Recently medicated na    Bed Mobility    Rolling: Supervision     Supine to sit: Minimal assist of 1    Sit to supine: Minimal assist of 1    Scooting: Minimal assist of 1   Rolling: Minimal assist of 1   Supine to sit: Minimal assist of 1   Sit to supine: Minimal assist of 1   Scooting: Minimal assist of 1    Rolling: Independent    Supine to sit:  Independent    Sit to supine: Independent    Scooting: Independent     Transfers Sit to stand: Minimal assist of 1 bed elevated, Cues for hand placement and safety  Sit to stand: Minimal assist of 1      Sit to stand: Independent     Ambulation    4 side steps using  chair back with Minimal assist of 1     2 x 25 feet using  wheeled walker with Minimal assist of 1   c/o fatigue    40 feet using  wheeled walker with Independent    Stair negotiation: ascended and descended   Not assessed            ROM Within functional limits        Strength BUE:  refer to OT eval  RLE:  3+/5  LLE:  3+/5 Increase strength in affected mm groups by 1/3 grade   Balance Sitting EOB:  good -  Dynamic Standing:  fair   Sitting EOB: good   Dynamic Standing: fair    Sitting EOB:  good    Dynamic Standing: fair + wheeled walker      Patient is Alert & Oriented x person, place, time and situation and follows directions    Sensation:  Patient  denies numbness and tingling     Edema:  no    Endurance: fair       Patient education  Patient educated on role of Physical Therapy, risks of immobility, safety and plan of care,  importance of mobility while in hospital  and positioning for skin integrity and comfort      Patient response to education:   Pt verbalized understanding Pt demonstrated skill Pt requires further education in this area   Yes Partial Yes      Treatment:  Patient practiced and was instructed/facilitated in the following treatment: Patient assisted to edge of bed  Sat edge of bed 10 minutes with Supervision  to increase dynamic sitting balance and activity tolerance. stood and ambulated in guardado. Returned to edge of bed and performed exercises. Therapeutic Exercises:  ankle pumps, long arc quad and seated marching x 10 reps. At end of session, patient in bed with alarm call light and phone within reach,   all lines and tubes intact, nursing notified. Patient would benefit from continued skilled Physical Therapy to improve functional independence and quality of life.           Patient's/ family goals   home if able      ASSESSMENT: Patient exhibits decreased strength, balance, endurance and pain chronic left side impairing functional mobility, transfers, gait , gait distance and tolerance to activity       Plan of Care:     -Bed Mobility: Lower extremity exercises   -Sitting Balance: Incorporate reaching activities to activate trunk muscles   -Standing Balance: Perform strengthening exercises in standing to promote motor control with or without upper extremity support   -Transfers: Provide instruction on proper hand and foot position for adequate transfer of weight onto lower extremities and use of gait device  -Gait: Gait training and Standing activities to improve: base of support, weight shift, weight bearing    -Endurance: Utilize Supervised activities to increase level of endurance to allow for safe functional mobility including transfers and gait     Patient and or family understand(s) diagnosis, prognosis, and plan of care. Frequency of treatments: Patient will be seen  daily.        Time in  1015  Time out  1038    Total Treatment Time  23 minutes  CPT codes:    Therapeutic activities (50662)   13 minutes  1 unit(s)  Therapeutic exercises (43564)   10 minutes  1 unit(s)    Juany Goodson PTA [facility], [place]#[id number]

## 2021-03-30 NOTE — PROGRESS NOTES
Nephrology Progress Note  Patient's Name: Paradise Navarro  9:40 AM  3/30/2021    Nephrologist: Isaiah Adler    Reason for Consult: NEERAJ  Requesting Physician: Nader Mandujano MD    Chief Complaint:  Syncope    History Obtained From:  patient and past medical records    History of Present Ilness:    Paradise Navarro is a 63 y.o. male with prior history of CKD G3B with a baseline serum cr 1.6-1.8mg/dl and an e-GFR=41-44ml/min with a Hx of  Crohn's Disease. Pt presented to the ED 3/27/21 after having a syncopal event x 2 with one  while taking a shower. He has increased diarrhea PTA. Pt in the ED had a BP as low as 72/52. CT Scan showed a high grade SBO. As an out pt, he was on lisinopril 5mg QD. He states minimal  abd pain at this time    3/30/21: Pt awake alert and states he feels better, denies abd pain    Past Medical History:   Diagnosis Date    [facility]     [facility]'s disease [facility]     Hx of blood clots 2012    liver, lung    Hyperlipidemia     Hypertension     Multiple sclerosis [facility]     Stage 3b chronic kidney disease 1/29/2021       Past Surgical History:   Procedure Laterality Date    APPENDECTOMY      CHOLECYSTECTOMY      COLONOSCOPY      ENDOSCOPY, COLON, DIAGNOSTIC         History reviewed. No pertinent family history. reports that he quit smoking about 2 months ago. His smoking use included cigarettes. He has never used smokeless tobacco. He reports that he does not drink alcohol or use drugs.     Allergies:  Neurontin [gabapentin] and Lyrica [pregabalin]    Current Medications:    sodium bicarbonate 150 mEq in dextrose 5 % 1000 mL infusion, Continuous  methylPREDNISolone sodium (SOLU-MEDROL) injection 40 mg, Daily  morphine (PF) injection 2 mg, Q2H PRN    Or  morphine injection 4 mg, Q2H PRN  [Held by provider] apixaban (ELIQUIS) tablet 5 mg, See Admin Instructions  [Held by provider] aspirin chewable tablet 81 mg, Daily  [Held by provider] atorvastatin (LIPITOR) tablet 80 mg, Nightly  [Held by provider] baclofen (LIORESAL) tablet 10 mg, BID  [Held by provider] lisinopril (PRINIVIL;ZESTRIL) tablet 5 mg, Daily  metoprolol succinate (TOPROL XL) extended release tablet 50 mg, Daily  [Held by provider] mirtazapine (REMERON) tablet 15 mg, Nightly  [Held by provider] oxybutynin (DITROPAN) tablet 5 mg, BID  [Held by provider] oxyCODONE (ROXICODONE) immediate release tablet 10 mg, Q4H  pantoprazole (PROTONIX) injection 40 mg, Daily    And  sodium chloride (PF) 0.9 % injection 10 mL, Daily  heparin (porcine) injection 5,000 Units, 3 times per day  ondansetron (ZOFRAN) injection 4 mg, Q6H PRN        Review of Systems:   Pertinent items are noted in HPI.     Physical exam:   Constitutional:  Elderly male in NAD  Vitals:   VITALS:  /60   Pulse 75   Temp 97.6 °F (36.4 °C) (Oral)   Resp 14   Ht 5' (1.524 m)   Wt 121 lb (54.9 kg)   SpO2 98%   BMI 23.63 kg/m²   24HR INTAKE/OUTPUT:      Intake/Output Summary (Last 24 hours) at 3/30/2021 0940  Last data filed at 3/30/2021 6771  Gross per 24 hour   Intake 1500 ml   Output 825 ml   Net 675 ml     URINARY CATHETER OUTPUT (Stacy):     DRAIN/TUBE OUTPUT:     VENT SETTINGS:  Vent Information  SpO2: 98 %  Additional Respiratory  Assessments  Pulse: 75  Resp: 14  SpO2: 98 %    Skin: no rash, turgor wnl  Heent:  eomi, mmm  Neck: no bruits or jvd noted  Cardiovascular:  S1, S2 without m/r/g  Respiratory: CTA B without w/r/r  Abdomen:  +bs, soft, nt, nd  Ext: (-) bilat lower extremity edema  Psychiatric: mood and affect appropriate  Musculoskeletal:  Rom, muscular strength intact    Data:   Labs:  CBC:   Lab Results   Component Value Date    WBC 14.7 03/30/2021    RBC 3.35 03/30/2021    HGB 9.2 03/30/2021    HCT 30.8 03/30/2021    MCV 91.9 03/30/2021    MCH 27.5 03/30/2021    MCHC 29.9 03/30/2021    RDW 17.8 03/30/2021     03/30/2021    MPV 10.0 03/30/2021     CBC with Differential:    Lab Results   Component Value Date    WBC 14.7 03/30/2021    RBC 3.35 03/30/2021    HGB 9.2 03/30/2021    HCT 30.8 03/30/2021     03/30/2021    MCV 91.9 03/30/2021    MCH 27.5 03/30/2021    MCHC 29.9 03/30/2021    RDW 17.8 03/30/2021    SEGSPCT 62 07/11/2013    METASPCT 1.0 03/27/2021    LYMPHOPCT 7.0 03/27/2021    MONOPCT 11.0 03/27/2021    MYELOPCT 2.0 03/27/2021    BASOPCT 0.0 03/27/2021    MONOSABS 1.50 03/27/2021    LYMPHSABS 0.95 03/27/2021    EOSABS 0.00 03/27/2021    BASOSABS 0.00 03/27/2021     Hemoglobin/Hematocrit:    Lab Results   Component Value Date    HGB 9.2 03/30/2021    HCT 30.8 03/30/2021     CMP:    Lab Results   Component Value Date     03/30/2021    K 4.3 03/30/2021    K 5.0 03/27/2021     03/30/2021    CO2 14 03/30/2021    BUN 60 03/30/2021    CREATININE 2.7 03/30/2021    GFRAA 29 03/30/2021    LABGLOM 24 03/30/2021    GLUCOSE 94 03/30/2021    PROT 4.5 03/30/2021    LABALBU 2.5 03/30/2021    CALCIUM 7.5 03/30/2021    BILITOT 0.3 03/30/2021    ALKPHOS 91 03/30/2021    AST 13 03/30/2021    ALT 10 03/30/2021     BMP:    Lab Results   Component Value Date     03/30/2021    K 4.3 03/30/2021    K 5.0 03/27/2021     03/30/2021    CO2 14 03/30/2021    BUN 60 03/30/2021    LABALBU 2.5 03/30/2021    CREATININE 2.7 03/30/2021    CALCIUM 7.5 03/30/2021    GFRAA 29 03/30/2021    LABGLOM 24 03/30/2021    GLUCOSE 94 03/30/2021     BUN/Creatinine:    Lab Results   Component Value Date    BUN 60 03/30/2021    CREATININE 2.7 03/30/2021     Hepatic Function Panel:    Lab Results   Component Value Date    ALKPHOS 91 03/30/2021    ALT 10 03/30/2021    AST 13 03/30/2021    PROT 4.5 03/30/2021    BILITOT 0.3 03/30/2021    LABALBU 2.5 03/30/2021     Albumin:    Lab Results   Component Value Date    LABALBU 2.5 03/30/2021     Calcium:    Lab Results   Component Value Date    CALCIUM 7.5 03/30/2021     Ionized Calcium:  No results found for: IONCA  Magnesium:    Lab Results   Component Value Date    MG 2.0 03/30/2021     Phosphorus:    Lab Results   Component Value Date    PHOS 4.9 03/30/2021     LDH:  No results found for: LDH  Uric Acid:  No results found for: LABURIC, URICACID  PT/INR:    Lab Results   Component Value Date    PROTIME 31.4 03/27/2021    INR 2.7 03/27/2021     PTT:    Lab Results   Component Value Date    APTT 31.8 03/27/2021   [APTT}  Troponin:    Lab Results   Component Value Date    TROPONINI 0.03 03/27/2021     U/A:  No results found for: NITRITE, COLORU, PROTEINU, PHUR, LABCAST, WBCUA, RBCUA, MUCUS, TRICHOMONAS, YEAST, BACTERIA, CLARITYU, SPECGRAV, LEUKOCYTESUR, UROBILINOGEN, BILIRUBINUR, BLOODU, GLUCOSEU, AMORPHOUS  ABG:    Lab Results   Component Value Date    PH 7.443 02/07/2021    PCO2 26.7 02/07/2021    PO2 77.4 02/07/2021    HCO3 17.9 02/07/2021    BE -4.9 02/07/2021    O2SAT 95.0 02/07/2021     HgBA1c:  No results found for: LABA1C  Microalbumen/Creatinine ratio:  No components found for: RUCREAT  FLP:    Lab Results   Component Value Date    TRIG 173 09/25/2020    HDL 46 09/25/2020    LDLCALC 125 09/25/2020    LABVLDL 35 09/25/2020     TSH:    Lab Results   Component Value Date    TSH 0.788 09/25/2020     VITAMIN B12: No components found for: B12  FOLATE:    Lab Results   Component Value Date    FOLATE 7.4 03/29/2021     Iron Saturation:  No components found for: PERCENTFE  FERRITIN:    Lab Results   Component Value Date    FERRITIN 724 03/29/2021     AMYLASE:  No results found for: AMYLASE  LIPASE:  No results found for: LIPASE  Fibrinogen Level:  No components found for: FIB  Urine Toxicology:  No components found for: IAMMENTA, IBARBIT, IBENZO, ICOCAINE, IMARTHC, IOPIATES, IPHENCYC  24 Hour Urine for Protein:  No components found for: RAWUPRO, UHRS3, AEUC78UF, UTV3  24 Hour Urine for Creatinine Clearance:  No components found for: CREAT4, UHRS10, UTV10  PSA:   Lab Results   Component Value Date    PSA 3.14 09/25/2020        Imaging:  EXAMINATION:   CT OF THE ABDOMEN AND PELVIS WITHOUT CONTRAST 3/27/2021 1:22 pm       TECHNIQUE:   CT of the abdomen and pelvis was performed without the administration of   intravenous contrast. Multiplanar reformatted images are provided for review. Dose modulation, iterative reconstruction, and/or weight based adjustment of   the mA/kV was utilized to reduce the radiation dose to as low as reasonably   achievable.       COMPARISON:   02/07/2021       HISTORY:   ORDERING SYSTEM PROVIDED HISTORY: nausea, diarrhea, hx crohn's   TECHNOLOGIST PROVIDED HISTORY:   Reason for exam:->nausea, diarrhea, hx crohn's   Additional Contrast?->None   Decision Support Exception->Emergency Medical Condition (MA)       FINDINGS:   Lower Chest: Right lower lobe consolidation has decreased leaving residual   pleural scarring and subsegmental atelectasis.       Organs: The adrenal glands are normal.       Splenic morphology and attenuation/echotexture is normal.       The pancreas has normal morphology and parenchymal attenuation aside from   dystrophic calcifications suggestive of prior chronic pancreatitis.       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  There are multiple simple right renal cysts. The largest measures 5.8 cm a 10 mm  simple cyst lies in the liver.  ,   unchanged. The remaining liver parenchyma appears normal. Aminah Chen has been a   previous cholecystectomy.       GI/Bowel:  There is moderate to marked dilatation of the upstream small   intestine.  Dilated intestine extends 2 point 4 cm upstream from a small   bowel anastomosis.  The 4 cm segment has thickened walls.  The findings   suggest moderate to high-grade obstruction secondary to focal   enteritis/stricture.  The remainder of the GI tract has normal course,   caliber, and morphology.       Pelvis:  The bladder has normal morphology and wall thickness.       The prostate is mildly enlarged, the seminal vesicles appear normal.       Peritoneum/Retroperitoneum: There are no signs of free intraperitoneal air /   gas.  There are no signs of free intraperitoneal fluid.  There are no signs   of pelvic or retro peritoneal lymphadenopathy. Vasculature: The abdominal   aorta, IVC and their branches are normal aside from mild aortic   atherosclerotic vascular disease.       Bones/Soft Tissues:  The hips, bony pelvis, and lumbar spine appear   unremarkable. There is a benign bone island in the left iliac wing.  The   bones are osteopenic.  The abdominal wall is intact.           Impression   Moderate to high-grade mechanical small bleb bowel obstruction.  Obstruction   appears to be due to a thickened segment just upstream from the anastomosis   likely reflecting a stricture are focal enteritis.  Alternatively, the   obstruction could be and the anastomosis itself.       Unchanged right renal cyst, left renal atresia and sequela of chronic   pancreatitis.       Improving right lower lobe aeration. Assessment  1-Stage II NEERAJ- presumed sec to decreased effective renal perfusion from the hypotension exacerbated by the ACEI and the 3rd spacing of fluid into the bowel due to SBO  Cr trending down  PLAN:1. Continue to hold ACEI    2-G3B with a baseline serum cr 1.6-1.8mg/dl and an e-GFR=41-44ml/min with a L Atrophic Kidney possibly a state of a  Unilat Functioning kidney  PLAN:1 Once the cr trends back to baseline would check a NM Renal Flow Scan to evaluate function of the L kidney    3-Hypocalcemia in the setting of Hypoalbuminemia and Sec HPTH of Renal Origin with Unspecified Vit D Def  Vit D 27  PLAN:1.Start Vit D supplement  2. Follow Ca++ and PO4    4- Anemia in CKD  HgB below goal >/=10  Ferritin 724, Iron Sat 58%, Folate 7.4, Vit B 12 208  PLAN:1. Will dose with parenteral vit B12 today and then start oral supplement    5- HTN with CKD I-IV with Hypotension on admission now improved  PLAN:1. Follow BP off the ACEI    6-SBO  PLAN:1. Defer to Gen.  Surgery    7-Hypokalemia with Hypomagnesemia  Mg++ 2.0 post supplement, K+ 4.3  PLAN:1. Follow K+ and MG++    8- Anion Gap Met Acidosis-worsening Acidosis component due to Cl(-) expansion and the GI losses  PLAN:1. Change to IV D5 W with 150meq HCO3/L    Thank you  for allowing us to participate in care of Brett Martell  9:39 AM  3/30/2021

## 2021-03-30 NOTE — PROGRESS NOTES
Labs/Imaging/Diagnostics    Labs:  CBC:  Recent Labs     03/27/21  1214 03/28/21  0721 03/29/21  0520   WBC 13.6* 11.5 12.0*   RBC 4.76 4.52 3.89   HGB 13.1 12.3* 10.5*   HCT 42.9 41.3 36.2*   MCV 90.1 91.4 93.1   RDW 18.3* 18.4* 18.0*    460* 423     CHEMISTRIES:  Recent Labs     03/28/21  0721 03/29/21  0520 03/29/21  1556    143 143   K 3.8 3.4* 4.0    111* 112*   CO2 22 17* 13*   BUN 65* 67* 61*   CREATININE 3.8* 3.3* 2.9*   GLUCOSE 174* 84 70*   PHOS  --  4.2  --    MG  --  1.2* 2.2     PT/INR:  Recent Labs     03/27/21  1214   PROTIME 31.4*   INR 2.7     APTT:  Recent Labs     03/27/21  1214   APTT 31.8     LIVER PROFILE:  Recent Labs     03/27/21  1214 03/28/21  0721 03/29/21  0520   AST 23 16 15   ALT 16 15 12   BILITOT 0.4 0.3 0.3   ALKPHOS 133* 125 101       Imaging Last 24 Hours:  Xr Acute Abd Series Chest 1 Vw    Result Date: 3/28/2021  EXAMINATION: TWO XRAY VIEWS OF THE ABDOMEN AND SINGLE  XRAY VIEW OF THE CHEST 3/28/2021 9:45 am COMPARISON: 03/27/2021 HISTORY: ORDERING SYSTEM PROVIDED HISTORY: sbo TECHNOLOGIST PROVIDED HISTORY: Reason for exam:->sbo FINDINGS: There is moderate, nonspecific elevation of the right diaphragm. Volume loss and scarring in the lower right thorax appears unchanged. The left lung is normally aerated. The heart and mediastinum are normal for AP technique. There is moderate dilation of small bowel loops, consistent with mechanical small bowel obstruction. The sigmoid colon is moderately distended. There are no signs of organomegaly or suspicious calcifications. Persistent small bowel dilatation consistent with mechanical obstruction. Chronic right basilar scar/atelectasis.      Us Retroperitoneal Complete    Result Date: 3/28/2021  EXAMINATION: RETROPERITONEAL ULTRASOUND OF THE KIDNEYS AND URINARY BLADDER 3/28/2021 COMPARISON: None HISTORY: ORDERING SYSTEM PROVIDED HISTORY: renal, acute renal failure TECHNOLOGIST PROVIDED HISTORY: Reason for exam:->renal Reason for exam:->NEERAJ What reading provider will be dictating this exam?->CRC FINDINGS: Kidneys: The right kidney measures  10.8 cm in length and the left kidney measures 6.6 cm in length. Kidneys demonstrate normal cortical echogenicity. No evidence of hydronephrosis or intrarenal stones. 5.8 cm cyst mid zone right kidney. 2.8 cm cyst inferior pole right kidney. Bladder: Unremarkable appearance of the bladder. Only a right ureteral jet was visualized. 5.8 cm cyst mid zone right kidney. 2.8 cm cyst inferior pole right kidney. Left renal atrophy. Assessment//Plan           Hospital Problems           Last Modified POA    Small bowel obstruction ([address] 3/28/2021 Yes    SBO (small bowel obstruction) ([address] 3/27/2021 Yes    History of pulmonary embolism (Chronic) 3/28/2021 Yes    Acute on chronic renal insufficiency 3/28/2021 Yes    Overview Signed 3/28/2021  6:06 PM by Estephania Heredia MD     Stage  3b- 11 4         Ischemic cardiomyopathy (Chronic) 3/28/2021 Yes        Assessment:  (Problem List as of 3/29/2021 Reviewed: 2/17/2021 11:11 AM by PALMA Lake - CNP    History of pulmonary embolism    Ischemic cardiomyopathy    Malnutrition ([address]    Multiple sclerosis ([address]    Bilateral pulmonary embolism ([address]    Crohn's disease ([address]    COPD (chronic obstructive pulmonary disease) ([address]    Tobacco abuse    Hypertension    Hyperlipidemia    Stage 3b chronic kidney disease    Hyperkalemia    Leukocytosis    Small bowel obstruction (HCC)    HCAP (healthcare-associated pneumonia)    Acute on chronic renal failure (HCC)    Sepsis (HCC)    SBO (small bowel obstruction) ([address]    Acute on chronic renal insufficiency    ). Plan:   (Keep npo).        Electronically signed by Aleksandar Dukes MD on 3/29/21 at 10:47 PM EDT

## 2021-03-30 NOTE — PROGRESS NOTES
GENERAL SURGERY  DAILY PROGRESS NOTE  3/30/2021    Chief Complaint   Patient presents with    Loss of Consciousness     while sitting and having bowel movement, no injuries/did not fall, wife was holding him    Diarrhea     hx crohns       Subjective:  Feels well. Denies N/V. Had a few BMs yesterday    Objective:  /60   Pulse 75   Temp 97.6 °F (36.4 °C) (Oral)   Resp 14   Ht 5' (1.524 m)   Wt 121 lb (54.9 kg)   SpO2 98%   BMI 23.63 kg/m²     GENERAL:  Laying in bed, awake, alert, cooperative, no apparent distress  LUNGS:  No increased work of breathing  CARDIOVASCULAR:  RR  ABDOMEN:  Soft, non-tender, non-distended  EXTREMITIES: No edema or swelling  SKIN: Warm and dry    Assessment/Plan:  63 y.o. male admitted with diarrhea, CT suggestive of SBO, Crohn's flare    Ok for liquid diet from surgical perspective  N/V, abdominal pain- resolved   Cr improved with IVF resuscitation, continue to monitor  GI consult appreciated    Electronically signed by Keith Verma DO on 3/30/2021 at 12:06 PM    General Surgery Progress Note  [facility]    Patient's Name/Date of Birth: Nataly Stacy / 1955    Date: March 30, 2021     Surgeon: Randy Gamez MD    Chief Complaint: Partial small bowel obstruction    Patient Active Problem List   Diagnosis    Malnutrition ([address]    Multiple sclerosis ([address]    Bilateral pulmonary embolism ([address]    Crohn's disease ([address]    COPD (chronic obstructive pulmonary disease) ([address]    Tobacco abuse    Hypertension    Hyperlipidemia    Stage 3b chronic kidney disease    Hyperkalemia    Leukocytosis    Small bowel obstruction ([address]    HCAP (healthcare-associated pneumonia)    Acute on chronic renal failure ([address]    Sepsis ([address]    SBO (small bowel obstruction) ([address]    History of pulmonary embolism    Acute on chronic renal insufficiency    Ischemic cardiomyopathy       Subjective: Continues to have bowel movements. No nausea or vomiting.   Wants to eat.    Objective:  /60   Pulse 75   Temp 97.6 °F (36.4 °C) (Oral)   Resp 14   Ht 5' (1.524 m)   Wt 121 lb (54.9 kg)   SpO2 98%   BMI 23.63 kg/m²   Labs:  Recent Labs     03/28/21  0721 03/29/21  0520 03/30/21  0614   WBC 11.5 12.0* 14.7*   HGB 12.3* 10.5* 9.2*   HCT 41.3 36.2* 30.8*     Lab Results   Component Value Date    CREATININE 2.7 (H) 03/30/2021    BUN 60 (H) 03/30/2021     03/30/2021    K 4.3 03/30/2021     (H) 03/30/2021    CO2 14 (L) 03/30/2021     No results for input(s): LIPASE, AMYLASE in the last 72 hours.   General appearance:  NAD  Head: NCAT, PERRLA, EOMI, red conjunctiva  Neck: supple, no masses  Lungs: CTAB, equal chest rise bilateral  Heart: Reg rate  Abdomen: soft, mildly distended, nontender  Skin; no lesions  Gu: no cva tenderness  Extremities: extremities normal, atraumatic, no cyanosis or edema      Assessment/Plan:  Joleen Gutierrez is a 63 y.o. male partial small bowel obstruction secondary to stricture from Crohn's disease, history of small bowel resection    Appreciate GI input  Diet as tolerated  No plan for surgical intervention at this time  We will follow    Omi Bowens MD  3/30/2021  4:00 PM

## 2021-03-30 NOTE — PROGRESS NOTES
PROGRESS NOTE    By Shelia Ponce D.O GI Fellow    [facility]  Dr. Vida Fox MD, Dr. Amelia Mireles MD, Dr Roshni Fabian, Dr. Deneen Smith MD, Dr. Zelia Cranker, [facility]  63 y.o.  male    SUBJECTIVE:  Patient seen and examined today at bedside. No new complaints. Passing flatus with minimal BMs. Denies nausea and vomiting. Still feels bloated and distended. OBJECTIVE:    /60   Pulse 75   Temp 97.6 °F (36.4 °C) (Oral)   Resp 14   Ht 5' (1.524 m)   Wt 121 lb (54.9 kg)   SpO2 98%   BMI 23.63 kg/m²     Gen: NAD, AAO x 3  HEENT:PEERL, no icterus  Heart: RRR, no M/R/G  Lungs: CTAB  Abd.: soft, NT, mild distention, hypoactive bowel sounds, no G/R/R, no HSM  Extr.: no C/C/E, no bruising         Lab Results   Component Value Date    WBC 14.7 03/30/2021    WBC 12.0 03/29/2021    WBC 11.5 03/28/2021    HGB 9.2 03/30/2021    HGB 10.5 03/29/2021    HGB 12.3 03/28/2021    HCT 30.8 03/30/2021    MCV 91.9 03/30/2021    RDW 17.8 03/30/2021     03/30/2021     03/29/2021     03/28/2021     Lab Results   Component Value Date     03/30/2021    K 4.3 03/30/2021    K 5.0 03/27/2021     03/30/2021    CO2 14 03/30/2021    BUN 60 03/30/2021    CREATININE 2.7 03/30/2021    CALCIUM 7.5 03/30/2021    PROT 4.5 03/30/2021    LABALBU 2.5 03/30/2021    BILITOT 0.3 03/30/2021    BILITOT 0.3 03/29/2021    BILITOT 0.3 03/28/2021    ALKPHOS 91 03/30/2021    ALKPHOS 101 03/29/2021    ALKPHOS 125 03/28/2021    AST 13 03/30/2021    AST 15 03/29/2021    AST 16 03/28/2021    ALT 10 03/30/2021    ALT 12 03/29/2021    ALT 15 03/28/2021     No results found for: LIPASE  No results found for: AMYLASE      ASSESSMENT/PLAN:       1.  SBO  - likely 2/2 inflammatory Crohn's stricture proximal to anastomosis, cannot r/o fibrostenotic or adhesions   - overall improving, still with air-fluid levels on Xray although air has progressed to the left colon  - patient continues to refuse NG tube placement  - continue solu-medrol 40mg daily  - maintain NPO, IV fluids  - follow electrolytes daily and replete as needed  - 2view abdomen tomorrow      2. Ileocolonic Crohn's Disease, severe, stricturing  - glucocorticoids as above  - recommend Hep B/TB quant and outpatient biologic therapy       Sayda Isaacs DO  3/30/2021  10:24 AM    Pt seen and independently examined. Pertinent notes and lab work reviewed. Monitor reviewed showing sinus rhythm. D/w Dr. Echavarria Bertrand with physical exam and A&P. Discussed with patient - all questions answered - agreeable with the plan as delineated.     Randall Cool MD  3/30/2021  12:02 PM

## 2021-03-30 NOTE — PROGRESS NOTES
Physician Progress Note      Dina Wheeler  [facility] #:                  [id number]  :                       1955  ADMIT DATE:       3/27/2021 11:10 AM  DISCH DATE:  RESPONDING  PROVIDER #:        Sandra Nye MD          QUERY TEXT:    Pt admitted with small bowel obstruction. Pt noted to have documentation from   surgery of Sepsis. If possible, please document in the progress notes and   discharge summary if you are evaluating and /or treating any of the following: The medical record reflects the following:  Risk Factors: per documentation diagnosis of small bowel obstruction, crohn's  Clinical Indicators: admission Wbc 13.6, lactic acid 2.1, repeat 1.8 and 2.4,   per surgery consult   sepsis, Recent PE, elev trop, nausea, vomiting, Crohns,   PSBO, coagulopathy,  per H and P Acute small bowel obstruction  Treatment: IVF boluses, IVF, lab work monitoring    Thank you  Hall Cushing RN [facility]  [phone]  Options provided:  -- Sepsis, present on admission  -- Sepsis, not present on admission  -- No Sepsis  -- Sepsis was ruled out  -- Other - I will add my own diagnosis  -- Disagree - Not applicable / Not valid  -- Disagree - Clinically unable to determine / Unknown  -- Refer to Clinical Documentation Reviewer    PROVIDER RESPONSE TEXT:    This patient has sepsis which was present on admission.     Query created by: Myrene Medicine on 3/29/2021 11:19 AM      Electronically signed by:  Sandra Nye MD 3/30/2021 8:12 AM

## 2021-03-31 NOTE — PROGRESS NOTES
Nephrology Progress Note  Patient's Name: Eunice Royal  11:50 AM  3/31/2021    Nephrologist: Dean Atkins    Reason for Consult: NEERAJ  Requesting Physician: Elsa Belle MD    Chief Complaint:  Syncope    History Obtained From:  patient and past medical records    History of Present Ilness:    Eunice Royal is a 63 y.o. male with prior history of CKD G3B with a baseline serum cr 1.6-1.8mg/dl and an e-GFR=41-44ml/min with a Hx of  Crohn's Disease. Pt presented to the ED 3/27/21 after having a syncopal event x 2 with one  while taking a shower. He has increased diarrhea PTA. Pt in the ED had a BP as low as 72/52. CT Scan showed a high grade SBO. As an out pt, he was on lisinopril 5mg QD. He states minimal  abd pain at this time    3/31/21: Pt awake alert and states he feels better, denies abd pain    Past Medical History:   Diagnosis Date    [facility]     Cro[facility]'s disease [facility]     Hx of blood clots 2012    liver, lung    Hyperlipidemia     Hypertension     Multiple sclerosis ([facility]     Stage 3b chronic kidney disease 1/29/2021       Past Surgical History:   Procedure Laterality Date    APPENDECTOMY      CHOLECYSTECTOMY      COLONOSCOPY      ENDOSCOPY, COLON, DIAGNOSTIC         History reviewed. No pertinent family history. reports that he quit smoking about 2 months ago. His smoking use included cigarettes. He has never used smokeless tobacco. He reports that he does not drink alcohol or use drugs.     Allergies:  Neurontin [gabapentin] and Lyrica [pregabalin]    Current Medications:    sodium bicarbonate 150 mEq in dextrose 5 % 1000 mL infusion, Continuous  Vitamin D (CHOLECALCIFEROL) tablet 2,000 Units, Daily  vitamin B-12 (CYANOCOBALAMIN) tablet 1,000 mcg, Daily  heparin (porcine) injection 4,390 Units, PRN  heparin (porcine) injection 2,200 Units, PRN  heparin 25,000 units in dextrose 5% 250 mL (premix) infusion, Continuous  methylPREDNISolone sodium (SOLU-MEDROL) injection 40 mg, Daily  morphine (PF) injection 2 mg, Q2H PRN    Or  morphine injection 4 mg, Q2H PRN  [Held by provider] apixaban (ELIQUIS) tablet 5 mg, See Admin Instructions  [Held by provider] aspirin chewable tablet 81 mg, Daily  [Held by provider] atorvastatin (LIPITOR) tablet 80 mg, Nightly  [Held by provider] baclofen (LIORESAL) tablet 10 mg, BID  [Held by provider] lisinopril (PRINIVIL;ZESTRIL) tablet 5 mg, Daily  metoprolol succinate (TOPROL XL) extended release tablet 50 mg, Daily  [Held by provider] mirtazapine (REMERON) tablet 15 mg, Nightly  [Held by provider] oxybutynin (DITROPAN) tablet 5 mg, BID  [Held by provider] oxyCODONE (ROXICODONE) immediate release tablet 10 mg, Q4H  pantoprazole (PROTONIX) injection 40 mg, Daily    And  sodium chloride (PF) 0.9 % injection 10 mL, Daily  ondansetron (ZOFRAN) injection 4 mg, Q6H PRN        Review of Systems:   Pertinent items are noted in HPI.     Physical exam:   Constitutional:  Elderly male in NAD  Vitals:   VITALS:  /67   Pulse 82   Temp 97.5 °F (36.4 °C) (Infrared)   Resp 16   Ht 5' (1.524 m)   Wt 121 lb (54.9 kg)   SpO2 97%   BMI 23.63 kg/m²   24HR INTAKE/OUTPUT:      Intake/Output Summary (Last 24 hours) at 3/31/2021 1150  Last data filed at 3/31/2021 4167  Gross per 24 hour   Intake 2262 ml   Output 350 ml   Net 1912 ml     URINARY CATHETER OUTPUT (Stacy):     DRAIN/TUBE OUTPUT:     VENT SETTINGS:  Vent Information  SpO2: 97 %  Additional Respiratory  Assessments  Pulse: 82  Resp: 16  SpO2: 97 %    Skin: no rash, turgor wnl  Heent:  eomi, mmm  Neck: no bruits or jvd noted  Cardiovascular:  S1, S2 without m/r/g  Respiratory: CTA B without w/r/r  Abdomen:  +bs, soft, nt, nd  Ext: (-) bilat lower extremity edema  Psychiatric: mood and affect appropriate  Musculoskeletal:  Rom, muscular strength intact    Data:   Labs:  CBC:   Lab Results   Component Value Date    WBC 14.7 03/31/2021    RBC 3.22 03/31/2021    HGB 9.0 Value Date    LABALBU 2.5 03/31/2021     Calcium:    Lab Results   Component Value Date    CALCIUM 7.6 03/31/2021     Ionized Calcium:  No results found for: IONCA  Magnesium:    Lab Results   Component Value Date    MG 1.7 03/31/2021     Phosphorus:    Lab Results   Component Value Date    PHOS 1.8 03/31/2021     LDH:  No results found for: LDH  Uric Acid:  No results found for: LABURIC, URICACID  PT/INR:    Lab Results   Component Value Date    PROTIME 31.4 03/27/2021    INR 2.7 03/27/2021     PTT:    Lab Results   Component Value Date    APTT 154.6 03/31/2021   [APTT}  Troponin:    Lab Results   Component Value Date    TROPONINI 0.03 03/27/2021     U/A:  No results found for: NITRITE, COLORU, PROTEINU, PHUR, LABCAST, WBCUA, RBCUA, MUCUS, TRICHOMONAS, YEAST, BACTERIA, CLARITYU, SPECGRAV, LEUKOCYTESUR, UROBILINOGEN, BILIRUBINUR, BLOODU, GLUCOSEU, AMORPHOUS  ABG:    Lab Results   Component Value Date    PH 7.443 02/07/2021    PCO2 26.7 02/07/2021    PO2 77.4 02/07/2021    HCO3 17.9 02/07/2021    BE -4.9 02/07/2021    O2SAT 95.0 02/07/2021     HgBA1c:  No results found for: LABA1C  Microalbumen/Creatinine ratio:  No components found for: RUCREAT  FLP:    Lab Results   Component Value Date    TRIG 173 09/25/2020    HDL 46 09/25/2020    LDLCALC 125 09/25/2020    LABVLDL 35 09/25/2020     TSH:    Lab Results   Component Value Date    TSH 0.788 09/25/2020     VITAMIN B12: No components found for: B12  FOLATE:    Lab Results   Component Value Date    FOLATE 7.4 03/29/2021     Iron Saturation:  No components found for: PERCENTFE  FERRITIN:    Lab Results   Component Value Date    FERRITIN 724 03/29/2021     AMYLASE:  No results found for: AMYLASE  LIPASE:  No results found for: LIPASE  Fibrinogen Level:  No components found for: FIB  Urine Toxicology:  No components found for: IAMMENTA, IBARBIT, IBENZO, ICOCAINE, IMARTHC, IOPIATES, IPHENCYC  24 Hour Urine for Protein:  No components found for: Perfecto Denilson, LUOH95BH, UTV3  24 Hour Urine for Creatinine Clearance:  No components found for: CREAT4, UHRS10, UTV10  PSA:   Lab Results   Component Value Date    PSA 3.14 09/25/2020        Imaging:  EXAMINATION:   CT OF THE ABDOMEN AND PELVIS WITHOUT CONTRAST 3/27/2021 1:22 pm       TECHNIQUE:   CT of the abdomen and pelvis was performed without the administration of   intravenous contrast. Multiplanar reformatted images are provided for review. Dose modulation, iterative reconstruction, and/or weight based adjustment of   the mA/kV was utilized to reduce the radiation dose to as low as reasonably   achievable.       COMPARISON:   02/07/2021       HISTORY:   ORDERING SYSTEM PROVIDED HISTORY: nausea, diarrhea, hx crohn's   TECHNOLOGIST PROVIDED HISTORY:   Reason for exam:->nausea, diarrhea, hx crohn's   Additional Contrast?->None   Decision Support Exception->Emergency Medical Condition (MA)       FINDINGS:   Lower Chest: Right lower lobe consolidation has decreased leaving residual   pleural scarring and subsegmental atelectasis.       Organs: The adrenal glands are normal.       Splenic morphology and attenuation/echotexture is normal.       The pancreas has normal morphology and parenchymal attenuation aside from   dystrophic calcifications suggestive of prior chronic pancreatitis.       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  There are multiple simple right renal cysts. The largest measures 5.8 cm a 10 mm  simple cyst lies in the liver.  ,   unchanged. The remaining liver parenchyma appears normal. Arneta Lalita has been a   previous cholecystectomy.       GI/Bowel:  There is moderate to marked dilatation of the upstream small   intestine.  Dilated intestine extends 2 point 4 cm upstream from a small   bowel anastomosis.  The 4 cm segment has thickened walls.  The findings   suggest moderate to high-grade obstruction secondary to focal   enteritis/stricture.  The remainder of the GI tract has normal course,   caliber, and morphology.       Pelvis:  The bladder has normal morphology and wall thickness.       The prostate is mildly enlarged, the seminal vesicles appear normal.       Peritoneum/Retroperitoneum: There are no signs of free intraperitoneal air /   gas.  There are no signs of free intraperitoneal fluid.  There are no signs   of pelvic or retro peritoneal lymphadenopathy. Vasculature: The abdominal   aorta, IVC and their branches are normal aside from mild aortic   atherosclerotic vascular disease.       Bones/Soft Tissues:  The hips, bony pelvis, and lumbar spine appear   unremarkable. There is a benign bone island in the left iliac wing.  The   bones are osteopenic.  The abdominal wall is intact.           Impression   Moderate to high-grade mechanical small bleb bowel obstruction.  Obstruction   appears to be due to a thickened segment just upstream from the anastomosis   likely reflecting a stricture are focal enteritis.  Alternatively, the   obstruction could be and the anastomosis itself.       Unchanged right renal cyst, left renal atresia and sequela of chronic   pancreatitis.       Improving right lower lobe aeration. Assessment  1-Stage II NEERAJ- presumed sec to decreased effective renal perfusion from the hypotension exacerbated by the ACEI and the 3rd spacing of fluid into the bowel due to SBO  Cr trending down  PLAN:1. Continue to hold ACEI    2-G3B with a baseline serum cr 1.6-1.8mg/dl and an e-GFR=41-44ml/min with a L Atrophic Kidney possibly a state of a  Unilat Functioning kidney  PLAN:1 Once the cr trends back to baseline would check a NM Renal Flow Scan to evaluate function of the L kidney    3-Hypocalcemia in the setting of Hypoalbuminemia and Sec HPTH of Renal Origin with Unspecified Vit D Def with Hypophosphatemia  Vit D 27  PLAN:1. Continue Vit D supplement  2. Follow Ca++ and PO4  3.  Supplement with IV KPO4    4- Anemia in CKD  HgB below goal >/=10  Ferritin 724, Iron Sat 58%, Folate 7.4, Vit B 12 208  PLAN:1. Continue B12 oral supplement    5- HTN with CKD I-IV with Hypotension on admission now improved  PLAN:1. Follow BP off the ACEI    6-SBO  PLAN:1. Defer to Gen. Surgery    7-Hypokalemia with Hypomagnesemia  K+ 3.1, Mg++ 1.7  PLAN:1.Supplement K+ & Mg++ IV   2.  Follow K+ and MG++    8- Anion Gap Met Acidosis-worsening Acidosis component due to Cl(-) expansion and the GI losses  PLAN:1. Change to IV to LR    Thank you  for allowing us to participate in care of Ruma Martell  11:50 AM  3/31/2021

## 2021-03-31 NOTE — PROGRESS NOTES
GENERAL SURGERY  DAILY PROGRESS NOTE  3/31/2021    Chief Complaint   Patient presents with    Loss of Consciousness     while sitting and having bowel movement, no injuries/did not fall, wife was holding him    Diarrhea     hx crohns       Subjective:  Not feeling great this morning he says because he has been having diarrhea all night. Also noted to have DVT on LE US yesterday and started on heparin drip    Objective:  /67   Pulse 82   Temp 97.5 °F (36.4 °C) (Infrared)   Resp 16   Ht 5' (1.524 m)   Wt 121 lb (54.9 kg)   SpO2 97%   BMI 23.63 kg/m²     GENERAL:  Laying in bed, awake, alert, cooperative, no apparent distress  LUNGS:  No increased work of breathing  CARDIOVASCULAR:  RR  ABDOMEN:  Soft, non-tender, non-distended  EXTREMITIES: No edema or swelling  SKIN: Warm and dry    Assessment/Plan:  63 y.o. male admitted with diarrhea, CT suggestive of SBO, Crohn's flare    Ok for general diet   N/V, abdominal pain- resolved   Cr improved with IVF resuscitation, continue to monitor  GI consult appreciated  [id number] Trisha Benitez for oral anticoagulation per general surgery  No surgical interventions planned  [id number] Trisha Benitez for dc from surgical perspective     Electronically signed by Alexandrea Buck DO on 3/31/2021 at 8:43 AM    As above.

## 2021-03-31 NOTE — PROGRESS NOTES
Progress Note  Date:3/30/2021       Room:0421/0421-01  Patient Name:Davian Zhang     YOB: 1955     Age:65 y.o. Subjective    Subjective:  Symptoms:  Stable. He reports shortness of breath, malaise, weakness and anxiety. No cough, chest pain, headache, chest pressure, anorexia or diarrhea. Diet:  Adequate intake. Activity level: Impaired due to weakness. Pain:  He complains of pain that is moderate. pt is doing ok . Leg swelling and have dvt. Pt had dvt and pe and was on eliquis and was off due to small bowel obstruction. He is on heparin drip now until he is not npo anymore. Review of Systems   Respiratory: Positive for shortness of breath. Negative for cough. Cardiovascular: Negative for chest pain. Gastrointestinal: Negative for anorexia and diarrhea. Neurological: Positive for weakness. Objective         Vitals Last 24 Hours:  TEMPERATURE:  Temp  Av.8 °F (36.6 °C)  Min: 97.6 °F (36.4 °C)  Max: 98.1 °F (36.7 °C)  RESPIRATIONS RANGE: Resp  Av  Min: 14  Max: 18  PULSE OXIMETRY RANGE: SpO2  Av.7 %  Min: 96 %  Max: 98 %  PULSE RANGE: Pulse  Av.3  Min: 74  Max: 75  BLOOD PRESSURE RANGE: Systolic (96NEZ), YDY:471 , Min:114 , THK:517   ; Diastolic (56NLN), FYA:09, Min:60, Max:63    I/O (24Hr): Intake/Output Summary (Last 24 hours) at 3/30/2021 2116  Last data filed at 3/30/2021 1747  Gross per 24 hour   Intake 1206 ml   Output 500 ml   Net 706 ml     Objective:  General Appearance:  Comfortable. Vital signs: (most recent): Blood pressure 121/63, pulse 74, temperature 97.7 °F (36.5 °C), temperature source Oral, resp. rate 16, height 5' (1.524 m), weight 121 lb (54.9 kg), SpO2 96 %. Vital signs are normal.    Output: Producing urine. HEENT: Normal HEENT exam.    Lungs:  Normal respiratory rate. Heart: Regular rhythm. S1 normal and S2 normal.    Abdomen: Abdomen is soft. Bowel sounds are normal.     Extremities: Normal range of motion. Neurological: Patient is alert and oriented to person, place and time. Normal strength. Pupils:  Pupils are equal, round, and reactive to light. Skin:  Warm. Labs/Imaging/Diagnostics    Labs:  CBC:  Recent Labs     03/29/21  0520 03/30/21  0614 03/30/21 2008   WBC 12.0* 14.7* 11.3   RBC 3.89 3.35* 3.24*   HGB 10.5* 9.2* 8.9*   HCT 36.2* 30.8* 29.0*   MCV 93.1 91.9 89.5   RDW 18.0* 17.8* 17.9*    347 316     CHEMISTRIES:  Recent Labs     03/29/21  0520 03/29/21  1556 03/30/21  0614    143 146   K 3.4* 4.0 4.3   * 112* 114*   CO2 17* 13* 14*   BUN 67* 61* 60*   CREATININE 3.3* 2.9* 2.7*   GLUCOSE 84 70* 94   PHOS 4.2  --  4.9*   MG 1.2* 2.2 2.0     PT/INR:No results for input(s): PROTIME, INR in the last 72 hours. APTT:  Recent Labs     03/30/21 2008   APTT 46.0*     LIVER PROFILE:  Recent Labs     03/28/21  0721 03/29/21  0520 03/30/21  0614   AST 16 15 13   ALT 15 12 10   BILITOT 0.3 0.3 0.3   ALKPHOS 125 101 91       Imaging Last 24 Hours:  Us Dup Lower Extremity Right Jai    Result Date: 3/30/2021  EXAMINATION: DUPLEX VENOUS ULTRASOUND OF THE RIGHT LOWER EXTREMITY, 3/30/2021 5:16 pm TECHNIQUE: Duplex ultrasound using B-mode/gray scaled imaging and Doppler spectral analysis and color flow was obtained of the right lower extremity. COMPARISON: None. HISTORY: ORDERING SYSTEM PROVIDED HISTORY: DVT TECHNOLOGIST PROVIDED HISTORY: Reason for exam:->DVT What reading provider will be dictating this exam?->CRC FINDINGS: Echogenic thrombus identified in the proximal femoral vein with extension into the calf veins. Trickle flow is identified in these regions. No thrombus identified in the right common femoral vein in. There is mild superficial soft tissue edema. Near occlusive thrombus in the right lower extremity from the proximal femoral vein through the calf veins. Critical results were called by Dr. Nilson Melton to charge nurse Lauren Rasmussen on 3/30/2021 at 18:33.      Xr Abdomen (2 Views)    Result Date: 3/30/2021  EXAMINATION: TWO XRAY VIEWS OF THE ABDOMEN 3/30/2021 8:23 am COMPARISON: None. HISTORY: ORDERING SYSTEM PROVIDED HISTORY: SBO TECHNOLOGIST PROVIDED HISTORY: Reason for exam:->SBO Reason for exam:->upright, AP and lateral FINDINGS: Air-fluid level appears to be in the colon. Nonspecific bowel gas with some gaseous distention of large and small bowel with air-fluid levels although diminished size of small bowel compared to prior study. Surgical clips now noted in the right lower quadrant. Surgical clips now evident the right lower quadrant. There appears to be gaseous distention of colon. Nonspecific bowel gas pattern with interval decrease in size of small bowel. Assessment//Plan           Hospital Problems           Last Modified POA    Small bowel obstruction ([address] 3/28/2021 Yes    SBO (small bowel obstruction) ([address] 3/27/2021 Yes    History of pulmonary embolism (Chronic) 3/28/2021 Yes    Acute on chronic renal insufficiency 3/28/2021 Yes    Overview Signed 3/28/2021  6:06 PM by Margaret Moore MD     Stage  3b- 11 4         Ischemic cardiomyopathy (Chronic) 3/28/2021 Yes        Assessment:  (Problem List as of 3/30/2021 Reviewed: 2/17/2021 11:11 AM by PALMA Sandhu - CNP    History of pulmonary embolism    Ischemic cardiomyopathy    Malnutrition ([address]    Multiple sclerosis ([address]    Bilateral pulmonary embolism ([address]    Crohn's disease ([address]    COPD (chronic obstructive pulmonary disease) ([address]    Tobacco abuse    Hypertension    Hyperlipidemia    Stage 3b chronic kidney disease    Hyperkalemia    Leukocytosis    Small bowel obstruction (HCC)    HCAP (healthcare-associated pneumonia)    Acute on chronic renal failure (HCC)    Sepsis (HCC)    SBO (small bowel obstruction) (HCC)    Acute on chronic renal insufficiency    dvt/pe). Plan:   (Heparin drip while npo).        Electronically signed by Edilma Villavicencio MD on 3/30/21 at 9:16 PM EDT

## 2021-03-31 NOTE — PROGRESS NOTES
biologic therapy    José Luis Kolb MD  3/31/2021  12:51 PM    NOTE:  This report was transcribed using voice recognition software. Every effort was made to ensure accuracy; however, inadvertent computerized transcription errors may be present.

## 2021-03-31 NOTE — PROGRESS NOTES
Physical Therapy Treatment Note    Room #:  0421/0421-01  Patient Name: Joleen Gutierrez  YOB: 1955  MRN: [id number]    Referring Provider:   Sania Galvan MD      Date of Service: 3/31/2021    Evaluating Physical Therapist: Luke Colby, PT #[id number]       Diagnosis:   SBO (small bowel obstruction) ([address] [K56.609]   Loss of Consciousness while sitting and having bowel movement, no injuries/did not fall, wife was holding him      Patient Active Problem List   Diagnosis    Malnutrition ([address]    Multiple sclerosis ([address]    Bilateral pulmonary embolism ([address]    Crohn's disease ([address]    COPD (chronic obstructive pulmonary disease) ([address]    Tobacco abuse    Hypertension    Hyperlipidemia    Stage 3b chronic kidney disease    Hyperkalemia    Leukocytosis    Small bowel obstruction ([address]    HCAP (healthcare-associated pneumonia)    Acute on chronic renal failure ([address]    Sepsis ([address]    SBO (small bowel obstruction) ([address]    History of pulmonary embolism    Acute on chronic renal insufficiency    Ischemic cardiomyopathy        Tentative placement recommendation: Subacute rehab    Equipment recommendation:  To be determined      Prior Level of Function: Patient ambulated with rollator    Rehab Potential: good    for baseline    Past medical history:   Past Medical History:   Diagnosis Date    COPD (chronic obstructive pulmonary disease) ([address]     Crohn's disease ([address]     Hx of blood clots 2012    liver, lung    Hyperlipidemia     Hypertension     Multiple sclerosis ([address]     Stage 3b chronic kidney disease 1/29/2021     Past Surgical History:   Procedure Laterality Date    APPENDECTOMY      CHOLECYSTECTOMY      COLONOSCOPY      ENDOSCOPY, COLON, DIAGNOSTIC         Precautions:  , falls and contact isolation ,      SUBJECTIVE:    Social history: Patient lives with spouse   in a ranch home  with 1 step  to enter with Rail  Tub shower grab bars    Equipment owned: WheelchairEdilma Olena Clarity, Shower chair and O2,       AM-PAC Basic Mobility        AM-[facility] Mobility Inpatient   How much difficulty turning over in bed?: A Little  How much difficulty sitting down on / standing up from a chair with arms?: A Little  How much difficulty moving from lying on back to sitting on side of bed?: A Little  How much help from another person moving to and from a bed to a chair?: A Lot  How much help from another person needed to walk in hospital room?: A Lot  How much help from another person for climbing 3-5 steps with a railing?: A Lot  AM-PAC Inpatient Mobility Raw Score : 15  AM-PAC Inpatient T-Scale Score : 39.45  Mobility Inpatient CMS 0-100% Score: 57.7  Mobility Inpatient CMS G-Code Modifier : CK    Nursing cleared patient for PT treatment. OBJECTIVE;   Initial Evaluation  Date: 3/29/2021 Treatment Date:  3/31/2021     Short Term/ Long Term   Goals   Was pt agreeable to Eval/treatment? Yes   yes To be met in 3 days   Pain level   10/10  Chronic left side pain   Recently medicated na    Bed Mobility    Rolling: Supervision     Supine to sit: Minimal assist of 1    Sit to supine: Minimal assist of 1    Scooting: Minimal assist of 1   Rolling: Not assessed    Supine to sit: Minimal assist of 1   Sit to supine: Minimal assist of 1   Scooting: Not assessed     Rolling: Independent    Supine to sit: Independent    Sit to supine: Independent    Scooting: Independent     Transfers Sit to stand: Minimal assist of 1 bed elevated, Cues for hand placement and safety  Sit to stand: Minimal assist of 1      Sit to stand: Independent     Ambulation    4 side steps using  chair back with Minimal assist of 1     2 x 20 feet using  wheeled walker with Minimal assist of 1   c/o fatigue. Cues given for hell strike d/t R foot drop.      40 feet using  wheeled walker with Independent    Stair negotiation: ascended and descended   Not assessed            ROM Within functional limits        Strength BUE:  refer to OT eval  RLE:  3+/5  LLE:  3+/5   Increase strength in affected mm groups by 1/3 grade   Balance Sitting EOB:  good -  Dynamic Standing:  fair   Sitting EOB: good   Dynamic Standing: fair    Sitting EOB:  good    Dynamic Standing: fair + wheeled walker      Patient is Alert & Oriented x person, place, time and situation and follows directions    Sensation:  Patient  denies numbness and tingling     Edema:  yes, R foot    Endurance: fair       Patient education  Patient educated on role of Physical Therapy, risks of immobility, safety and plan of care,  importance of mobility while in hospital , positioning for skin integrity and comfort and importance of heel strike to avoid R foot drop and falls. Patient response to education:   Pt verbalized understanding Pt demonstrated skill Pt requires further education in this area   Yes Partial Yes      Treatment:  Patient practiced and was instructed/facilitated in the following treatment: Patient assisted to edge of bed. na Pt stood and ambulated into guardado and back to bed w/ WW with emphasis on R foot heel strike to  toes. Returned to edge of bed and performed exercises. Pt required Rodri x1 to lift LE to get back into bed. Therapeutic Exercises:  ankle pumps, long arc quad and seated marching x 10 reps. At end of session, patient in bed with na call light and phone within reach,   all lines and tubes intact, nursing notified. Pt spouse present. Patient would benefit from continued skilled Physical Therapy to improve functional independence and quality of life. Patient's/ family goals   home if able      ASSESSMENT: Patient exhibits decreased strength, balance, endurance and pain chronic left side impairing functional mobility, transfers, gait , gait distance and tolerance to activity. Pt has inc. Swelling in R foot noted.         Plan of Care:     -Bed Mobility: Lower extremity exercises   -Sitting Balance: Incorporate reaching activities to activate trunk muscles   -Standing Balance: Perform strengthening exercises in standing to promote motor control with or without upper extremity support   -Transfers: Provide instruction on proper hand and foot position for adequate transfer of weight onto lower extremities and use of gait device  -Gait: Gait training and Standing activities to improve: base of support, weight shift, weight bearing    -Endurance: Utilize Supervised activities to increase level of endurance to allow for safe functional mobility including transfers and gait     Patient and or family understand(s) diagnosis, prognosis, and plan of care. Frequency of treatments: Patient will be seen  daily.        Time in  125  Time out  148    Total Treatment Time  23 minutes  CPT codes:    Therapeutic exercises (58496)   13 minutes  1 unit(s)  Gait Training (64698) 10 minutes 1 unit(s)    Caroline Hanley, PTA

## 2021-03-31 NOTE — CARE COORDINATION
3/31/21 1435 CM note: NO COVID TESTING. Met with patient and his wife, Ritika Diallo, at the bedside to discuss discharge planning. Discharge plan is home. Patient and family are requesting [facility] for nursing and therapy- referral faxed to [facility] rep at [phone]; awaiting their response. If accepted WILL NEED [facility] ORDERS FOR CPA, MED COMPLIANCE, AND PT/OT EVAL AND TREAT. Pts wife will provide transportation home.  Electronically signed by Rashaun Silverio RN on 3/31/2021 at 2:51 PM

## 2021-04-01 NOTE — PROGRESS NOTES
Progress Note  4/1/2021 9:28 AM  Subjective:   Admit Date: 3/27/2021  PCP: Garfield Glez MD  Interval History: Patient doing well feels ok , able to eat some BF since am     Diet: DIET LOW FIBER;    Data:   Scheduled Meds:   Vitamin D  2,000 Units Oral Daily    vitamin B-12  1,000 mcg Oral Daily    methylPREDNISolone  40 mg Intravenous Daily    [Held by provider] apixaban  5 mg Oral See Admin Instructions    [Held by provider] aspirin  81 mg Oral Daily    [Held by provider] atorvastatin  80 mg Oral Nightly    [Held by provider] baclofen  10 mg Oral BID    [Held by provider] lisinopril  5 mg Oral Daily    metoprolol succinate  50 mg Oral Daily    [Held by provider] mirtazapine  15 mg Oral Nightly    [Held by provider] oxybutynin  5 mg Oral BID    [Held by provider] oxyCODONE HCl  10 mg Oral Q4H    pantoprazole  40 mg Intravenous Daily    And    sodium chloride (PF)  10 mL Intravenous Daily     Continuous Infusions:   lactated ringers 150 mL/hr at 03/31/21 1224    heparin (PORCINE) Infusion 13.029 Units/kg/hr (04/01/21 0641)     PRN Meds:heparin (porcine), heparin (porcine), morphine **OR** morphine, ondansetron  I/O last 3 completed shifts:   In: 2789 [P.O.:480; I.V.:876]  Out: 1350 [Urine:1350]  I/O this shift:  In: 120 [P.O.:120]  Out: -     Intake/Output Summary (Last 24 hours) at 4/1/2021 0928  Last data filed at 4/1/2021 0910  Gross per 24 hour   Intake 120 ml   Output 1350 ml   Net -1230 ml     CBC:   Recent Labs     03/30/21 2008 03/31/21  0812 04/01/21  0329   WBC 11.3 14.7* 14.5*   HGB 8.9* 9.0* 8.9*    344 324     BMP:    Recent Labs     03/30/21 0614 03/31/21  0812 04/01/21  0329    140 138   K 4.3 3.1* 3.8   * 103 101   CO2 14* 27 29   BUN 60* 42* 28*   CREATININE 2.7* 1.8* 1.5*   GLUCOSE 94 160* 116*     Hepatic:   Recent Labs     03/30/21  0614 03/31/21  0812 04/01/21  0329   AST 13 16 19   ALT 10 12 11   BILITOT 0.3 0.3 0.3   ALKPHOS 91 84 84     Troponin: No results for input(s): TROPONINI in the last 72 hours. BNP: No results for input(s): BNP in the last 72 hours. Lipids: No results for input(s): CHOL, HDL in the last 72 hours. Invalid input(s): LDLCALCU  ABGs: No results found for: PHART, PO2ART, YHC1HXP  INR: No results for input(s): INR in the last 72 hours. -----------------------------------------------------------------  RAD: Ct Abdomen Pelvis Wo Contrast Additional Contrast? None    Result Date: 3/27/2021  EXAMINATION: CT OF THE ABDOMEN AND PELVIS WITHOUT CONTRAST 3/27/2021 1:22 pm TECHNIQUE: CT of the abdomen and pelvis was performed without the administration of intravenous contrast. Multiplanar reformatted images are provided for review. Dose modulation, iterative reconstruction, and/or weight based adjustment of the mA/kV was utilized to reduce the radiation dose to as low as reasonably achievable. COMPARISON: 02/07/2021 HISTORY: ORDERING SYSTEM PROVIDED HISTORY: nausea, diarrhea, hx crohn's TECHNOLOGIST PROVIDED HISTORY: Reason for exam:->nausea, diarrhea, hx crohn's Additional Contrast?->None Decision Support Exception->Emergency Medical Condition (MA) FINDINGS: Lower Chest: Right lower lobe consolidation has decreased leaving residual pleural scarring and subsegmental atelectasis. Organs: The adrenal glands are normal. Splenic morphology and attenuation/echotexture is normal. The pancreas has normal morphology and parenchymal attenuation aside from dystrophic calcifications suggestive of prior chronic pancreatitis. 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 There are multiple simple right renal cysts. The largest measures 5.8 cm a 10 mm  simple cyst lies in the liver. , unchanged. The remaining liver parenchyma appears normal.  There has been a previous cholecystectomy. GI/Bowel: There is moderate to marked dilatation of the upstream small intestine. Dilated intestine extends 2 point 4 cm upstream from a small bowel anastomosis.   The 4 cm segment has thickened carrasco. The findings suggest moderate to high-grade obstruction secondary to focal enteritis/stricture. The remainder of the GI tract has normal course, caliber, and morphology. Pelvis: The bladder has normal morphology and wall thickness. The prostate is mildly enlarged, the seminal vesicles appear normal. Peritoneum/Retroperitoneum: There are no signs of free intraperitoneal air / gas. There are no signs of free intraperitoneal fluid. There are no signs of pelvic or retro peritoneal lymphadenopathy. Vasculature: The abdominal aorta, IVC and their branches are normal aside from mild aortic atherosclerotic vascular disease. Bones/Soft Tissues: The hips, bony pelvis, and lumbar spine appear unremarkable. There is a benign bone island in the left iliac wing. The bones are osteopenic. The abdominal wall is intact. Moderate to high-grade mechanical small bleb bowel obstruction. Obstruction appears to be due to a thickened segment just upstream from the anastomosis likely reflecting a stricture are focal enteritis. Alternatively, the obstruction could be and the anastomosis itself. Unchanged right renal cyst, left renal atresia and sequela of chronic pancreatitis. Improving right lower lobe aeration. Xr Acute Abd Series Chest 1 Vw    Result Date: 3/28/2021  EXAMINATION: TWO XRAY VIEWS OF THE ABDOMEN AND SINGLE  XRAY VIEW OF THE CHEST 3/28/2021 9:45 am COMPARISON: 03/27/2021 HISTORY: ORDERING SYSTEM PROVIDED HISTORY: sbo TECHNOLOGIST PROVIDED HISTORY: Reason for exam:->sbo FINDINGS: There is moderate, nonspecific elevation of the right diaphragm. Volume loss and scarring in the lower right thorax appears unchanged. The left lung is normally aerated. The heart and mediastinum are normal for AP technique. There is moderate dilation of small bowel loops, consistent with mechanical small bowel obstruction. The sigmoid colon is moderately distended.  There are no signs of organomegaly or suspicious calcifications. Persistent small bowel dilatation consistent with mechanical obstruction. Chronic right basilar scar/atelectasis. Ct Head Wo Contrast    Result Date: 3/27/2021  EXAMINATION: CT OF THE HEAD WITHOUT CONTRAST  3/27/2021 11:38 am TECHNIQUE: CT of the head was performed without the administration of intravenous contrast. Dose modulation, iterative reconstruction, and/or weight based adjustment of the mA/kV was utilized to reduce the radiation dose to as low as reasonably achievable. COMPARISON: 02/07/2021 HISTORY: ORDERING SYSTEM PROVIDED HISTORY: syncope; confusion; (+) eliquis TECHNOLOGIST PROVIDED HISTORY: Reason for exam:->syncope; confusion; (+) eliquis Has a \"code stroke\" or \"stroke alert\" been called? ->No Decision Support Exception->Emergency Medical Condition (MA) FINDINGS: BRAIN/VENTRICLES: Brain volume is moderately decreased diffusely, morphology is normal.  There are no extra-axial fluid collections, mass effect, or hemorrhage. There is physiologic mineral deposition in the basal ganglia bilaterally. A small focus of low attenuation in the inferior left cerebellar hemisphere is unchanged, possibly an old lacunar infarct. Low attenuation in the periventricular white matter tracks reflects moderate non-specific white matter disease. ORBITS: The visualized portion of the orbits demonstrate no acute abnormality. SINUSES:  The paranasal sinuses are morphologically normal and free of fluid or mucoperiosteal thickening. The mastoid air cells are normally aerated. SOFT TISSUES/SKULL:  The skull and extracranial soft tissues are intact. A benign osteoma lies in the left skull base. No acute cerebral abnormalities. Moderate, unchanged cerebral volume loss and non-specific periventricular white matter disease.      Xr Chest Portable    Result Date: 3/27/2021  EXAMINATION: ONE XRAY VIEW OF THE CHEST 3/27/2021 10:46 am COMPARISON: 02/07/2021 HISTORY: ORDERING SYSTEM PROVIDED HISTORY: syncope TECHNOLOGIST PROVIDED HISTORY: Reason for exam:->syncope FINDINGS: The cardiomediastinal silhouette is stable. There is elevation of the right hemidiaphragm again seen, with improved aeration of the right lateral lung base. No new infiltrate is seen. No pneumothorax. Cannot exclude a right pleural effusion. There is improved aeration of the right lung base, with continued elevation of the right hemidiaphragm. Cannot exclude a small right pleural effusion. Us Retroperitoneal Complete    Result Date: 3/28/2021  EXAMINATION: RETROPERITONEAL ULTRASOUND OF THE KIDNEYS AND URINARY BLADDER 3/28/2021 COMPARISON: None HISTORY: ORDERING SYSTEM PROVIDED HISTORY: renal, acute renal failure TECHNOLOGIST PROVIDED HISTORY: Reason for exam:->renal Reason for exam:->NEERAJ What reading provider will be dictating this exam?->CRC FINDINGS: Kidneys: The right kidney measures  10.8 cm in length and the left kidney measures 6.6 cm in length. Kidneys demonstrate normal cortical echogenicity. No evidence of hydronephrosis or intrarenal stones. 5.8 cm cyst mid zone right kidney. 2.8 cm cyst inferior pole right kidney. Bladder: Unremarkable appearance of the bladder. Only a right ureteral jet was visualized. 5.8 cm cyst mid zone right kidney. 2.8 cm cyst inferior pole right kidney. Left renal atrophy. Objective:   Vitals: BP (!) 168/79   Pulse 77   Temp 98.2 °F (36.8 °C) (Oral)   Resp 18   Ht 5' (1.524 m)   Wt 121 lb (54.9 kg)   SpO2 94%   BMI 23.63 kg/m²   General appearance: appears stated age   Skin:  No rashes or lesions  HEENT: Head: Normocephalic, no lesions, without obvious abnormality.   Neck: no adenopathy, no carotid bruit, no JVD, supple, symmetrical, trachea midline and thyroid not enlarged, symmetric, no tenderness/mass/nodules  Lungs: clear to auscultation bilaterally  Heart: regular rate and rhythm, S1, S2 normal, no murmur, click, rub or gallop  Abdomen: soft, non-tender; bowel sounds normal; no masses,  no organomegaly  Extremities: extremities normal, atraumatic, no cyanosis or edema  Neurologic: Mental status: Alert, oriented, thought content appropriate    Assessment:   Patient Active Problem List:     Malnutrition ([facility]     Multiple sclerosis ([facility]     Bilateral pulmonary embolism (HCC)     Crohn's disease ([facility]     COPD (chronic obstructive pulmonary disease) (HCC)     Tobacco abuse     Hypertension     Hyperlipidemia     Stage 3b chronic kidney disease     Hyperkalemia     Leukocytosis     Small bowel obstruction (HCC)     HCAP (healthcare-associated pneumonia)     Acute on chronic renal failure (HCC)     Sepsis (HCC)     SBO (small bowel obstruction) ([facility]     History of pulmonary embolism     Acute on chronic renal insufficiency     Ischemic cardiomyopathy    Plan:   Assessment  1-Stage II NEERAJ- presumed sec to decreased effective renal perfusion from the hypotension exacerbated by the ACEI and the 3rd spacing of fluid into the bowel due to SBO  Cr trending down on hydration , u/o 1350 cc   PLAN:1. Continue to hold ACEI and continue hydration      2-G3B with a baseline serum cr 1.6-1.8mg/dl and an e-GFR=41-44ml/min with a L Atrophic Kidney possibly a state of a  Unilat Functioning kidney  PLAN:1 Once the cr trends back to baseline would check a NM Renal Flow Scan to evaluate function of the L kidney, cr approaching baseline      3-Hypocalcemia in the setting of Hypoalbuminemia and Sec HPTH of Renal Origin with Unspecified Vit D Def with Hypophosphatemia  Vit D 27  PLAN:1. Continue Vit D supplement  2. Follow Ca++ and PO4  3. Supplement with IV KPO4     4- Anemia in CKD  HgB below goal >/=10  Ferritin 724, Iron Sat 58%, Folate 7.4, Vit B 12 208  PLAN:1. Continue B12 oral supplement     5- HTN with CKD I-IV with Hypotension on admission now improved  PLAN:1. Follow BP off the ACEI     6-SBO - improving , no intervention at this point as per surgery   PLAN:1. Defer to Gen.  Surgery    7-Hypokalemia with Hypomagnesemia  K+ 3.8, Mg++ 1.9   Follow K+ and MG++     8- Anion Gap Met Acidosis-worsening Acidosis component due to Cl(-) expansion and the GI losses  PLAN:1. Change to IV to LR - acidosis better      Thank you  for allowing us to participate in care of Marysol Goodmantorsten Trujillo

## 2021-04-01 NOTE — PROGRESS NOTES
PROGRESS NOTE    By Ashlie Theodore D.O GI Fellow    [facility]  Dr. Abby Davis MD, Dr. Sony Sheets MD, Dr Vishal Lindsey, Dr. Edilma Jc MD, Dr. Nik Jacinto, DO Anjum Ya  63 y.o.  male    SUBJECTIVE:  Patient seen and examined today at bedside. No new complaints. Tolerating diet. Passing flatus and BMs.  Denies abdominal pain, bloating, n/v.     OBJECTIVE:    BP (!) 168/79   Pulse 77   Temp 98.2 °F (36.8 °C) (Oral)   Resp 18   Ht 5' (1.524 m)   Wt 121 lb (54.9 kg)   SpO2 94%   BMI 23.63 kg/m²     Gen: NAD, AAO x 3  HEENT:PEERL, no icterus  Heart: RRR, no M/R/G  Lungs: CTAB  Abd.: soft, NT, ND, BS +, no G/R, no HSM  Extr.: no C/C/E, no bruising      Stool (measured) : 0 mL  Lab Results   Component Value Date    WBC 14.5 04/01/2021    WBC 14.7 03/31/2021    WBC 11.3 03/30/2021    HGB 8.9 04/01/2021    HGB 9.0 03/31/2021    HGB 8.9 03/30/2021    HCT 27.8 04/01/2021    MCV 86.1 04/01/2021    RDW 17.9 04/01/2021     04/01/2021     03/31/2021     03/30/2021     Lab Results   Component Value Date     04/01/2021    K 3.8 04/01/2021    K 5.0 03/27/2021     04/01/2021    CO2 29 04/01/2021    BUN 28 04/01/2021    CREATININE 1.5 04/01/2021    CALCIUM 7.8 04/01/2021    PROT 4.7 04/01/2021    LABALBU 2.7 04/01/2021    BILITOT 0.3 04/01/2021    BILITOT 0.3 03/31/2021    BILITOT 0.3 03/30/2021    ALKPHOS 84 04/01/2021    ALKPHOS 84 03/31/2021    ALKPHOS 91 03/30/2021    AST 19 04/01/2021    AST 16 03/31/2021    AST 13 03/30/2021    ALT 11 04/01/2021    ALT 12 03/31/2021    ALT 10 03/30/2021     No results found for: LIPASE  No results found for: AMYLASE      ASSESSMENT/PLAN:    1. SBO, resolved  - likely 2/2 inflammatory Crohn's stricture proximal to anastomosis, cannot r/o fibrostenotic or adhesions   - Has refused NG tube  - convert solumedrol to prednisone 40mg daily  - low dose PPI as he is on ASA/Eliquis/prednisone and is > 61years old  - low residue diet as tolerated     2. Ileocolonic Crohn's Disease, severe, stricturing  - glucocorticoids as above  - patient will need follow up in office for likely escalation of therapy    OK to be d/c from GI POV. Thank you for this consultation. Please call with any questions or concerns. Follow in office in 2 weeks or sooner as needed - pt to call for appointment - [phone]. Ashlie Theodore DO  4/1/2021  10:37 AM    Pt seen and independently examined. Pertinent notes and lab work reviewed. Monitor reviewed showing sinus rhythm. D/w Dr. Ayleen Chacon with physical exam and A&P. Discussed with patient - all questions answered - agreeable with the plan as delineated.     David Barney MD  4/1/2021  12:14 PM

## 2021-04-02 NOTE — PROGRESS NOTES
Physical Therapy Treatment Note    Room #:  0421/0421-01  Patient Name: Katharine Baptiste  YOB: 1955  MRN: [id number]    Referring Provider:   Nick Kaplan MD      Date of Service: 4/2/2021    Evaluating Physical Therapist: William Mary, PT #[id number]       Diagnosis:   SBO (small bowel obstruction) ([address] [K56.609]   Loss of Consciousness while sitting and having bowel movement, no injuries/did not fall, wife was holding him      Patient Active Problem List   Diagnosis    Malnutrition ([address]    Multiple sclerosis ([address]    Bilateral pulmonary embolism ([address]    Crohn's disease ([address]    COPD (chronic obstructive pulmonary disease) ([address]    Tobacco abuse    Hypertension    Hyperlipidemia    Stage 3b chronic kidney disease    Hyperkalemia    Leukocytosis    Small bowel obstruction ([address]    HCAP (healthcare-associated pneumonia)    Acute on chronic renal failure ([address]    Sepsis ([address]    SBO (small bowel obstruction) ([address]    History of pulmonary embolism    Acute on chronic renal insufficiency    Ischemic cardiomyopathy        Tentative placement recommendation: Home Health Physical Therapy     Equipment recommendation:  To be determined      Prior Level of Function: Patient ambulated with rollator    Rehab Potential: good    for baseline    Past medical history:   Past Medical History:   Diagnosis Date    COPD (chronic obstructive pulmonary disease) ([address]     Crohn's disease ([address]     Hx of blood clots 2012    liver, lung    Hyperlipidemia     Hypertension     Multiple sclerosis ([address]     Stage 3b chronic kidney disease 1/29/2021     Past Surgical History:   Procedure Laterality Date    APPENDECTOMY      CHOLECYSTECTOMY      COLONOSCOPY      ENDOSCOPY, COLON, DIAGNOSTIC         Precautions:  , falls and contact isolation ,      SUBJECTIVE:    Social history: Patient lives with spouse   in a ranch home  with 1 step  to enter with Rail  Tub shower grab bars    Equipment owned: extremity support   -Transfers: Provide instruction on proper hand and foot position for adequate transfer of weight onto lower extremities and use of gait device  -Gait: Gait training and Standing activities to improve: base of support, weight shift, weight bearing    -Endurance: Utilize Supervised activities to increase level of endurance to allow for safe functional mobility including transfers and gait     Patient and or family understand(s) diagnosis, prognosis, and plan of care. Frequency of treatments: Patient will be seen  daily.        Time in  200   Time out  213    Total Treatment Time  13 minutes  CPT codes:    Neuromuscular reeducation (74351)   13 minutes  1 unit(s)    Kris Scott, PT

## 2021-04-02 NOTE — PROGRESS NOTES
Progress Note  Date:2021       Room:0421/0421-01  Patient Name:Davian Zhang     YOB: 1955     Age:65 y.o. Subjective    Subjective:  Symptoms:  Stable. He reports shortness of breath, malaise, weakness and anxiety. No cough, chest pain, headache, chest pressure, anorexia or diarrhea. Diet:  Adequate intake. Activity level: Impaired due to weakness. Pain:  He complains of pain that is moderate. pt is doing ok. Tolerating po intake  Review of Systems   Respiratory: Positive for shortness of breath. Negative for cough. Cardiovascular: Negative for chest pain. Gastrointestinal: Negative for anorexia and diarrhea. Neurological: Positive for weakness. Objective         Vitals Last 24 Hours:  TEMPERATURE:  Temp  Av.3 °F (36.8 °C)  Min: 98.2 °F (36.8 °C)  Max: 98.3 °F (36.8 °C)  RESPIRATIONS RANGE: Resp  Av  Min: 18  Max: 18  PULSE OXIMETRY RANGE: SpO2  Av %  Min: 94 %  Max: 94 %  PULSE RANGE: Pulse  Av.5  Min: 77  Max: 84  BLOOD PRESSURE RANGE: Systolic (75JME), QOZ:018 , Min:153 , QGI:259   ; Diastolic (13IDF), YARI:49, Min:79, Max:80    I/O (24Hr): Intake/Output Summary (Last 24 hours) at 2021 2151  Last data filed at 2021 1545  Gross per 24 hour   Intake 300 ml   Output 1750 ml   Net -1450 ml     Objective:  General Appearance:  Comfortable. Vital signs: (most recent): Blood pressure (!) 153/80, pulse 84, temperature 98.3 °F (36.8 °C), temperature source Oral, resp. rate 18, height 5' (1.524 m), weight 121 lb (54.9 kg), SpO2 94 %. Output: Producing urine. Lungs:  Normal respiratory rate. He is not in respiratory distress. No wheezes. Heart: Regular rhythm. S1 normal and S2 normal.    Abdomen: Abdomen is soft. Bowel sounds are normal.   There is no abdominal tenderness. Extremities: Normal range of motion. Pulses: Distal pulses are intact. Neurological: Patient is alert and oriented to person, place and time. Labs/Imaging/Diagnostics    Labs:  CBC:  Recent Labs     03/30/21 2008 03/31/21 0812 04/01/21  0329   WBC 11.3 14.7* 14.5*   RBC 3.24* 3.22* 3.23*   HGB 8.9* 9.0* 8.9*   HCT 29.0* 27.6* 27.8*   MCV 89.5 85.7 86.1   RDW 17.9* 17.8* 17.9*    344 324     CHEMISTRIES:  Recent Labs     03/30/21  0614 03/31/21 0812 04/01/21 0329    140 138   K 4.3 3.1* 3.8   * 103 101   CO2 14* 27 29   BUN 60* 42* 28*   CREATININE 2.7* 1.8* 1.5*   GLUCOSE 94 160* 116*   PHOS 4.9* 1.8* 1.7*   MG 2.0 1.7 1.9     PT/INR:No results for input(s): PROTIME, INR in the last 72 hours. APTT:  Recent Labs     03/31/21  1058 03/31/21 1948 04/01/21 0329   APTT 154.6* 52.4* >240.0*     LIVER PROFILE:  Recent Labs     03/30/21 0614 03/31/21 0812 04/01/21 0329   AST 13 16 19   ALT 10 12 11   BILITOT 0.3 0.3 0.3   ALKPHOS 91 84 84       Imaging Last 24 Hours:  Xr Abdomen (2 Views)    Result Date: 3/31/2021  EXAMINATION: TWO XRAY VIEWS OF THE ABDOMEN 3/31/2021 8:35 am COMPARISON: 03/30/2021 HISTORY: ORDERING SYSTEM PROVIDED HISTORY: sbo TECHNOLOGIST PROVIDED HISTORY: Reason for exam:->sbo Reason for exam:->upright, AP/Lateral FINDINGS: There is gaseous dilation small bowel, especially within the mid to upper abdomen, which appears increased when compared to the previous exam from yesterday. The large bowel is relatively decompressed. No free air is seen. Anastomotic staple lines are identified within the rightward aspect of pelvis. No pathologic calcifications are identified. There is scarring within the lower right lung. The visualized left lung is unremarkable in appearance. Gaseous dilation of small bowel within the mid upper abdomen, with relative decompression of large bowel compatible with the given history of small bowel obstruction. The degree of small bowel dilation appears increased when compared to the previous exam from yesterday.      Assessment//Plan           Hospital Problems           Last Modified POA Small bowel obstruction ([address] 3/28/2021 Yes    SBO (small bowel obstruction) ([address] 3/27/2021 Yes    History of pulmonary embolism (Chronic) 3/28/2021 Yes    Acute on chronic renal insufficiency 3/28/2021 Yes    Overview Signed 3/28/2021  6:06 PM by Sheron Dean MD     Stage  3b- 11 4         Ischemic cardiomyopathy (Chronic) 3/28/2021 Yes        Assessment:  (Problem List as of 4/1/2021 Reviewed: 2/17/2021 11:11 AM by PALMA Jordan - CNP    History of pulmonary embolism    Ischemic cardiomyopathy    Malnutrition ([address]    Multiple sclerosis ([address]    Bilateral pulmonary embolism ([address]    Crohn's disease ([address]    COPD (chronic obstructive pulmonary disease) ([address]    Tobacco abuse    Hypertension    Hyperlipidemia    Stage 3b chronic kidney disease    Hyperkalemia    Leukocytosis    Small bowel obstruction (HCC)    HCAP (healthcare-associated pneumonia)    Acute on chronic renal failure (HCC)    Sepsis (HCC)    SBO (small bowel obstruction) ([address]    Acute on chronic renal insufficiency    ). Plan:   (Dc heparin drip and resume eliquis).        Electronically signed by Garfield Glez MD on 4/1/21 at 9:51 PM EDT

## 2021-04-02 NOTE — PROGRESS NOTES
[facility] Quality Flow/Interdisciplinary Rounds Progress Note        Quality Flow Rounds held on April 2, 2021    Disciplines Attending:  Bedside Nurse, ,  and Nursing Unit Leadership    Nataly Stacy was admitted on 3/27/2021 11:10 AM    Anticipated Discharge Date:  Expected Discharge Date: 03/31/21    Disposition:    Pop Score:  Pop Scale Score: 19    Readmission Risk              Risk of Unplanned Readmission:        31           Discussed patient goal for the day, patient clinical progression, and barriers to discharge.   The following Goal(s) of the Day/Commitment(s) have been identified:  Discharged      Satya Zuluaga  April 2, 2021

## 2021-04-02 NOTE — CARE COORDINATION
4/2/21 1209 CM note: NO COVID TESTING THIS ADMIT. Discharge order noted. Met with patient and his wife at the bedside to discuss discharge planning. Discharge plan remains home with [facility]. HHC order noted. Notified Nixon Mcintosh, [facility] liaison, of pts discharge today. Pts wife will provide transportation home.  Electronically signed by Cynthia Cardenas RN on 4/2/2021 at 12:11 PM

## 2021-04-02 NOTE — PROGRESS NOTES
ABGs: No results found for: PHART, PO2ART, SGX4ONT  INR: No results for input(s): INR in the last 72 hours. -----------------------------------------------------------------  RAD: Ct Abdomen Pelvis Wo Contrast Additional Contrast? None    Result Date: 3/27/2021  EXAMINATION: CT OF THE ABDOMEN AND PELVIS WITHOUT CONTRAST 3/27/2021 1:22 pm TECHNIQUE: CT of the abdomen and pelvis was performed without the administration of intravenous contrast. Multiplanar reformatted images are provided for review. Dose modulation, iterative reconstruction, and/or weight based adjustment of the mA/kV was utilized to reduce the radiation dose to as low as reasonably achievable. COMPARISON: 02/07/2021 HISTORY: ORDERING SYSTEM PROVIDED HISTORY: nausea, diarrhea, hx crohn's TECHNOLOGIST PROVIDED HISTORY: Reason for exam:->nausea, diarrhea, hx crohn's Additional Contrast?->None Decision Support Exception->Emergency Medical Condition (MA) FINDINGS: Lower Chest: Right lower lobe consolidation has decreased leaving residual pleural scarring and subsegmental atelectasis. Organs: The adrenal glands are normal. Splenic morphology and attenuation/echotexture is normal. The pancreas has normal morphology and parenchymal attenuation aside from dystrophic calcifications suggestive of prior chronic pancreatitis. 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 There are multiple simple right renal cysts. The largest measures 5.8 cm a 10 mm  simple cyst lies in the liver. , unchanged. The remaining liver parenchyma appears normal.  There has been a previous cholecystectomy. GI/Bowel: There is moderate to marked dilatation of the upstream small intestine. Dilated intestine extends 2 point 4 cm upstream from a small bowel anastomosis. The 4 cm segment has thickened walls. The findings suggest moderate to high-grade obstruction secondary to focal enteritis/stricture. The remainder of the GI tract has normal course, caliber, and morphology. Pelvis:   The bladder has normal morphology and wall thickness. The prostate is mildly enlarged, the seminal vesicles appear normal. Peritoneum/Retroperitoneum: There are no signs of free intraperitoneal air / gas. There are no signs of free intraperitoneal fluid. There are no signs of pelvic or retro peritoneal lymphadenopathy. Vasculature: The abdominal aorta, IVC and their branches are normal aside from mild aortic atherosclerotic vascular disease. Bones/Soft Tissues: The hips, bony pelvis, and lumbar spine appear unremarkable. There is a benign bone island in the left iliac wing. The bones are osteopenic. The abdominal wall is intact. Moderate to high-grade mechanical small bleb bowel obstruction. Obstruction appears to be due to a thickened segment just upstream from the anastomosis likely reflecting a stricture are focal enteritis. Alternatively, the obstruction could be and the anastomosis itself. Unchanged right renal cyst, left renal atresia and sequela of chronic pancreatitis. Improving right lower lobe aeration. Xr Acute Abd Series Chest 1 Vw    Result Date: 3/28/2021  EXAMINATION: TWO XRAY VIEWS OF THE ABDOMEN AND SINGLE  XRAY VIEW OF THE CHEST 3/28/2021 9:45 am COMPARISON: 03/27/2021 HISTORY: ORDERING SYSTEM PROVIDED HISTORY: sbo TECHNOLOGIST PROVIDED HISTORY: Reason for exam:->sbo FINDINGS: There is moderate, nonspecific elevation of the right diaphragm. Volume loss and scarring in the lower right thorax appears unchanged. The left lung is normally aerated. The heart and mediastinum are normal for AP technique. There is moderate dilation of small bowel loops, consistent with mechanical small bowel obstruction. The sigmoid colon is moderately distended. There are no signs of organomegaly or suspicious calcifications. Persistent small bowel dilatation consistent with mechanical obstruction. Chronic right basilar scar/atelectasis.      Ct Head Wo Contrast    Result Date: 3/27/2021  EXAMINATION: CT OF THE HEAD WITHOUT CONTRAST  3/27/2021 11:38 am TECHNIQUE: CT of the head was performed without the administration of intravenous contrast. Dose modulation, iterative reconstruction, and/or weight based adjustment of the mA/kV was utilized to reduce the radiation dose to as low as reasonably achievable. COMPARISON: 02/07/2021 HISTORY: ORDERING SYSTEM PROVIDED HISTORY: syncope; confusion; (+) eliquis TECHNOLOGIST PROVIDED HISTORY: Reason for exam:->syncope; confusion; (+) eliquis Has a \"code stroke\" or \"stroke alert\" been called? ->No Decision Support Exception->Emergency Medical Condition (MA) FINDINGS: BRAIN/VENTRICLES: Brain volume is moderately decreased diffusely, morphology is normal.  There are no extra-axial fluid collections, mass effect, or hemorrhage. There is physiologic mineral deposition in the basal ganglia bilaterally. A small focus of low attenuation in the inferior left cerebellar hemisphere is unchanged, possibly an old lacunar infarct. Low attenuation in the periventricular white matter tracks reflects moderate non-specific white matter disease. ORBITS: The visualized portion of the orbits demonstrate no acute abnormality. SINUSES:  The paranasal sinuses are morphologically normal and free of fluid or mucoperiosteal thickening. The mastoid air cells are normally aerated. SOFT TISSUES/SKULL:  The skull and extracranial soft tissues are intact. A benign osteoma lies in the left skull base. No acute cerebral abnormalities. Moderate, unchanged cerebral volume loss and non-specific periventricular white matter disease. Xr Chest Portable    Result Date: 3/27/2021  EXAMINATION: ONE XRAY VIEW OF THE CHEST 3/27/2021 10:46 am COMPARISON: 02/07/2021 HISTORY: ORDERING SYSTEM PROVIDED HISTORY: syncope TECHNOLOGIST PROVIDED HISTORY: Reason for exam:->syncope FINDINGS: The cardiomediastinal silhouette is stable.   There is elevation of the right hemidiaphragm again seen, with improved aeration of the right

## 2021-04-02 NOTE — PLAN OF CARE
Problem: Falls - Risk of:  Goal: Will remain free from falls  Description: Will remain free from falls  4/1/2021 2227 by Ashok Gold RN  Outcome: Met This Shift     Problem: Pain:  Goal: Pain level will decrease  Description: Pain level will decrease  Outcome: Met This Shift

## 2021-04-02 NOTE — PROGRESS NOTES
Progress Note  Date:2021       Room:0421/0421-01  Patient Name:Davian Zhang     YOB: 1955     Age:65 y.o. Subjective    Subjective:  Symptoms:  Stable. He reports shortness of breath, malaise, weakness, anorexia and anxiety. No cough, chest pain, headache, chest pressure or diarrhea. Diet:  Adequate intake. Activity level: Impaired due to weakness. Pain:  He complains of pain that is moderate. Review of Systems   Respiratory: Positive for shortness of breath. Negative for cough. Cardiovascular: Negative for chest pain. Gastrointestinal: Positive for anorexia. Negative for diarrhea. Neurological: Positive for weakness. Objective         Vitals Last 24 Hours:  TEMPERATURE:  Temp  Av.3 °F (36.8 °C)  Min: 98.2 °F (36.8 °C)  Max: 98.3 °F (36.8 °C)  RESPIRATIONS RANGE: Resp  Av  Min: 18  Max: 18  PULSE OXIMETRY RANGE: SpO2  Av %  Min: 94 %  Max: 94 %  PULSE RANGE: Pulse  Av.5  Min: 77  Max: 84  BLOOD PRESSURE RANGE: Systolic (00GPL), GYE:511 , Min:153 , WQY:153   ; Diastolic (56ROP), HNY:54, Min:79, Max:80    I/O (24Hr): Intake/Output Summary (Last 24 hours) at 2021 2144  Last data filed at 2021 1545  Gross per 24 hour   Intake 300 ml   Output 1750 ml   Net -1450 ml     Objective:  General Appearance:  Comfortable. Vital signs: (most recent): Blood pressure (!) 153/80, pulse 84, temperature 98.3 °F (36.8 °C), temperature source Oral, resp. rate 18, height 5' (1.524 m), weight 121 lb (54.9 kg), SpO2 94 %. Vital signs are normal.    Output: Producing urine. HEENT: Normal HEENT exam.    Heart: Regular rhythm. S1 normal and S2 normal.    Abdomen: Abdomen is soft. There is no abdominal tenderness. Extremities: Normal range of motion. Neurological: Patient is alert and oriented to person, place and time. Normal strength. Pupils:  Pupils are equal, round, and reactive to light.       Labs/Imaging/Diagnostics    Labs:  CBC: Recent Labs     03/30/21 2008 03/31/21 0812 04/01/21 0329   WBC 11.3 14.7* 14.5*   RBC 3.24* 3.22* 3.23*   HGB 8.9* 9.0* 8.9*   HCT 29.0* 27.6* 27.8*   MCV 89.5 85.7 86.1   RDW 17.9* 17.8* 17.9*    344 324     CHEMISTRIES:  Recent Labs     03/30/21 0614 03/31/21 0812 04/01/21 0329    140 138   K 4.3 3.1* 3.8   * 103 101   CO2 14* 27 29   BUN 60* 42* 28*   CREATININE 2.7* 1.8* 1.5*   GLUCOSE 94 160* 116*   PHOS 4.9* 1.8* 1.7*   MG 2.0 1.7 1.9     PT/INR:No results for input(s): PROTIME, INR in the last 72 hours. APTT:  Recent Labs     03/31/21  1058 03/31/21  1948 04/01/21 0329   APTT 154.6* 52.4* >240.0*     LIVER PROFILE:  Recent Labs     03/30/21 0614 03/31/21 0812 04/01/21 0329   AST 13 16 19   ALT 10 12 11   BILITOT 0.3 0.3 0.3   ALKPHOS 91 84 84       Imaging Last 24 Hours:  Xr Abdomen (2 Views)    Result Date: 3/31/2021  EXAMINATION: TWO XRAY VIEWS OF THE ABDOMEN 3/31/2021 8:35 am COMPARISON: 03/30/2021 HISTORY: ORDERING SYSTEM PROVIDED HISTORY: sbo TECHNOLOGIST PROVIDED HISTORY: Reason for exam:->sbo Reason for exam:->upright, AP/Lateral FINDINGS: There is gaseous dilation small bowel, especially within the mid to upper abdomen, which appears increased when compared to the previous exam from yesterday. The large bowel is relatively decompressed. No free air is seen. Anastomotic staple lines are identified within the rightward aspect of pelvis. No pathologic calcifications are identified. There is scarring within the lower right lung. The visualized left lung is unremarkable in appearance. Gaseous dilation of small bowel within the mid upper abdomen, with relative decompression of large bowel compatible with the given history of small bowel obstruction. The degree of small bowel dilation appears increased when compared to the previous exam from yesterday.      Assessment//Plan           Hospital Problems           Last Modified POA    Small bowel obstruction ([facility] [address] 3/28/2021

## 2021-04-18 PROBLEM — R56.9 SEIZURE-LIKE ACTIVITY (HCC): Status: ACTIVE | Noted: 2021-01-01

## 2021-04-18 NOTE — ED PROVIDER NOTES
HPI:  4/18/21, Time: 1:28 PM EDT      HPI per patient's wife. Patient is currently at his mental baseline per wife, but is a poor historian (alert and oriented to self baseline)   Yu Najera is a 63 y.o. male presenting to the ED for closed head injury and seizure activity  beginning an hour or so ago. Per wife, patient was on toilet having diarrhea (also baseline as has Crohn's) and while seating on toilet and passed out, his head on wall and had some jerking movements that she describes as \"seizure\" activity. She states he has no history of seizures and takes no medications for seizures. She called EMS and he presents to the ER for evaluation and is currently back to his baseline mental status per wife. He is currently on Eliquis for PEs. The complaint has been persistent, moderate in severity, and worsened by nothing. Wife denies recent denies fever/chills, sore throat, cough, congestion, chest pain, shortness of breath, edema, headache, visual disturbances, focal paresthesias, focal weakness, abdominal pain, nausea, vomiting, diarrhea, constipation, dysuria, hematuria, trauma, neck or back pain, rash or other complaints. ROS:   A complete review of systems was performed and all pertinent positives and negatives are stated within HPI, all other systems reviewed and are negative.      --------------------------------------------- PAST HISTORY ---------------------------------------------  Past Medical History:  has a past medical history of COPD (chronic obstructive pulmonary disease) ([facility] [address] Crohn's disease ([facility] [address] Hx of blood clots, Hyperlipidemia, Hypertension, Multiple sclerosis ([facility] and Stage 3b chronic kidney disease. Past Surgical History:  has a past surgical history that includes Cholecystectomy; Appendectomy; Colonoscopy; and Endoscopy, colon, diagnostic. Social History:  reports that he quit smoking about 2 months ago. His smoking use included cigarettes.  He has never used smokeless tobacco. He reports that he does not drink alcohol or use drugs. Family History: family history is not on file. The patients home medications have been reviewed. Allergies: Neurontin [gabapentin] and Lyrica [pregabalin]        ----------------------------------------PHYSICAL EXAM--------------------------------------  Constitutional:  Well developed, well nourished, no acute distress, non-toxic appearance   Eyes:  PERRL, conjunctiva normal, EOMI  HENT: Large right forehead abrasion, external ears normal, nose normal, oropharynx moist,. Neck- normal range of motion, no nuchal rigidity   Respiratory:  No respiratory distress, normal breath sounds, no rales, no wheezing   Cardiovascular:  Normal rate, normal rhythm, no murmurs, no gallops, no rubs. Radial and DP pulses 2+ bilaterally. GI:  Soft, nondistended, normal bowel sounds, nontender, no organomegaly, no mass, no rebound, no guarding   :  No costovertebral angle tenderness   Musculoskeletal:  No edema, no tenderness, no deformities. Back- no tenderness  Integument:  Well hydrated, no visible rash. Adequate perfusion. . Moderate sized skin tear right forearm. Lymphatic:  No cervical lymphadenopathy noted   Neurologic:  Alert & oriented x Person (baseline), CN 2-12 normal, no focal deficits noted. DTRs 2+ bilateral patellar. Psychiatric:  Speech and behavior appropriate       -------------------------------------------------- RESULTS -------------------------------------------------  I have personally reviewed all laboratory and imaging results for this patient. Results are listed below.      LABS:  Results for orders placed or performed during the hospital encounter of 04/18/21   Urinalysis with Microscopic   Result Value Ref Range    Color, UA Yellow Straw/Yellow    Clarity, UA SLCLOUDY Clear    Glucose, Ur Negative Negative mg/dL    Bilirubin Urine Negative Negative    Ketones, Urine Negative Negative mg/dL    Specific [place], UA 1.015 1.005 - 1.030    Blood, Urine TRACE (A) Negative    pH, UA 6.0 5.0 - 9.0    Protein, UA Negative Negative mg/dL    Urobilinogen, Urine 0.2 <2.0 E.U./dL    Nitrite, Urine Negative Negative    Leukocyte Esterase, Urine TRACE (A) Negative    WBC, UA 10-20 (A) 0 - 5 /HPF    RBC, UA 0-1 0 - 2 /HPF    Bacteria, UA MANY (A) None Seen /HPF   CBC auto differential   Result Value Ref Range    WBC 11.3 4.5 - 11.5 E9/L    RBC 3.84 3.80 - 5.80 E12/L    Hemoglobin 10.7 (L) 12.5 - 16.5 g/dL    Hematocrit 34.9 (L) 37.0 - 54.0 %    MCV 90.9 80.0 - 99.9 fL    MCH 27.9 26.0 - 35.0 pg    MCHC 30.7 (L) 32.0 - 34.5 %    RDW 18.6 (H) 11.5 - 15.0 fL    Platelets 876 010 - 408 E9/L    MPV 9.8 7.0 - 12.0 fL    Neutrophils % 74.4 43.0 - 80.0 %    Immature Granulocytes % 1.2 0.0 - 5.0 %    Lymphocytes % 16.4 (L) 20.0 - 42.0 %    Monocytes % 6.4 2.0 - 12.0 %    Eosinophils % 1.2 0.0 - 6.0 %    Basophils % 0.4 0.0 - 2.0 %    Neutrophils Absolute 8.38 (H) 1.80 - 7.30 E9/L    Immature Granulocytes # 0.14 E9/L    Lymphocytes Absolute 1.85 1.50 - 4.00 E9/L    Monocytes Absolute 0.72 0.10 - 0.95 E9/L    Eosinophils Absolute 0.13 0.05 - 0.50 E9/L    Basophils Absolute 0.04 0.00 - 0.20 E9/L   Comprehensive Metabolic Panel   Result Value Ref Range    Sodium 143 132 - 146 mmol/L    Potassium 3.3 (L) 3.5 - 5.0 mmol/L    Chloride 104 98 - 107 mmol/L    CO2 24 22 - 29 mmol/L    Anion Gap 15 7 - 16 mmol/L    Glucose 94 74 - 99 mg/dL    BUN 17 8 - 23 mg/dL    CREATININE 1.8 (H) 0.7 - 1.2 mg/dL    GFR Non-African American 38 >=60 mL/min/1.73    GFR African American 46     Calcium 5.4 (LL) 8.6 - 10.2 mg/dL    Total Protein 5.0 (L) 6.4 - 8.3 g/dL    Albumin 2.8 (L) 3.5 - 5.2 g/dL    Total Bilirubin 0.6 0.0 - 1.2 mg/dL    Alkaline Phosphatase 81 40 - 129 U/L    ALT 30 0 - 40 U/L    AST 38 0 - 39 U/L   APTT   Result Value Ref Range    aPTT 29.1 24.5 - 35.1 sec   Lactic Acid, Plasma   Result Value Ref Range    Lactic Acid 1.9 0.5 - 2.2 mmol/L   Protime-INR answered at this time and they are agreeable with the plan.    --------------------------- IMPRESSION AND DISPOSITION ---------------------------------    IMPRESSION  1. Seizure-like activity ([address]    2. Urinary tract infection without hematuria, site unspecified    3. Acute renal insufficiency    4. Hypokalemia    5. Elevated troponin    6. Closed head injury, initial encounter    7. Skin tear of right forearm without complication, initial encounter    8. Abrasion of forehead, initial encounter    9. Diarrhea, unspecified type    10. Anticoagulated    11.  Hypocalcemia        DISPOSITION  Disposition: Admit to telemetry  Patient condition is stable             Martha Shankar DO  04/18/21 [id number]

## 2021-04-18 NOTE — ED PROVIDER NOTES
200  Was called into the room because patient was having a seizure. This patient is currently admitted and is a hold down here. I was not the physician originally saw this patient. Patient was admitted for possible vasovagal syncope versus seizure-like activity. Initially the Keppra was held. Due to witnessed seizure activity now in the ED for Keppra has been ordered. Patient did receive Ativan which did stop the seizure. Patient is now having purposeful movements. CT head will be obtained and glucose checked. Patient's EKG does show a prolonged QT. Patient was also initially hypokalemic with a potassium of 3.3. Will check patient's magnesium as well. I have updated Dr. Marietta Caban who is the admitting physician of this as well.     1952  Patient's serum magnesium level is 0.51.  2 g of magnesium has already been ordered and we will supplement his low level. 2043  I did review patient's CT of head. I do not see any gross changes from the original CT.      Manjit Espino DO  04/18/21 2043

## 2021-04-18 NOTE — ED NOTES
Bed: 01  Expected date:   Expected time:   Means of arrival:   Comments:  derek Alicia RN  04/18/21 1016

## 2021-04-19 PROBLEM — R41.82 ALTERED MENTAL STATE: Status: ACTIVE | Noted: 2021-01-01

## 2021-04-19 NOTE — PROGRESS NOTES
Patient agitated. Pushing and kicking at staff. Trying to get out of bed. Very disoriented, incomprehensible sounds verbally. Dr. Tan Velasquez notified and Ativan ordered. In that time of getting the order,  patient settled down and fell back asleep. Ativan dose held for now. Will continue to monitor.

## 2021-04-19 NOTE — PROGRESS NOTES
Progress Note  Date:2021       Room:0431/0431-02  Patient Name:Davian Zhang     YOB: 1955     Age:65 y.o. Subjective    Subjective:  Symptoms:  Stable. He reports malaise, weakness, anorexia and anxiety. No shortness of breath, cough, chest pain, headache, chest pressure or diarrhea. Diet:  Poor intake. Activity level: Impaired due to weakness. Pain:  He complains of pain that is moderate. pt is lethargic. He was agitated last night. Ativan was given. Review of Systems   Respiratory: Negative for cough and shortness of breath. Cardiovascular: Negative for chest pain. Gastrointestinal: Positive for anorexia. Negative for diarrhea. Neurological: Positive for seizures and weakness. Objective         Vitals Last 24 Hours:  TEMPERATURE:  Temp  Av °F (37.2 °C)  Min: 98.5 °F (36.9 °C)  Max: 99.3 °F (37.4 °C)  RESPIRATIONS RANGE: Resp  Av  Min: 14  Max: 24  PULSE OXIMETRY RANGE: SpO2  Av %  Min: 94 %  Max: 99 %  PULSE RANGE: Pulse  Av.3  Min: 75  Max: 120  BLOOD PRESSURE RANGE: Systolic (28WPY), YCW:538 , Min:118 , ZQD:369   ; Diastolic (89IFX), BEJ:67, Min:58, Max:89    I/O (24Hr): Intake/Output Summary (Last 24 hours) at 2021 0815  Last data filed at 2021 0549  Gross per 24 hour   Intake    Output 2575 ml   Net -2575 ml     Objective:  General Appearance:  Comfortable. Vital signs: (most recent): Blood pressure (!) 168/71, pulse 90, temperature 98.5 °F (36.9 °C), temperature source Axillary, resp. rate 18, height 5' (1.524 m), weight 121 lb (54.9 kg), SpO2 95 %. Vital signs are normal.    Output: Producing urine. HEENT: Normal HEENT exam.    Lungs:  Normal respiratory rate. He is not in respiratory distress. There are wheezes. Heart: Regular rhythm. S1 normal and S2 normal.    Abdomen: Abdomen is soft. Bowel sounds are normal.     Extremities: Normal range of motion. Pulses: Distal pulses are intact. Neurological: Patient is alert. Pupils:  Pupils are equal, round, and reactive to light. Labs/Imaging/Diagnostics    Labs:  CBC:  Recent Labs     04/18/21  1116 04/19/21  0706   WBC 11.3 9.7   RBC 3.84 3.61*   HGB 10.7* 10.0*   HCT 34.9* 31.4*   MCV 90.9 87.0   RDW 18.6* 18.1*    170     CHEMISTRIES:  Recent Labs     04/18/21  1116 04/19/21  0706    142   K 3.3* 3.2*    105   CO2 24 20*   BUN 17 12   CREATININE 1.8* 1.5*   GLUCOSE 94 67*   MG 0.5* 1.8     PT/INR:  Recent Labs     04/18/21  1116   PROTIME 19.4*   INR 1.7     APTT:  Recent Labs     04/18/21  1116   APTT 29.1     LIVER PROFILE:  Recent Labs     04/18/21  1116 04/19/21  0706   AST 38 34   ALT 30 26   BILITOT 0.6 0.5   ALKPHOS 81 79       Imaging Last 24 Hours:  Ct Head Wo Contrast    Result Date: 4/18/2021  EXAMINATION: CT OF THE HEAD WITHOUT CONTRAST  4/18/2021 8:01 pm TECHNIQUE: CT of the head was performed without the administration of intravenous contrast. Dose modulation, iterative reconstruction, and/or weight based adjustment of the mA/kV was utilized to reduce the radiation dose to as low as reasonably achievable. COMPARISON: Same day study obtained 6 hours earlier. HISTORY: ORDERING SYSTEM PROVIDED HISTORY: Evaluate intracranial abnormality TECHNOLOGIST PROVIDED HISTORY: Has a \"code stroke\" or \"stroke alert\" been called? ->No Reason for exam:->Evaluate intracranial abnormality FINDINGS: There is no acute infarction, intracranial hemorrhage or intracranial mass lesion. No mass effect, midline shift or extra-axial collection is noted. There are moderate nonspecific foci of periventricular and subcortical cerebral white matter hypodensity, most likely representing chronic microangiopathic disease in this age group. Old lacunar infarction of the left cerebellar hemisphere. The brain parenchyma is otherwise normal. The cerebellar tonsils are in normal position.  The ventricles, sulci, and cisterns are mildly prominent suggestive of moderate generalized volume loss. The globes and orbits are within normal limits. The visualized extracranial structures including paranasal sinuses and mastoid air cells are unremarkable. No fracture is identified. Stable study. No evidence for acute intracranial hemorrhage, territorial infarction or intracranial mass lesion. Moderate chronic microangiopathic ischemic disease. Moderate generalized volume loss. Considering the extensive amount of white matter changes, if there is need for evaluation of subtle acute infarction brain MRI with DWI is much more sensitive. Old lacunar infarction left cerebellar hemisphere. Ct Head Wo Contrast    Result Date: 4/18/2021  EXAMINATION: CT OF THE HEAD WITHOUT CONTRAST  4/18/2021 1:25 pm TECHNIQUE: CT of the head was performed without the administration of intravenous contrast. Dose modulation, iterative reconstruction, and/or weight based adjustment of the mA/kV was utilized to reduce the radiation dose to as low as reasonably achievable. COMPARISON: CT head from March 27, 2021 HISTORY: ORDERING SYSTEM PROVIDED HISTORY: Evaluate intracranial abnormality TECHNOLOGIST PROVIDED HISTORY: Has a \"code stroke\" or \"stroke alert\" been called? ->No Reason for exam:->Evaluate intracranial abnormality Decision Support Exception->Emergency Medical Condition (MA) FINDINGS: BRAIN/VENTRICLES: There are extensive white matter changes. Moderate degree of periventricular and deep white matter hypoattenuating regions which are suggestive of areas of chronic microvascular ischemic change. No evidence of intracranial hemorrhage or herniation. The ventricles and CSF containing spaces are prominent consistent with involutional change. Intracranial atherosclerotic disease is present. Probably physiologic mineral deposition in the bilateral basal ganglia. Stable sequela of a prior focal ischemic event in the left cerebellum.  ORBITS: The visualized portion of the orbits demonstrate no acute abnormality. SINUSES: The visualized paranasal sinuses and mastoid air cells demonstrate no acute abnormality. SOFT TISSUES/SKULL:  No acute abnormality of the visualized skull or soft tissues. 1.  No evidence of acute intracranial hemorrhage or herniation. 2.  Moderate senescent changes and white matter changes as well as involutional changes throughout the brain parenchyma. Stable sequela of a prior focal ischemic event in the left cerebellum. Intracranial atherosclerotic disease present. Ct Cervical Spine Wo Contrast    Result Date: 4/18/2021  EXAMINATION: CT OF THE CERVICAL SPINE WITHOUT CONTRAST 4/18/2021 1:25 pm TECHNIQUE: CT of the cervical spine was performed without the administration of intravenous contrast. Multiplanar reformatted images are provided for review. Dose modulation, iterative reconstruction, and/or weight based adjustment of the mA/kV was utilized to reduce the radiation dose to as low as reasonably achievable. COMPARISON: MRI cervical spine August 9, 2018 HISTORY: ORDERING SYSTEM PROVIDED HISTORY: trauma TECHNOLOGIST PROVIDED HISTORY: Reason for exam:->trauma Decision Support Exception->Emergency Medical Condition (MA) FINDINGS: BONES/ALIGNMENT: There is no acute fracture or traumatic malalignment. DEGENERATIVE CHANGES: Degenerative changes most prominent at C5-C6. SOFT TISSUES: There is no prevertebral soft tissue swelling. No traumatic injuries in cervical spine. Xr Chest Portable    Result Date: 4/18/2021  EXAMINATION: ONE XRAY VIEW OF THE CHEST 4/18/2021 12:38 pm COMPARISON: Chest radiograph March 28, 2021 HISTORY: ORDERING SYSTEM PROVIDED HISTORY: LOC TECHNOLOGIST PROVIDED HISTORY: Reason for exam:->LOC FINDINGS: Trachea is midline. Cardiomediastinal silhouette is stable in size. Linear opacity again identified in right lung. Blunting of right costophrenic angles unchanged. Scarring right lower lung.  Blunting right costophrenic angle may represent small pleural or possible scarring. Assessment//Plan           Hospital Problems           Last Modified POA    * (Principal) Altered mental state 4/19/2021 Yes    Seizures ([address] 4/19/2021 Yes        Assessment:  (Problem List as of 4/19/2021 Reviewed: 2/17/2021 11:11 AM by PALMA Marr - CNP    History of pulmonary embolism    Ischemic cardiomyopathy    Malnutrition ([address]    Multiple sclerosis ([address]    Bilateral pulmonary embolism ([address]    Crohn's disease ([address]    COPD (chronic obstructive pulmonary disease) ([address]    Tobacco abuse    Hypertension    Hyperlipidemia    Stage 3b chronic kidney disease    Hyperkalemia    Leukocytosis    Small bowel obstruction (HCC)    HCAP (healthcare-associated pneumonia)    Acute on chronic renal failure (HCC)    Sepsis (HCC)    SBO (small bowel obstruction) (HCC)    Acute on chronic renal insufficiency    Seizures (HCC)    * (Principal) Altered mental state    hypiocalcemia  Acute dvt/pe  Moderate malnutrition). Plan:   (Pt has hypocalcemia due to poor intake. He has moderate malnutrition. Pt has uc and is on steroid by gastro. He is on elqiuis for life due to recurrent dvt and pe. Kidneys function is better. He is on keppra for seizure disorder. Chronic pain due to ms and back pain. He pain med is reducing. ).        Electronically signed by Yue Elliott MD on 4/19/21 at 8:15 AM EDT

## 2021-04-19 NOTE — CONSULTS
History Of Present Illness: Chief Complaint: Patient is poor historian, wife had called earlier, patient had seizures at home.     History of present illness:  Patient is a 63 y.o. patient with known history of Crohn's disease, COPD, cardiomyopathy, acute on chronic renal insufficiency, history of multiple sclerosis, chronic atrial fibrillation, stage IIIb chronic kidney disease admitted after having seizures witnessed by patient's wife, as large bruise on right part of the forehead. Patient Active Problem List   Diagnosis    [facility]    Multiple sclerosis ([address]    Bilateral pulmonary embolism (HCC)    Crohn's disease ([address]    COPD (chronic obstructive pulmonary disease) ([address]    Tobacco abuse    Hypertension    Hyperlipidemia    Stage 3b chronic kidney disease    Hyperkalemia    Leukocytosis    Small bowel obstruction ([facility]    HCAP (healthcare-associated pneumonia)    [facility]    Sepsis ([facility]    SBO [facility]small bowel obstruction) ([facility]    History of pulmonary embolism    Acute on chronic renal insufficiency    Ischemic cardiomyopathy    Seizure-like activity ([facility]     As above per . The patient is a 63 y.o. male with significant past medical history of see above and below who presents with above.       The patient has the following symptoms:    Change in level of consciousness: unresponsive    New Weakness: no    Numbness or Tingling: no    Difficulty Swallowing: no    Current Medications:   Scheduled Meds:   LORazepam  0.5 mg Intravenous Once    atorvastatin  80 mg Oral Nightly    mirtazapine  15 mg Oral Nightly    oxybutynin  5 mg Oral BID    oxyCODONE HCl  10 mg Oral Q4H    pantoprazole  40 mg Oral QAM AC    predniSONE  10 mg Oral TID    cefTRIAXone (ROCEPHIN) IV  1,000 mg Intravenous Q24H    apixaban  5 mg Oral BID     Continuous Infusions:  PRN Meds:perflutren lipid microspheres    Allergies:  Neurontin [gabapentin] and Lyrica [pregabalin]    Social History:   TOBACCO:   reports that he quit smoking about 3 months ago. His smoking use included cigarettes. He has never used smokeless tobacco.  ETOH:   reports no history of alcohol use. Past Medical History:        Diagnosis Date    [facility]chronic obstructive [facility] disease) [facility]     Crohn's disease ([facility]     Hx of blood clots 2012    liver, lung    Hyperlipidemia     Hypertension     [facility]     Stage 3b chronic kidney disease 1/29/2021       Past Surgical History:        Procedure Laterality Date    APPENDECTOMY      CHOLECYSTECTOMY      COLONOSCOPY      ENDOSCOPY, COLON, DIAGNOSTIC           Outside reports reviewed: ER records, historical medical records, lab reports and radiology reports. Patient's medications, allergies, past medical, surgical, social and family histories were reviewed and updated as appropriate. Review of Systems  A comprehensive review of systems was negative except for:       Objective:     Neuro exam 168/70, pulse 90, he is afebrile  General: somnolent. Cranial nerve testing  unable to cooperate. PERRL, corneal reflexes +  . Funduscopic eye exam revealed not testable. Motor exam: . Camila Linger Deep tendon reflexes were 1+ bilaterally. Plantar responses were flexor bilaterally. Cerebellar exam noted . Camila Linger Sensation was . Camila Linger Assessment:   Witnessed seizure in a patient with advanced multiple sclerosis and significant hypocalcemia  Head CT unremarkable  Moderate anemia noted      Plan: We will check EEG  Keppra  Defer to primary care evaluation and treatment of hypocalcemia  Consider reduction or weaning to discontinue Remeron, oxycodone, prednisone.   Thank you for consultation

## 2021-04-19 NOTE — PROGRESS NOTES
Patient admitted to floor. Patient very lethargic. Responds to verbal and painful stimuli with movement. Pupils very sluggish to light. Patient has an abrasion on forehead from fall during seizure at home. Also a large skin tear on right forearm from fall at home. Seizure pads placed on side rails of bed. HR-75. Temp-99.1. /60. Pulse-ox-95% on 3L via NC. Will continue to monitor.

## 2021-04-19 NOTE — H&P
Patient ID:  Lucio Madden  [id number]  32 y.o.  1955    Chief Complaint: Patient is poor historian, wife had called earlier, patient had seizures at home. History of present illness:  Patient is a 63 y.o. patient with known history of Crohn's disease, COPD, cardiomyopathy, acute on chronic renal insufficiency, history of multiple sclerosis, chronic atrial fibrillation, stage IIIb chronic kidney disease admitted after having seizures witnessed by patient's wife, as large bruise on right part of the forehead. Patient Active Problem List   Diagnosis    Malnutrition ([address]    Multiple sclerosis ([address]    Bilateral pulmonary embolism (HCC)    Crohn's disease ([address]    COPD (chronic obstructive pulmonary disease) ([address]    Tobacco abuse    Hypertension    Hyperlipidemia    Stage 3b chronic kidney disease    Hyperkalemia    Leukocytosis    Small bowel obstruction ([address]    HCAP (healthcare-associated pneumonia)    Acute on chronic renal failure (HCC)    Sepsis ([address]    [facility]    History of pulmonary embolism    Acute on chronic renal insufficiency    Ischemic cardiomyopathy    Seizure-like activity ([address]     Past Medical History:   Diagnosis Date    COPD (chronic obstructive pulmonary disease) ([address]     Crohn's disease ([address]     Hx of blood clots 2012    liver, lung    Hyperlipidemia     Hypertension     Multiple sclerosis ([address]     Stage 3b chronic kidney disease 2021      Past Surgical History:   Procedure Laterality Date    APPENDECTOMY      CHOLECYSTECTOMY      COLONOSCOPY      ENDOSCOPY, COLON, DIAGNOSTIC        Not in a hospital admission.   Allergies   Allergen Reactions    Neurontin [Gabapentin] Palpitations    Lyrica [Pregabalin] Other (See Comments)     photophobia      Social History     Tobacco Use    Smoking status: Former Smoker     Types: Cigarettes     Quit date: 2021     Years since quittin.2    Smokeless tobacco: Never Used Substance Use Topics    Alcohol use: No      History reviewed. No pertinent family history. Review of systems:  Constitutional:  Denies fever or chills   Eyes:  Denies change in visual acuity   HENT:  Denies nasal congestion or sore throat   Respiratory:  Denies cough or shortness of breath   Cardiovascular:  Denies chest pain or edema   GI:  Denies abdominal pain, nausea, vomiting, bloody stools or diarrhea   :  Denies dysuria   Musculoskeletal:  Denies back pain or joint pain   Integument:  Denies rash   Neurologic: Right forehead bruise. Endocrine:  Denies polyuria or polydipsia   Lymphatic:  Denies swollen glands   Psychiatric:  Denies depression or anxiety     Physical Exam:  BP (!) 118/58   Pulse 77   Temp 99.3 °F (37.4 °C) (Oral)   Resp 20   Ht 5' (1.524 m)   Wt 121 lb (54.9 kg)   SpO2 99%   BMI 23.63 kg/m²   No intake/output data recorded. Constitutional: Patient with right forehead bruise, oriented to place not oriented to time and person. Eyes:  PERRL, conjunctiva normal   HENT:  Atraumatic, external ears normal, nose normal, oropharynx moist, no pharyngeal exudates. Neck- normal range of motion, no tenderness, supple   Respiratory:  No respiratory distress, normal breath sounds, no rales, no wheezing   Cardiovascular:  Normal rate, normal rhythm, no murmurs, no gallops, no rubs   GI:  Soft, nondistended, normal bowel sounds, nontender, no organomegaly, no mass, no rebound, no guarding   :  No costovertebral angle tenderness   Musculoskeletal:  No edema, no tenderness, no deformities. Back- no tenderness  Integument:  Well hydrated, no rash   Lymphatic:  No lymphadenopathy noted   Neurologic:  Alert & oriented x 1CN 2-12 normal, normal motor function, normal sensory function, no focal deficits noted   Psychiatric:  Speech normal, patient shows evidence of some dementia.        Labs:  CBC:   Lab Results   Component Value Date    WBC 11.3 04/18/2021    RBC 3.84 04/18/2021    HGB 10.7 04/18/2021    HCT 34.9 04/18/2021    MCV 90.9 04/18/2021    MCH 27.9 04/18/2021    MCHC 30.7 04/18/2021    RDW 18.6 04/18/2021     04/18/2021    MPV 9.8 04/18/2021     CMP:    Lab Results   Component Value Date     04/18/2021    K 3.3 04/18/2021    K 5.0 03/27/2021     04/18/2021    CO2 24 04/18/2021    BUN 17 04/18/2021    CREATININE 1.8 04/18/2021    GFRAA 46 04/18/2021    LABGLOM 38 04/18/2021    GLUCOSE 94 04/18/2021    PROT 5.0 04/18/2021    LABALBU 2.8 04/18/2021    CALCIUM 5.4 04/18/2021    BILITOT 0.6 04/18/2021    ALKPHOS 81 04/18/2021    AST 38 04/18/2021    ALT 30 04/18/2021     PT/INR:    Lab Results   Component Value Date    PROTIME 19.4 04/18/2021    INR 1.7 04/18/2021       Ct Head Wo Contrast    Result Date: 4/18/2021  EXAMINATION: CT OF THE HEAD WITHOUT CONTRAST  4/18/2021 1:25 pm TECHNIQUE: CT of the head was performed without the administration of intravenous contrast. Dose modulation, iterative reconstruction, and/or weight based adjustment of the mA/kV was utilized to reduce the radiation dose to as low as reasonably achievable. COMPARISON: CT head from March 27, 2021 HISTORY: ORDERING SYSTEM PROVIDED HISTORY: Evaluate intracranial abnormality TECHNOLOGIST PROVIDED HISTORY: Has a \"code stroke\" or \"stroke alert\" been called? ->No Reason for exam:->Evaluate intracranial abnormality Decision Support Exception->Emergency Medical Condition (MA) FINDINGS: BRAIN/VENTRICLES: There are extensive white matter changes. Moderate degree of periventricular and deep white matter hypoattenuating regions which are suggestive of areas of chronic microvascular ischemic change. No evidence of intracranial hemorrhage or herniation. The ventricles and CSF containing spaces are prominent consistent with involutional change. Intracranial atherosclerotic disease is present. Probably physiologic mineral deposition in the bilateral basal ganglia.   Stable sequela of a prior focal ischemic event in the left cerebellum. ORBITS: The visualized portion of the orbits demonstrate no acute abnormality. SINUSES: The visualized paranasal sinuses and mastoid air cells demonstrate no acute abnormality. SOFT TISSUES/SKULL:  No acute abnormality of the visualized skull or soft tissues. 1.  No evidence of acute intracranial hemorrhage or herniation. 2.  Moderate senescent changes and white matter changes as well as involutional changes throughout the brain parenchyma. Stable sequela of a prior focal ischemic event in the left cerebellum. Intracranial atherosclerotic disease present. Ct Cervical Spine Wo Contrast    Result Date: 4/18/2021  EXAMINATION: CT OF THE CERVICAL SPINE WITHOUT CONTRAST 4/18/2021 1:25 pm TECHNIQUE: CT of the cervical spine was performed without the administration of intravenous contrast. Multiplanar reformatted images are provided for review. Dose modulation, iterative reconstruction, and/or weight based adjustment of the mA/kV was utilized to reduce the radiation dose to as low as reasonably achievable. COMPARISON: MRI cervical spine August 9, 2018 HISTORY: ORDERING SYSTEM PROVIDED HISTORY: trauma TECHNOLOGIST PROVIDED HISTORY: Reason for exam:->trauma Decision Support Exception->Emergency Medical Condition (MA) FINDINGS: BONES/ALIGNMENT: There is no acute fracture or traumatic malalignment. DEGENERATIVE CHANGES: Degenerative changes most prominent at C5-C6. SOFT TISSUES: There is no prevertebral soft tissue swelling. No traumatic injuries in cervical spine. Xr Chest Portable    Result Date: 4/18/2021  EXAMINATION: ONE XRAY VIEW OF THE CHEST 4/18/2021 12:38 pm COMPARISON: Chest radiograph March 28, 2021 HISTORY: ORDERING SYSTEM PROVIDED HISTORY: LOC TECHNOLOGIST PROVIDED HISTORY: Reason for exam:->LOC FINDINGS: Trachea is midline. Cardiomediastinal silhouette is stable in size. Linear opacity again identified in right lung. Blunting of right costophrenic angles unchanged. Scarring right lower lung. Blunting right costophrenic angle may represent small pleural or possible scarring. Assessment    Patient Active Problem List   Diagnosis    Malnutrition ([address]    Multiple sclerosis ([address]    Bilateral pulmonary embolism (HCC)    Crohn's disease ([address]    COPD (chronic obstructive pulmonary disease) ([address]    Tobacco abuse    Hypertension    Hyperlipidemia    Stage 3b chronic kidney disease    Hyperkalemia    Leukocytosis    Small bowel obstruction ([address]    HCAP (healthcare-associated pneumonia)    Acute on chronic renal failure (HCC)    Sepsis ([address]    SBO (small bowel obstruction) (HCC)    History of pulmonary embolism    Acute on chronic renal insufficiency    Ischemic cardiomyopathy    Seizure-like activity ([address]       Plan     See my orders    1. New onset seizure, neurology consult, 2D echo, EEG. Patient will require Keppra to be started. 2. History of multiple sclerosis recently seen at [address] [place] [address] for work-up in case his MS is getting advanced. 3. 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 Patient poor historian. 4. Stage IIIb chronic renal insufficiency with underlying history of cardiomyopathy. 5. Known history of pulmonary embolism recently patient on Eliquis given history of seizure disorder if it has been more than 6 months patient Eliquis should be discontinued will defer up to Dr. Hector Henderson to decide as I am not well aware when his last CTA showed pulmonary embolism. 6.  History of Crohn's disease continue home medication. Completed By:  Dr. Darold Litten, MD, [address]     4/18/2021      Electronically signed by Gustavo Hernandez MD on 4/18/21

## 2021-04-19 NOTE — CARE COORDINATION
4/19/2021 1252 CM note: No covid test. CM attempted to meet with pt, however pt sleeping. CM spoke with pt's wife Gaye([phone]) for transition of care needs. Pt resides with his wife Joselyn Adame in 1 story home, 1 step entry. Joselyn Adame reports pt had been fairly independent at home and able to ambulate short distances with walker. He is wearing o2 @2L NC, does not have home o2. Pt has hx of home o2 through [facility]. DME includes: rollator,cane,walker,w/c and shower chair. Pt has recent hx of MVI , no hx SNF. PCP is Dr Awilda Malik, he uses [address] [place] [address]. Will await PT/OT ramon to assist with dc plan.  Nancy MAXWELL    Readmission Assessment  Number of Days since last admission?: 8-30 days  Previous Disposition: Home with Home Health  Who is being Interviewed: Caregiver(pt's wife Little Mendieta)  Did you visit your Primary Care Physician after you left the hospital, before you returned this time?: Yes  Did you see a specialist, such as Cardiac, Pulmonary, Orthopedic Physician, etc. after you left the hospital?: No  Who advised the patient to return to the hospital?: Caregiver  Does the patient report anything that got in the way of taking their medications?: No  In our efforts to provide the best possible care to you and others like you, can you think of anything that we could have done to help you after you left the hospital the first time, so that you might not have needed to return so soon?: Other (Comment)(no)

## 2021-04-20 NOTE — PROCEDURES
[address]                          ELECTROENCEPHALOGRAM REPORT    PATIENT NAME: Sanford Freire                   :        1955  MED REC NO:   [id number]                            ROOM:       1606  ACCOUNT NO:   [de-identified]                           ADMIT DATE: 2021  PROVIDER:     Celia Burns MD    DATE OF EE2021    EEG DIAGNOSIS:  Awake normal, sleep normal.    REPORT:  A 19-channel EEG was recorded and demonstrates a background  rhythm of 7-8 Hz independent of eye closure. Hyperventilation and  photic stimulation were not performed. During the sleep portion of the  record, there was no activation. INTERPRETATION:  Normal awake and sleep EEG. A normal EEG does not rule  out seizure disorder. Clinical correlation advised.         Sandy Shone, MD    D: 2021 11:54:50       T: 2021 11:59:38     [email or web address]  Job#: [id number]     Doc#: [id number]    CC:

## 2021-04-20 NOTE — PROGRESS NOTES
Cane, Wheeled Walker, Rollator and Shower chair,       AM-PAC Basic Mobility        AM-[facility] Mobility Inpatient   How much difficulty turning over in bed?: A Little  How much difficulty sitting down on / standing up from a chair with arms?: A Little  How much difficulty moving from lying on back to sitting on side of bed?: A Little  How much help from another person moving to and from a bed to a chair?: A Little  How much help from another person needed to walk in hospital room?: A Lot  How much help from another person for climbing 3-5 steps with a railing?: A Lot  AM-PAC Inpatient Mobility Raw Score : 16  AM-PAC Inpatient T-Scale Score : 40.78  Mobility Inpatient CMS 0-100% Score: 54.16  Mobility Inpatient CMS G-Code Modifier : CK    Nursing cleared patient for PT evaluation. The admitting diagnosis and active problem list as listed above have been reviewed prior to the initiation of this evaluation. OBJECTIVE;   Initial Evaluation  Date: 4/20/2021 Treatment Date:     Short Term/ Long Term   Goals   Was pt agreeable to Eval/treatment? Yes    To be met in 3 days   Pain level   0/10        Bed Mobility    Rolling: Supervision     Supine to sit: Minimal assist of 1    Sit to supine: Minimal assist of 1    Scooting: Minimal assist of 1    Rolling: Independent    Supine to sit:  Independent    Sit to supine: Independent    Scooting: Independent     Transfers Sit to stand: Minimal assist of 1    Sit to stand: Independent     Ambulation    20 feet and 50  feet using  wheeled walker with Moderate assist of 1   for balance, wheeled walker approximation and safety; loss of balance x 1 with mod a to recover with decreased awareness of deficit; shuffling gait increasing risk for fall   75 feet using  wheeled walker with Supervision  and no loss of balance    Stair negotiation: ascended and descended   Not assessed       1 step wheeled walker    ROM Within functional limits      Increase range of motion 10% of affected joints    Strength BUE:  3+/5  RLE:  3+/5  LLE:  3+/5   Increase strength in affected mm groups by 1/3 grade   Balance Sitting EOB:  poor    Dynamic Standing:  poor    Sitting EOB:  fair    Dynamic Standing: fair       Patient is Alert & Oriented x person and place and follows one step directions  , impulsive  Sensation:  Patient  denies numbness and tingling     Edema:  no   Endurance: fair       Vitals: room air    SPO2 at rest 100%  SPO2 during session 96%     Patient education  Patient educated on role of Physical Therapy, risks of immobility, safety and plan of care,  importance of mobility while in hospital  and importance and purpose of adaptive device and adjusted to proper height for the patient. Patient response to education:   Pt verbalized understanding Pt demonstrated skill Pt requires further education in this area   Yes Partial Yes      Treatment:  Patient practiced and was instructed/facilitated in the following treatment: Patient   Sat edge of bed 10 minutes with Supervision  to increase dynamic sitting balance and activity tolerance. seated and standing challenges with focus on balance, attention to safety and endurance     Therapeutic Exercises:  not performed      At end of session, patient in bed with alarm call light and phone within reach,   all lines and tubes intact, nursing notified. Patient would benefit from continued skilled Physical Therapy to improve functional independence and quality of life.           Patient's/ family goals   home        ASSESSMENT: Patient exhibits decreased strength, balance and endurance impairing functional mobility, gait distance and tolerance to activity are barriers to d/c and require skilled intervention during hospital stay   and places patient at increased risk for falls; recommend speech cognitive eval for closed head injury and new deficits     Plan of Care:     -Standing Balance: Perform strengthening exercises in standing to promote motor control with or without upper extremity support   -Transfers: Provide instruction on proper hand and foot position for adequate transfer of weight onto lower extremities and use of gait device  -Gait: Gait training and Standing activities to improve: base of support, weight shift, weight bearing    -Endurance: Utilize Supervised activities to increase level of endurance to allow for safe functional mobility including transfers and gait   -Stairs: Stair training with instruction on proper technique and hand placement on rail    Patient and or family understand(s) diagnosis, prognosis, and plan of care. Frequency of treatments: Patient will be seen  daily. Time in  355  Time out  421    Total Treatment Time  10 minutes    Evaluation time includes thorough review of current medical information, gathering information on past medical history/social history and prior level of function, completion of standardized testing/informal observation of tasks, assessment of data, and development of Plan of care and goals.      CPT codes:  Low Complexity PT evaluation (54584)  Gait Training (29343) 10 minutes 355 unit(s)    Roel Reeder PT

## 2021-04-20 NOTE — PROGRESS NOTES
Occupational Therapy  OT SESSION ATTEMPT     Date:2021  Patient Name: Alviia Peña  MRN: [id number]  : 1955  Room: [address] [place] [address]     Attempted OT session this date:    [] unavailable due to other medical staff currently with pt   [] on hold per nursing staff   [] on hold per nursing staff secondary to lab / radiology results    [] pt declined due to ____. Benefits of participation in therapy reviewed with pt. [x] off unit   [] Other:     Will reattempt OT evaluation and/or treatment at a later time.     Na Kelly, OTR/L #[id number]

## 2021-04-20 NOTE — PROGRESS NOTES
are intact. Neurological: Patient is alert and oriented to person, place and time. Pupils:  Pupils are equal, round, and reactive to light. Skin:  Warm and dry. Labs/Imaging/Diagnostics    Labs:  CBC:  Recent Labs     04/18/21  1116 04/19/21  0706 04/20/21  0723   WBC 11.3 9.7 6.7   RBC 3.84 3.61* 3.70*   HGB 10.7* 10.0* 10.3*   HCT 34.9* 31.4* 32.8*   MCV 90.9 87.0 88.6   RDW 18.6* 18.1* 17.8*    170 201     CHEMISTRIES:  Recent Labs     04/18/21  1116 04/19/21  0706 04/20/21  0723    142 144   K 3.3* 3.2* 4.0    105 107   CO2 24 20* 20*   BUN 17 12 12   CREATININE 1.8* 1.5* 1.5*   GLUCOSE 94 67* 95   MG 0.5* 1.8  --      PT/INR:  Recent Labs     04/18/21  1116   PROTIME 19.4*   INR 1.7     APTT:  Recent Labs     04/18/21  1116   APTT 29.1     LIVER PROFILE:  Recent Labs     04/18/21  1116 04/19/21  0706 04/20/21  0723   AST 38 34 19   ALT 30 26 22   BILITOT 0.6 0.5 0.5   ALKPHOS 81 79 84       Imaging Last 24 Hours:  Ct Head Wo Contrast    Result Date: 4/18/2021  EXAMINATION: CT OF THE HEAD WITHOUT CONTRAST  4/18/2021 8:01 pm TECHNIQUE: CT of the head was performed without the administration of intravenous contrast. Dose modulation, iterative reconstruction, and/or weight based adjustment of the mA/kV was utilized to reduce the radiation dose to as low as reasonably achievable. COMPARISON: Same day study obtained 6 hours earlier. HISTORY: ORDERING SYSTEM PROVIDED HISTORY: Evaluate intracranial abnormality TECHNOLOGIST PROVIDED HISTORY: Has a \"code stroke\" or \"stroke alert\" been called? ->No Reason for exam:->Evaluate intracranial abnormality FINDINGS: There is no acute infarction, intracranial hemorrhage or intracranial mass lesion. No mass effect, midline shift or extra-axial collection is noted. There are moderate nonspecific foci of periventricular and subcortical cerebral white matter hypodensity, most likely representing chronic microangiopathic disease in this age group.   Old lacunar infarction of the left cerebellar hemisphere. The brain parenchyma is otherwise normal. The cerebellar tonsils are in normal position. The ventricles, sulci, and cisterns are mildly prominent suggestive of moderate generalized volume loss. The globes and orbits are within normal limits. The visualized extracranial structures including paranasal sinuses and mastoid air cells are unremarkable. No fracture is identified. Stable study. No evidence for acute intracranial hemorrhage, territorial infarction or intracranial mass lesion. Moderate chronic microangiopathic ischemic disease. Moderate generalized volume loss. Considering the extensive amount of white matter changes, if there is need for evaluation of subtle acute infarction brain MRI with DWI is much more sensitive. Old lacunar infarction left cerebellar hemisphere. Ct Head Wo Contrast    Result Date: 4/18/2021  EXAMINATION: CT OF THE HEAD WITHOUT CONTRAST  4/18/2021 1:25 pm TECHNIQUE: CT of the head was performed without the administration of intravenous contrast. Dose modulation, iterative reconstruction, and/or weight based adjustment of the mA/kV was utilized to reduce the radiation dose to as low as reasonably achievable. COMPARISON: CT head from March 27, 2021 HISTORY: ORDERING SYSTEM PROVIDED HISTORY: Evaluate intracranial abnormality TECHNOLOGIST PROVIDED HISTORY: Has a \"code stroke\" or \"stroke alert\" been called? ->No Reason for exam:->Evaluate intracranial abnormality Decision Support Exception->Emergency Medical Condition (MA) FINDINGS: BRAIN/VENTRICLES: There are extensive white matter changes. Moderate degree of periventricular and deep white matter hypoattenuating regions which are suggestive of areas of chronic microvascular ischemic change. No evidence of intracranial hemorrhage or herniation. The ventricles and CSF containing spaces are prominent consistent with involutional change.   Intracranial atherosclerotic disease is silhouette is stable in size. Linear opacity again identified in right lung. Blunting of right costophrenic angles unchanged. Scarring right lower lung. Blunting right costophrenic angle may represent small pleural or possible scarring. Mri Brain Wo Contrast    Result Date: 4/19/2021  EXAMINATION: MRI OF THE BRAIN WITHOUT CONTRAST  4/19/2021 5:27 pm TECHNIQUE: Multiplanar multisequence MRI of the brain was performed without the administration of intravenous contrast. COMPARISON: None HISTORY: ORDERING SYSTEM PROVIDED HISTORY: seizure exacerbation MS TECHNOLOGIST PROVIDED HISTORY: Reason for exam:->seizure exacerbation MS Initial evaluation. FINDINGS: INTRACRANIAL STRUCTURES/VENTRICLES: There is no acute infarct. Motion artifact degrades the images. The ventricles and cisternal spaces are prominent consistent with cerebral atrophy. There are numerous and confluent areas of high signal in the periventricular white matter and centrum semiovale that are likely related to chronic small vessel ischemic disease. Demyelination could be considered in a patient with known MS. There is no midline shift or mass effect. There is a remote lacunar infarct in the left cerebellum. There is a remote lacunar infarct in the left thalamus. There is no obvious mesial temporal sclerosis. ORBITS: The visualized portion of the orbits demonstrate no acute abnormality. SINUSES:  The visualized paranasal sinuses and mastoid air cells are for the most part clear. BONES/SOFT TISSUES: The bone marrow signal intensity appears normal. The soft tissues demonstrate no acute abnormality. Motion artifact severely degrades the images. Cerebral atrophy. Severe chronic small vessel ischemic changes. Remote lacunar infarcts in the left cerebellum and left thalamus. No areas of restricted diffusion to suggest an acute ischemic event. No obvious mesial temporal sclerosis.      Assessment//Plan           Hospital Problems           Last

## 2021-04-21 PROBLEM — E21.3 HYPERPARATHYROIDISM (HCC): Status: ACTIVE | Noted: 2021-01-01

## 2021-04-21 NOTE — PROGRESS NOTES
Occupational Therapy  OT SESSION ATTEMPT     Date:2021  Patient Name: Marietta Hunt  MRN: [id number]  : 1955  Room: [address] [place] [address]     Attempted OT session this date:    [] unavailable due to other medical staff currently with pt   [] on hold per nursing staff   [] on hold per nursing staff secondary to lab / radiology results    [x] pt declined due to _just working with PT___. Benefits of participation in therapy reviewed with pt.    [] off unit   [] Other:     Will reattempt OT evaluation and/or treatment at a later time.     Jennifer Shafer, OTR/L #[id number]

## 2021-04-21 NOTE — CARE COORDINATION
4/21/2021 1034 CM note:No covid test. CM called pt's wife Court Poster regarding PT eval/recommendations. Court Poster states pt is at his baseline and plan is for pt to return home. Pt lives with Court Poster and their adult son is staying with them and assists as needed. Gaye declines Anders Myers. Court Poster will transport pt home.  Nancy MAXWELL

## 2021-04-21 NOTE — PROGRESS NOTES
Physical Therapy    Physical Therapy Treatment Note    Room #:  5603/5675-68  Patient Name: Lena Drake  YOB: 1955  MRN: [id number]    Referring Provider: Ham Blake MD       Date of Service: 4/21/2021    Evaluating Physical Therapist: Peterson Cantu, PT  #[id number]       Diagnosis:   Seizure-like activity ([address] [R56.9]   Admitted with closed head injury following seizure    Patient Active Problem List   Diagnosis    Malnutrition ([address]    Multiple sclerosis ([address]    Bilateral pulmonary embolism ([address]    Crohn's disease ([address]    COPD (chronic obstructive pulmonary disease) ([address]    Tobacco abuse    Hypertension    Hyperlipidemia    Stage 3b chronic kidney disease    Hyperkalemia    Leukocytosis    Small bowel obstruction ([address]    HCAP (healthcare-associated pneumonia)    Acute on chronic renal failure ([address]    Sepsis ([address]    SBO (small bowel obstruction) ([address]    History of pulmonary embolism    Acute on chronic renal insufficiency    Ischemic cardiomyopathy    Seizures ([address]    Altered mental state    Hyperparathyroidism ([address]        Tentative placement recommendation: Subacute rehab    Equipment recommendation:  To be determined      Prior Level of Function: Patient ambulated independently  with wheeled walker   Rehab Potential: good    for baseline    Past medical history:   Past Medical History:   Diagnosis Date    COPD (chronic obstructive pulmonary disease) ([address]     Crohn's disease ([address]     Hx of blood clots 2012    liver, lung    Hyperlipidemia     Hypertension     Multiple sclerosis ([address]     Stage 3b chronic kidney disease 1/29/2021     Past Surgical History:   Procedure Laterality Date    APPENDECTOMY      CHOLECYSTECTOMY      COLONOSCOPY      ENDOSCOPY, COLON, DIAGNOSTIC         Precautions: , falls, alarm and seizures ,  multiple sclerosis     SUBJECTIVE:    Social history: Patient lives with spouse   in a ranch home  with 1 step  to enter Equipment owned: Wheelchair, Cane, [address] [employer] [address] and [address] [facility] chair,       [address] [facility]   How much difficulty turning over in bed?: A Little  How much difficulty sitting down on / standing up from a chair with arms?: A Little  How much difficulty moving from lying on back to sitting on side of bed?: A Little  How much help from another person moving to and from a bed to a chair?: A Little  How much help from another person needed to walk in hospital room?: A Little  How much help from another person for climbing 3-5 steps with a railing?: A Lot  AM-PAC Inpatient Mobility Raw Score : 17  AM-PAC Inpatient T-Scale Score : 42.13  Mobility Inpatient CMS 0-100% Score: 50.57  Mobility Inpatient CMS G-Code Modifier : CK    Nursing cleared patient for PT treatment. OBJECTIVE;   Initial Evaluation  Date: 4/20/2021 Treatment Date:    4/21/2021   Short Term/ Long Term   Goals   Was pt agreeable to Eval/treatment? Yes   yes To be met in 3 days   Pain level   0/10    10/10 L LE    Bed Mobility    Rolling: Supervision     Supine to sit: Minimal assist of 1    Sit to supine: Minimal assist of 1    Scooting: Minimal assist of 1   Rolling: Supervision    Supine to sit: Minimal assist of 1   Sit to supine: Minimal assist of 1   Scooting: Supervision     Rolling: Independent    Supine to sit: Independent    Sit to supine: Independent    Scooting: Independent     Transfers Sit to stand: Minimal assist of 1   Sit to stand: Minimal assist of 1      Sit to stand: Independent     Ambulation    20 feet and 50  feet using  wheeled walker with Moderate assist of 1   for balance, wheeled walker approximation and safety; loss of balance x 1 with mod a to recover with decreased awareness of deficit; shuffling gait increasing risk for fall 2x15 feet using  wheeled walker with Minimal assist of 1   cues for R LE foot clearance.      75 feet using  wheeled walker with Supervision  and no loss of balance    Stair negotiation: ascended and descended   Not assessed       1 step wheeled walker    ROM Within functional limits      Increase range of motion 10% of affected joints    Strength BUE:  3+/5  RLE:  3+/5  LLE:  3+/5   Increase strength in affected mm groups by 1/3 grade   Balance Sitting EOB:  poor    Dynamic Standing:  poor   Sitting EOB: good   Dynamic Standing: fair with wheeled walker   Sitting EOB:  fair    Dynamic Standing: fair       Patient is Alert & Oriented x person and place and follows one step directions  , impulsive  Sensation:  Patient  denies numbness and tingling     Edema:  no   Endurance: fair       Vitals: room air    SPO2 at rest 98%  SPO2 during session      Patient education  Patient educated on role of Physical Therapy, risks of immobility, safety and plan of care, energy conservation,  importance of mobility while in hospital , ankle pumps, quad set and glut set for edema control, blood clot prevention, importance and purpose of adaptive device and adjusted to proper height for the patient. and safety       Patient response to education:   Pt verbalized understanding Pt demonstrated skill Pt requires further education in this area   Yes Partial Yes      Treatment:  Patient practiced and was instructed/facilitated in the following treatment: Patient completed supine exercises. Patient transferred to edge of bed and  stood to wheeled walker. Patient required to sit again before amb in room. Patient amb to doorway and back to bedside. Patient completed seated exercises edge of bed before returning to bed. Therapeutic Exercises:  ankle pumps, heel slide, straight leg raise, long arc quad and seated marching      At end of session, patient in bed with alarm call light and phone within reach,   all lines and tubes intact, nursing notified. Patient would benefit from continued skilled Physical Therapy to improve functional independence and quality of life. Patient's/ family goals   home        ASSESSMENT: Patient exhibits decreased strength, balance and endurance impairing functional mobility, gait distance and tolerance to activity are barriers to d/c and require skilled intervention during hospital stay   and places patient at increased risk for falls; Patient improve with edge of bed balance, however still limited with function by fatigue. Patient demonstrates slight shuffle with R LE and has a hard time picking up R foot with amb despite cuing. Plan of Care:     -Standing Balance: Perform strengthening exercises in standing to promote motor control with or without upper extremity support   -Transfers: Provide instruction on proper hand and foot position for adequate transfer of weight onto lower extremities and use of gait device  -Gait: Gait training and Standing activities to improve: base of support, weight shift, weight bearing    -Endurance: Utilize Supervised activities to increase level of endurance to allow for safe functional mobility including transfers and gait   -Stairs: Stair training with instruction on proper technique and hand placement on rail    Patient and or family understand(s) diagnosis, prognosis, and plan of care. Frequency of treatments: Patient will be seen  daily.        Time in  1038  Time out  1103    Total Treatment Time  25 minutes      CPT codes:    Therapeutic activities (30172)   15 minutes  1 unit(s)  Therapeutic exercises (48573)   10 minutes  1 unit(s)    Cynthea Romberg, [facility] [place]  #[id number]

## 2021-05-03 PROBLEM — N17.9 ACUTE RENAL FAILURE (ARF) (HCC): Status: ACTIVE | Noted: 2021-01-01

## 2021-05-03 NOTE — ED PROVIDER NOTES
53-year-old male presenting with general weakness for a few days. He also has some ongoing nausea but no emesis. Is been ongoing for quite some time. He lives at home with his wife, he has a family doctor that he saw not too long ago. They are reducing his Keppra dose because they are concerned that that might be contributing to his weakness. He reportedly has a history of multiple sclerosis and Crohn's disease as well. No abdominal pain at this point. There is no focal neurologic deficits. Is awake calm alert, oriented x4. This is gradual onset, persistent, steady, moderate severity, associated with his MS and his nausea. History reviewed. No pertinent family history. Past Surgical History:   Procedure Laterality Date    APPENDECTOMY      CHOLECYSTECTOMY      COLONOSCOPY      ENDOSCOPY, COLON, DIAGNOSTIC         Review of Systems   Constitutional: Negative for chills and fever. Respiratory: Negative for chest tightness and shortness of breath. Gastrointestinal: Positive for diarrhea and nausea. Negative for abdominal pain and vomiting. Neurological: Positive for weakness. All other systems reviewed and are negative. Physical Exam  Constitutional:       General: He is not in acute distress. Appearance: He is well-developed. Comments: Frail, weak appearing, chronically ill   HENT:      Head: Normocephalic and atraumatic. Eyes:      Pupils: Pupils are equal, round, and reactive to light. Neck:      Musculoskeletal: Normal range of motion and neck supple. Thyroid: No thyromegaly. Cardiovascular:      Rate and Rhythm: Normal rate and regular rhythm. Pulmonary:      Effort: Pulmonary effort is normal. No respiratory distress. Breath sounds: Normal breath sounds. No wheezing. Abdominal:      General: There is no distension. Palpations: Abdomen is soft. There is no mass. Tenderness: There is no abdominal tenderness.  There is no guarding or rebound. Musculoskeletal: Normal range of motion. General: No tenderness. Skin:     General: Skin is warm and dry. Findings: No erythema. Comments: Bandage covering a skin tear in his right arm   Neurological:      General: No focal deficit present. Mental Status: He is alert and oriented to person, place, and time. Mental status is at baseline. Cranial Nerves: No cranial nerve deficit. Comments: No focal deficits   Psychiatric:         Mood and Affect: Mood normal.          Procedures     [facility]     ED Course as of May 03 1358   Mon May 03, 2021   1138 EKG: Interpreted by meSinus rhythm, rate of 89, normal axis, no ST elevations or depressions, T wave flattening in the lateral leads, QTC of 481. When compared to an EKG of 4/18/2021 and the inferior leads are unchanged and the lateral leads have a decreased amplitude of the T waves. [SO]      ED Course User Index  [SO] Sarika Young, DO       --------------------------------------------- PAST HISTORY ---------------------------------------------  Past Medical History:  has a past medical history of COPD (chronic obstructive pulmonary disease) ([facility] [address] Crohn's disease ([facility] Hx of blood clots, Hyperlipidemia, Hypertension, Multiple sclerosis ([facility] Seizures ([facility] and Stage 3b chronic kidney disease. Past Surgical History:  has a past surgical history that includes Cholecystectomy; Appendectomy; Colonoscopy; and Endoscopy, colon, diagnostic. Social History:  reports that he quit smoking about 3 months ago. His smoking use included cigarettes. He has never used smokeless tobacco. He reports that he does not drink alcohol or use drugs. Family History: family history is not on file. The patients home medications have been reviewed.     Allergies: Neurontin [gabapentin] and Lyrica [pregabalin]    -------------------------------------------------- RESULTS -------------------------------------------------    LABS:  Results BPM    Atrial Rate 89 BPM    P-R Interval 116 ms    QRS Duration 78 ms    Q-T Interval 396 ms    QTc Calculation (Bazett) 481 ms    P Axis 62 degrees    R Axis 60 degrees    T Axis 3 degrees       RADIOLOGY:  No orders to display         ------------------------- NURSING NOTES AND VITALS REVIEWED ---------------------------  Date / Time Roomed:  5/3/2021 11:11 AM  ED Bed Assignment:  [id number]    The nursing notes within the ED encounter and vital signs as below have been reviewed. Patient Vitals for the past 24 hrs:   BP Temp Temp src Pulse Resp SpO2 Height Weight   05/03/21 1305 120/82   90 20 95 %     05/03/21 1122 111/66 97.6 °F (36.4 °C) Oral 91 20 90 % 5' (1.524 m) 100 lb (45.4 kg)       Oxygen Saturation Interpretation: Normal    ------------------------------------------ PROGRESS NOTES ------------------------------------------  Re-evaluation(s): Patients symptoms show no change  Repeat physical examination is not changed    Counseling:  I have spoken with the patient and discussed todays results, in addition to providing specific details for the plan of care and counseling regarding the diagnosis and prognosis. Their questions are answered at this time and they are agreeable with the plan of admission.    --------------------------------- ADDITIONAL PROVIDER NOTES ---------------------------------  Consultations:  Time: 6405. Spoke with Dr. Imani Marquez. Discussed case. They will admit the patient. This patient's ED course included: a personal history and physicial examination, re-evaluation prior to disposition, multiple bedside re-evaluations, IV medications and cardiac monitoring    This patient has remained hemodynamically stable during their ED course. Diagnosis:  1. Acute renal failure, unspecified acute renal failure type ([facility] [address]        Disposition:  Patient's disposition: Admit to med/surg floor  Patient's condition is stable.             Kamla Cesar DO  05/03/21 1350

## 2021-05-04 NOTE — PROGRESS NOTES
With medication administration, Mariah Ballard was having notable difficulty swallowing his pills. Large calcium pill was crushed, though he did tolerate the smaller pills fine one at a time with chin tuck double swallow. Wife at bedside and confirmed abnormal EGD with Dr. Lis Jensen and was to schedule esophageal dilation for near future. Diet downgraded to dental soft at this time for safety and Mariah Ballard is agreeable.

## 2021-05-04 NOTE — ED NOTES
Dr. Nuria Schwartz called this morning. Made aware that patient still needed admission orders and would be going up to his assigned room here shortly.      Caden Hodges RN  05/04/21 2001

## 2021-05-04 NOTE — CARE COORDINATION
CM note: Met with patient and his wife, Tre Shannon for transition of care planning. Patient has has multiple hospitalizations since the beginning of the year. Wife states that every time the patient has a change in medications he gets very weak until the dose gets adjusted. Pt lives with his wife and she is his caregiver but states that he is able to performs most of his own ADLs. Pt has MS (goes to the [facility]), ambulates short distances with a walker. Also owns a cane, WC and a shower chair. Hx of [facility] home care but wife states she does not feel this is needed at his time. PCP is Dr. Alden Bird and they use the [facility] pharmacy in [place]. Wife will provide transportation at discharge.       Readmission Assessment  Number of Days since last admission?: 8-30 days  Previous Disposition: Home with Family  Who is being Interviewed: Caregiver(wife, Tre Shannon)  What was the patient's/caregiver's perception as to why they think they needed to return back to the hospital?: Other (Comment)(he got weak with the new seizure medication)  Did you visit your Primary Care Physician after you left the hospital, before you returned this time?: Yes  Did you see a specialist, such as Cardiac, Pulmonary, Orthopedic Physician, etc. after you left the hospital?: No  Who advised the patient to return to the hospital?: Caregiver  Does the patient report anything that got in the way of taking their medications?: No  In our efforts to provide the best possible care to you and others like you, can you think of anything that we could have done to help you after you left the hospital the first time, so that you might not have needed to return so soon?: Other (Comment)(no)

## 2021-05-05 NOTE — PLAN OF CARE
Problem: Pain:  Goal: Pain level will decrease  Description: Pain level will decrease  Outcome: Met This Shift     Problem: Pain:  Goal: Control of chronic pain  Description: Control of chronic pain  Outcome: Met This Shift     Problem: Falls - Risk of:  Goal: Will remain free from falls  Description: Will remain free from falls  Outcome: Met This Shift     Problem: Falls - Risk of:  Goal: Absence of physical injury  Description: Absence of physical injury  Outcome: Met This Shift     Problem: Skin Integrity:  Goal: Will show no infection signs and symptoms  Description: Will show no infection signs and symptoms  Outcome: Met This Shift     Problem: SAFETY  Goal: Free from accidental physical injury  Outcome: Met This Shift     Problem: DAILY CARE  Goal: Daily care needs are met  Outcome: Met This Shift     Problem: PAIN  Goal: Patient's pain/discomfort is manageable  Outcome: Met This Shift     Problem: PAIN  Goal: Patient's pain/discomfort is manageable  Outcome: Met This Shift     Problem: SKIN INTEGRITY  Goal: Skin integrity is maintained or improved  Outcome: Met This Shift     Problem: KNOWLEDGE DEFICIT  Goal: Patient/S.O. demonstrates understanding of disease process, treatment plan, medications, and discharge instructions.   Outcome: Met This Shift

## 2021-05-05 NOTE — PROGRESS NOTES
Halina Wong,    Your patient is on a medication that requires a renal dose adjustment. Renal Function Assessment:    Date Body Weight IBW Adj. Body Weight SCr CrCl Dialysis status   5/5/2021 45.4 kg 50 kg 45.4 kg 2.1 23 ml/min No orders       Pharmacy has renally dose-adjusted the following medication(s):    Date Medication Original Dosing Regimen New Dosing Regimen   5/5/2021 enoxaparin 40 mg 2 times daily 40 mg daily           These changes were made per protocol according to the [facility] Renal Function-Based Dose Adjustments Policy    *Please note this dose may need readjusted if your patient's renal function significantly improves. Please contact pharmacy with any questions regarding these changes.

## 2021-05-05 NOTE — H&P
Department of Internal Medicine            CHIEF COMPLAINT:  Weakness. HISTORY OF PRESENT ILLNESS:      Pt was seen on 5/5/2021. He was brought to er due to weakness. Pt has not been eating well due to hard time swallow. He has recent sbo and ms. Pt is moderate malnourish and developed acute renal failure. PAST MEDICAL Hx:  Past Medical History:   Diagnosis Date    COPD (chronic obstructive pulmonary disease) ([facility] [address] [facility]     Crohn's disease ([facility] [address]     Hx of blood clots 2012    liver, lung    Hyperlipidemia     Hypertension     Multiple sclerosis ([facility] [address]     Seizures ([facility] [address]     Stage 3b chronic kidney disease 1/29/2021       PAST SURGICAL Hx:   Past Surgical History:   Procedure Laterality Date    APPENDECTOMY      CHOLECYSTECTOMY      COLONOSCOPY      ENDOSCOPY, COLON, DIAGNOSTIC         FAMILY Hx:  History reviewed. No pertinent family history. HOME MEDICATIONS:  Prior to Admission medications    Medication Sig Start Date End Date Taking?  Authorizing Provider   atorvastatin (LIPITOR) 80 MG tablet Take 80 mg by mouth daily 2/1/21  Yes Historical Provider, MD   levETIRAcetam (KEPPRA) 500 MG tablet Take 500 mg by mouth 2 times daily 4/21/21  Yes Historical Provider, MD   oxybutynin (DITROPAN) 5 MG tablet Take by mouth 3/22/21  Yes Historical Provider, MD   metoprolol succinate (TOPROL XL) 50 MG extended release tablet Take 1 tablet by mouth daily 4/21/21 7/20/21 Yes Manpreet Mckeon MD   levETIRAcetam (KEPPRA) 500 MG tablet Take 1 tablet by mouth 2 times daily  Patient taking differently: Take 500 mg by mouth 2 times daily Pt reporting to take 250 mg in morning and 500 mg at night 4/21/21  Yes Manpreet Mckeon MD   calcium-cholecalciferol 500-200 MG-UNIT per tablet Take 1 tablet by mouth 2 times daily 4/21/21  Yes Manpreet Mckeon MD   oxybutynin (DITROPAN) 5 MG tablet Take 5 mg by mouth 2 times daily   Yes Historical Provider, MD   apixaban starter pack (ELIQUIS DVT/PE STARTER PACK) 5 MG TBPK tablet Take 5 mg by mouth 2 times daily  21  Yes Historical Provider, MD   atorvastatin (LIPITOR) 80 MG tablet Take 1 tablet by mouth nightly 21  Yes Kristan Thomas MD   pantoprazole (PROTONIX) 40 MG tablet Take 1 tablet by mouth every morning (before breakfast) 21  Yes Kristan Thomas MD   oxyCODONE HCl (OXY-IR) 10 MG immediate release tablet Take 10 mg by mouth every 4 hours. Yes Historical Provider, MD   mirtazapine (REMERON) 15 MG tablet Take 15 mg by mouth nightly    Yes Historical Provider, MD   predniSONE (DELTASONE) 10 MG tablet Take 10 mg by mouth daily    Historical Provider, MD   apixaban (ELIQUIS) 5 MG TABS tablet Take 5 mg by mouth 2 times daily    Historical Provider, MD   oxyCODONE HCl (OXY-IR) 10 MG immediate release tablet Take 10 mg by mouth as needed.     Historical Provider, MD   pantoprazole (PROTONIX) 40 MG tablet     Historical Provider, MD       ALLERGIES:  Neurontin [gabapentin] and Lyrica [pregabalin]    SOCIAL Hx:  Social History     Socioeconomic History    Marital status:      Spouse name: Not on file    Number of children: Not on file    Years of education: Not on file    Highest education level: Not on file   Occupational History    Not on file   Social Needs    Financial resource strain: Not on file    Food insecurity     Worry: Not on file     Inability: Not on file    Transportation needs     Medical: Not on file     Non-medical: Not on file   Tobacco Use    Smoking status: Former Smoker     Types: Cigarettes     Quit date: 2021     Years since quittin.2    Smokeless tobacco: Never Used   Substance and Sexual Activity    Alcohol use: No    Drug use: No    Sexual activity: Not Currently   Lifestyle    Physical activity     Days per week: Not on file     Minutes per session: Not on file    Stress: Not on file   Relationships    Social connections     Talks on phone: Not on file     Gets together: Not on file     Attends Nondenominational service: Not on file     Active member of club or organization: Not on file     Attends meetings of clubs or organizations: Not on file     Relationship status: Not on file    Intimate partner violence     Fear of current or ex partner: Not on file     Emotionally abused: Not on file     Physically abused: Not on file     Forced sexual activity: Not on file   Other Topics Concern    Not on file   Social History Narrative    Not on file       ROS:  General:   Denies chills, fatigue, fever, malaise, night sweats or weight loss    Psychological:   Denies anxiety, disorientation or hallucinations    ENT:    Denies epistaxis, headaches, vertigo or visual changes    Cardiovascular:   Denies any chest pain, irregular heartbeats, or palpitations. No paroxysmal nocturnal dyspnea. Respiratory:   Denies shortness of breath, coughing, sputum production, hemoptysis, or wheezing. No orthopnea. Gastrointestinal:   Denies nausea, vomiting, diarrhea, or constipation. Denies any abdominal pain. Denies change in bowel habits or stools. Genito-Urinary:    Denies any urgency, frequency, hematuria. Voiding without difficulty. Musculoskeletal:   Denies joint pain, joint stiffness, joint swelling or muscle pain    Neurology:    Denies any headache or focal neurological deficits. No weakness or paresthesia. Derm:    Denies any rashes, ulcers, or excoriations. Denies bruising. Extremities:   Denies any lower extremity swelling or edema. PHYSICAL EXAM:  VITALS:  Vitals:    05/05/21 0457   BP: 130/77   Pulse: 98   Resp: 18   Temp: 97.5 °F (36.4 °C)   SpO2: 94%         CONSTITUTIONAL:    Awake, alert, cooperative, no apparent distress, and appears stated age    EYES:    PERRL, EOMI, sclera clear, conjunctiva normal    ENT:    Normocephalic, atraumatic, sinuses nontender on palpation. External ears without lesions. Oral pharynx with moist mucus membranes. Tonsils without erythema or exudates.     NECK:    Supple, symmetrical, trachea midline, no adenopathy, thyroid symmetric, not enlarged and no tenderness, skin normal, no bruits, no JVD    HEMATOLOGIC/LYMPHATICS:    No cervical lymphadenopathy and no supraclavicular lymphadenopathy    LUNGS:    Symmetric. No increased work of breathing, good air exchange, clear to auscultation bilaterally, no wheezes, rhonchi, or rales,     CARDIOVASCULAR:    Normal apical impulse, regular rate and rhythm, normal S1 and S2, no S3 or S4, and no murmur noted    ABDOMEN:    No scars, normal bowel sounds, soft, non-distended, non-tender, no masses palpated, no hepatosplenomegaly, no rebound or guarding elicited on palpation     MUSCULOSKELETAL:    There is no redness, warmth, or swelling of the joints. Full range of motion noted. Motor strength is 5 out of 5 all extremities bilaterally. Tone is normal.    NEUROLOGIC:    Awake, alert, oriented to name, place and time. Cranial nerves II-XII are grossly intact. Motor is 5 out of 5 bilaterally. SKIN:    No bruising or bleeding. No redness, warmth, or swelling    EXTREMITIES:    Peripheral pulses present. No edema, cyanosis, or swelling.       LABORATORY DATA:  CBC:   Lab Results   Component Value Date    WBC 7.8 05/05/2021    RBC 2.93 05/05/2021    HGB 8.1 05/05/2021    HCT 26.7 05/05/2021    MCV 91.1 05/05/2021    MCH 27.6 05/05/2021    MCHC 30.3 05/05/2021    RDW 16.4 05/05/2021     05/05/2021    MPV 9.8 05/05/2021     CMP:    Lab Results   Component Value Date     05/05/2021    K 4.7 05/05/2021    K 5.0 03/27/2021     05/05/2021    CO2 16 05/05/2021    BUN 44 05/05/2021    CREATININE 2.1 05/05/2021    GFRAA 39 05/05/2021    LABGLOM 32 05/05/2021    GLUCOSE 67 05/05/2021    PROT 4.3 05/05/2021    LABALBU 2.4 05/05/2021    CALCIUM 6.5 05/05/2021    BILITOT 0.5 05/05/2021    ALKPHOS 96 05/05/2021    AST 22 05/05/2021    ALT 13 05/05/2021       ASSESSMENT:  Patient Active Problem List   Diagnosis    Malnutrition ([facility] [address]

## 2021-05-05 NOTE — PROGRESS NOTES
SPEECH/LANGUAGE PATHOLOGY  CLINICAL ASSESSMENT OF SWALLOWING FUNCTION    PATIENT NAME:  Franko Lopez  (male)     MRN:  [id number]    :  1955  (63 y.o.)  STATUS:  Inpatient:  173-8    TODAY'S DATE:  2021     REFERRING PROVIDER:    Dr. Deanna Carney    ADMITTING DIAGNOSIS: Acute renal failure (ARF) ([facility] [address] [N17.9]    REASON FOR REFERRAL: difficulty swallowing per Dr. Adolfo Galicia for further evaluation with upper endoscopy with possible dilatation-will require holding of oral anticoagulation for at least 48 hours prior to nonemergent procedure -last dose 5/4 AM     PATIENT REPORT/COMPLAINT: food sticks between my nose and throat                   SUMMARY OF EVALUATION RN cleared patient for participation in assessment? yes     DYSPHAGIA DIAGNOSIS:   Clinical indicators of moderate-severe pharyngeal phase dysphagia  and esophageal motility deficit suspected      DIET RECOMMENDATIONS:  NPO until MBSS can be completed        FEEDING RECOMMENDATIONS:     Assistance level:  Not applicable      Compensatory strategies recommended: Not applicable      Discussed recommendations with nursing?: Yes    PLAN OF CARE:  Skilled SLP intervention for dysphagia management on acute care 3-5 x per week until goals met and/or pt plateaus in function with emphasis on skilled diet analysis, therapeutic exercises, and compensatory strategy training/ education    Short Term Goals:  Pt will participate in MBSS to fully assess oropharyngeal swallow function and to assist in determining the least restrictive PO diet to maintain adequate nutrition/hydration     Long Term Goals: A Video Swallow Study (MBSS) is recommended and requires a physician order      Patient/family Goal:      Return to least restrictive diet.     Plan of care discussed with Patient   The Patient understand(s) the diagnosis, prognosis and plan of care     Rehabilitation Potential/Prognosis: fair but highly contingent upon etiology of dysphagia SWALLOWING HISTORY:    PAST HISTORY OF DYSPHAGIA?: none reported    HOME DIET: Regular consistency solids with  thin liquids per patient     PROCEDURE:  Consistencies Administered During the Evaluation   Liquids: thin liquid   Solids:  pureed foods      Method of Intake:   cup, spoon  Self fed      Position:   Seated, upright    CLINICAL ASSESSMENT  Oral Stage: The oral stage of swallowing was within functional limits and Dentition:  natural        Pharyngeal Stage:    Throat clearing present after presentation of thin liquid and pureed foods. Then increased spitting up of phlegm for about a minute afterwards. No further PO given. Cognition:   Within functional limits for this exam    Oral Peripheral Examination   Adequate lingual/labial strength     Current Respiratory Status    room air     Parameters of Speech Production  Respiration:  Adequate for speech production  Quality:   Within functional limits  Intensity: Within functional limits    Volitional Swallow: Present    Volitional Cough:    Present    Pain: No pain reported. EDUCATION:   The Speech Language Pathologist (SLP) completed education regarding results of evaluation and that intervention is warranted at this time. Learner: Patient  Education:  Reviewed results and recommendations of this evaluation  Evaluation of Education: Verbalizes understanding    This plan will be re-evaluated and revised in 1 week  if warranted. CPT code:  35960  bedside swallow eval      [x]The admitting diagnosis and active problem list, have been reviewed prior to initiation of this evaluation.         ACTIVE PROBLEM LIST:   Patient Active Problem List   Diagnosis    Malnutrition ([address]    Multiple sclerosis ([address]    Bilateral pulmonary embolism (HCC)    Crohn's disease ([address]    COPD (chronic obstructive pulmonary disease) ([address]    Tobacco abuse    Hypertension    Hyperlipidemia    Stage 3b chronic kidney disease    Hyperkalemia    Leukocytosis  Small bowel obstruction ([address]    HCAP (healthcare-associated pneumonia)    Acute on chronic renal failure (HCC)    Sepsis ([address]    SBO (small bowel obstruction) ([address]    History of pulmonary embolism    Acute on chronic renal insufficiency    Ischemic cardiomyopathy    Seizures ([address]    Altered mental state    Hyperparathyroidism ([address]    Acute renal failure (ARF) ([address]           Esa Alicea MSCCC/SLP  Speech Language Pathologist  [id number]

## 2021-05-05 NOTE — PROGRESS NOTES
SPEECH/LANGUAGE PATHOLOGY  VIDEOFLUOROSCOPIC STUDY OF SWALLOWING (MBS)    ADMISSION INFORMATION      PATIENT NAME:  Eunice Royal  (male)     MRN:  [id number]    :  1955  (63 y.o.)  STATUS:  Inpatient:  924-8    TODAY'S DATE:  2021       REFERRING PROVIDER:    Dr. Tania Dugan DIAGNOSIS: Acute renal failure (ARF) ([facility] [N17.9]    REASON FOR REFERRAL: not able to swallow without constantly spitting up during bedside swallow eval      PATIENT REPORT/COMPLAINT: food gets stuck     SUMMARY OF EVALUATION      DYSPHAGIA DIAGNOSIS:  moderate pharyngeal phase dysphagia  and esophageal motility deficit suspected     DIET RECOMMENDATIONS:  NPO until after EGD is completed and GI clears him for PO. Then will need re-evaluated to determine if there is any improvement in pharyngeal swallow once the esophageal motility is improved. FEEDING RECOMMENDATIONS:    Assistance level:  Set-up is required for all oral intake     Compensatory strategies recommended: No strategies are recommended at this time     Discussed recommendations with nursing or faxed results to referring physician/facility?: Yes    PLAN OF CARE:  Skilled SLP intervention for dysphagia management on acute care 3-5 x per week until goals met and/or pt plateaus in function with emphasis on skilled diet analysis, therapeutic exercises, and compensatory strategy training/ education    Short Term Goals:  Ongoing evaluation of swallow function to determine when PO diet can be safely initiated once esophageal phase cleared by GI    Long Term Goals:  Pt will improve oropharyngeal swallow function to ensure airway protection during PO intake to maintain adequate nutrition/hydration and decrease signs/symptoms of aspiration to less than 1 x/day. Patient/family Goal:    Return to least restrictive diet.     Plan of care discussed with Patient   The Patient understand(s) the diagnosis, prognosis and plan of care     Rehabilitation Potential/Prognosis: fair                  SWALLOWING HISTORY:    Past History of Dysphagia?:  none reported    HOME DIET: Regular consistency solids with  thin liquids    PROCEDURE:  Consistencies Administered During the Evaluation   Liquids: nectar thick liquid   Solids:  Not appropriate to give       Method of Intake:   spoon  Fed by clinician      Position:   Seated, upright, Lateral plane    [facility] ASSESSMENT:    [facility]:   Lip closure for intraoral bolus containment resulted in no labial escape. Tongue control during bolus hold maintained a cohesive bolus held between tongue to palate seal. Bolus preparation and mastication solid not appropriate to be given. Bolus transport/lingual motion was with slow repetitive tongue motion. Oral residue was not observed. There was complete oral clearance. Initiation of the pharyngeal swallow occurred as the bolus head reached the pyriform sinuses. Soft palate elevation resulted in no bolus between the soft palate and the pharyngeal wall. Laryngeal elevation demonstrated complete superior movement of the thyroid cartilage with complete approximation of the arytenoids to the epiglottic petiole. Anterior hyoid excursion demonstrated complete anterior movement. Epiglottic movement resulted in partial inversion. Laryngeal vestibule closure was complete, as indicated by no column of air or contrast within the laryngeal vestibule at the height of the swallow. Pharyngeal stripping wave was present but diminished. Pharyngeal contraction could not be determined due to logistical reasons not related to physiologic impairment. Pharyngoesophageal segment opening was completely distended for incomplete duration with partial obstruction of bolus flow. Tongue base retraction allowed a wide collection of residue between the retracted tongue base and the posterior pharyngeal wall. A significant collection of residue remained within the valleculae and trace amount in the pyriforms.   With a double swallow pyriforms cleared but valleculae did not. Esophageal clearance in the upright position resulted in retrograde flow of significant amount of barium back into the pyriforms. Eventually had to spit barium and secretions out. (at the onset of the limited amount given his own secretions could be seen pooling the pharynx). PENETRATION-ASPIRATION SCALE (PAS):  THIN item not administered  MILDLY THICK 1 = Material does not enter the airway  MODERATELY THICK item not administered  PUREE item not administered  HARD SOLID item not administered       COMPENSATORY STRATEGIES    Compensatory strategies that were not beneficial included Double swallow      STRUCTURAL/FUNCTIONAL ANOMALIES   No structural/functional anomalies were noted    CERVICAL ESOPHAGEAL STAGE :     Redirection of bolus from the esophagus into pharynx           ___________    Cognition:   Within functional limits for this exam    Oral Peripheral Examination   Adequate lingual/labial strength     Current Respiratory Status   room air     Parameters of Speech Production  Respiration:  Adequate for speech production  Quality:   Within functional limits  Intensity: Within functional limits    Pain: No pain reported. EDUCATION:  Learner: Patient  Education:  Reviewed results and recommendations of this evaluation  Evaluation of Education: Verbalizes understanding    CPT Code: 13459  dysphagia study    [x]The admitting diagnosis and active problem list, as listed below have been reviewed prior to initiation of this evaluation.       ACTIVE PROBLEM LIST:   Patient Active Problem List   Diagnosis    Malnutrition ([address]    Multiple sclerosis ([address]    Bilateral pulmonary embolism (HCC)    Crohn's disease ([address]    COPD (chronic obstructive pulmonary disease) ([address]    Tobacco abuse    Hypertension    Hyperlipidemia    Stage 3b chronic kidney disease    Hyperkalemia    Leukocytosis    Small bowel obstruction ([address]    HCAP (healthcare-associated pneumonia)    Acute on chronic renal failure ([address]    Sepsis ([address]    SBO (small bowel obstruction) ([address]    History of pulmonary embolism    Acute on chronic renal insufficiency    Ischemic cardiomyopathy    Seizures ([address]    Altered mental state    Hyperparathyroidism ([address]    Acute renal failure (ARF) ([address]       Estee Courser MSCCC/SLP  Speech Language Pathologist  [id number]

## 2021-05-05 NOTE — CONSULTS
Catie Moss M.D. [facility]  Dr. Luz Navarro M.D.,  Dr. Clarita Goodell, M.D.,  Dr. Jewels Goodwin D.O.,  Dr. Beverley Castillo D.O. ,  Dr. Mikel Estrada M.D.,  Dr. Gladys Dukes D.O. Ladonna Pan  63 y.o.  male      Re: Needs dilatation  Requesting physician: Dr. Nuria Schwartz  Date:11:20 AM 5/5/2021          HPI: 47-year-old male patient presenting to the hospital on 3 May because of weakness. Patient also has been complaining of abdominal pain resolved quickly several days allergies especially for solids. Our service was consulted yesterday. Patient also has received coagulation with Eliquis yesterday morning before consult been placed. Patient reports no significant abdominal pain. No nausea or vomiting. He is able to tolerate soft diet for next week. Patient reports previous EGD with dilatation however he is unsure of the exact timeline.     Information sources:   -Patient  -medical record  -health care team    PMHx:  Past Medical History:   Diagnosis Date    [facility]) [facility]     Crohn's disease ([facility]     Hx of blood clots 2012    liver, lung    Hyperlipidemia     Hypertension     Multiple sclerosis ([facility]     Seizures ([facility]     Stage 3b chronic kidney disease 1/29/2021       PSHx:  Past Surgical History:   Procedure Laterality Date    APPENDECTOMY      CHOLECYSTECTOMY      COLONOSCOPY      ENDOSCOPY, COLON, DIAGNOSTIC         Meds:  Current Facility-Administered Medications   Medication Dose Route Frequency Provider Last Rate Last Admin    enoxaparin (LOVENOX) injection 40 mg  40 mg Subcutaneous Daily David Morrison MD        [Held by provider] apixaban [facility]) tablet 5 mg  5 mg Oral BID David Morrison MD   Stopped at 05/04/21 2100    levETIRAcetam (KEPPRA) tablet 250 mg  250 mg Oral Daily David Morrison MD   250 mg at 05/04/21 1144    levETIRAcetam (KEPPRA) tablet 500 mg  500 mg Oral Nightly David Morrison MD 500 mg at 21    pantoprazole (PROTONIX) tablet 40 mg  40 mg Oral QAM AC Edilma Villavicencio MD   40 mg at 21 0441    oxyCODONE (ROXICODONE) immediate release tablet 10 mg  10 mg Oral Q4H Edilma Villavicencio MD   10 mg at 21 0532    oxyCODONE (ROXICODONE) immediate release tablet 10 mg  10 mg Oral Q6H PRN Edilma Villavicencio MD        atorvastatin (LIPITOR) tablet 80 mg  80 mg Oral Nightly Edilma Villavicencio MD   80 mg at 21    calcium-cholecalciferol 500-200 MG-UNIT per tablet 1 tablet  1 tablet Oral BID Edilma Villavicencio MD   1 tablet at 21    metoprolol succinate (TOPROL XL) extended release tablet 50 mg  50 mg Oral Daily Edilma Villavicencio MD   50 mg at 21 114    mirtazapine (REMERON) tablet 15 mg  15 mg Oral Nightly Edilma Villavicencio MD   15 mg at 21    oxybutynin (DITROPAN) tablet 5 mg  5 mg Oral BID Edilma Villavicencio MD   5 mg at 21    potassium chloride 10 mEq in sodium chloride 0.45 % 1,000 mL infusion   Intravenous Continuous Edilma Villavicencio  mL/hr at 215 New Bag at 21 0125       SocHx:  Social History     Socioeconomic History    Marital status:      Spouse name: Not on file    Number of children: Not on file    Years of education: Not on file    Highest education level: Not on file   Occupational History    Not on file   Social Needs    Financial resource strain: Not on file    Food insecurity     Worry: Not on file     Inability: Not on file   [employer] needs     Medical: Not on file     Non-medical: Not on file   Tobacco Use    Smoking status: Former Smoker     Types: Cigarettes     Quit date: 2021     Years since quittin.2    Smokeless tobacco: Never Used   Substance and Sexual Activity    Alcohol use: No    Drug use: No    Sexual activity: Not Currently   Lifestyle    Physical activity     Days per week: Not on file     Minutes per session: Not on file    Stress: Not on file   Relationships    Social connections     Talks on phone: Not on file     Gets together: Not on file     Attends Yazidism service: Not on file     Active member of club or organization: Not on file     Attends meetings of clubs or organizations: Not on file     Relationship status: Not on file    Intimate partner violence     Fear of current or ex partner: Not on file     Emotionally abused: Not on file     Physically abused: Not on file     Forced sexual activity: Not on file   Other Topics Concern    Not on file   Social History Narrative    Not on file       FamHx:History reviewed. No pertinent family history. Allergy:  Allergies   Allergen Reactions    Neurontin [Gabapentin] Palpitations    Lyrica [Pregabalin] Other (See Comments)     photophobia         ROS: As described in the HPI and in addition is negative upon detailed review of systems or unobtainable unless otherwise stated in this dictation.     PE:  /77   Pulse 98   Temp 97.5 °F (36.4 °C) (Oral)   Resp 18   Ht 5' (1.524 m)   Wt 100 lb (45.4 kg)   SpO2 94%   BMI 19.53 kg/m²     Gen.: NAD/ male  Head: Atraumatic/normocephalic  Eyes: EOMI/anicteric sclera  ENT: Moist oral mucosa/no discharge from nose or ears  Neck: Supple with trachea midline  Chest: CTA B  Cor: Regular  Abd.: Soft/NT/ND  Extr./Muscles: Decreased muscle tone and bulk throughout  Skin: Warm and dry/anicteric        DATA:  Stool (measured) : 0 mL  Lab Results   Component Value Date    WBC 7.8 05/05/2021    RBC 2.93 05/05/2021    HGB 8.1 05/05/2021    HCT 26.7 05/05/2021    MCV 91.1 05/05/2021    MCH 27.6 05/05/2021    MCHC 30.3 05/05/2021    RDW 16.4 05/05/2021     05/05/2021    MPV 9.8 05/05/2021     Lab Results   Component Value Date     05/05/2021    K 4.7 05/05/2021    K 5.0 03/27/2021     05/05/2021    CO2 16 05/05/2021    BUN 44 05/05/2021    CREATININE 2.1 05/05/2021    CALCIUM 6.5 05/05/2021    PROT 4.3 05/05/2021    LABALBU 2.4 05/05/2021    BILITOT 0.5 05/05/2021    ALKPHOS 96 05/05/2021    AST 22 05/05/2021    ALT 13 05/05/2021     No results found for: LIPASE  No results found for: AMYLASE      ASSESSMENT/PLAN:  1. Dysphagia  -Plan for further evaluation with upper endoscopy with possible dilatation  -will require holding of oral anticoagulation for at least 48 hours prior to nonemergent procedure -last dose 5/4 AM    Above has been discussed with the patient all questions answered to his satisfaction. He is agreeable with the plan as delineated. Thank you for the opportunity to see this patient in consultation        Harpreet Cruz MD  5/5/2021  11:20 AM    NOTE:  This report was transcribed using voice recognition software. Every effort was made to ensure accuracy; however, inadvertent computerized transcription errors may be present.

## 2021-05-05 NOTE — PROGRESS NOTES
Skin:  Warm and dry. Labs/Imaging/Diagnostics    Labs:  CBC:  Recent Labs     05/03/21  1158 05/04/21  1115 05/05/21  0558   WBC 13.2* 9.4 7.8   RBC 4.42 3.58* 2.93*   HGB 12.2* 9.8* 8.1*   HCT 38.9 31.6* 26.7*   MCV 88.0 88.3 91.1   RDW 16.7* 16.6* 16.4*    341 285     CHEMISTRIES:  Recent Labs     05/03/21 1158 05/04/21  1115 05/05/21  0558    133 129*   K 4.7 3.9 4.7   CL 95* 101 102   CO2 24 18* 16*   BUN 72* 68* 44*   CREATININE 4.1* 3.3* 2.1*   GLUCOSE 132* 92 67*     PT/INR:No results for input(s): PROTIME, INR in the last 72 hours. APTT:No results for input(s): APTT in the last 72 hours. LIVER PROFILE:  Recent Labs     05/03/21 1158 05/04/21  1115 05/05/21  0558   AST 25 20 22   ALT 21 16 13   BILITOT 0.5 0.5 0.5   ALKPHOS 153* 114 96       Imaging Last 24 Hours:  No results found. Assessment//Plan           Hospital Problems           Last Modified POA    Acute renal failure (ARF) ([facility] 5/3/2021 Yes        Assessment:  (Problem List as of 5/5/2021 Reviewed: 2/17/2021 11:11 AM by PALMA Bunn - CNP    History of pulmonary embolism    Ischemic cardiomyopathy    Malnutrition ([facility]    Multiple sclerosis ([facility]    Bilateral pulmonary embolism ([facility]    Crohn's disease ([facility]    COPD (chronic obstructive pulmonary disease) ([facility]    Tobacco abuse    Hypertension    Hyperlipidemia    Stage 3b chronic kidney disease    Hyperkalemia    Leukocytosis    Small bowel obstruction (HCC)    HCAP (healthcare-associated pneumonia)    Acute on chronic renal failure (HCC)    Sepsis (HCC)    SBO (small bowel obstruction) ([facility]    Acute on chronic renal insufficiency    Seizures (HCC)    Altered mental state    Hyperparathyroidism ([address]    Acute renal failure (ARF) ([facility]    ). Plan:   (Cont with ivf).        Electronically signed by Brigette Ramirez MD on 5/5/21 at 8:35 AM EDT

## 2021-05-06 NOTE — PROGRESS NOTES
Speech Language Pathology      NAME:  Becky Brunson  :  1955  DATE: 2021  ROOM:  [facility] [place]/[address]-    Attempted ongoing Speech-Language Pathology intervention for dysphagia. Pt unavailable at this time due to:  [] HOLD per RN/ medical staff d/t medical status   [] Off unit for testing/ procedure    [] With medical staff   [] Declined intervention  [] Sleeping/ Lethargic   [x] Other:  NPO for EGD    SLP to continue previously established POC and re-attempt as able.             Acute renal failure (ARF) ([facility] [N17.9]    Maday Lopez MSCCC/SLP  Speech Language Pathologist  [id number]

## 2021-05-06 NOTE — PROGRESS NOTES
Progress Note  Date:2021       Room:0333/0333-01  Patient Name:Davian Zhang     YOB: 1955     Age:65 y.o. Subjective    Subjective:  Symptoms:  Stable. He reports malaise, weakness, anorexia and anxiety. No shortness of breath, cough, chest pain, headache or chest pressure. Diet:  Adequate intake. Activity level: Impaired due to weakness. Pain:  He complains of pain that is moderate. pt failed swallow study. For egd today. Review of Systems   Respiratory: Negative for cough and shortness of breath. Cardiovascular: Negative for chest pain. Gastrointestinal: Positive for anorexia. Neurological: Positive for weakness. Objective         Vitals Last 24 Hours:  TEMPERATURE:  Temp  Av.2 °F (36.8 °C)  Min: 97.9 °F (36.6 °C)  Max: 98.4 °F (36.9 °C)  RESPIRATIONS RANGE: Resp  Av  Min: 14  Max: 18  PULSE OXIMETRY RANGE: SpO2  Av %  Min: 92 %  Max: 94 %  PULSE RANGE: Pulse  Av.5  Min: 81  Max: 98  BLOOD PRESSURE RANGE: Systolic (13DKR), LOB:817 , Min:143 , DIW:530   ; Diastolic (39YSZ), ZEZ:80, Min:81, Max:89    I/O (24Hr): Intake/Output Summary (Last 24 hours) at 2021 0819  Last data filed at 2021 0750  Gross per 24 hour   Intake 1276.67 ml   Output 1375 ml   Net -98.33 ml     Objective:  General Appearance:  Comfortable. Vital signs: (most recent): Blood pressure (!) 143/81, pulse 81, temperature 97.9 °F (36.6 °C), temperature source Oral, resp. rate 18, height 5' (1.524 m), weight 100 lb (45.4 kg), SpO2 93 %. Output: Producing urine. HEENT: Normal HEENT exam.    Lungs:  Normal respiratory rate. He is not in respiratory distress. Heart: Regular rhythm. S1 normal and S2 normal.    Abdomen: Abdomen is soft. Bowel sounds are normal.     Extremities: Normal range of motion. Pulses: Distal pulses are intact. Neurological: Patient is alert and oriented to person, place and time. Normal strength.     Pupils:  Pupils are equal, round, and reactive to light. Labs/Imaging/Diagnostics    Labs:  CBC:  Recent Labs     05/04/21  1115 05/05/21  0558 05/06/21  0556   WBC 9.4 7.8 10.6   RBC 3.58* 2.93* 3.61*   HGB 9.8* 8.1* 9.9*   HCT 31.6* 26.7* 32.9*   MCV 88.3 91.1 91.1   RDW 16.6* 16.4* 16.3*    285 309     CHEMISTRIES:  Recent Labs     05/04/21  1115 05/05/21  0558 05/06/21  0556    129* 136   K 3.9 4.7 4.0    102 105   CO2 18* 16* 15*   BUN 68* 44* 31*   CREATININE 3.3* 2.1* 1.8*   GLUCOSE 92 67* 54*     PT/INR:  Recent Labs     05/06/21  0556   PROTIME 19.2*   INR 1.7     APTT:No results for input(s): APTT in the last 72 hours. LIVER PROFILE:  Recent Labs     05/04/21 1115 05/05/21  0558 05/06/21  0556   AST 20 22 29   ALT 16 13 13   BILITOT 0.5 0.5 0.6   ALKPHOS 114 96 104       Imaging Last 24 Hours:  Fl Modified Barium Swallow W Video    Result Date: 5/5/2021  EXAMINATION: MODIFIED BARIUM SWALLOW WAS PERFORMED IN CONJUNCTION WITH SPEECH PATHOLOGY SERVICES TECHNIQUE: Fluoroscopic evaluation of the swallowing mechanism was performed with multiple consistency of barium product. FLUOROSCOPY DOSE AND TYPE OR TIME AND EXPOSURES: Fluoroscopy time 1 minutes. Air kerma 4.05 mGy COMPARISON: None HISTORY: ORDERING SYSTEM PROVIDED HISTORY: dysphagia TECHNOLOGIST PROVIDED HISTORY: Reason for exam:->dysphagia FINDINGS: There was retention in the vallecula and piriform sinuses. Pharyngeal delay was noted. Laryngeal elevation was adequate. No aspiration or laryngeal penetration was seen with barium nectar. There appear to be reflux back to the level of the piriform sinus from the esophagus. No evidence of laryngeal penetration or aspiration. No evidence of aspiration. Reflux was noted during the exam.  If clinically indicated, soft g could be done for further evaluation. Please see separate speech pathology report for full discussion of findings and recommendations.      Assessment//Plan           Hospital Problems Last Modified POA    Acute renal failure (ARF) ([address] 5/3/2021 Yes        Assessment:  (Problem List as of 5/6/2021 Reviewed: 2/17/2021 11:11 AM by PALMA Henderson - CNP    History of pulmonary embolism    Ischemic cardiomyopathy    Malnutrition ([address]    Multiple sclerosis ([address]    Bilateral pulmonary embolism ([address]    Crohn's disease ([address]    COPD (chronic obstructive pulmonary disease) ([address]    Tobacco abuse    Hypertension    Hyperlipidemia    Stage 3b chronic kidney disease    Hyperkalemia    Leukocytosis    Small bowel obstruction (HCC)    HCAP (healthcare-associated pneumonia)    Acute on chronic renal failure (HCC)    Sepsis (HCC)    SBO (small bowel obstruction) ([address]    Acute on chronic renal insufficiency    Seizures (HCC)    Altered mental state    Hyperparathyroidism ([address]    Acute renal failure (ARF) ([address]    ). Plan:   (For egd and we will reeval his swallow status. He might need peg if swallow did not improve after egd).        Electronically signed by Katie Arce MD on 5/6/21 at 8:19 AM EDT

## 2021-05-06 NOTE — PROGRESS NOTES
Halina Amin,    Your patient is on a medication that requires a renal dose adjustment. Renal Function Assessment:    Date Body Weight IBW Adj. Body Weight SCr CrCl Dialysis status   5/6/2021 45.4 kg 50 kg NA 1.8 27 ml/min N       Pharmacy has renally dose-adjusted the following medication(s):    Date Medication Original Dosing Regimen New Dosing Regimen   5/6/2021 enoxaparin 40 mg daily 30 mg daily           These changes were made per protocol according to the [facility] Renal Function-Based Dose Adjustments Policy    *Please note this dose may need readjusted if your patient's renal function significantly improves. Please contact pharmacy with any questions regarding these changes.     Itz Penny, PharmD, 6/4/138250:61 AM

## 2021-05-06 NOTE — ANESTHESIA PRE PROCEDURE
Department of Anesthesiology  Preprocedure Note       Name:  Lucio Madden   Age:  63 y.o.  :  1955                                          MRN:  [id number]         Date:  2021      Surgeon: Luiz Garcia):  Martha Jj DO    Procedure: Procedure(s):  EGD ESOPHAGOGASTRODUODENOSCOPY    Medications prior to admission:   Prior to Admission medications    Medication Sig Start Date End Date Taking? Authorizing Provider   atorvastatin (LIPITOR) 80 MG tablet Take 80 mg by mouth daily 21  Yes Historical Provider, MD   levETIRAcetam (KEPPRA) 500 MG tablet Take 500 mg by mouth 2 times daily 21  Yes Historical Provider, MD   oxybutynin (DITROPAN) 5 MG tablet Take by mouth 3/22/21  Yes Historical Provider, MD   metoprolol succinate (TOPROL XL) 50 MG extended release tablet Take 1 tablet by mouth daily 21 Yes Anna Dean MD   levETIRAcetam (KEPPRA) 500 MG tablet Take 1 tablet by mouth 2 times daily  Patient taking differently: Take 500 mg by mouth 2 times daily Pt reporting to take 250 mg in morning and 500 mg at night 21  Yes Anna Dean MD   calcium-cholecalciferol 500-200 MG-UNIT per tablet Take 1 tablet by mouth 2 times daily 21  Yes Anna Dean MD   oxybutynin (DITROPAN) 5 MG tablet Take 5 mg by mouth 2 times daily   Yes Historical Provider, MD   apixaban starter pack (ELIQUIS DVT/PE STARTER PACK) 5 MG TBPK tablet Take 5 mg by mouth 2 times daily  21  Yes Historical Provider, MD   atorvastatin (LIPITOR) 80 MG tablet Take 1 tablet by mouth nightly 21  Yes Anna Dean MD   pantoprazole (PROTONIX) 40 MG tablet Take 1 tablet by mouth every morning (before breakfast) 21  Yes Anna Dean MD   oxyCODONE HCl (OXY-IR) 10 MG immediate release tablet Take 10 mg by mouth every 4 hours.    Yes Historical Provider, MD   mirtazapine (REMERON) 15 MG tablet Take 15 mg by mouth nightly    Yes Historical Provider, MD   predniSONE (DELTASONE) 10 MG tablet Take 10 mg by mouth daily    Historical Provider, MD   apixaban (ELIQUIS) 5 MG TABS tablet Take 5 mg by mouth 2 times daily    Historical Provider, MD   oxyCODONE HCl (OXY-IR) 10 MG immediate release tablet Take 10 mg by mouth as needed.     Historical Provider, MD   pantoprazole (PROTONIX) 40 MG tablet     Historical Provider, MD       Current medications:    Current Facility-Administered Medications   Medication Dose Route Frequency Provider Last Rate Last Admin    enoxaparin (LOVENOX) injection 40 mg  40 mg Subcutaneous Daily Loly Kiser MD   40 mg at 05/06/21 1135    morphine injection 4 mg  4 mg Intravenous Q4H PRN Loly Kiser MD   4 mg at 05/06/21 0447    levETIRAcetam (KEPPRA) 250 mg in sodium chloride 0.9 % 100 mL IVPB  250 mg Intravenous Daily Loly Kiser MD   Stopped at 05/06/21 1100    levETIRAcetam (KEPPRA) 500 mg in sodium chloride 0.9 % 100 mL IVPB  500 mg Intravenous Nightly Loly Kiser MD   Stopped at 05/05/21 2349    [Held by provider] apixaban (ELIQUIS) tablet 5 mg  5 mg Oral BID Loly Kiser MD   Stopped at 05/04/21 2100    pantoprazole (PROTONIX) tablet 40 mg  40 mg Oral QAM AC Loly Kiser MD   40 mg at 05/05/21 5486    oxyCODONE (ROXICODONE) immediate release tablet 10 mg  10 mg Oral Q4H Loly Kiser MD   10 mg at 05/05/21 1142    oxyCODONE (ROXICODONE) immediate release tablet 10 mg  10 mg Oral Q6H PRN Loly Kiser MD        atorvastatin (LIPITOR) tablet 80 mg  80 mg Oral Nightly Loly Kiser MD   80 mg at 05/04/21 2118    calcium-cholecalciferol 500-200 MG-UNIT per tablet 1 tablet  1 tablet Oral BID Loly Kiser MD   1 tablet at 05/04/21 2117    metoprolol succinate (TOPROL XL) extended release tablet 50 mg  50 mg Oral Daily Loly Kiser MD   50 mg at 05/05/21 1140    mirtazapine (REMERON) tablet 15 mg  15 mg Oral Nightly Loly Kiser MD   15 mg at 05/04/21 2118    oxybutynin (DITROPAN) tablet 5 mg  5 mg Oral BID Jeaneth Croft MD   5 mg at 21 1141    potassium chloride 10 mEq in sodium chloride 0.45 % 1,000 mL infusion   Intravenous Continuous Jeaneth Corft  mL/hr at 21 1547 New Bag at 21 1547       Allergies:     Allergies   Allergen Reactions    Neurontin [Gabapentin] Palpitations    Lyrica [Pregabalin] Other (See Comments)     photophobia       Problem List:    Patient Active Problem List   Diagnosis Code    Malnutrition ([address] E46    Multiple sclerosis ([address] G35    Bilateral pulmonary embolism (HCC) I26.99    Crohn's disease ([address] K50.90    COPD (chronic obstructive pulmonary disease) ([address] J44.9    Tobacco abuse Z72.0    Hypertension I10    Hyperlipidemia E78.5    Stage 3b chronic kidney disease N18.32    Hyperkalemia E87.5    Leukocytosis D72.829    Small bowel obstruction ([address] K56.609    HCAP (healthcare-associated pneumonia) J18.9    Acute on chronic renal failure (HCC) N17.9, N18.9    Sepsis ([facility]) A41.9    SBO (small bowel obstruction) ([address] K56.609    History of pulmonary embolism Z86.711    Acute on chronic renal insufficiency N28.9, N18.9    Ischemic cardiomyopathy I25.5    Seizures ([facility]) R56.9    Altered mental state R41.82    Hyperparathyroidism ([address] E21.3    Acute renal failure (ARF) ([facility]) N17.9       Past Medical History:        Diagnosis Date    COPD (chronic obstructive pulmonary disease) (HCC)     Crohn's disease ([address]     Hx of blood clots     liver, lung    Hyperlipidemia     Hypertension     Multiple sclerosis ([address]     Seizures ([address]     Stage 3b chronic kidney disease 2021       Past Surgical History:        Procedure Laterality Date    APPENDECTOMY      CHOLECYSTECTOMY      COLONOSCOPY      ENDOSCOPY, COLON, DIAGNOSTIC         Social History:    Social History     Tobacco Use    Smoking status: Former Smoker     Types: Cigarettes     Quit date: 2021     Years since quittin.2    Smokeless tobacco: Never Used Substance Use Topics    Alcohol use: No                                Counseling given: Not Answered      Vital Signs (Current):   Vitals:    05/05/21 1720 05/05/21 2209 05/05/21 2215 05/06/21 0527   BP: (!) 156/84   (!) 143/81   Pulse: 91  84 81   Resp: 14   18   Temp: 98.4 °F (36.9 °C)   97.9 °F (36.6 °C)   TempSrc: Oral   Oral   SpO2: 92% 94%  93%   Weight:       Height:                                                  BP Readings from Last 3 Encounters:   05/06/21 (!) 143/81   04/21/21 (!) 152/75   04/02/21 137/76       NPO Status:                                                                                 BMI:   Wt Readings from Last 3 Encounters:   05/03/21 100 lb (45.4 kg)   04/18/21 121 lb (54.9 kg)   03/27/21 121 lb (54.9 kg)     Body mass index is 19.53 kg/m². CBC:   Lab Results   Component Value Date    WBC 10.6 05/06/2021    RBC 3.61 05/06/2021    HGB 9.9 05/06/2021    HCT 32.9 05/06/2021    MCV 91.1 05/06/2021    RDW 16.3 05/06/2021     05/06/2021       CMP:   Lab Results   Component Value Date     05/06/2021    K 4.0 05/06/2021    K 5.0 03/27/2021     05/06/2021    CO2 15 05/06/2021    BUN 31 05/06/2021    CREATININE 1.8 05/06/2021    GFRAA 46 05/06/2021    LABGLOM 38 05/06/2021    GLUCOSE 54 05/06/2021    PROT 4.8 05/06/2021    CALCIUM 7.6 05/06/2021    BILITOT 0.6 05/06/2021    ALKPHOS 104 05/06/2021    AST 29 05/06/2021    ALT 13 05/06/2021       POC Tests: No results for input(s): POCGLU, POCNA, POCK, POCCL, POCBUN, POCHEMO, POCHCT in the last 72 hours.     Coags:   Lab Results   Component Value Date    PROTIME 19.2 05/06/2021    INR 1.7 05/06/2021    APTT 29.1 04/18/2021       HCG (If Applicable): No results found for: PREGTESTUR, PREGSERUM, HCG, HCGQUANT     ABGs: No results found for: PHART, PO2ART, ZKP8TWD, VPJ1LLP, BEART, N3EZFSEB     Type & Screen (If Applicable):  No results found for: LABABO, LABRH    Drug/Infectious Status (If Applicable):  No results found for: HIV, HEPCAB    COVID-19 Screening (If Applicable):   Lab Results   Component Value Date    COVID19 Not Detected 02/07/2021           Anesthesia Evaluation  Patient summary reviewed  Airway: Mallampati: III  TM distance: >3 FB   Neck ROM: full  Mouth opening: > = 3 FB Dental: normal exam         Pulmonary: breath sounds clear to auscultation  (+) pneumonia:  COPD:      (-) not a current smoker                           Cardiovascular:    (+) hypertension:, hyperlipidemia        Rhythm: regular  Rate: normal           Beta Blocker:  Dose within 24 Hrs         Neuro/Psych:   (+) seizures:, neuromuscular disease: multiple sclerosis,             GI/Hepatic/Renal:   (+) renal disease: ARF,           Endo/Other: Negative Endo/Other ROS   (+) blood dyscrasia: anticoagulation therapy:., .                 Abdominal:   (+) scaphoid        Vascular:   + DVT, PE. Anesthesia Plan      MAC     ASA 3       Induction: intravenous. Anesthetic plan and risks discussed with patient. Plan discussed with CRNA.                   Parmjit Cho MD   5/6/2021

## 2021-05-06 NOTE — PROCEDURES
Procedure:  Esophagogastroduodenoscopy    Indication:  Dysphagia     Consent:   Informed consent was obtained from the patient including and not limited to risk of perforation, aspiration of gastric contents or teeth, bleeding, infection, dental breakage, ileus, need for surgery, or worst case death. Sedation:  MAC    Estimated Blood Loss: 2cc     Endoscope was advanced easily through mouth to second portion of duodenum      Oropharynx views are limited but grossly normal.    Esophagus:   Mucosa with LA Grade C/D from 34 down to GEJ at  38cm. Biopsy was taken of possible peptic stricture. Endoscope did pass easily through diaphragmatic hiatus at 42 for a 4 cm hiatal hernia no Akhil erosions    Stomach:   Antrum is normal     Gastric body is normal.    Retroflexed views show normal fundus and cardia. Hiatal hernia present    Duodenum: Bulb is normal.    Second portion of duodenum is normal.    No fresh or old blood was present     IMPRESSION AND PLAN:     1.  Mucosa with LA Grade C/D from 34 down to . GEJ at  38cm. Biopsy was taken of possible peptic stricture. Endoscope did pass easily. Patient's esophagitis may be source of patient's dysphagia will place patient on Protonix at 40 mg twice daily along with Carafate slurry 3 times a day monitor for improvement. 2.  Plan on repeat EGD in 8 to 12 weeks to document healing and for Carpenter's screening, and potential dilation at that time. 3.  Patient okay for dysphagia diet from GI point of view. Pt was seen and procedure was performed with Dr. Lokesh Childers  present for the entire procedure. Brianne Salvador DO   GI Fellow   1:40 PM    I was present and participated in key and non-key portions of the exam. Patient with LA-C/D esophagitis and small peptic stricture/distal narrowing. Unable to dilation given severity of esophagitis. Again plan is for BID PPI for ~12 wks with plans for repeat EGD in 12wks. Pt ok to resume anticoagulation from GI POV. Pt is ok for diet and plans for f/up in outpt office.      Kya Suh D.O.   05/06/21  1:43 PM

## 2021-05-06 NOTE — PLAN OF CARE
Outcome: Not Met This Shift     Problem: Mobility - Impaired:  Goal: Mobility will improve  Outcome: Not Met This Shift

## 2021-05-06 NOTE — ANESTHESIA POSTPROCEDURE EVALUATION
Department of Anesthesiology  Postprocedure Note    Patient: Ladonna Pan  MRN: [id number]  YOB: 1955  Date of evaluation: 5/6/2021  Time:  1:49 PM     Procedure Summary     Date: 05/06/21 Room / Location: [address] [place] [address] 01 / [address] [facility]    Anesthesia Start: 9772 Anesthesia Stop: 9912    Procedure: EGD BIOPSY (N/A ) Diagnosis: (DYSPHAGIA)    Surgeons: Dann Funez DO Responsible Provider: Amie Tillman MD    Anesthesia Type: MAC ASA Status: 3          Anesthesia Type: MAC    Laura Phase I:      Laura Phase II:      Last vitals: Reviewed and per EMR flowsheets.        Anesthesia Post Evaluation    Patient location during evaluation: PACU  Patient participation: complete - patient participated  Level of consciousness: awake and alert  Pain score: 4  Airway patency: patent  Nausea & Vomiting: no nausea  Complications: no  Cardiovascular status: blood pressure returned to baseline  Respiratory status: acceptable  Hydration status: euvolemic

## 2021-05-06 NOTE — PLAN OF CARE
Problem: Pain:  Goal: Pain level will decrease  Description: Pain level will decrease  5/6/2021 0025 by Rowena Hernandez RN  Outcome: Ongoing  5/5/2021 1745 by Petty Cunha RN  Outcome: Met This Shift  Goal: Control of acute pain  Description: Control of acute pain  Outcome: Ongoing  Goal: Control of chronic pain  Description: Control of chronic pain  5/6/2021 0025 by Rowena Hernandez RN  Outcome: Ongoing  5/5/2021 1745 by Petty Cunha RN  Outcome: Met This Shift     Problem: Falls - Risk of:  Goal: Will remain free from falls  Description: Will remain free from falls  5/6/2021 0025 by Rowena Hernandez RN  Outcome: Met This Shift  5/5/2021 1745 by Petty Cunha RN  Outcome: Met This Shift  Goal: Absence of physical injury  Description: Absence of physical injury  5/6/2021 0025 by Rowena Hernandez RN  Outcome: Met This Shift  5/5/2021 1745 by Petty Cunha RN  Outcome: Met This Shift     Problem: Skin Integrity:  Goal: Will show no infection signs and symptoms  Description: Will show no infection signs and symptoms  5/6/2021 0025 by Rowena Hernandez RN  Outcome: Met This Shift  5/5/2021 1745 by Petty Cunha RN  Outcome: Met This Shift  Goal: Absence of new skin breakdown  Description: Absence of new skin breakdown  Outcome: Met This Shift     Problem: SAFETY  Goal: Free from accidental physical injury  5/6/2021 0025 by Rowena Hernandez RN  Outcome: Ongoing  5/5/2021 1745 by Petty Cunha RN  Outcome: Met This Shift     Problem: DAILY CARE  Goal: Daily care needs are met  5/6/2021 0025 by Rowena Hernandez RN  Outcome: Ongoing  5/5/2021 1745 by Petty Cunha RN  Outcome: Met This Shift     Problem: PAIN  Goal: Patient's pain/discomfort is manageable  5/6/2021 0025 by Rowena Hernandez RN  Outcome: Ongoing  5/5/2021 1745 by Petty Cunha RN  Outcome: Met This Shift     Problem: SKIN INTEGRITY  Goal: Skin integrity is maintained or improved  5/6/2021 0025 by Rowena Hernandez RN  Outcome: Ongoing 5/5/2021 1745 by Larisa Jo RN  Outcome: Met This Shift     Problem: KNOWLEDGE DEFICIT  Goal: Patient/S.O. demonstrates understanding of disease process, treatment plan, medications, and discharge instructions.   5/6/2021 0025 by Yas Chow RN  Outcome: Ongoing  5/5/2021 1745 by Larisa Jo RN  Outcome: Met This Shift

## 2021-05-06 NOTE — H&P
Patient's H&P was reviewed from admission date    BP (!) 143/81   Pulse 81   Temp 97.9 °F (36.6 °C) (Oral)   Resp 18   Ht 5' (1.524 m)   Wt 100 lb (45.4 kg)   SpO2 93%   BMI 19.53 kg/m²      Patient examined.     Plan:   EGD, with possible biopsy and dilation

## 2021-05-07 NOTE — PLAN OF CARE
Problem: Pain:  Goal: Pain level will decrease  Description: Pain level will decrease  Outcome: Met This Shift  Goal: Control of acute pain  Description: Control of acute pain  Outcome: Met This Shift     Problem: Falls - Risk of:  Goal: Will remain free from falls  Description: Will remain free from falls  Outcome: Met This Shift  Goal: Absence of physical injury  Description: Absence of physical injury  Outcome: Met This Shift     Problem: Skin Integrity:  Goal: Will show no infection signs and symptoms  Description: Will show no infection signs and symptoms  Outcome: Met This Shift  Goal: Absence of new skin breakdown  Description: Absence of new skin breakdown  Outcome: Met This Shift     Problem: SAFETY  Goal: Free from accidental physical injury  Outcome: Met This Shift     Problem: DAILY CARE  Goal: Daily care needs are met  Outcome: Met This Shift     Problem: PAIN  Goal: Patient's pain/discomfort is manageable  Outcome: Met This Shift     Problem: SKIN INTEGRITY  Goal: Skin integrity is maintained or improved  Outcome: Met This Shift     Problem: KNOWLEDGE DEFICIT  Goal: Patient/S.O. demonstrates understanding of disease process, treatment plan, medications, and discharge instructions.   Outcome: Met This Shift     Problem: Diarrhea:  Goal: Bowel elimination is within specified parameters  Description: Bowel elimination is within specified parameters  Outcome: Met This Shift  Goal: Establishment of normal bowel function will improve to within specified parameters  Description: Establishment of normal bowel function will improve to within specified parameters  Outcome: Met This Shift

## 2021-05-07 NOTE — PROGRESS NOTES
pulses are intact. Labs/Imaging/Diagnostics    Labs:  CBC:  Recent Labs     05/05/21  0558 05/06/21  0556 05/07/21  0603   WBC 7.8 10.6 10.3   RBC 2.93* 3.61* 3.68*   HGB 8.1* 9.9* 10.2*   HCT 26.7* 32.9* 32.0*   MCV 91.1 91.1 87.0   RDW 16.4* 16.3* 16.3*    309 281     CHEMISTRIES:  Recent Labs     05/05/21  0558 05/06/21  0556 05/07/21  0603   * 136 136   K 4.7 4.0 3.8    105 104   CO2 16* 15* 17*   BUN 44* 31* 20   CREATININE 2.1* 1.8* 1.5*   GLUCOSE 67* 54* 101*     PT/INR:  Recent Labs     05/06/21 0556   PROTIME 19.2*   INR 1.7     APTT:No results for input(s): APTT in the last 72 hours. LIVER PROFILE:  Recent Labs     05/05/21  0558 05/06/21  0556 05/07/21  0603   AST 22 29 27   ALT 13 13 8   BILITOT 0.5 0.6 0.5   ALKPHOS 96 104 106       Imaging Last 24 Hours:  Fl Modified Barium Swallow W Video    Result Date: 5/5/2021  EXAMINATION: MODIFIED BARIUM SWALLOW WAS PERFORMED IN CONJUNCTION WITH SPEECH PATHOLOGY SERVICES TECHNIQUE: Fluoroscopic evaluation of the swallowing mechanism was performed with multiple consistency of barium product. FLUOROSCOPY DOSE AND TYPE OR TIME AND EXPOSURES: Fluoroscopy time 1 minutes. Air kerma 4.05 mGy COMPARISON: None HISTORY: ORDERING SYSTEM PROVIDED HISTORY: dysphagia TECHNOLOGIST PROVIDED HISTORY: Reason for exam:->dysphagia FINDINGS: There was retention in the vallecula and piriform sinuses. Pharyngeal delay was noted. Laryngeal elevation was adequate. No aspiration or laryngeal penetration was seen with barium nectar. There appear to be reflux back to the level of the piriform sinus from the esophagus. No evidence of laryngeal penetration or aspiration. No evidence of aspiration. Reflux was noted during the exam.  If clinically indicated, soft g could be done for further evaluation. Please see separate speech pathology report for full discussion of findings and recommendations.      Assessment//Plan           Hospital Problems Last Modified POA    Acute renal failure (ARF) ([address] 5/3/2021 Yes        Assessment:  (Problem List as of 5/7/2021 Reviewed: 2/17/2021 11:11 AM by PALMA Jordan - CNP    History of pulmonary embolism    Ischemic cardiomyopathy    Malnutrition ([address]    Multiple sclerosis ([address]    Bilateral pulmonary embolism ([address]    Crohn's disease ([address]    COPD (chronic obstructive pulmonary disease) ([address]    Tobacco abuse    Hypertension    Hyperlipidemia    Stage 3b chronic kidney disease    Hyperkalemia    Leukocytosis    Small bowel obstruction (HCC)    HCAP (healthcare-associated pneumonia)    Acute on chronic renal failure (HCC)    Sepsis (HCC)    SBO (small bowel obstruction) ([address]    Acute on chronic renal insufficiency    Seizures (HCC)    Altered mental state    Hyperparathyroidism ([address]    Acute renal failure (ARF) ([address]    ). Plan:   (Will need speech to reeval his swallow. Pt might need peg if he cont to have swallow issues since did not do dilation yesterday. ).        Electronically signed by Garfield Glez MD on 5/7/21 at 8:20 AM EDT

## 2021-05-07 NOTE — PROGRESS NOTES
PROGRESS NOTE    By Nancy Hernandez D.O GI Fellow    [facility]  Dr. Niki Carpenter MD, Dr. Delfin Moffett MD, Dr Asaf Bach, Dr. José Miguel Villar MD, Dr. Ofelia Braga, DO Marysol Covarrubias  63 y.o.  male    SUBJECTIVE:    Patient resting comfortably this AM.  Patient denies any melena medic easier hematemesis. Patient denies any diarrhea. Findings of EGD were discussed with the patient in detail he voiced understanding. Patient dates he is able to tolerate oral intake this a.m. OBJECTIVE:    BP (!) 160/96   Pulse 113   Temp 100.7 °F (38.2 °C) (Oral)   Resp 18   Ht 5' (1.524 m)   Wt 100 lb (45.4 kg)   SpO2 92%   BMI 19.53 kg/m²     Gen: NAD, AAO x 3  HEENT:PEERL, no icterus  Heart: RRR, no M/R/G  Lungs: CTAB  Abd.: soft, NT, ND,  Extr.: no C/C/E, no bruising      Stool (measured) : 0 mL  Lab Results   Component Value Date    WBC 10.3 05/07/2021    WBC 10.6 05/06/2021    WBC 7.8 05/05/2021    HGB 10.2 05/07/2021    HGB 9.9 05/06/2021    HGB 8.1 05/05/2021    HCT 32.0 05/07/2021    MCV 87.0 05/07/2021    RDW 16.3 05/07/2021     05/07/2021     05/06/2021     05/05/2021     Lab Results   Component Value Date     05/07/2021    K 3.8 05/07/2021    K 5.0 03/27/2021     05/07/2021    CO2 17 05/07/2021    BUN 20 05/07/2021    CREATININE 1.5 05/07/2021    CALCIUM 7.8 05/07/2021    PROT 4.9 05/07/2021    LABALBU 2.4 05/07/2021    BILITOT 0.5 05/07/2021    BILITOT 0.6 05/06/2021    BILITOT 0.5 05/05/2021    ALKPHOS 106 05/07/2021    ALKPHOS 104 05/06/2021    ALKPHOS 96 05/05/2021    AST 27 05/07/2021    AST 29 05/06/2021    AST 22 05/05/2021    ALT 8 05/07/2021    ALT 13 05/06/2021    ALT 13 05/05/2021     No results found for: LIPASE  No results found for: AMYLASE      ASSESSMENT/PLAN:      1.  Dysphagia  - EGD 5/6 with LA grade C/D erosive esophagitis biopsies were obtained dilation not undertaken due to the acute inflammation  -Placed on Protonix 40 mg oral twice daily along with Carafate  -Repeat EGD in 12 weeks consider dilation at that time  -No indication for PEG tube placement patient with history of Crohn's disease not currently on any treatment. ,  Patient's underlying MS also play a role in his dysphagia  -Patient counseled the need to work with speech therapy    2. Hx of Moderate-Severe Stricturing Crohns Disease   -No signs of acute flare at this time  - Diagnosed in 2012 hx of Terminal ileum resection  - No previous biological or medication   - Hx of  Ct abdomen with high-grade obstruction with significantly dilated proximal small bowel loops with thickened and edematous distal neoterminal ileum  - Colonoscopy 4/2019 with moderate stricture in the very distal \  -Recommend conservative treatment at this time     3. Acute normocytic anemia  - VSS NO overt GI Bleed on exam   - Baseline Hgb 10.2-->14.3   - On therapeutic Lovenox for PE  -Hemoglobin 10.5 this am   -Continue with supportive care     3. Hx of Bilateral acute pulmonary embolisms diagnosed on 1/27/2021  -Okay to restart Eliquis from GI point of view        Discussion about PEG tube was had with the patient the bedside. Patient refuses PEG tube placement if this would be needed, he states that he is able to swallow and tolerate oral intake. Pt was discussed with  Dr. Aleksandr Holloway DO  GI Fellow   5/7/2021  9:56 AM    Pt seen and independently examined. Pertinent notes and lab work reviewed. D/w Dr. Israel Delatorre with physical exam and A&P.     Nuria Kay MD  5/7/2021  2:43 PM

## 2021-05-07 NOTE — PROGRESS NOTES
Halina Garcia,    Your patient's renal function has improved to 32.4 mL/min. Enoxaparin has been returned to original dose of 40 mg twice daily. Renal Function Assessment:    These changes were made per protocol according to the [facility] Renal Function-Based Dose Adjustments Policy    *Please note this dose may need readjusted if your patient's renal function significantly improves. Please contact pharmacy with any questions regarding these changes.     Abhijeet Vigil, PharmD, 5/7/202110:09 AM

## 2021-05-07 NOTE — PROGRESS NOTES
Speech Language Pathology      NAME:  Dennys Rodriguez  :  1955  DATE: 2021  ROOM:  [address] [place], [place] [address]    Patient seen for swallow therapy 15 minutes. Patient with poor intake of am meal.  He reports he just isn't that hungry. Patient was willing to eat pudding and water. He tolerated 60 cc of pudding and 60 cc of water with no clinical indicators of aspiration. He did exhibit mild audible nasopharyngeal emission when swallowing which could indicated velopharyngeal insufficiency. Nasopharyngeal reflux was not observed on recent MBSS however volume of intake on that exam was extremely limited due to significant esophageal reflux. Encouraged patient to increase intake he says he will continue to try but he get full very quickly and just isn't able to eat very much. Discussed high protein supplements and he is willing to try them. Dietician notified of consult for this. Recommend continue on soft and bite size diet with thin liquids.   Will continue POC    Acute renal failure (ARF) ([facility] [N17.9]    [id number]  dysphagia tx    Ji Godinez MSCCC/SLP  Speech Language Pathologist  [id number]

## 2021-05-08 PROBLEM — E44.0 MODERATE PROTEIN-CALORIE MALNUTRITION (HCC): Status: ACTIVE | Noted: 2021-01-01

## 2021-05-08 NOTE — PROGRESS NOTES
Patient ID:  Jorge L Ro  [id number]  63 y.o.  1955    HPI:  Patient denies any pain, not hungry, seen by speech, suggested soft diet, oral intake poor, kidney function improving with IV hydration. Known history of multiple sclerosis, Crohn's disease, no PEG tube suggested by GI at present. Cachectic. Frequent falls and laceration right forearm. Dressing on right forearm. . Questions, answers, and tests reviewed. ROS:  Cardiovascular:   Denies any chest pain, irregular heartbeats, or palpitations. Respiratory:   Denies shortness of breath, coughing, sputum production, hemoptysis, or wheezing. Gastrointestinal:   Denies nausea, vomiting, diarrhea, or constipation. Denies any abdominal pain. Extremities:   Denies any lower extremity swelling or edema. Neurology:    Denies any headache or focal neurological deficits. No weakness or paresthesia. Derm:    Denies any rashes, ulcers, or excoriations. Denies bruising. Genitourinary:    Denies any urgency, frequency, hematuria. Voiding without difficulty. Physical Exam:    Vitals:    05/08/21 0534   BP: 104/78   Pulse: 90   Resp: 18   Temp: 98.2 °F (36.8 °C)   SpO2: 95%       HEENT:  PERRLA. EOMI. Sclera clear. Buccal mucosa moist.    Neck:  Supple. Trachea midline. No thyromegaly. No JVD. No bruits. Heart:  Rhythm regular, rate controlled. No murmurs. Lungs:  Symmetrical. Clear to auscultation bilaterally. No wheezes. No rhonchi. No rales. Abdomen: Soft. Non-tender. Non-distended. Bowel sounds positive. No organomegaly or masses. No pain on palpation    Extremities:  Peripheral pulses present. No peripheral edema. No ulcers. Neurologic:  Alert x 3. No focal deficit. Cranial nerves grossly intact. Skin:  No petechia. No hemorrhage. No wounds.     Labs:  CBC:   Lab Results   Component Value Date    WBC 9.6 05/08/2021    RBC 3.08 05/08/2021    HGB 8.7 05/08/2021    HCT 27.1 05/08/2021    MCV 88.0 05/08/2021    MCH 28.2 chronic renal insufficiency 03/28/2021    Ischemic cardiomyopathy 03/28/2021    SBO (small bowel obstruction) ([address] 03/27/2021    HCAP (healthcare-associated pneumonia) 02/08/2021    Acute on chronic renal failure ([address] 02/08/2021    Sepsis ([address]     Small bowel obstruction ([address] 02/07/2021    Stage 3b chronic kidney disease 01/29/2021    Hyperkalemia 01/29/2021    Leukocytosis 01/29/2021    Crohn's disease ([address]     COPD (chronic obstructive pulmonary disease) (HCC)     Tobacco abuse     Hypertension     Hyperlipidemia     Bilateral pulmonary embolism ([address] 01/27/2021    Multiple sclerosis ([address] 01/06/2021    Malnutrition ([address] 09/10/2012         Plan: 1. Acute renal insufficiency with underlying chronic renal insufficiency creatinine improving with IV hydration, creatinine 1.5.  2.  Cachexia and poor oral intake with malnutrition patient just not feeling hungry, on soft diet, seen by speech pathology. 3.  Known history of Crohn's disease not active suggested by GI at present. 4.  Laceration right forearm has local dressing. 5.  Past history of pulmonary embolism continue Eliquis. Continue current therapy. See orders for further plan of care. Completed By:  Dr. Keyonna Ya MD, [address].   11:57 AM  5/8/2021      Electronically signed by Vasiliy Butler MD on 5/8/21 at 11:57 AM EDT

## 2021-05-08 NOTE — CONSULTS
Comprehensive Nutrition Assessment    Type and Reason for Visit:  Initial, Consult    Nutrition Recommendations/Plan: Pt does not care for Ensure products but was amenable to trial Magic Cup. Will start Magic Cup TID to supplement intake. Nutrition Assessment:  Pt admitted w/ acute renal failure, PMH of CKD, COPD, Crohn's disease, and MS. Pt s/p EGD w/ finding of erosive esophagitis. Pt noted to need esophageal dilation, however on hold d/t inflammation. Will start ONS per pt preference and monitor while admitted. Malnutrition Assessment:  Malnutrition Status: Moderate malnutrition    Context:  Chronic Illness     Findings of the 6 clinical characteristics of malnutrition:  Energy Intake:  7 - 75% or less estimated energy requirements for 1 month or longer  Weight Loss:  Unable to assess(wt loss likely but masked by current fluid status.)     Body Fat Loss:  (moderate) Orbital, Triceps   Muscle Mass Loss:  (moderate) Temples (temporalis), Clavicles (pectoralis & deltoids)  Fluid Accumulation:  No significant fluid accumulation     Strength:  Not Performed    Estimated Daily Nutrient Needs:  Energy (kcal):  2305-5554; Weight Used for Energy Requirements:  Current     Protein (g):  65-75; Weight Used for Protein Requirements:  Current        Fluid (ml/day):  6508-6875; Method Used for Fluid Requirements:  1 ml/kcal      Nutrition Related Findings:  Pt disoriented to time, missing teeth, abd WDL, +BS, I/O WNL, +1 LLE and +2 RLE edema, hyponatremia, hyperkalemia      Wounds:  Multiple, Skin Tears       Current Nutrition Therapies:    DIET DYSPHAGIA SOFT AND BITE-SIZED;   Dietary Nutrition Supplements: Frozen Oral Supplement    Anthropometric Measures:  · Height: 5' (152.4 cm)  · Current Body Weight: 109 lb 4.8 oz (49.6 kg)(5/8 bed scale)   · Admission Body Weight: 109 lb 4.8 oz (49.6 kg)(5/8 bed scale)    · Usual Body Weight: 109 lb 7 oz (49.6 kg)(2/7/21 bed scale)     · Ideal Body Weight: 106 lbs; % Ideal Body Weight 103.1 %   · BMI: 21.3  · Adjusted Body Weight: No Adjustment    · BMI Categories: Normal Weight (BMI 18.5-24. 9)       Nutrition Diagnosis:   · Moderate malnutrition, In context of chronic illness related to catabolic illness as evidenced by poor intake prior to admission, moderate loss of subcutaneous fat, moderate muscle loss    Nutrition Interventions:   Food and/or Nutrient Delivery:  Continue Current Diet, Start Oral Nutrition Supplement(Pt does not care for Ensure products but was amenable to trial [employer].  Will start Magic Cup TID to supplement intake.)  Nutrition Education/Counseling:  Education initiated(Encouraged ONS intake between meals.)   Coordination of Nutrition Care:  Continue to monitor while inpatient    Goals:  Pt is to consume >50% of most meals/ONS       Nutrition Monitoring and Evaluation:   Behavioral-Environmental Outcomes:  None Identified   Food/Nutrient Intake Outcomes:  Food and Nutrient Intake, Supplement Intake  Physical Signs/Symptoms Outcomes:  Biochemical Data, Chewing or Swallowing, GI Status, Fluid Status or Edema, Nutrition Focused Physical Findings, Skin, Weight     Discharge Planning:    Continue Oral Nutrition Supplement, Continue current diet     Electronically signed by Katie Vega RD, LD on 5/8/21 at 3:40 PM EDT    Contact: [phone]

## 2021-05-08 NOTE — PROGRESS NOTES
PROGRESS NOTE    By Chayo Gann D.O GI Fellow    [facility]  Dr. Sherita Beverly MD, Dr. Zeb Sim MD, Dr kSy Ortiz, Dr. Jeri Calloway MD, Dr. Radha Riddle, [facility]  63 y.o.  male    SUBJECTIVE:    Patient resting comfortably this AM.  He states he is tolerating oral intake. Denies any diarrhea melena hematochezia. OBJECTIVE:    /78   Pulse 90   Temp 98.2 °F (36.8 °C) (Oral)   Resp 18   Ht 5' (1.524 m)   Wt 100 lb (45.4 kg)   SpO2 95%   BMI 19.53 kg/m²     Gen: NAD, AAO x 3  HEENT:PEERL, no icterus  Heart: RRR, no M/R/G  Lungs: CTAB  Abd.: soft, NT, ND,  Extr.: no C/C/E, right arm with dressing clean dry and intact    Stool (measured) : 0 mL  Lab Results   Component Value Date    WBC 9.6 05/08/2021    WBC 10.3 05/07/2021    WBC 10.6 05/06/2021    HGB 8.7 05/08/2021    HGB 10.2 05/07/2021    HGB 9.9 05/06/2021    HCT 27.1 05/08/2021    MCV 88.0 05/08/2021    RDW 16.0 05/08/2021     05/08/2021     05/07/2021     05/06/2021     Lab Results   Component Value Date     05/08/2021    K 5.1 05/08/2021    K 5.0 03/27/2021    CL 94 05/08/2021    CO2 19 05/08/2021    BUN 12 05/08/2021    CREATININE 1.1 05/08/2021    CALCIUM 6.4 05/08/2021    PROT 3.9 05/08/2021    LABALBU 1.7 05/08/2021    BILITOT 0.4 05/08/2021    BILITOT 0.5 05/07/2021    BILITOT 0.6 05/06/2021    ALKPHOS 79 05/08/2021    ALKPHOS 106 05/07/2021    ALKPHOS 104 05/06/2021    AST 23 05/08/2021    AST 27 05/07/2021    AST 29 05/06/2021    ALT 10 05/08/2021    ALT 8 05/07/2021    ALT 13 05/06/2021     No results found for: LIPASE  No results found for: AMYLASE      ASSESSMENT/PLAN:      1.  Dysphagia  - EGD 5/6 with LA grade C/D erosive esophagitis biopsies were obtained dilation not undertaken due to the acute inflammation  -Placed on Protonix 40 mg oral twice daily along with Carafate  -Repeat EGD in 12 weeks consider dilation at that time  -No indication for PEG tube placement patient with history of Crohn's disease not currently on any treatment. ,  Patient's underlying MS also play a role in his dysphagia  -Patient counseled the need to work with speech therapy    2. Hx of Moderate-Severe Stricturing Crohns Disease   -No signs of acute flare at this time  - Diagnosed in 2012 hx of Terminal ileum resection  - No previous biological or medication   - Hx of  Ct abdomen with high-grade obstruction with significantly dilated proximal small bowel loops with thickened and edematous distal neoterminal ileum  - Colonoscopy 4/2019 with moderate stricture in the very distal \  -Recommend conservative treatment at this time     3. Acute normocytic anemia  - VSS NO overt GI Bleed on exam   - Baseline Hgb 10.2-->14.3   - On therapeutic Lovenox for PE  -Hemoglobin 10.5 this am   -Continue with supportive care     3. Hx of Bilateral acute pulmonary embolisms diagnosed on 1/27/2021  -Okay to restart Eliquis from GI point of view          Pt was discussed with  Dr. Ruddy Colon DO  GI Fellow   5/8/2021    I was present at bedside and performed my own exam.  I agree with the above findings and plan. My first encounter with patient. EGD reviewed. Pt on Protonix and carafate. PT had tray and eating well he states. No outward bleeding reported. Hg has ranged 8-10 range. No gross outward bleeding per pt. PT with reported h/o of Crohn's. Pt needs close outpt follow up. Christiano valerio, NT, ND. Our service will follow.       DO Teddy  5/8/2021  11:01 PM

## 2021-05-09 NOTE — PROGRESS NOTES
Patient ID:  Naresh Carranza  [id number]  63 y.o.  1955    HPI:  Patient sleeping all the time, on multiple medication that would create drowsiness, speech pathology following about advancing diet. . Questions, answers, and tests reviewed. ROS:  Cardiovascular:   Denies any chest pain, irregular heartbeats, or palpitations. Respiratory:   Denies shortness of breath, coughing, sputum production, hemoptysis, or wheezing. Gastrointestinal:   Denies nausea, vomiting, diarrhea, or constipation. Denies any abdominal pain. Extremities:   Denies any lower extremity swelling or edema. Neurology:    Denies any headache or focal neurological deficits. No weakness or paresthesia. Derm:    Denies any rashes, ulcers, or excoriations. Denies bruising. Genitourinary:    Denies any urgency, frequency, hematuria. Voiding without difficulty. Physical Exam:    Vitals:    05/08/21 2200   BP: 134/79   Pulse: 100   Resp:    Temp:    SpO2:        HEENT:  PERRLA. EOMI. Sclera clear. Buccal mucosa moist.    Neck:  Supple. Trachea midline. No thyromegaly. No JVD. No bruits. Heart:  Rhythm regular, rate controlled. No murmurs. Lungs:  Symmetrical. Clear to auscultation bilaterally. No wheezes. No rhonchi. No rales. Abdomen: Soft. Non-tender. Non-distended. Bowel sounds positive. No organomegaly or masses. No pain on palpation    Extremities:  Peripheral pulses present. No peripheral edema. No ulcers. Neurologic:  Alert x 3. No focal deficit. Cranial nerves grossly intact. Skin:  No petechia. No hemorrhage. No wounds.     Labs:  CBC:   Lab Results   Component Value Date    WBC 10.4 05/09/2021    RBC 3.49 05/09/2021    HGB 9.7 05/09/2021    HCT 29.8 05/09/2021    MCV 85.4 05/09/2021    MCH 27.8 05/09/2021    MCHC 32.6 05/09/2021    RDW 15.9 05/09/2021     05/09/2021    MPV 10.2 05/09/2021     CMP:    Lab Results   Component Value Date     05/09/2021    K 4.1 05/09/2021    K 5.0 03/27/2021 CL 99 05/09/2021    CO2 18 05/09/2021    BUN 14 05/09/2021    CREATININE 1.4 05/09/2021    GFRAA >60 05/09/2021    LABGLOM 51 05/09/2021    GLUCOSE 104 05/09/2021    PROT 4.5 05/09/2021    LABALBU 2.0 05/09/2021    CALCIUM 7.6 05/09/2021    BILITOT 0.5 05/09/2021    ALKPHOS 89 05/09/2021    AST 27 05/09/2021    ALT 10 05/09/2021     PT/INR:    Lab Results   Component Value Date    PROTIME 19.2 05/06/2021    INR 1.7 05/06/2021         FL MODIFIED BARIUM SWALLOW W VIDEO   Final Result   No evidence of aspiration. Reflux was noted during the exam.  If clinically   indicated, soft g could be done for further evaluation. Please see separate speech pathology report for full discussion of findings   and recommendations.              Other Data:      Intake/Output Summary (Last 24 hours) at 5/9/2021 1117  Last data filed at 5/9/2021 0249  Gross per 24 hour   Intake 220 ml   Output 425 ml   Net -205 ml         Scheduled Medications:   metoprolol succinate  50 mg Oral BID    pantoprazole  40 mg Oral BID AC    sucralfate  1 g Oral 4 times per day    levetiracetam  250 mg Intravenous Daily    levetiracetam  500 mg Intravenous Nightly    apixaban  5 mg Oral BID    oxyCODONE HCl  10 mg Oral Q4H    atorvastatin  80 mg Oral Nightly    calcium-cholecalciferol  1 tablet Oral BID    mirtazapine  15 mg Oral Nightly    oxybutynin  5 mg Oral BID         Infusion Medications:      Assessment:   Patient Active Problem List    Diagnosis Date Noted    Moderate protein-calorie malnutrition ([facility] 05/08/2021    Acute renal failure (ARF) ([facility] 05/03/2021    Hyperparathyroidism ([facility] 04/21/2021    Altered mental state 04/19/2021    Seizures ([facility] 04/18/2021    History of pulmonary embolism 03/28/2021    Acute on chronic renal insufficiency 03/28/2021    Ischemic cardiomyopathy 03/28/2021    SBO (small bowel obstruction) ([facility] 03/27/2021    HCAP (healthcare-associated pneumonia) 02/08/2021    Acute on chronic renal failure ([facility] 02/08/2021    Sepsis ([facility]     Small bowel obstruction ([address] 02/07/2021    Stage 3b chronic kidney disease 01/29/2021    Hyperkalemia 01/29/2021    Leukocytosis 01/29/2021    Crohn's disease ([address]     COPD (chronic obstructive pulmonary disease) (HCC)     Tobacco abuse     Hypertension     Hyperlipidemia     Bilateral pulmonary embolism ([facility] 01/27/2021    Multiple sclerosis ([facility] 01/06/2021    Malnutrition ([facility] 09/10/2012         Plan: 1. Dysphagia patient has been started on soft diet. 2.  Acute renal insufficiency creatinine back towards baseline discontinue IV fluids and continue oral hydration and oral diet. 3.  Multiple sclerosis and Crohn's disease PT OT when patient more awake cooperative and off a lot of narcotics. 4.  Previous history of small bowel obstruction try to avoid narcotics that will end up back with bowel obstruction. 5.  History of seizure disorder continue present antiepileptics  6. Past history of pulmonary embolism on Eliquis. Continue current therapy. See orders for further plan of care.     Completed By:  Dr. Saran Ramirez MD, [address].  11:17 AM  5/9/2021      Electronically signed by Mikael Herrera MD on 5/9/21 at 11:17 AM EDT

## 2021-05-10 NOTE — PLAN OF CARE
Problem: Pain:  Goal: Pain level will decrease  5/10/2021 1134 by Marbella Navarrete RN  Outcome: Met This Shift     Problem: Pain:  Goal: Control of acute pain  5/10/2021 1134 by Marbella Navarrete RN  Outcome: Met This Shift     Problem: Pain:  Goal: Control of chronic pain  Outcome: Met This Shift     Problem: Falls - Risk of:  Goal: Will remain free from falls  5/10/2021 1134 by Marbella Navarrete RN  Outcome: Met This Shift     Problem: Falls - Risk of:  Goal: Absence of physical injury  5/10/2021 1134 by Marbella Navarrete RN  Outcome: Met This Shift     Problem: Skin Integrity:  Goal: Will show no infection signs and symptoms  5/10/2021 1134 by Marbella Navarrete RN  Outcome: Met This Shift     Problem: Skin Integrity:  Goal: Absence of new skin breakdown  5/10/2021 1134 by Marbella Navarrete RN  Outcome: Met This Shift     Problem: SAFETY  Goal: Free from accidental physical injury  5/10/2021 1134 by Marbella Navarrete RN  Outcome: Met This Shift     Problem: DAILY CARE  Goal: Daily care needs are met  5/10/2021 1134 by Marbella Navarrete RN  Outcome: Met This Shift     Problem: PAIN  Goal: Patient's pain/discomfort is manageable  5/10/2021 1134 by Marbella Navarrete RN  Outcome: Met This Shift     Problem: SKIN INTEGRITY  Goal: Skin integrity is maintained or improved  5/10/2021 1134 by Marbella Navarrete RN  Outcome: Met This Shift     Problem: KNOWLEDGE DEFICIT  Goal: Patient/S.O. demonstrates understanding of disease process, treatment plan, medications, and discharge instructions.   5/10/2021 1134 by Marbella Navarrete RN  Outcome: Met This Shift     Problem: Diarrhea:  Goal: Bowel elimination is within specified parameters  5/10/2021 1134 by Marbella Navarrete RN  Outcome: Met This Shift     Problem: Diarrhea:  Goal: Passage of soft, formed stool  5/10/2021 1134 by Marbella Navarrete RN  Outcome: Met This Shift     Problem: Fluid Volume:  Goal: Ability to achieve a balanced intake and output will improve  Outcome: Ongoing     Problem: Physical Regulation:  Goal: Ability to maintain clinical measurements within normal limits will improve  Outcome: Not Met This Shift     Problem: Mobility - Impaired:  Goal: Mobility will improve  Outcome: Not Met This Shift

## 2021-05-10 NOTE — CARE COORDINATION
CM note: Patient had EGD last week, erosive esophagitis found, did not perform dilation d/t inflammation,  Pt is tolerating oral intake. Had low sodium levels on blood work over the weekend, this has since corrected, creatinine slightly increased from baseline (1.5). Anticipate that patient is ready for discharge, awaiting physician rounds. Plan is to return home with spouse at discharge, spouse denies any discharge needs.

## 2021-05-10 NOTE — PROGRESS NOTES
CLINICAL PHARMACY NOTE: MEDS TO [address] Select Patient?: No  Total # of Prescriptions Filled: 1   The following medications were delivered to the patient:  · Sucralfate 1gm  Total # of Interventions Completed: 4  Time Spent (min): 45    Additional Documentation:

## 2021-05-10 NOTE — PROGRESS NOTES
PROGRESS NOTE    By Veronica Chaudhry D.O GI Fellow    [facility]  Dr. Michelle Harry MD, Dr. Yamile Jaimes MD, Dr Bettye Eisenberg, Dr. Ayla Betancourt MD, Dr. Lew Kirkland, DO Billye Cheadle  63 y.o.  male    SUBJECTIVE:  Patient tolerating oral intake      OBJECTIVE:    /64   Pulse 78   Temp 98.2 °F (36.8 °C) (Oral)   Resp 16   Ht 5' (1.524 m)   Wt 109 lb 4.8 oz (49.6 kg)   SpO2 92%   BMI 21.35 kg/m²     Gen: NAD, AAO x 3  HEENT:PEERL, no icterus  Heart: RRR, no M/R/G  Lungs: CTAB  Abd.: soft, NT, ND, BHSM  Extr.: no C/C/E,      Stool (measured) : 0 mL  Lab Results   Component Value Date    WBC 10.4 05/09/2021    WBC 9.6 05/08/2021    WBC 10.3 05/07/2021    HGB 9.7 05/10/2021    HGB 10.2 05/10/2021    HGB 10.7 05/09/2021    HCT 29.8 05/09/2021    MCV 85.4 05/09/2021    RDW 15.9 05/09/2021     05/09/2021     05/08/2021     05/07/2021     Lab Results   Component Value Date     05/10/2021    K 3.7 05/10/2021    K 5.0 03/27/2021     05/10/2021    CO2 19 05/10/2021    BUN 17 05/10/2021    CREATININE 1.5 05/10/2021    CALCIUM 7.8 05/10/2021    PROT 4.3 05/10/2021    LABALBU 2.0 05/10/2021    BILITOT 0.5 05/10/2021    BILITOT 0.5 05/09/2021    BILITOT 0.4 05/08/2021    ALKPHOS 91 05/10/2021    ALKPHOS 89 05/09/2021    ALKPHOS 79 05/08/2021    AST 26 05/10/2021    AST 27 05/09/2021    AST 23 05/08/2021    ALT 12 05/10/2021    ALT 10 05/09/2021    ALT 10 05/08/2021     No results found for: LIPASE  No results found for: AMYLASE      ASSESSMENT/PLAN:       1. Dysphagia  - EGD 5/6 with LA grade C/D erosive esophagitis biopsies were obtained dilation not undertaken due to the acute inflammation  -Placed on Protonix 40 mg oral twice daily along with Carafate  -Repeat EGD in 12 weeks consider dilation at that time  -No indication for PEG tube placement patient with history of Crohn's disease not currently on any treatment. ,  Patient's underlying MS also play a role in his dysphagia  -Patient counseled the need to work with speech therapy     2. Hx of Moderate-Severe Stricturing Crohns Disease   -No signs of acute flare at this time  - Diagnosed in 2012 hx of Terminal ileum resection  - No previous biological or medication   - Hx of  Ct abdomen with high-grade obstruction with significantly dilated proximal small bowel loops with thickened and edematous distal neoterminal ileum  - Colonoscopy 4/2019 with moderate stricture in the very distal \  -Recommend conservative treatment at this time     3.  Acute normocytic anemia  - VSS NO overt GI Bleed on exam   - Baseline Hgb 10.2-->14.3   - On therapeutic Lovenox for PE  -Hemoglobin 9.4 this am   -Continue with supportive care     3.  Hx of Bilateral acute pulmonary embolisms diagnosed on 1/27/2021  -Okay to restart Eliquis from GI point of view            Pt was discussed with Dr. Christi Portillo DO  GI Fellow   5/10/2021  2:01 PM    Pt seen and independently examined. Pertinent notes and lab work reviewed. D/w Dr. Shante Rubio with physical exam and A&P. Discussed with patient/family at bedside - all questions answered - agreeable with the plan as delineated.     Layla Aguirre MD  5/10/2021  2:06 PM

## 2021-05-13 NOTE — DISCHARGE SUMMARY
Discharge Summary    Date: 5/12/2021  Patient Name: Paradise Navarro YOB: 1955 Age: 63 y.o. Admit Date: 4/18/2021  Discharge Date: 4/21/2021  Discharge Condition: Stable    Admission Diagnosis  Seizure-like activity ([facility] [address] (R56.9)     Discharge Diagnosis  Principal Problem: Altered mental stateActive Problems: Seizures ([address] Hyperparathyroidism (HCC)Resolved Problems: * No resolved hospital problems. [facility] Stay  Narrative of Hospital Course:  Pt came in with seizure disorder. He also has acute renal failure. During hospital course pt was started on keppra and ivf hydration    Consultants:  IP CONSULT TO HOSPITALISTIP CONSULT TO NEUROLOGYIP CONSULT TO NEUROLOGY    Surgeries/procedures Performed:       Treatments:    Therapies    PT and OT    Discharge Plan/Disposition:  Home    Hospital/Incidental Findings Requiring Follow Up:    Patient Instructions:    Diet: Regular Diet    Activity:Activity as Tolerated  For number of days (if applicable): Other Instructions:    Provider Follow-Up:   No follow-ups on file.      Significant Diagnostic Studies:    Recent Labs:  Admission on 04/18/2021, Discharged on 04/21/2021Ventricular Rate                              Date: 04/18/2021Value: 91          Ref range: BPM                Status: FinalAtrial Rate                                   Date: 04/18/2021Value: 92          Ref range: BPM                Status: FinalP-R Interval                                  Date: 04/18/2021Value: 102         Ref range: ms                 Status: FinalQRS Duration                                  Date: 04/18/2021Value: 70          Ref range: ms                 Status: FinalQ-T Interval                                  Date: 04/18/2021Value: 390         Ref range: ms                 Status: FinalQTc Calculation ([place]ett)                      Date: 04/18/2021Value: 479         Ref range: ms                 Status: FinalP Axis Date: 04/18/2021Value: 0-1         Ref range: 0 - 2 /HPF         Status: FinalBacteria, UA                                  Date: 04/18/2021Value: MANY*       Ref range: None Seen /HPF     Status: FinalOrganism                                      Date: 04/18/2021Value: Klebsiella pneumoniae                     Status: FinalUrine Culture, Routine                        Date: 04/18/2021Value: >100,000 CFU/ml                     Status: [address] [facility]                                           Date: 04/18/2021Value: 11.3        Ref range: 4.5 - 11.5 E9/L    Status: FinalRBC                                           Date: 04/18/2021Value: 3.84        Ref range: 3.80 - 5.80 E12/L  Status: FinalHemoglobin                                    Date: 04/18/2021Value: 10.7*       Ref range: 12.5 - 16.5 g/dL   Status: FinalHematocrit                                    Date: 04/18/2021Value: 34.9*       Ref range: 37.0 - 54.0 %      Status: FinalMCV                                           Date: 04/18/2021Value: 90.9        Ref range: 80.0 - 99.9 fL     Status: REYNA BELTRAN [facility]                                           Date: 04/18/2021Value: 27.9        Ref range: 26.0 - 35.0 pg     Status: [address] [facility]                                          Date: 04/18/2021Value: 30.7*       Ref range: 32.0 - 34.5 %      Status: FinalRDW                                           Date: 04/18/2021Value: 18.6*       Ref range: 11.5 - 15.0 fL     Status: FinalPlatelets                                     Date: 04/18/2021Value: 246         Ref range: 130 - 450 E9/L     Status: FinalMPV                                           Date: 04/18/2021Value: 9.8         Ref range: 7.0 - 12.0 fL      Status: FinalNeutrophils %                                 Date: 04/18/2021Value: 74.4        Ref range: 43.0 - 80.0 %      Status: FinalImmature Granulocytes %                       Date: 04/18/2021Value: 1.2         Ref range: 0.0 - 5.0 % Status: FinalLymphocytes %                                 Date: 04/18/2021Value: 16.4*       Ref range: 20.0 - 42.0 %      Status: FinalMonocytes %                                   Date: 04/18/2021Value: 6.4         Ref range: 2.0 - 12.0 %       Status: FinalEosinophils %                                 Date: 04/18/2021Value: 1.2         Ref range: 0.0 - 6.0 %        Status: FinalBasophils %                                   Date: 04/18/2021Value: 0.4         Ref range: 0.0 - 2.0 %        Status: FinalNeutrophils Absolute                          Date: 04/18/2021Value: 8.38*       Ref range: 1.80 - 7.30 E9/L   Status: FinalImmature Granulocytes #                       Date: 04/18/2021Value: 0.14        Ref range: E9/L               Status: FinalLymphocytes Absolute                          Date: 04/18/2021Value: 1.85        Ref range: 1.50 - 4.00 E9/L   Status: FinalMonocytes Absolute                            Date: 04/18/2021Value: 0.72        Ref range: 0.10 - 0.95 E9/L   Status: FinalEosinophils Absolute                          Date: 04/18/2021Value: 0.13        Ref range: 0.05 - 0.50 E9/L   Status: FinalBasophils Absolute                            Date: 04/18/2021Value: 0.04        Ref range: 0.00 - 0.20 E9/L   Status: FinalSodium                                        Date: 04/18/2021Value: 143         Ref range: 132 - 146 mmol/L   Status: FinalPotassium                                     Date: 04/18/2021Value: 3.3*        Ref range: 3.5 - 5.0 mmol/L   Status: FinalChloride                                      Date: 04/18/2021Value: 104         Ref range: 98 - 107 mmol/L    Status: FinalCO2                                           Date: 04/18/2021Value: 24          Ref range: 22 - 29 mmol/L     Status: FinalAnion Gap                                     Date: 04/18/2021Value: 15          Ref range: 7 - 16 mmol/L      Status: FinalGlucose                                       Date: 04/18/2021Value: 94 Date: 04/18/2021Value: 1.7           Status: FinalTroponin                                      Date: 04/18/2021Value: 0.04*       Ref range: 0.00 - 0.03 ng/mL  Status: Final              Comment: TROPONIN T BLOOD LEVELS:       0.03 ng/mL     Upper Reference Limit0. 04 - 0.09 ng/mL     Possible myocardial injury    >= 0.10 ng/mL     Myocardial injuryCulture, Blood 2                              Date: 04/18/2021Value: 5 Days no growth                     Status: FinalBlood Culture, Routine                        Date: 04/18/2021Value: 5 Days no growth                     Status: FinalTotal CK                                      Date: 04/18/2021Value: 118         Ref range: 20 - 200 U/L       Status: FinalLeft Ventricular Ejection Fraction            Date: 04/19/2021Value: 53            Status: Final-EditedLVEF MODALITY                                 Date: 04/19/2021Value: ECHO          Status: Final-EditedMagnesium                                     Date: 04/18/2021Value: 0.5*        Ref range: 1.6 - 2.6 mg/dL    Status: FinalPTH                                           Date: 04/18/2021Value: 124*        Ref range: 15 - 65 pg/mL      Status: FinalMagnesium                                     Date: 04/19/2021Value: 1.8         Ref range: 1.6 - 2.6 mg/dL    Status: FinalSodium                                        Date: 04/19/2021Value: 142         Ref range: 132 - 146 mmol/L   Status: FinalPotassium                                     Date: 04/19/2021Value: 3.2*        Ref range: 3.5 - 5.0 mmol/L   Status: FinalChloride                                      Date: 04/19/2021Value: 105         Ref range: 98 - 107 mmol/L    Status: FinalCO2                                           Date: 04/19/2021Value: 20*         Ref range: 22 - 29 mmol/L     Status: FinalAnion Gap                                     Date: 04/19/2021Value: 17*         Ref range: 7 - 16 mmol/L      Status: FinalGlucose Date: 04/19/2021Value: 79*         Ref range: 74 - 99 mg/dL      Status: FinalBUN                                           Date: 04/19/2021Value: 12          Ref range: 8 - 23 mg/dL       Status: FinalCREATININE                                    Date: 04/19/2021Value: 1.5*        Ref range: 0.7 - 1.2 mg/dL    Status: FinalGFR Non-                      Date: 04/19/2021Value: 47          Ref range: >=60 mL/min/1.73   Status: Final              Comment: Chronic Kidney Disease: less than 60 ml/min/1.73 sq.m. Kidney Failure: less than 15 ml/min/1.73 sq. m. Results valid for patients 18 years and older. GFR                           Date: 04/19/2021Value: 57            Status: FinalCalcium                                       Date: 04/19/2021Value: 6.0*        Ref range: 8.6 - 10.2 mg/dL   Status: FinalTotal Protein                                 Date: 04/19/2021Value: 4.8*        Ref range: 6.4 - 8.3 g/dL     Status: FinalAlbumin                                       Date: 04/19/2021Value: 2.6*        Ref range: 3.5 - 5.2 g/dL     Status: FinalTotal Bilirubin                               Date: 04/19/2021Value: 0.5         Ref range: 0.0 - 1.2 mg/dL    Status: FinalAlkaline Phosphatase                          Date: 04/19/2021Value: 79          Ref range: 40 - 129 U/L       Status: FinalALT                                           Date: 04/19/2021Value: 26          Ref range: 0 - 40 U/L         Status: FinalAST                                           Date: 04/19/2021Value: 34          Ref range: 0 - 39 U/L         Status: Final              Comment: Specimen is slightly Hemolyzed. Result may be artificially increased. WBC                                           Date: 04/19/2021Value: 9.7         Ref range: 4.5 - 11.5 E9/L    Status: FinalRBC                                           Date: 04/19/2021Value: 3.61*       Ref range: 3.80 - 5.80 E12/L  Status: FinalHemoglobin                                    Date: 04/19/2021Value: 10.0*       Ref range: 12.5 - 16.5 g/dL   Status: FinalHematocrit                                    Date: 04/19/2021Value: 31.4*       Ref range: 37.0 - 54.0 %      Status: FinalMCV                                           Date: 04/19/2021Value: 87.0        Ref range: 80.0 - 99.9 fL     Status: REYNA BELTRAN [facility]                                           Date: 04/19/2021Value: 27.7        Ref range: 26.0 - 35.0 pg     Status: [address]                                          Date: 04/19/2021Value: 31.8*       Ref range: 32.0 - 34.5 %      Status: FinalRDW                                           Date: 04/19/2021Value: 18.1*       Ref range: 11.5 - 15.0 fL     Status: FinalPlatelets                                     Date: 04/19/2021Value: 170         Ref range: 130 - 450 E9/L     Status: FinalMPV                                           Date: 04/19/2021Value: 10.5        Ref range: 7.0 - 12.0 fL      Status: FinalNeutrophils %                                 Date: 04/19/2021Value: 75.2        Ref range: 43.0 - 80.0 %      Status: FinalImmature Granulocytes %                       Date: 04/19/2021Value: 1.8         Ref range: 0.0 - 5.0 %        Status: FinalLymphocytes %                                 Date: 04/19/2021Value: 14.2*       Ref range: 20.0 - 42.0 %      Status: FinalMonocytes %                                   Date: 04/19/2021Value: 6.0         Ref range: 2.0 - 12.0 %       Status: FinalEosinophils %                                 Date: 04/19/2021Value: 2.3         Ref range: 0.0 - 6.0 %        Status: FinalBasophils %                                   Date: 04/19/2021Value: 0.5         Ref range: 0.0 - 2.0 %        Status: FinalNeutrophils Absolute                          Date: 04/19/2021Value: 7.30        Ref range: 1.80 - 7.30 E9/L   Status: FinalImmature Granulocytes #                       Date: 04/19/2021Value: 0.17        Ref range: E9/L               Status: FinalLymphocytes Absolute                          Date: 04/19/2021Value: 1.38*       Ref range: 1.50 - 4.00 E9/L   Status: FinalMonocytes Absolute                            Date: 04/19/2021Value: 0.58        Ref range: 0.10 - 0.95 E9/L   Status: FinalEosinophils Absolute                          Date: 04/19/2021Value: 0.22        Ref range: 0.05 - 0.50 E9/L   Status: FinalBasophils Absolute                            Date: 04/19/2021Value: 0.05        Ref range: 0.00 - 0.20 E9/L   Status: FinalAnisocytosis                                  Date: 04/19/2021Value: 2+            Status: FinalPoikilocytes                                  Date: 04/19/2021Value: 3+            Status: FinalSchistocytes                                  Date: 04/19/2021Value: 1+            Status: FinalAcanthocytes                                  Date: 04/19/2021Value: 2+            Status: FinalBurr Cells                                    Date: 04/19/2021Value: 2+            Status: FinalOvalocytes                                    Date: 04/19/2021Value: 1+            Status: [address]                                           Date: 04/19/2021Value: 133*        Ref range: 15 - 65 pg/mL      Status: [address] [facility]                                           Date: 04/20/2021Value: 6.7         Ref range: 4.5 - 11.5 E9/L    Status: FinalRBC                                           Date: 04/20/2021Value: 3.70*       Ref range: 3.80 - 5.80 E12/L  Status: FinalHemoglobin                                    Date: 04/20/2021Value: 10.3*       Ref range: 12.5 - 16.5 g/dL   Status: FinalHematocrit                                    Date: 04/20/2021Value: 32.8*       Ref range: 37.0 - 54.0 %      Status: FinalMCV                                           Date: 04/20/2021Value: 88.6        Ref range: 80.0 - 99.9 fL     Status: REYNA BELTRAN [facility]                                           Date: 04/20/2021Value: 27.8 Ref range: 26.0 - 35.0 pg     Status: [address]                                          Date: 04/20/2021Value: 31.4*       Ref range: 32.0 - 34.5 %      Status: FinalRDW                                           Date: 04/20/2021Value: 17.8*       Ref range: 11.5 - 15.0 fL     Status: FinalPlatelets                                     Date: 04/20/2021Value: 201         Ref range: 130 - 450 E9/L     Status: FinalMPV                                           Date: 04/20/2021Value: 9.7         Ref range: 7.0 - 12.0 fL      Status: FinalSodium                                        Date: 04/20/2021Value: 144         Ref range: 132 - 146 mmol/L   Status: FinalPotassium                                     Date: 04/20/2021Value: 4.0         Ref range: 3.5 - 5.0 mmol/L   Status: FinalChloride                                      Date: 04/20/2021Value: 107         Ref range: 98 - 107 mmol/L    Status: FinalCO2                                           Date: 04/20/2021Value: 20*         Ref range: 22 - 29 mmol/L     Status: FinalAnion Gap                                     Date: 04/20/2021Value: 17*         Ref range: 7 - 16 mmol/L      Status: FinalGlucose                                       Date: 04/20/2021Value: 95          Ref range: 74 - 99 mg/dL      Status: FinalBUN                                           Date: 04/20/2021Value: 12          Ref range: 8 - 23 mg/dL       Status: FinalCREATININE                                    Date: 04/20/2021Value: 1.5*        Ref range: 0.7 - 1.2 mg/dL    Status: FinalGFR Non-                      Date: 04/20/2021Value: 47          Ref range: >=60 mL/min/1.73   Status: Final              Comment: Chronic Kidney Disease: less than 60 ml/min/1.73 sq.m. Kidney Failure: less than 15 ml/min/1.73 sq. m. Results valid for patients 18 years and older. GFR                           Date: 04/20/2021Value: 62            Status: Rommel Odonnell Date: 04/20/2021Value: 6.6*        Ref range: 8.6 - 10.2 mg/dL   Status: FinalTotal Protein                                 Date: 04/20/2021Value: 5.0*        Ref range: 6.4 - 8.3 g/dL     Status: FinalAlbumin                                       Date: 04/20/2021Value: 2.8*        Ref range: 3.5 - 5.2 g/dL     Status: FinalTotal Bilirubin                               Date: 04/20/2021Value: 0.5         Ref range: 0.0 - 1.2 mg/dL    Status: FinalAlkaline Phosphatase                          Date: 04/20/2021Value: 84          Ref range: 40 - 129 U/L       Status: FinalALT                                           Date: 04/20/2021Value: 22          Ref range: 0 - 40 U/L         Status: FinalAST                                           Date: 04/20/2021Value: 19          Ref range: 0 - 39 U/L         Status: Final              Comment: Specimen is slightly Hemolyzed. Result may be artificially increased. Calcium, Ion                                  Date: 04/20/2021Value: 1.00*       Ref range: 1.15 - 1.33 mmol*  Status: [address] [facility]                                           Date: 04/21/2021Value: 6.3         Ref range: 4.5 - 11.5 E9/L    Status: FinalRBC                                           Date: 04/21/2021Value: 3.54*       Ref range: 3.80 - 5.80 E12/L  Status: FinalHemoglobin                                    Date: 04/21/2021Value: 10.0*       Ref range: 12.5 - 16.5 g/dL   Status: FinalHematocrit                                    Date: 04/21/2021Value: 31.2*       Ref range: 37.0 - 54.0 %      Status: FinalMCV                                           Date: 04/21/2021Value: 88.1        Ref range: 80.0 - 99.9 fL     [facility]                                           Date: 04/21/2021Value: 28.2        Ref range: 26.0 - 35.0 pg     Status: [address]                                          Date: 04/21/2021Value: 32.1        Ref range: 32.0 - 34.5 %      Status: FinalRDW Date: 04/21/2021Value: 17.5*       Ref range: 11.5 - 15.0 fL     Status: FinalPlatelets                                     Date: 04/21/2021Value: 206         Ref range: 130 - 450 E9/L     Status: FinalMPV                                           Date: 04/21/2021Value: 9.6         Ref range: 7.0 - 12.0 fL      Status: FinalSodium                                        Date: 04/21/2021Value: 139         Ref range: 132 - 146 mmol/L   Status: FinalPotassium                                     Date: 04/21/2021Value: 4.4         Ref range: 3.5 - 5.0 mmol/L   Status: FinalChloride                                      Date: 04/21/2021Value: 106         Ref range: 98 - 107 mmol/L    Status: FinalCO2                                           Date: 04/21/2021Value: 26          Ref range: 22 - 29 mmol/L     Status: FinalAnion Gap                                     Date: 04/21/2021Value: 7           Ref range: 7 - 16 mmol/L      Status: FinalGlucose                                       Date: 04/21/2021Value: 136*        Ref range: 74 - 99 mg/dL      Status: FinalBUN                                           Date: 04/21/2021Value: 9           Ref range: 8 - 23 mg/dL       Status: FinalCREATININE                                    Date: 04/21/2021Value: 1.5*        Ref range: 0.7 - 1.2 mg/dL    Status: FinalGFR Non-                      Date: 04/21/2021Value: 47          Ref range: >=60 mL/min/1.73   Status: Final              Comment: Chronic Kidney Disease: less than 60 ml/min/1.73 sq.m. Kidney Failure: less than 15 ml/min/1.73 sq. m. Results valid for patients 18 years and older. GFR                           Date: 04/21/2021Value: 62            Status: FinalCalcium                                       Date: 04/21/2021Value: 7.4*        Ref range: 8.6 - 10.2 mg/dL   Status: FinalTotal Protein                                 Date: 04/21/2021Value: 5.1* Ref range: 6.4 - 8.3 g/dL     Status: FinalAlbumin                                       Date: 04/21/2021Value: 2.9*        Ref range: 3.5 - 5.2 g/dL     Status: FinalTotal Bilirubin                               Date: 04/21/2021Value: 0.4         Ref range: 0.0 - 1.2 mg/dL    Status: FinalAlkaline Phosphatase                          Date: 04/21/2021Value: 79          Ref range: 40 - 129 U/L       Status: FinalALT                                           Date: 04/21/2021Value: 20          Ref range: 0 - 40 U/L         Status: FinalAST                                           Date: 04/21/2021Value: 14          Ref range: 0 - 39 U/L         Status: Final              Comment: Specimen is slightly Hemolyzed. Result may be artificially increased. Lactic Acid, Sepsis                           Date: 04/21/2021Value: 2.2*        Ref range: 0.5 - 1.9 mmol/L   Status: Final------------    Radiology last 7 days:  No results found.      @[facility]@    Discharge Medications    Discharge Medication List as of 4/21/2021 12:28 PMSTART taking these medicationslevETIRAcetam (KEPPRA) 500 MG tabletTake 1 tablet by mouth 2 times daily, Disp-60 tablet, R-3Normalcalcium-cholecalciferol 500-200 MG-UNIT per tabletTake 1 tablet by mouth 2 times daily, Disp-60 tablet, R-5Normal    Discharge Medication List as of 4/21/2021 12:28 PMCONTINUE these medications which have CHANGEDmetoprolol succinate (TOPROL XL) 50 MG extended release tabletTake 1 tablet by mouth daily, Disp-30 tablet, R-5Normal    Discharge Medication List as of 4/21/2021 12:28 PMCONTINUE these medications which have NOT CHANGEDpredniSONE (DELTASONE) 10 MG tablet4 tablets p.o. daily x7 days then 3 tablets p.o. daily x7 days then 2 tablets p.o. daily x7 days then 1 tablet p.o. daily x7 days, Disp-70 tablet, R-0Normaloxybutynin (DITROPAN) 5 MG tabletTake 5 mg by mouth 2 times dailyHistorical Medapixaban starter pack (ELIQUIS DVT/PE STARTER PACK) 5 MG TBPK tabletTake 5 mg by mouth See Admin Instructions Take 2 tablets PO BID x7 days, then take 1 tablet PO BIDHistorical Medatorvastatin (LIPITOR) 80 MG tabletTake 1 tablet by mouth nightly, Disp-30 tablet, R-3Normalpantoprazole (PROTONIX) 40 MG tabletTake 1 tablet by mouth every morning (before breakfast), Disp-30 tablet, R-3NormaloxyCODONE HCl (OXY-IR) 10 MG immediate release tabletTake 10 mg by mouth every 4 hours. Historical Medmirtazapine (REMERON) 15 MG tabletTake 15 mg by mouth nightlyHistorical Med    Discharge Medication List as of 4/21/2021 12:28 PMSTOP taking these medicationsOXYGENComments:Reason for Stopping:lisinopril (PRINIVIL;ZESTRIL) 5 MG tabletComments:Reason for Stopping:    Time Spent on Discharge:E] minutes were spent in patient examination, evaluation, counseling as well as medication reconciliation, prescriptions for required medications, discharge plan, and follow up.     Electronically signed by Geo Berger MD on 5/12/21 at 11:00 PM EDT

## 2021-05-26 NOTE — PROGRESS NOTES
General Surgery History and Physical  Miko Veloz MD, MS    Patient's Name/Date of Birth: Dennys Rodriguez / 1955    Date: May 26, 2021     Consulting Surgeon: Bon Mcneil MD    PCP: Aleksandar Dukes MD     Chief Complaint: Oropharyngeal Dysphagia, Severe Protein calorie malnutrition    HPI:   Dennys Rodriguez is a 63 y.o. male who presents for evaluation of dysphagia. Patient has been chronically ill with decompensated nutritional reserve. They have been unable to maintain adequate oral intake due to illness and dysphagia. We have been asked to evaluate for placement of PEG tube for supplemental or total enteral nutrition replacement. Patient denies abdominal pain currently. Review of medical and surgical history obtained from EMR and family due to patients clinical state. Patient has had poor oral intake for and has been losing weight.  Had  Peg in the past.     Patient Active Problem List   Diagnosis    Malnutrition ([address]    Multiple sclerosis ([address]    Bilateral pulmonary embolism ([address]    Crohn's disease ([address]    COPD (chronic obstructive pulmonary disease) ([address]    Tobacco abuse    Hypertension    Hyperlipidemia    Stage 3b chronic kidney disease    Hyperkalemia    Leukocytosis    Small bowel obstruction ([address]    HCAP (healthcare-associated pneumonia)    Acute on chronic renal failure (HCC)    Sepsis ([address]    [facility]    History of pulmonary embolism    Acute on chronic renal insufficiency    Ischemic cardiomyopathy    Seizures (HCC)    Altered mental state    Hyperparathyroidism ([address]    Acute renal failure (ARF) (HCC)    Moderate protein-calorie malnutrition (HCC)       Allergies   Allergen Reactions    Neurontin [Gabapentin] Palpitations    Lyrica [Pregabalin] Other (See Comments)     photophobia       Past Medical History:   Diagnosis Date    COPD (chronic obstructive pulmonary disease) ([address]     Cro[facility]'s disease ([facility]     Hx of blood clots     liver, lung    Hyperlipidemia     Hypertension     [facility]     Seizures ([facility]     Stage 3b chronic kidney disease 2021       Past Surgical History:   Procedure Laterality Date    APPENDECTOMY      CHOLECYSTECTOMY      COLONOSCOPY      ENDOSCOPY, COLON, DIAGNOSTIC      UPPER GASTROINTESTINAL ENDOSCOPY N/A 2021    [facility] History     Socioeconomic History    Marital status:      Spouse name: Not on file    Number of children: Not on file    Years of education: Not on file    Highest education level: Not on file   Occupational History    Not on file   Tobacco Use    Smoking status: Former Smoker     Types: Cigarettes     Quit date: 2021     Years since quittin.3    Smokeless tobacco: Never Used   Substance and Sexual Activity    Alcohol use: No    Drug use: No    Sexual activity: Not Currently   Other Topics Concern    Not on file   Social History Narrative    Not on file     Social Determinants of Health     Financial Resource Strain:     Difficulty of Paying Living Expenses:    Food Insecurity:     Worried About Running Out of Food in the Last Year:     [address] in the Last Year:    Transportation Needs:     Lack of Transportation (Medical):      Lack of Transportation (Non-Medical):    Physical Activity:     Days of Exercise per Week:     Minutes of Exercise per Session:    Stress:     Feeling of Stress :    Social Connections:     Frequency of Communication with Friends and Family:     Frequency of Social Gatherings with Friends and Family:     Attends Adventism Services:     Active Member of Clubs or Organizations:     Attends Club or Organization Meetings:     Marital Status:    Intimate Partner Violence:     Fear of Current or Ex-Partner:     Emotionally Abused:     Physically Abused:     Sexually Abused: history, vitals, nursing assessment and images.         Physician Signature: Electronically signed by Dr. Benedicto Willams  [phone] (p)

## 2021-05-26 NOTE — TELEPHONE ENCOUNTER
Per Dr. Shirley Vann, patient is scheduled for Percutaneous endoscopic gastrostomy tube placement  at [facility] on 6/1/21. Surgery scheduling form faxed with confirmation, surgeon's calendar updated. Dr. Shirley Vann to enter orders. Follow up appointment scheduled. Patient instructed to hold eliquis for 3 days prior to procedure. Electronically signed by Kurt Calzada RN on 5/26/2021 at 2:00 PM    Dr. Jaswinder Falk office contacted via telephone and fax to set up home care/home tube feedings for patient.   Electronically signed by Kurt Calzada RN on 5/27/2021 at 11:26 AM

## 2021-05-26 NOTE — TELEPHONE ENCOUNTER
Prior Authorization Form:      DEMOGRAPHICS:                     Patient Name:  Amanda Chi  Patient :  1955            Insurance:  Payor: [place]hed [place] / Plan: [place] / Product Type: *No Product type* /   Insurance ID Number:    Payor/Plan Subscr  Sex Relation Sub. Ins. ID Effective Group Num   1.  [facility] 1955 Male Self [id number]                                    [id number]         DIAGNOSIS & PROCEDURE:                       Procedure/Operation: Percutaneous endoscopic gastrostomy tube placement            CPT Code: 03182    Diagnosis:  Malnutrition, Decreased oral intake    ICD10 Code: E43 + R63.8    Location:  [address] [place] [address]    Surgeon:  Chrystal Prabhakar INFORMATION:                          Date: 21    Time: TBD              Anesthesia:  MAC/TIVA                                                       Status:  Outpatient        Special Comments:         Electronically signed by Naresh Yusuf RN on 2021 at 2:00 PM

## 2021-05-28 NOTE — PROGRESS NOTES
[facility]                                                                                                                    PRE OP INSTRUCTIONS FOR  Billye Cheadle        Date: 5/28/2021    Date of surgery: 6/1/2021  Arrival Time: Hospital will call you between 5pm and 7pm with your final arrival time for surgery    1. Do not eat or drink anything after 12 prior to surgery. This includes no water, chewing gum, mints or ice chips. 2. Take the following medications with a small sip of water on the morning of Surgery: keppra, metoprolol, oxy-IR     3. Diabetics may take evening dose of insulin but none after midnight. If you feel symptomatic or low blood sugar morning of surgery drink 1-2 ounces of apple juice only. 4. Aspirin, Ibuprofen, Advil, Naproxen, Vitamin E and other Anti-inflammatory products should be stopped  before surgery  as directed by your physician. Take Tylenol only unless instructed otherwise by your surgeon. 5. Check with your Doctor regarding stopping Plavix, Coumadin, Lovenox, Eliquis, Effient, or other blood thinners. 6. Do not smoke,use illicit drugs and do not drink any alcoholic beverages 24 hours prior to surgery. 7. You may brush your teeth the morning of surgery. DO NOT SWALLOW WATER    8. You MUST make arrangements for a responsible adult to take you home after your surgery. You will not be allowed to leave alone or drive yourself home. It is strongly suggested someone stay with you the first 24 hrs. Your surgery will be cancelled if you do not have a ride home. 9. PEDIATRIC PATIENTS ONLY:  A parent/legal guardian must accompany a child scheduled for surgery and plan to stay at the hospital until the child is discharged. Please do not bring other children with you.     10. Please wear simple, loose fitting clothing to the hospital.  Grayson Romerot not bring valuables (money, credit cards, checkbooks, etc.) Do not wear any makeup (including no eye makeup) or nail polish on your fingers or toes. 11. DO NOT wear any jewelry or piercings on day of surgery. All body piercing jewelry must be removed. 12. Shower the night before surgery with ___Antibacterial soap /ERROL WIPES________    13. TOTAL JOINT REPLACEMENT/HYSTERECTOMY PATIENTS ONLY---Remember to bring Blood Bank bracelet to the hospital on the day of surgery. 14. If you have a Living Will and Durable Power of  for Healthcare, please bring in a copy. 15. If appropriate bring crutches, inspirex, WALKER, CANE etc... 12. Notify your Surgeon if you develop any illness between now and surgery time, cough, cold, fever, sore throat, nausea, vomiting, etc.  Please notify your surgeon if you experience dizziness, shortness of breath or blurred vision between now & the time of your surgery. 17. If you have ___dentures, they will be removed before going to the OR; we will provide you a container. If you wear ___contact lenses or ___glasses, they will be removed; please bring a case for them. 18. To provide excellent care visitors will be limited to 2 in the room at any given time. 19. Please bring picture ID and insurance card. 20. Sleep apnea patients need to bring CPAP AND SETTINGS to hospital on day of surgery. 21. During flu season no children under the age of 15 are permitted in the hospital for the safety of all patients. 22. Other                  Please call AMBULATORY CARE if you have any further questions.    [facility]     [address]

## 2021-06-01 NOTE — ANESTHESIA PRE PROCEDURE
Department of Anesthesiology  Preprocedure Note       Name:  Dennys Rodriguez   Age:  63 y.o.  :  1955                                          MRN:  [id number]         Date:  2021      Surgeon: Mindy Lua):  Bon Mcneil MD    Procedure: Procedure(s):  PERCUTANEOUS ENDOSCOPIC GASTROSTOMY TUBE PLACEMENT    Medications prior to admission:   Prior to Admission medications    Medication Sig Start Date End Date Taking? Authorizing Provider   sucralfate (CARAFATE) 1 GM tablet Take 1 tablet by mouth 4 times daily 5/10/21  Yes Aleksandar Dukes MD   apixaban (ELIQUIS) 5 MG TABS tablet Take 5 mg by mouth 2 times daily   Yes Historical Provider, MD   oxyCODONE HCl (OXY-IR) 10 MG immediate release tablet Take 10 mg by mouth as needed. Yes Historical Provider, MD   pantoprazole (PROTONIX) 40 MG tablet    Yes Historical Provider, MD   metoprolol succinate (TOPROL XL) 50 MG extended release tablet Take 1 tablet by mouth daily 21 Yes Aleksandar Dukes MD   levETIRAcetam (KEPPRA) 500 MG tablet Take 1 tablet by mouth 2 times daily  Patient taking differently: Take 500 mg by mouth 2 times daily Pt reporting to take 250 mg in morning and 500 mg at night 21  Yes Aleksandar Dukes MD   calcium-cholecalciferol 500-200 MG-UNIT per tablet Take 1 tablet by mouth 2 times daily 21  Yes Aleksandar Dukes MD   oxybutynin (DITROPAN) 5 MG tablet Take 5 mg by mouth 2 times daily   Yes Historical Provider, MD   atorvastatin (LIPITOR) 80 MG tablet Take 1 tablet by mouth nightly 21  Yes Aleksandar Dukes MD   mirtazapine (REMERON) 15 MG tablet Take 15 mg by mouth nightly    Yes Historical Provider, MD       Current medications:    No current facility-administered medications for this encounter.      Current Outpatient Medications   Medication Sig Dispense Refill    sucralfate (CARAFATE) 1 GM tablet Take 1 tablet by mouth 4 times daily 120 tablet 3    apixaban (ELIQUIS) 5 MG TABS tablet Take 5 mg by mouth 2 times daily      oxyCODONE HCl (OXY-IR) 10 MG immediate release tablet Take 10 mg by mouth as needed.  pantoprazole (PROTONIX) 40 MG tablet       metoprolol succinate (TOPROL XL) 50 MG extended release tablet Take 1 tablet by mouth daily 30 tablet 5    levETIRAcetam (KEPPRA) 500 MG tablet Take 1 tablet by mouth 2 times daily (Patient taking differently: Take 500 mg by mouth 2 times daily Pt reporting to take 250 mg in morning and 500 mg at night) 60 tablet 3    calcium-cholecalciferol 500-200 MG-UNIT per tablet Take 1 tablet by mouth 2 times daily 60 tablet 5    oxybutynin (DITROPAN) 5 MG tablet Take 5 mg by mouth 2 times daily      atorvastatin (LIPITOR) 80 MG tablet Take 1 tablet by mouth nightly 30 tablet 3    mirtazapine (REMERON) 15 MG tablet Take 15 mg by mouth nightly          Allergies:     Allergies   Allergen Reactions    Neurontin [Gabapentin] Palpitations    Lyrica [Pregabalin] Other (See Comments)     photophobia       Problem List:    Patient Active Problem List   Diagnosis Code    Malnutrition ([address] E46    Multiple sclerosis ([address] G35    Bilateral pulmonary embolism (HCC) I26.99    Crohn's disease ([address] K50.90    COPD (chronic obstructive pulmonary disease) ([address] J44.9    Tobacco abuse Z72.0    Hypertension I10    Hyperlipidemia E78.5    Stage 3b chronic kidney disease N18.32    Hyperkalemia E87.5    Leukocytosis D72.829    Small bowel obstruction ([address] K56.609    HCAP (healthcare-associated pneumonia) J18.9    Acute on chronic renal failure (HCC) N17.9, N18.9    Sepsis ([address] A41.9    SBO (small bowel obstruction) ([address] K56.609    History of pulmonary embolism Z86.711    Acute on chronic renal insufficiency N28.9, N18.9    Ischemic cardiomyopathy I25.5    Seizures (HCC) R56.9    Altered mental state R41.82    Hyperparathyroidism ([address] E21.3    Acute renal failure (ARF) (HCC) N17.9    Moderate protein-calorie malnutrition (HCC) E44.0       Past Medical History: Diagnosis Date    [facility]chronic [facility] disease) ([facility]     Crohn's disease ([facility]     Hx of blood clots     liver, lung    Hyperlipidemia     Hypertension     Multiple sclerosis ([facility]     Seizures ([facility]     Stage 3b chronic kidney disease 2021       Past Surgical History:        Procedure Laterality Date    APPENDECTOMY      CHOLECYSTECTOMY      COLONOSCOPY      ENDOSCOPY, COLON, DIAGNOSTIC      UPPER GASTROINTESTINAL ENDOSCOPY N/A 2021    EGD BIOPSY performed by Manpreet Dobson DO at [address] History:    Social History     Tobacco Use    Smoking status: Former Smoker     Types: Cigarettes     Quit date: 2021     Years since quittin.3    Smokeless tobacco: Never Used   Substance Use Topics    Alcohol use: No                                Counseling given: Not Answered      Vital Signs (Current):   Vitals:    21 1034   Weight: 109 lb (49.4 kg)   Height: 5' (1.524 m)                                              BP Readings from Last 3 Encounters:   21 98/63   05/10/21 112/64   21 131/77       NPO Status:                                                                                 BMI:   Wt Readings from Last 3 Encounters:   21 109 lb (49.4 kg)   21 109 lb 4.8 oz (49.6 kg)   21 121 lb (54.9 kg)     Body mass index is 21.29 kg/m².     CBC:   Lab Results   Component Value Date    WBC 6.4 2021    RBC 3.76 2021    HGB 10.3 2021    HCT 32.3 2021    MCV 85.9 2021    RDW 16.2 2021     2021       CMP:   Lab Results   Component Value Date     2021    K 3.6 2021    K 5.0 2021     2021    CO2 21 2021    BUN 20 2021    CREATININE 1.5 2021    GFRAA 57 2021    LABGLOM 47 2021    GLUCOSE 118 2021    PROT 4.4 2021    CALCIUM 7.9 2021    BILITOT 0.4 2021    ALKPHOS 108 2021    AST normal     Neuro/Psych:   (+) seizures (Controlled on Keppra):, neuromuscular disease: multiple sclerosis,             GI/Hepatic/Renal:   (+) renal disease (Stage III CKD:  creatinine 1.5 & GFR 47): CRI and ARF,          ROS comment: Crohn's. Endo/Other:    (+) blood dyscrasia (Eliquis): anticoagulation therapy and anemia, arthritis: OA., electrolyte abnormalities, . Pt had no PAT visit        ROS comment: Hyperparathyroidism    Narcotic dependent chronic pain (Oxycodone) Abdominal:   (+) scaphoid        Vascular:   + DVT, PE. Anesthesia Plan      MAC     ASA 4       Induction: intravenous. MIPS: Prophylactic antiemetics administered. Anesthetic plan and risks discussed with patient. Plan discussed with CRNA.                   Sia Leon DO   6/1/2021

## 2021-06-01 NOTE — ANESTHESIA POSTPROCEDURE EVALUATION
Department of Anesthesiology  Postprocedure Note    Patient: Lucio Madden  MRN: [id number]  YOB: 1955  Date of evaluation: 6/1/2021  Time:  10:58 AM     Procedure Summary     Date: 06/01/21 Room / Location: [address] [place] [address] 01 / [facility]    Anesthesia Start: 2320 Anesthesia Stop: 3252    Procedure: PERCUTANEOUS ENDOSCOPIC GASTROSTOMY TUBE PLACEMENT (N/A ) Diagnosis: (MALNUTRITION, DECREASED ORAL INTAKE)    Surgeons: Maranda Seth MD Responsible Provider: [address] [place] [address]    Anesthesia Type: MAC ASA Status: 4          Anesthesia Type: MAC    Laura Phase I: Laura Score: 10    Laura Phase II:      Last vitals: Reviewed and per EMR flowsheets.        Anesthesia Post Evaluation    Patient location during evaluation: bedside  Patient participation: complete - patient participated  Level of consciousness: responsive to verbal stimuli  Pain score: 2  Airway patency: patent  Nausea & Vomiting: no vomiting and no nausea  Complications: no  Cardiovascular status: hemodynamically stable  Respiratory status: acceptable  Hydration status: stable

## 2021-06-01 NOTE — H&P
General Surgery History and Physical  Miko Veloz MD, MS    Patient's Name/Date of Birth: Dennys Rodriguez / 1955    Date: June 1, 2021     Consulting Surgeon: Bon Mcneil MD    PCP: Aleksandar Dukes MD     Chief Complaint: Oropharyngeal Dysphagia, Severe Protein calorie malnutrition    HPI:   Dennys Rodriguez is a 63 y.o. male who presents for evaluation of dysphagia. Patient has been chronically ill with decompensated nutritional reserve. They have been unable to maintain adequate oral intake due to illness and dysphagia. We have been asked to evaluate for placement of PEG tube for supplemental or total enteral nutrition replacement. Patient denies abdominal pain currently. Review of medical and surgical history obtained from EMR and family due to patients clinical state. Patient has had poor oral intake for and has been losing weight.  Had  Peg in the past.     Patient Active Problem List   Diagnosis    Malnutrition ([address]    Multiple sclerosis ([address]    Bilateral pulmonary embolism ([address]    Crohn's disease ([address]    COPD (chronic obstructive pulmonary disease) ([address]    Tobacco abuse    Hypertension    Hyperlipidemia    Stage 3b chronic kidney disease    Hyperkalemia    Leukocytosis    Small bowel obstruction ([address]    HCAP (healthcare-associated pneumonia)    Acute on chronic renal failure (HCC)    Sepsis ([address]    [facility]    History of pulmonary embolism    Acute on chronic renal insufficiency    Ischemic cardiomyopathy    Seizures (HCC)    Altered mental state    Hyperparathyroidism ([address]    [facility]F) ([facility])    Moderate protein-calorie malnutrition (HCC)       Allergies   Allergen Reactions    Neurontin [Gabapentin] Palpitations    Lyrica [Pregabalin] Other (See Comments)     photophobia       Past Medical History:   Diagnosis Date    COPD (chronic obstructive pulmonary disease) ([address]     [facility]'s [facility] ([facility]     Hx of blood clots     liver, lung    Hyperlipidemia     Hypertension     [facility]     Seizures ([facility]     Stage 3b chronic kidney disease 2021       Past Surgical History:   Procedure Laterality Date    APPENDECTOMY      CHOLECYSTECTOMY      COLONOSCOPY      ENDOSCOPY, COLON, DIAGNOSTIC      UPPER GASTROINTESTINAL ENDOSCOPY N/A 2021    EGD BIOPSY performed by Amanda Hwang DO at [address] History     Socioeconomic History    Marital status:      Spouse name: Not on file    Number of children: Not on file    Years of education: Not on file    Highest education level: Not on file   Occupational History    Not on file   Tobacco Use    Smoking status: Former Smoker     Types: Cigarettes     Quit date: 2021     Years since quittin.3    Smokeless tobacco: Never Used   Substance and Sexual Activity    Alcohol use: No    Drug use: No    Sexual activity: Not Currently   Other Topics Concern    Not on file   Social History Narrative    Not on file     Social Determinants of Health     Financial Resource Strain:     Difficulty of Paying Living Expenses:    Food Insecurity:     Worried About Running Out of Food in the Last Year:     [address] in the Last Year:    Transportation Needs:     Lack of Transportation (Medical):      Lack of Transportation (Non-Medical):    Physical Activity:     Days of Exercise per Week:     Minutes of Exercise per Session:    Stress:     Feeling of Stress :    Social Connections:     Frequency of Communication with Friends and Family:     Frequency of Social Gatherings with Friends and Family:     Attends Scientology Services:     Active Member of Clubs or Organizations:     Attends Club or Organization Meetings:     Marital Status:    Intimate Partner Violence:     Fear of Current or Ex-Partner:     Emotionally Abused:     Physically Abused:     Sexually Abused: The patient has a family history that is negative for severe cardiovascular or respiratory issues, negative for reaction to anesthesia. No results for input(s): WBC, HGB, HCT, MCV, PLT in the last 72 hours. No results for input(s): NA, K, CO2, PHOS, BUN, CREATININE in the last 72 hours. Invalid input(s): CA    No results for input(s): PROT, INR, LIPASE, AMYLASE in the last 72 hours. No intake or output data in the 24 hours ending 06/01/21 1298    Review of Systems  General ROS: unable to perform due to the patients clinical state however, review of physical state revealed:  ENT ROS: negative for - nasal congestion or nasal discharge  Psych: Negative for hallucinations  Allergy and Immunology ROS: negative for - hives  Hematological and Lymphatic ROS: negative for - bruising or fatigue  Endocrine ROS: negative for - polydipsia/polyuria  Respiratory ROS: negative for -  stridor  Cardiovascular ROS: negative for - PVCs  Gastrointestinal ROS: negative for - nausea, vomiting  Genito-Urinary ROS: negative for -  genital discharge, genital ulcers or hematuria  Musculoskeletal ROS: bed bound        Physical exam:   /64   Pulse 60   Temp 99.3 °F (37.4 °C) (Temporal)   Resp 18   Ht 5' 1\" (1.549 m)   Wt 99 lb (44.9 kg)   SpO2 95%   BMI 18.71 kg/m²   General appearance:  NAD  Head: NCAT, PERRLA, EOMI, red conjunctiva  Neck: supple, no masses  Lungs: Equal chest rise bilateral  Heart: Reg rate  Abdomen: soft, nontender, nondistended  Skin; no lesions  Gu: no cva tenderness  Extremities: extremities normal, atraumatic, no cyanosis        Assessment:  [place] is a 63 y.o. male with oropharyngeal dysphagia and unspecified degree of protein calorie malnutrition    Plan:  Consent for Percutaneous endoscopic gastrostomy tube placement  Discussed risk, benefits alternatives including damage to surrounding structures, erosion, obstruction, leakage and death.   Time spent reviewing past medical, surgical, social and family history, vitals, nursing assessment and images.         Physician Signature: Electronically signed by Dr. Jeannine Marie  [phone] (p)

## 2021-06-01 NOTE — OP NOTE
present for the entire procedure. All instrument counts, lap counts, and needle counts were correct at the end of the procedure.     Red Levi MD  6/1/2021  10:28 AM

## 2021-06-03 NOTE — ED PROVIDER NOTES
Patient is a 64 y/o female who presents to the ED via EMS. EMS reports that the patient had a seizure this morning. No other details are available. Patient states that he fell this morning. He denies hitting his head or any loss of consciousness. Currently, he states \"I don't feel good. \" He reports diffuse abdominal pain. He denies any fever, nausea, vomiting, diarrhea or dysuria. Patient does have a history of seizures and is on Keppra. Review of Systems   Unable to perform ROS: Mental status change        Physical Exam  Vitals and nursing note reviewed. HENT:      Head: Normocephalic and atraumatic. Right Ear: External ear normal.      Left Ear: External ear normal.      Nose: Nose normal.      Mouth/Throat:      Mouth: Mucous membranes are moist.   Eyes:      Conjunctiva/sclera: Conjunctivae normal.      Pupils: Pupils are equal, round, and reactive to light. Neck:      Comments: No midline cervical tenderness to palpation. Cardiovascular:      Rate and Rhythm: Normal rate and regular rhythm. Heart sounds: No murmur heard. Pulmonary:      Effort: Pulmonary effort is normal. No respiratory distress. Breath sounds: Normal breath sounds. No stridor. No wheezing, rhonchi or rales. Abdominal:      General: Bowel sounds are normal. There is no distension. Palpations: Abdomen is soft. Tenderness: There is abdominal tenderness (Diffuse). There is no guarding. Comments: Peg tube noted without erythema or drainage. Musculoskeletal:         General: Normal range of motion. Cervical back: Normal range of motion and neck supple. No tenderness. Skin:     General: Skin is warm and dry. Findings: No rash. Neurological:      Mental Status: He is alert and oriented to person, place, and time. GCS: GCS eye subscore is 3. GCS verbal subscore is 4. GCS motor subscore is 6.           Procedures     MDM            EKG:  Normal sinus rhythm with ventricular rate of 83.  VT interval, QRS duration and QT interval within normal range. Normal axis. Nonspecific ST segment and T wave changes. No previous EKG. Time: 8903. Re-evaluation. Patients symptoms are improving  Repeat physical examination is improved  Patient is alert and oriented. Denies any recent chest pain. Denies missing any doses of Keppra.           --------------------------------------------- PAST HISTORY ---------------------------------------------  Past Medical History:  has a past medical history of COPD (chronic obstructive pulmonary disease) ([facility] [facility] Hx of blood clots, Hyperlipidemia, Hypertension, Multiple sclerosis ([facility] Seizures ([facility] and Stage 3b chronic kidney disease. Past Surgical History:  has a past surgical history that includes Cholecystectomy; Appendectomy; Colonoscopy; Endoscopy, colon, diagnostic; Upper gastrointestinal endoscopy (N/A, 5/6/2021); and Gastrostomy tube placement (N/A, 6/1/2021). Social History:  reports that he quit smoking about 4 months ago. His smoking use included cigarettes. He has never used smokeless tobacco. He reports that he does not drink alcohol and does not use drugs. Family History: family history is not on file. The patients home medications have been reviewed.     Allergies: Neurontin [gabapentin] and Lyrica [pregabalin]    -------------------------------------------------- RESULTS -------------------------------------------------    LABS:  Results for orders placed or performed during the hospital encounter of 06/03/21   CBC auto differential   Result Value Ref Range    WBC 13.7 (H) 4.5 - 11.5 E9/L    RBC 3.91 3.80 - 5.80 E12/L    Hemoglobin 10.8 (L) 12.5 - 16.5 g/dL    Hematocrit 34.4 (L) 37.0 - 54.0 %    MCV 88.0 80.0 - 99.9 fL    MCH 27.6 26.0 - 35.0 pg    MCHC 31.4 (L) 32.0 - 34.5 %    RDW 18.4 (H) 11.5 - 15.0 fL    Platelets 712 895 - 369 E9/L    MPV 10.2 7.0 - 12.0 fL    Neutrophils % 63.7 43.0 - 80.0 %    Immature Granulocytes % 1.6 0.0 - 5.0 %    Lymphocytes % 23.0 20.0 - 42.0 %    Monocytes % 8.7 2.0 - 12.0 %    Eosinophils % 1.5 0.0 - 6.0 %    Basophils % 1.5 0.0 - 2.0 %    Neutrophils Absolute 8.74 (H) 1.80 - 7.30 E9/L    Immature Granulocytes # 0.22 E9/L    Lymphocytes Absolute 3.16 1.50 - 4.00 E9/L    Monocytes Absolute 1.20 (H) 0.10 - 0.95 E9/L    Eosinophils Absolute 0.21 0.05 - 0.50 E9/L    Basophils Absolute 0.20 0.00 - 0.20 E9/L   Comprehensive Metabolic Panel   Result Value Ref Range    Sodium 142 132 - 146 mmol/L    Potassium 3.8 3.5 - 5.0 mmol/L    Chloride 104 98 - 107 mmol/L    CO2 29 22 - 29 mmol/L    Anion Gap 9 7 - 16 mmol/L    Glucose 131 (H) 74 - 99 mg/dL    BUN 14 6 - 23 mg/dL    CREATININE 1.6 (H) 0.7 - 1.2 mg/dL    GFR Non-African American 44 >=60 mL/min/1.73    GFR African American 53     Calcium 7.6 (L) 8.6 - 10.2 mg/dL    Total Protein 4.8 (L) 6.4 - 8.3 g/dL    Albumin 2.6 (L) 3.5 - 5.2 g/dL    Total Bilirubin 0.5 0.0 - 1.2 mg/dL    Alkaline Phosphatase 98 40 - 129 U/L    ALT 14 0 - 40 U/L    AST 29 0 - 39 U/L   Lipase   Result Value Ref Range    Lipase 33 13 - 60 U/L   Lactic Acid, Plasma   Result Value Ref Range    Lactic Acid 2.5 (H) 0.5 - 2.2 mmol/L   Troponin   Result Value Ref Range    Troponin, High Sensitivity 68 (H) 0 - 11 ng/L   Protime-INR   Result Value Ref Range    Protime 16.2 (H) 9.3 - 12.4 sec    INR 1.4    APTT   Result Value Ref Range    aPTT 33.2 24.5 - 35.1 sec   EKG 12 Lead   Result Value Ref Range    Ventricular Rate 83 BPM    Atrial Rate 83 BPM    P-R Interval 106 ms    QRS Duration 80 ms    Q-T Interval 404 ms    QTc Calculation (Bazett) 474 ms    P Axis 60 degrees    R Axis 75 degrees    T Axis 40 degrees       RADIOLOGY:  XR CHEST 1 VIEW   Final Result   No radiographic evidence of acute cardiopulmonary disease process. Correlate   with CT abdomen pelvis findings to evaluate bowel gas pattern. CT HEAD WO CONTRAST   Final Result   1.  There is no acute intracranial abnormality. Specifically, there is no   intracranial hemorrhage. 2. Atrophy and periventricular leukomalacia,         CT CERVICAL SPINE WO CONTRAST   Final Result   1. There is no acute compression fracture or subluxation of the cervical spine   2. Degenerative disc disease at the C5-6 and C6-7 levels most prominent at   the C5-6 level. CT ABDOMEN PELVIS W IV CONTRAST Additional Contrast? None   Final Result   1. High-grade distal small-bowel obstruction. There are multiple air-fluid   levels. The small bowel measures up to 6.2 cm in maximum dimension. 2. The transition point is seen within the mid right hemiabdomen just   proximal to the anastomosis of the small bowel. .   3. There is no intra-abdominal free air. There is a small amount of free   fluid within the pelvis.                   ------------------------- NURSING NOTES AND VITALS REVIEWED ---------------------------  Date / Time Roomed:  6/3/2021  6:01 AM  ED Bed Assignment:  09/09    The nursing notes within the ED encounter and vital signs as below have been reviewed. Patient Vitals for the past 24 hrs:   BP Temp Temp src Pulse Resp SpO2 Height Weight   06/03/21 0605 116/80 97.5 °F (36.4 °C) Oral 90 18 94 % 5' 1\" (1.549 m) 99 lb (44.9 kg)       Oxygen Saturation Interpretation: Normal    ------------------------------------------ PROGRESS NOTES ------------------------------------------  Re-evaluation(s):  Time: 8451. Patients symptoms are improving  Repeat physical examination is improved    Counseling:  I have spoken with the patient and discussed todays results, in addition to providing specific details for the plan of care and counseling regarding the diagnosis and prognosis. Their questions are answered at this time and they are agreeable with the plan of admission.    --------------------------------- ADDITIONAL PROVIDER NOTES ---------------------------------  Consultations:  Time: 1932. Spoke with Dr. Nataly Kennedy.   Discussed case.  They will admit the patient. Spoke with Dr. Jeannine Marie (Surgery). Discussed the case. They will provide consultation. This patient's ED course included: a personal history and physicial examination, re-evaluation prior to disposition, multiple bedside re-evaluations, IV medications, cardiac monitoring and continuous pulse oximetry    This patient has remained hemodynamically stable during their ED course. Diagnosis:  1. Small bowel obstruction ([facility]    2. NSTEMI (non-ST elevated myocardial infarction) ([facility]    3. Breakthrough seizure ([facility]        Disposition:  Patient's disposition: Admit to [facility]  Patient's condition is stable.          Sean Sutton, DO  06/03/21 0757       Deshawn Morales, DO  06/03/21 0040

## 2021-06-03 NOTE — CONSULTS
lung    Hyperlipidemia     Hypertension     Multiple sclerosis ([facility]     Seizures (HCC)     Stage 3b chronic kidney disease 2021       Past Surgical History:   Procedure Laterality Date    APPENDECTOMY      CHOLECYSTECTOMY      COLONOSCOPY      ENDOSCOPY, COLON, DIAGNOSTIC      GASTROSTOMY TUBE PLACEMENT N/A 2021    PERCUTANEOUS ENDOSCOPIC GASTROSTOMY TUBE PLACEMENT performed by Sherry Justice MD at [address] [facility] N/A 2021    EGD BIOPSY performed by aJyme Vaughn DO at [address] History     Socioeconomic History    Marital status:      Spouse name: Not on file    Number of children: Not on file    Years of education: Not on file    Highest education level: Not on file   Occupational History    Not on file   Tobacco Use    Smoking status: Former Smoker     Types: Cigarettes     Quit date: 2021     Years since quittin.3    Smokeless tobacco: Never Used   Substance and Sexual Activity    Alcohol use: No    Drug use: No    Sexual activity: Not Currently   Other Topics Concern    Not on file   Social History Narrative    Not on file     Social Determinants of Health     Financial Resource Strain:     Difficulty of Paying Living Expenses:    Food Insecurity:     Worried About Running Out of Food in the Last Year:     Ran Out of Food in the Last Year:    Transportation Needs:     Lack of Transportation (Medical):      Lack of Transportation (Non-Medical):    Physical Activity:     Days of Exercise per Week:     Minutes of Exercise per Session:    Stress:     Feeling of Stress :    Social Connections:     Frequency of Communication with Friends and Family:     Frequency of Social Gatherings with Friends and Family:     Attends Druze Services:     Active Member of Clubs or Organizations:     Attends Club or Organization Meetings:     Marital Status:    Intimate Partner Violence:     Fear of Current or Ex-Partner:     Emotionally Abused:     Physically Abused:     Sexually Abused: The patient has a family history that is negative for severe cardiovascular or respiratory issues, negative for reaction to anesthesia. Recent Labs     06/03/21  0600   WBC 13.7*   HGB 10.8*   HCT 34.4*   MCV 88.0          Recent Labs     06/03/21  0600      K 3.8   CO2 29   BUN 14   CREATININE 1.6*       Recent Labs     06/03/21  0600   PROT 4.8*   INR 1.4   LIPASE 33       No intake or output data in the 24 hours ending 06/03/21 1145    I have reviewed relevant labs from this admission and interpretation is included in my assessment and plan      Review of Systems  A complete 10 system review was performed and are otherwise negative unless mentioned in the above HPI. Specific negatives are listed below but may not include all those reviewed.     General ROS: negative obtundation, AMS  ENT ROS: negative rhinorrhea, epistaxis  Allergy and Immunology ROS: negative itchy/watery eyes or nasal congestion  Hematological and Lymphatic ROS: negative spontaneous bleeding or bruising  Endocrine ROS: negative  lethargy, mood swings, palpitations or polydipsia/polyuria  Respiratory ROS: negative sputum changes, stridor, tachypnea or wheezing  Cardiovascular ROS: negative for - loss of consciousness, murmur or orthopnea  Gastrointestinal ROS: negative for - hematochezia or hematemesis  Genito-Urinary ROS: negative for -  genital discharge or hematuria  Musculoskeletal ROS: negative for - focal weakness, gangrene  Psych/Neuro ROS: negative for - visual or auditory hallucinations, suicidal ideation      Physical exam:   /76   Pulse 80   Temp 97.3 °F (36.3 °C)   Resp 16   Ht 5' 1\" (1.549 m)   Wt 99 lb (44.9 kg)   SpO2 99%   BMI 18.71 kg/m²   General appearance:  NAD, appears older than stated age, significant malnutrition evident on physical exam  Head: NCAT, PERRLA, EOMI, red conjunctiva  Neck: supple, no masses, trachea midline  Lungs: Equal chest rise bilateral, no retractions, no wheezing  Heart: Reg rate  Abdomen: soft, PEG in place good repair appears healthy, peritonitis  Skin; warm and dry, no cyanosis  Gu: no cva tenderness  Extremities: atraumatic, no focal motor deficits, no open wounds  Psych: No tremor, visual hallucinations        Radiology: I reviewed relevant abdominal imaging from this admission and that available in the EMR including CT abd/pel from admission. My assessment is SBO, appears proximal to the ileocolonic anastomosis. Does not appear to be associated with the PEG tube. Assessment:  Alivia Peña is a 63 y.o. male with SBO    Patient Active Problem List   Diagnosis    Malnutrition ([address]    Multiple sclerosis ([address]    Bilateral pulmonary embolism ([address]    Crohn's disease ([address]    COPD (chronic obstructive pulmonary disease) ([address]    Tobacco abuse    Hypertension    Hyperlipidemia    Stage 3b chronic kidney disease    Hyperkalemia    Leukocytosis    Small bowel obstruction ([address]    HCAP (healthcare-associated pneumonia)    Acute on chronic renal failure (HCC)    Sepsis ([address]    SBO (small bowel obstruction) (HCC)    History of pulmonary embolism    Acute on chronic renal insufficiency    Ischemic cardiomyopathy    Seizures (HCC)    Altered mental state    Hyperparathyroidism ([address]    Acute renal failure (ARF) ([address]    Moderate protein-calorie malnutrition ([address]       Plan:  PEG tube to gravity   Rocephin  OR for dx lap poss bowel resection poss gastrostomy placement    Time spent reviewing past medical, surgical, social and family history, vitals, nursing assessment and images. Time spent face to face with patient and family counciling and discussing care exceeded 50% of the time of the consult. Additional time spent reviewing images and labs, discussing case with nursing, support staff and other physicians; as well as coordinating care.         Physician Signature: Electronically signed by Dr. Ole Mathur  [phone] (p)

## 2021-06-03 NOTE — CARE COORDINATION
SOCIAL WORK / DISCHARGE PLANNING:  COVID neg 6/01. Sw received notice from Shady Sen, [facility] that they had been working with Dr Awilda Malik ordering tube feedings with [facility]. SW/CM will complete a full assessment and assist with further dc planning.        Electronically signed by BRENT Felton on 6/3/2021 at 4:08 PM

## 2021-06-03 NOTE — CONSULTS
INPATIENT CARDIOLOGY CONSULT    Name: Christal Rousseau    Age: 63 y.o. Date of Service: 6/3/2021    History of Present Illness:  61-year-old male with medical history of pulmonary embolism, chronic kidney disease baseline creatinine 1.8, multiple sclerosis uses wheelchair for ambulation on infusion therapy and PEG tube for feeding recent diagnosis of ischemic cardiomyopathy ejection fraction 40 to 45% with minimal ischemia on nuclear stress imaging treated medically. Patient presents with small bowel obstruction. Labs were significant for elevated troponin 69. Patient did not have any cardiac symptoms. Review of Systems:  Cardiac: As per HPI  General: No fever, chills  Pulmonary: As per HPI  HEENT: No visual disturbances, difficult swallowing  GI: No nausea, vomiting  Endocrine: No thyroid disease or DM  Musculoskeletal: MADRID x 4, no focal motor deficits  Skin: Intact, no rashes  Neuro/Psych: No headache or seizures    Past Medical History:  Past Medical History:   Diagnosis Date    COPD [facility]chronic obstructive [facility] disease) ([facility]     Crohn's disease ([facility]     Hx of blood clots 2012    liver, lung    Hyperlipidemia     Hypertension     Multiple sclerosis ([facility] [facility]     Seizures ([facility] [facility]     Stage 3b chronic kidney disease 1/29/2021       Past Surgical History:  Past Surgical History:   Procedure Laterality Date    APPENDECTOMY      CHOLECYSTECTOMY      COLONOSCOPY      ENDOSCOPY, COLON, DIAGNOSTIC      GASTROSTOMY TUBE PLACEMENT N/A 6/1/2021    PERCUTANEOUS ENDOSCOPIC GASTROSTOMY TUBE PLACEMENT performed by Carol Villegas MD at [facility] N/A 5/6/2021    EGD BIOPSY performed by Radha Ellis DO at [address] [place] [address] History:  No family history on file.     Social History:  Social History     Socioeconomic History    Marital status:      Spouse name: Not on file    Number of children: Not on file    Years of education: Not on file  Highest education level: Not on file   Occupational History    Not on file   Tobacco Use    Smoking status: Former Smoker     Types: Cigarettes     Quit date: 2021     Years since quittin.3    Smokeless tobacco: Never Used   Substance and Sexual Activity    Alcohol use: No    Drug use: No    Sexual activity: Not Currently   Other Topics Concern    Not on file   Social History Narrative    Not on file     Social Determinants of Health     Financial Resource Strain:     Difficulty of Paying Living Expenses:    Food Insecurity:     Worried About Running Out of Food in the Last Year:     [address] in the Last Year:    Transportation Needs:     Lack of Transportation (Medical):  Lack of Transportation (Non-Medical):    Physical Activity:     Days of Exercise per Week:     Minutes of Exercise per Session:    Stress:     Feeling of Stress :    Social Connections:     Frequency of Communication with Friends and Family:     Frequency of Social Gatherings with Friends and Family:     Attends Worship Services:     Active Member of Clubs or Organizations:     Attends Club or Organization Meetings:     Marital Status:    Intimate Partner Violence:     Fear of Current or Ex-Partner:     Emotionally Abused:     Physically Abused:     Sexually Abused: Allergies:   Allergies   Allergen Reactions    Neurontin [Gabapentin] Palpitations    Lyrica [Pregabalin] Other (See Comments)     photophobia       Current Medications:  Current Facility-Administered Medications   Medication Dose Route Frequency Provider Last Rate Last Admin    sodium chloride flush 0.9 % injection 5-40 mL  5-40 mL Intravenous 2 times per day Bibi Galan MD        sodium chloride flush 0.9 % injection 5-40 mL  5-40 mL Intravenous PRN Bibi Galan MD        0.9 % sodium chloride infusion  25 mL Intravenous PRN Bibi Galan MD        acetaminophen (TYLENOL) tablet 650 mg  650 mg Oral Q4H PRN Bibi Galan MD        ondansetron (ZOFRAN-ODT) disintegrating tablet 4 mg  4 mg Oral Q8H PRN Bibi Galan MD        Or    [facility]etron [facility]) injection 4 mg  4 mg Intravenous Q6H PRN Bibi Galan MD        morphine injection 4 mg  4 mg Intravenous Q4H PRN Bibi Galan MD   4 mg at 06/03/21 1140    potassium chloride 10 mEq in sodium chloride 0.45 % 1,000 mL infusion   Intravenous Continuous Bibi Galan  mL/hr at 06/03/21 1139 New Bag at 06/03/21 1139    enoxaparin (LOVENOX) injection 40 mg  40 mg Subcutaneous BID Bibi Galan MD   40 mg at 06/03/21 1140       Physical Exam:  /76   Pulse 80   Temp 97.3 °F (36.3 °C)   Resp 16   Ht 5' 1\" (1.549 m)   Wt 99 lb (44.9 kg)   SpO2 99%   BMI 18.71 kg/m²   Wt Readings from Last 3 Encounters:   06/03/21 99 lb (44.9 kg)   06/01/21 99 lb (44.9 kg)   05/26/21 109 lb (49.4 kg)     Appearance: Awake, alert, no acute respiratory distress  Skin: Intact, no rash  Head: Normocephalic, atraumatic  Eyes: EOMI, no conjunctival erythema  Neck: Supple, no elevated JVP, no carotid bruits  Lungs: Clear to auscultation bilaterally. No wheezes, rales, or rhonchi.   Cardiac: Regular rate and rhythm, +Y9H4, systolic murmurs apparent  Extremities: no lower extremity edema  Peripheral Pulses: Intact posterior tibial pulses bilaterally    Laboratory Tests:  Lab Results   Component Value Date    CREATININE 1.6 (H) 06/03/2021    BUN 14 06/03/2021     06/03/2021    K 3.8 06/03/2021     06/03/2021    CO2 29 06/03/2021     Lab Results   Component Value Date    MG 1.8 04/19/2021     Lab Results   Component Value Date    WBC 13.7 (H) 06/03/2021    HGB 10.8 (L) 06/03/2021    HCT 34.4 (L) 06/03/2021    MCV 88.0 06/03/2021     06/03/2021     Lab Results   Component Value Date    ALT 14 06/03/2021    AST 29 06/03/2021    ALKPHOS 98 06/03/2021    BILITOT 0.5 06/03/2021     Lab Results   Component Value Date    CKTOTAL 25 05/03/2021 TROPONINI 0.03 05/03/2021    TROPONINI 0.04 (H) 04/18/2021    TROPONINI 0.03 03/27/2021     Lab Results   Component Value Date    INR 1.4 06/03/2021    INR 1.7 05/06/2021    INR 1.7 04/18/2021    PROTIME 16.2 (H) 06/03/2021    PROTIME 19.2 (H) 05/06/2021    PROTIME 19.4 (H) 04/18/2021     Lab Results   Component Value Date    TSH 0.788 09/25/2020     No results found for: LABA1C  No results found for: EAG  Lab Results   Component Value Date    CHOL 206 (H) 09/25/2020    CHOL 187 08/02/2019    CHOL 218 (H) 02/26/2018     Lab Results   Component Value Date    TRIG 173 (H) 09/25/2020    TRIG 181 (H) 08/02/2019    TRIG 221 (H) 02/26/2018     Lab Results   Component Value Date    HDL 46 09/25/2020    HDL 36 08/02/2019    HDL 68 02/26/2018     Lab Results   Component Value Date    LDLCALC 125 (H) 09/25/2020    LDLCALC 115 (H) 08/02/2019    LDLCALC 106 (H) 02/26/2018     Lab Results   Component Value Date    LABVLDL 35 09/25/2020    LABVLDL 36 08/02/2019    LABVLDL 44 02/26/2018     No results found for: CHOLHDLRATIO  No results for input(s): PROBNP in the last 72 hours. ASSESSMENT / PLAN:  62-year-old male with medical history of pulmonary embolism and ischemic cardiomyopathy treated medically, chronic kidney disease and multiple sclerosis advanced on wheelchair and PEG tube insertion for feeding presents with small bowel obstruction. We are consulted for elevated troponin. No acute cardiac symptoms. Recommendations  Patient has a known ischemic cardiomyopathy obstructive coronary artery disease treated medically due to his advanced comorbidities. I do not recommend any further cardiac intervention at this point. Thank you for allowing me to participate in your patient's care. Please feel free to contact me if you have any questions or concerns.     Alaina Ramos MD  [facility]) Cardiology

## 2021-06-04 NOTE — PROGRESS NOTES
Comprehensive Nutrition Assessment    Type and Reason for Visit:  Initial, Consult    Nutrition Recommendations/Plan: Continue Current Diet, TF consult: Bolus 240ml Standard w/Fiber)Jevity 1.5 4x daily will provide 960mlTV/1440kcal/61gm pro/730ml FW. Will provide 96% kcal/100% protein,. Addiional water flushes: Flush 90ml before and after each bolus feeding. Nutrition Assessment:  Pt meets criteria for moderate malnutrition AEB moderate fat/muscle, poor p.o. intakes, sign. wt loss x1mo. Pt admitted w/ Small Bowel Obstruction w/ PMH of CKD, COPD, Crohn's disease, and MS. Consult for TF order and management    Malnutrition Assessment:  Malnutrition Status: Moderate malnutrition    Context:  Chronic Illness     Findings of the 6 clinical characteristics of malnutrition:  Energy Intake:  7 - 75% or less estimated energy requirements for 1 month or longer  Weight Loss:  7 - 5% over 1 month     Body Fat Loss:   (moderate fat loss) Orbital, Triceps   Muscle Mass Loss:   (moderate muscle loss) Temples (temporalis), Clavicles (pectoralis & deltoids)  Fluid Accumulation:  No significant fluid accumulation     Strength:  Not Performed    Estimated Daily Nutrient Needs:  Energy (kcal):  0243-1587; Weight Used for Energy Requirements:  Current     Protein (g):  60-70 (x1.3-1.5gm/kg); Weight Used for Protein Requirements:  Current        Fluid (ml/day):  8958-9595; Method Used for Fluid Requirements:  1 ml/kcal      Nutrition Related Findings:  A/ox4 w/ intermittent forgetfulness, missing teeth,hypoactive BS, trace BLE edema, renal labs elevated, +I/O +1.3L      Wounds:  Skin Tears (Rt radial)       Current Nutrition Therapies:    ADULT DIET;  Regular    Anthropometric Measures:  · Height: 5' 1\" (154.9 cm)  · Current Body Weight: 99 lb (44.9 kg) (6/1 actual wt)     · Usual Body Weight: 109 lb (49.4 kg) (5/3 bed scale)     · Ideal Body Weight: 112 lbs; % Ideal Body Weight 88.4 %   · BMI: 18.7  · Adjusted Body Weight:  ;

## 2021-06-04 NOTE — PROGRESS NOTES
Pt exhibited abdominal pain level 9, unrelieved by morphine. Pt's orders were changed to oral medications and diet, and surgery canceled per secondary RN, per Dr. Johnnie Moss. Pt attempted to take oral medications, resulting in dark brown emesis. Pt's heart rhythm was sustained in the 140's for at least 20 minutes. Dr. Chaitanya Ferrer contacted nurse by phone to discuss Pt's discharge. Dr. Chaitanya Ferrer determined he would not release the Pt and further review by Dr. Johnnie Moss was appropriate. Dr Chaitanya Ferrer ordered labetalol and abdominal series. Dr. Johnnie Moss saw Pt and Pt's wife at bedside and scheduled surgery to assess PEG tube.

## 2021-06-04 NOTE — OP NOTE
[address]                                OPERATIVE REPORT    PATIENT NAME: Rowena Allen                   :        1955  MED REC NO:   [id number]                            ROOM:  ACCOUNT NO:   [de-identified]                           ADMIT DATE: 2021  PROVIDER:     Moses Rollins MD    DATE OF PROCEDURE:  2021    PREOPERATIVE DIAGNOSIS:  Small bowel obstruction. POSTOPERATIVE DIAGNOSIS:  Small bowel obstruction with a PEG tube  through small bowel mesentery causing small bowel obstruction. OPERATION PERFORMED:  Diagnostic laparoscopy with lysis of adhesions,  removal of PEG tube, and replacement of PEG tube. SURGEON:  Moses Rollins MD    ASSISTANT:  None. ANESTHESIA:  General.    COMPLICATIONS:  None. FLUIDS:  Crystalloid. BLOOD LOSS:  Minimal.    DISPOSITION:  To be admitted to the floor for routine postoperative  care. SPECIMEN:  None. INDICATION:  This is a 65-year-old male with the aforementioned  diagnosis. I explained risks, benefits, potential outcomes, alternate  treatment to the aforementioned procedure and he agreed to proceed  understanding those risks and potential outcomes. OPERATIVE PROCEDURE:  The patient was brought into the operative suite,  placed under general anesthesia, had bilateral PCDs placed, was given  preoperative antibiotics within 30 minutes of incision, and was then  prepped and draped in normal sterile condition. Once this was done,  local anesthetic was infiltrated in the left side of the abdomen. A  5-mm incision was made. A Veress needle was passed into the peritoneum. CO2 was used to insufflate the abdomen at a pressure of 15 mmHg. At  this time, the Veress needle was removed and a 5-mm trocar was put in  its place. Additional 5-mm trocars placed in the subumbilical area.    Once we are in there, we could obviously see that this PEG had passed  through the small bowel mesentery. The small bowel itself was intact  without any signs of injuries, but it was kinked at that point. So, we  removed the PEG tube, freeing it up. There was no bleeding at the  mesenteric site. We then replaced the PEG tube with an endoscope, the  pull method, under direct visualization of the endoscope and assisted  with a laparoscope. Once this was in place, we suction irrigated out  the abdomen until the aspirate returned clear. The appropriate PEG  attachments were placed and the patient was then woken up in stable  condition and taken to PACU. There was an adhesion also from the stomach to the small bowel, which  appeared to be kinking and this was taken with electrocautery.         Lacey Dawson MD    D: 06/04/2021 17:13:49       T: 06/04/2021 17:23:16     [email or web address]  Job#: [id number]     Doc#: [id number]    CC:

## 2021-06-04 NOTE — OP NOTE
Operative Note      Patient: Criss Bourgeois  YOB: 1955  MRN: [id number]    Date of Procedure: 6/4/2021    Pre-Op Diagnosis: SMALL BOWEL OBSTRUCTION    Post-Op Diagnosis: Same       Procedure(s):  DIAGNOSTIC LAPAROSCOPY  GASTROSTOMY TUBE PLACEMENT    Surgeon(s):  Thomas Tyson MD    Assistant:   First Assistant: Nicolette Parker    Anesthesia: General    Estimated Blood Loss (mL): Minimal    Complications: None    Specimens:   * No specimens in log *    Implants:  * No implants in log *      Drains:   Gastrostomy/Enterostomy/Jejunostomy Percutaneous Endoscopic Gastrostomy (PEG) LUQ 1 20 fr (Active)       [REMOVED] Gastrostomy/Enterostomy/Jejunostomy LUQ 1 20 fr (Removed)   Output (mL) 30 ml 06/04/21 0557       [REMOVED] Urethral Catheter Non-latex 16 fr (Removed)   Catheter Indications Need for fluid volume management of the critically ill patient in a critical care setting 05/04/21 2115   Urine Color Yellow 05/04/21 2115       Findings: AS DICTATED    Detailed Description of Procedure:   [id number]    Electronically signed by Thomas Tyson MD on 6/4/2021 at 5:11 PM

## 2021-06-04 NOTE — PROGRESS NOTES
Spoke with Dr. Lanie Colon, he stated that pt is now having BMs and would like home medications ordered and okays general diet. Dr. Mari Lawson made aware that pt is now having BMs is slightly improved stomach pain, said ok cancel surgery. surgery scheduling called. 1330- pt unable to tolerate po medications. Dr. Mari Lawson made aware, asked for all updates to be stopped.

## 2021-06-04 NOTE — CARE COORDINATION
SOCIAL WORK / DISCHARGE PLANNING:  COVID neg 6/01. Sw spoke with Marino Knapp,  [email or web address], they had been attempting to get Tube feed orders in place from home but needed dietary recommendations/ labs etc. Their dietician had initially recommended Jevity 1.5. Dietician consult will be obtained. Pt must have tube feed started and tolerating TF prior to dc. Script will be needed/ faxed to [facility] prior to dc. Sae Butler will follow at dc as no education had been completed with family. Pt resides with wife who is primary caregiver. Has number of dme in place in home, rollater, SPC, cane, shower chair. Home O2 from [address] PCP Dr Lilli Moran Pharm: Fabiola Barnard. Family provide home going transport.                    Electronically signed by BRENT Kwan on 6/4/2021 at 11:12 AM

## 2021-06-04 NOTE — ANESTHESIA POSTPROCEDURE EVALUATION
Department of Anesthesiology  Postprocedure Note    Patient: Billye Cheadle  MRN: [id number]  YOB: 1955  Date of evaluation: 6/4/2021  Time:  5:45 PM     Procedure Summary     Date: 06/04/21 Room / Location: [address] [place] [address] 03 / [address] [facility], [place]    Anesthesia Start: 2310 Anesthesia Stop: 9367    Procedure: DIAGNOSTIC LAPAROSCOPY  GASTROSTOMY TUBE PLACEMENT (N/A Abdomen) Diagnosis: (SMALL BOWEL OBSTRUCTION)    Surgeons: Shella Skiff, MD Responsible Provider: Devendra Anton MD    Anesthesia Type: general ASA Status: 4          Anesthesia Type: No value filed. Laura Phase I: Laura Score: 9    Laura Phase II:      Last vitals: Reviewed and per EMR flowsheets.        Anesthesia Post Evaluation    Patient location during evaluation: PACU  Patient participation: complete - patient participated  Level of consciousness: awake  Pain score: 1  Airway patency: patent  Nausea & Vomiting: no nausea and no vomiting  Complications: no  Cardiovascular status: hemodynamically stable and tachycardic  Respiratory status: acceptable, airway suctioned and face mask  Hydration status: euvolemic

## 2021-06-04 NOTE — ANESTHESIA PRE PROCEDURE
Department of Anesthesiology  Preprocedure Note       Name:  Franko Lopez   Age:  63 y.o.  :  1955                                          MRN:  [id number]         Date:  2021      Surgeon: Karolina Trent):  Jay Arvizu MD    Procedure: Procedure(s):  DIAGNOSTIC LAPAROSCOPY POSS BOWEL RESECTION POSS GASTROSTOMY TUBE PLACEMENT    Medications prior to admission:   Prior to Admission medications    Medication Sig Start Date End Date Taking? Authorizing Provider   levETIRAcetam (KEPPRA) 500 MG tablet Take 250 mg by mouth every morning    Historical Provider, MD   levETIRAcetam (KEPPRA) 500 MG tablet Take 500 mg by mouth nightly    Historical Provider, MD   sucralfate (CARAFATE) 1 GM tablet Take 1 tablet by mouth 4 times daily 5/10/21   Emery Alves MD   apixaban (ELIQUIS) 5 MG TABS tablet Take 5 mg by mouth 2 times daily    Historical Provider, MD   oxyCODONE HCl (OXY-IR) 10 MG immediate release tablet Take 10 mg by mouth every 4 hours. Historical Provider, MD   pantoprazole (PROTONIX) 40 MG tablet Take 40 mg by mouth daily     Historical Provider, MD   metoprolol succinate (TOPROL XL) 50 MG extended release tablet Take 1 tablet by mouth daily 21  Emery Alves MD   calcium-cholecalciferol 500-200 MG-UNIT per tablet Take 1 tablet by mouth 2 times daily 21   Emery Alves MD   oxybutynin (DITROPAN) 5 MG tablet Take 5 mg by mouth 2 times daily    Historical Provider, MD   atorvastatin (LIPITOR) 80 MG tablet Take 1 tablet by mouth nightly 21   Emery Alves MD   mirtazapine (REMERON) 15 MG tablet Take 15 mg by mouth nightly     Historical Provider, MD       Current medications:    No current facility-administered medications for this visit. No current outpatient medications on file.      Facility-Administered Medications Ordered in Other Visits   Medication Dose Route Frequency Provider Last Rate Last Admin    atorvastatin (LIPITOR) tablet 80 mg  80 mg Oral Nightly Colvin Hashimoto, MD        calcium-cholecalciferol 500-200 MG-UNIT per tablet 1 tablet  1 tablet Oral BID Colvin Hashimoto, MD        levETIRAcetam [facility]) tablet 250 mg  250 mg Oral QAM Colvin Hashimoto, MD        levETIRAcetam (KEPPRA) tablet 500 mg  500 mg Oral Nightly Colvin Hashimoto, MD        metoprolol succinate (TOPROL XL) extended release tablet 50 mg  50 mg Oral Daily Colvin Hashimoto, MD        mirtazapine (REMERON) tablet 15 mg  15 mg Oral Nightly Colvin Hashimoto, MD        oxybutynin [facility]) tablet 5 mg  5 mg Oral BID Colvin Hashimoto, MD        oxyCODONE (ROXICODONE) immediate release tablet 10 mg  10 mg Oral Q4H Colvin Hashimoto, MD        pantoprazole (PROTONIX) tablet 40 mg  40 mg Oral Daily Colvin Hashimoto, MD   40 mg at 06/04/21 1203    sucralfate (CARAFATE) tablet 1 g  1 g Oral 4x Daily Colvin Hashimoto, MD        apixaban Jenelle Border) tablet 5 mg  5 mg Oral BID Colvin Hashimoto, MD        labetalol (NORMODYNE;TRANDATE) injection 5 mg  5 mg Intravenous Once Colvin Hashimoto, MD        cefTRIAXone (ROCEPHIN) 1,000 mg in sterile water 10 mL IV syringe  1,000 mg Intravenous On Call to Nathan Madrigal MD        sodium chloride flush 0.9 % injection 5-40 mL  5-40 mL Intravenous 2 times per day Colvin Hashimoto, MD   10 mL at 06/04/21 1010    sodium chloride flush 0.9 % injection 5-40 mL  5-40 mL Intravenous PRN Colvin Hashimoto, MD        0.9 % sodium chloride infusion  25 mL Intravenous PRN Colvin Hashimoto, MD        acetaminophen (TYLENOL) tablet 650 mg  650 mg Oral Q4H PRN Colvin Hashimoto, MD        ondansetron (ZOFRAN-ODT) disintegrating tablet 4 mg  4 mg Oral Q8H PRN Colvin Hashimoto, MD        Or    ondansetron [facility]) injection 4 mg  4 mg Intravenous Q6H PRN Colvin Hashimoto, MD   4 mg at 06/04/21 0555    morphine injection 4 mg  4 mg Intravenous Q4H PRN Colvin Hashimoto, MD   4 mg at 06/04/21 1131    potassium chloride 10 mEq in sodium chloride 0.45 % 1,000 mL infusion   Intravenous Continuous Kristan Thomas  mL/hr at 06/04/21 1009 New Bag at 06/04/21 1009       Allergies:     Allergies   Allergen Reactions    Neurontin [Gabapentin] Palpitations    Lyrica [Pregabalin] Other (See Comments)     photophobia       Problem List:    Patient Active Problem List   Diagnosis Code    Malnutrition ([address] E46    Multiple sclerosis ([address] G35    Bilateral pulmonary embolism (HCC) I26.99    Crohn's disease ([address] K50.90    COPD (chronic obstructive pulmonary disease) ([address] J44.9    Tobacco abuse Z72.0    Hypertension I10    Hyperlipidemia E78.5    Stage 3b chronic kidney disease N18.32    Hyperkalemia E87.5    Leukocytosis D72.829    Small bowel obstruction ([address] K56.609    HCAP (healthcare-associated pneumonia) J18.9    Acute on chronic renal failure (HCC) N17.9, N18.9    Sepsis ([address] A41.9    SBO (small bowel obstruction) ([address] K56.609    History of pulmonary embolism Z86.711    Acute on chronic renal insufficiency N28.9, N18.9    Ischemic cardiomyopathy I25.5    Seizures (HCC) R56.9    Altered mental state R41.82    Hyperparathyroidism ([address] E21.3    Acute renal failure (ARF) (HCC) N17.9    Moderate protein-calorie malnutrition (HCC) E44.0       Past Medical History:        Diagnosis Date    COPD (chronic obstructive pulmonary disease) ([address]     Crohn's disease ([address]     Hx of blood clots 2012    liver, lung    Hyperlipidemia     Hypertension     Multiple sclerosis ([address]     Seizures ([address]     Stage 3b chronic kidney disease 1/29/2021       Past Surgical History:        Procedure Laterality Date    APPENDECTOMY      CHOLECYSTECTOMY      COLONOSCOPY      ENDOSCOPY, COLON, DIAGNOSTIC      GASTROSTOMY TUBE PLACEMENT N/A 6/1/2021    PERCUTANEOUS ENDOSCOPIC GASTROSTOMY TUBE PLACEMENT performed by Allan Castillo MD at [address] [facility] N/A 5/6/2021    EGD BIOPSY performed by Corinna Carreon DO at [facility]       Social History:    Social History     Tobacco Use    Smoking status: Former Smoker     Types: Cigarettes     Quit date: 2021     Years since quittin.3    Smokeless tobacco: Never Used   Substance Use Topics    Alcohol use: No                                Counseling given: Not Answered      Vital Signs (Current): There were no vitals filed for this visit. BP Readings from Last 3 Encounters:   21 (!) 157/111   21 120/70   21 114/77       NPO Status:                                                                                 BMI:   Wt Readings from Last 3 Encounters:   21 99 lb (44.9 kg)   21 99 lb (44.9 kg)   21 109 lb (49.4 kg)     There is no height or weight on file to calculate BMI.    CBC:   Lab Results   Component Value Date    WBC 17.7 2021    RBC 3.35 2021    HGB 9.3 2021    HCT 28.4 2021    MCV 84.8 2021    RDW 18.8 2021     2021       CMP:   Lab Results   Component Value Date     2021    K 4.9 2021    K 5.0 2021     2021    CO2 25 2021    BUN 25 2021    CREATININE 1.4 2021    GFRAA >60 2021    LABGLOM 51 2021    GLUCOSE 125 2021    PROT 4.5 2021    CALCIUM 6.8 2021    BILITOT 0.6 2021    ALKPHOS 94 2021    AST 42 2021    ALT 12 2021       POC Tests: No results for input(s): POCGLU, POCNA, POCK, POCCL, POCBUN, POCHEMO, POCHCT in the last 72 hours.     Coags:   Lab Results   Component Value Date    PROTIME 16.2 2021    INR 1.4 2021    APTT 33.2 2021       HCG (If Applicable): No results found for: PREGTESTUR, PREGSERUM, HCG, HCGQUANT     ABGs: No results found for: PHART, PO2ART, YAS8SIW, TAV7KXU, BEART, S2DKIBDC     Type & Screen (If Applicable):  No results found for: LABABO, LABRH    Drug/Infectious Status (If Applicable):  No results found for: HIV, HEPCAB    COVID-19 Screening (If Applicable):   Lab Results   Component Value Date    COVID19 Not Detected 06/01/2021           Anesthesia Evaluation  Patient summary reviewed no history of anesthetic complications:   Airway: Mallampati: III  TM distance: >3 FB   Neck ROM: limited  Mouth opening: > = 3 FB Dental:          Pulmonary:   (+) pneumonia: resolved,  COPD (Oxygen saturation 95% on RA): moderate,  rhonchi,  decreased breath sounds,  rales (R>L),      (-) not a current smoker (Former)                           Cardiovascular:  Exercise tolerance: poor (<4 METS),   (+) hypertension:, CAD: obstructive, WEI: after ambulating 1 flight of stairs and no interval change, murmur ( Grade 1/6 murmur), hyperlipidemia      ECG reviewed  Rhythm: regular  Rate: abnormal  Echocardiogram reviewed         Beta Blocker:  Dose within 24 Hrs      ROS comment: Normal sinus rhythm  Nonspecific ST and T wave abnormality  Abnormal ECG    Summary  Compared to prior echo, changes noted. Technically adequate study. Left ventricle size is normal.  Normal left ventricular wall thickness. Ejection fraction is visually estimated at 50-55%. Mild basal septal and basal anteroseptal hypokinesis  E/A flow reversal noted. Suggestive of diastolic dysfunction. Physiologic and/or trace aortic regurgitation is noted. Physiologic and/or trace tricuspid regurgitation. RVSP is normal    PE comment: Tachycardic   Neuro/Psych:   (+) seizures (Controlled on Keppra):, neuromuscular disease: multiple sclerosis,             GI/Hepatic/Renal:   (+) renal disease (Stage III CKD:  creatinine 1.5 & GFR 47): CRI and ARF,          ROS comment: Crohn's. Endo/Other:    (+) blood dyscrasia (Eliquis): anticoagulation therapy and anemia, arthritis: OA., electrolyte abnormalities, .           Pt had no PAT visit        ROS comment: Hyperparathyroidism    Narcotic dependent chronic pain (Oxycodone) Abdominal:   (+) scaphoid        Vascular:   + DVT, PE. Anesthesia Plan      general     ASA 4       Induction: intravenous. MIPS: Postoperative opioids intended and Prophylactic antiemetics administered. Anesthetic plan and risks discussed with patient. Plan discussed with CRNA.                   Araceli Emery MD   6/4/2021

## 2021-06-04 NOTE — H&P
Department of Internal Medicine            CHIEF COMPLAINT:  abd pain    HISTORY OF PRESENT ILLNESS:      This is a recent peg tube came in er with abd pain. He was found to have high grade sbo. PAST MEDICAL Hx:  Past Medical History:   Diagnosis Date    [facility]) [facility]     Crohn's disease ([facility]     Hx of blood clots 2012    liver, lung    Hyperlipidemia     Hypertension     Multiple sclerosis ([facility]     Seizures ([facility]     Stage 3b chronic kidney disease 1/29/2021       PAST SURGICAL Hx:   Past Surgical History:   Procedure Laterality Date    APPENDECTOMY      CHOLECYSTECTOMY      COLONOSCOPY      ENDOSCOPY, COLON, DIAGNOSTIC      GASTROSTOMY TUBE PLACEMENT N/A 6/1/2021    PERCUTANEOUS ENDOSCOPIC GASTROSTOMY TUBE PLACEMENT performed by Maranda Seth MD at [address] [place] [address] N/A 5/6/2021    EGD BIOPSY performed by Martha Jj DO at [address] [place] [address] Hx:  No family history on file. HOME MEDICATIONS:  Prior to Admission medications    Medication Sig Start Date End Date Taking? Authorizing Provider   levETIRAcetam (KEPPRA) 500 MG tablet Take 250 mg by mouth every morning   Yes Historical Provider, MD   levETIRAcetam (KEPPRA) 500 MG tablet Take 500 mg by mouth nightly   Yes Historical Provider, MD   sucralfate (CARAFATE) 1 GM tablet Take 1 tablet by mouth 4 times daily 5/10/21  Yes Anna Dean MD   apixaban (ELIQUIS) 5 MG TABS tablet Take 5 mg by mouth 2 times daily   Yes Historical Provider, MD   oxyCODONE HCl (OXY-IR) 10 MG immediate release tablet Take 10 mg by mouth every 4 hours.     Yes Historical Provider, MD   pantoprazole (PROTONIX) 40 MG tablet Take 40 mg by mouth daily    Yes Historical Provider, MD   metoprolol succinate (TOPROL XL) 50 MG extended release tablet Take 1 tablet by mouth daily 4/21/21 7/20/21 Yes Anna Dean MD   calcium-cholecalciferol 500-200 MG-UNIT per tablet Take 1 tablet fatigue, fever, malaise, night sweats or weight loss    Psychological:   Denies anxiety, disorientation or hallucinations    ENT:    Denies epistaxis, headaches, vertigo or visual changes    Cardiovascular:   Denies any chest pain, irregular heartbeats, or palpitations. No paroxysmal nocturnal dyspnea. Respiratory:   Denies shortness of breath, coughing, sputum production, hemoptysis, or wheezing. No orthopnea. Gastrointestinal:   abd pain and vomiting. Genito-Urinary:    Denies any urgency, frequency, hematuria. Voiding without difficulty. Musculoskeletal:   Denies joint pain, joint stiffness, joint swelling or muscle pain    Neurology:    Denies any headache or focal neurological deficits. No weakness or paresthesia. Derm:    Denies any rashes, ulcers, or excoriations. Denies bruising. Extremities:   Denies any lower extremity swelling or edema. PHYSICAL EXAM:  VITALS:  Vitals:    06/03/21 2015   BP: 134/85   Pulse: 106   Resp:    Temp:    SpO2:          CONSTITUTIONAL:    Awake, alert, cooperative, no apparent distress, and appears stated age    EYES:    PERRL, EOMI, sclera clear, conjunctiva normal    ENT:    Normocephalic, atraumatic, sinuses nontender on palpation. External ears without lesions. Oral pharynx with moist mucus membranes. Tonsils without erythema or exudates. NECK:    Supple, symmetrical, trachea midline, no adenopathy, thyroid symmetric, not enlarged and no tenderness, skin normal, no bruits, no JVD    HEMATOLOGIC/LYMPHATICS:    No cervical lymphadenopathy and no supraclavicular lymphadenopathy    LUNGS:    Symmetric.  No increased work of breathing, good air exchange, clear to auscultation bilaterally, no wheezes, rhonchi, or rales,     CARDIOVASCULAR:    Normal apical impulse, regular rate and rhythm, normal S1 and S2, no S3 or S4, and no murmur noted    ABDOMEN:    No scars, normal bowel sounds, soft, non-distended, non-tender, no masses palpated, no hepatosplenomegaly, no rebound or guarding elicited on palpation     MUSCULOSKELETAL:    There is no redness, warmth, or swelling of the joints. Full range of motion noted. Motor strength is 5 out of 5 all extremities bilaterally. Tone is normal.    NEUROLOGIC:    Awake, alert, oriented to name, place and time. Cranial nerves II-XII are grossly intact. Motor is 5 out of 5 bilaterally. SKIN:    No bruising or bleeding. No redness, warmth, or swelling    EXTREMITIES:    Peripheral pulses present. No edema, cyanosis, or swelling.       LABORATORY DATA:  CBC:   Lab Results   Component Value Date    WBC 13.7 06/03/2021    RBC 3.91 06/03/2021    HGB 10.8 06/03/2021    HCT 34.4 06/03/2021    MCV 88.0 06/03/2021    MCH 27.6 06/03/2021    MCHC 31.4 06/03/2021    RDW 18.4 06/03/2021     06/03/2021    MPV 10.2 06/03/2021     CMP:    Lab Results   Component Value Date     06/03/2021    K 3.8 06/03/2021    K 5.0 03/27/2021     06/03/2021    CO2 29 06/03/2021    BUN 14 06/03/2021    CREATININE 1.6 06/03/2021    GFRAA 53 06/03/2021    LABGLOM 44 06/03/2021    GLUCOSE 131 06/03/2021    PROT 4.8 06/03/2021    LABALBU 2.6 06/03/2021    CALCIUM 7.6 06/03/2021    BILITOT 0.5 06/03/2021    ALKPHOS 98 06/03/2021    AST 29 06/03/2021    ALT 14 06/03/2021       ASSESSMENT:  Patient Active Problem List   Diagnosis    Malnutrition ([address]    Multiple sclerosis ([address]    Bilateral pulmonary embolism (HCC)    Crohn's disease ([address]    COPD (chronic obstructive pulmonary disease) ([address]    Tobacco abuse    Hypertension    Hyperlipidemia    Stage 3b chronic kidney disease    Hyperkalemia    Leukocytosis    Small bowel obstruction ([address]    HCAP (healthcare-associated pneumonia)    Acute on chronic renal failure (HCC)    Sepsis ([address]    SBO (small bowel obstruction) (HCC)    History of pulmonary embolism    Acute on chronic renal insufficiency    Ischemic cardiomyopathy    Seizures (HCC)    Altered

## 2021-06-04 NOTE — PROGRESS NOTES
Physical Therapy Initial Evaluation/Plan of Care    Room #:  3536/8866-78  Patient Name: Paradise Navarro  YOB: 1955  MRN: [id number]    Date of Service: 6/4/2021     Tentative placement recommendation: Subacute vs Home Health Physical Therapy if patient meets goals  Equipment recommendation: To be determined      Evaluating Physical Therapist: Brionna Charles #[id number]     Specific Provider Orders/Date/Referring Provider : 06/04/21 0900   PT eval and treat Start: 06/04/21 0900, End: 06/04/21 0900, ONE TIME, Standing Count: 1 Occurrences, Oral MD Erica      Admitting Diagnosis:   Small bowel obstruction ([facility] [K56.609]     Visit Diagnoses       Codes    NSTEMI (non-ST elevated myocardial infarction) ([address]     I21.4    Breakthrough seizure ([address]     G40.919          Patient Active Problem List   Diagnosis    Malnutrition ([address]    Multiple sclerosis ([address]    Bilateral pulmonary embolism ([address]    Crohn's disease ([address]    COPD (chronic obstructive pulmonary disease) ([address]    Tobacco abuse    Hypertension    Hyperlipidemia    Stage 3b chronic kidney disease    Hyperkalemia    Leukocytosis    Small bowel obstruction ([address]    HCAP (healthcare-associated pneumonia)    Acute on chronic renal failure ([address]    Sepsis ([address]    SBO (small bowel obstruction) ([address]    History of pulmonary embolism    Acute on chronic renal insufficiency    Ischemic cardiomyopathy    Seizures (HCC)    Altered mental state    Hyperparathyroidism ([address]    Acute renal failure (ARF) ([address]    Moderate protein-calorie malnutrition ([address]       ASSESSMENT of Current Deficits Patient exhibits decreased strength, balance and endurance impairing functional mobility, transfers and participation. Patient presents with 9/10 pain in abdominal area limiting participation at this time.         PHYSICAL THERAPY  PLAN OF CARE       Physical therapy plan of care is established based on physician order,  patient Basic Mobility       -[facility] Mobility Inpatient   How much difficulty turning over in bed?: A Little  How much difficulty sitting down on / standing up from a chair with arms?: A Little  How much difficulty moving from lying on back to sitting on side of bed?: A Little  How much help from another person moving to and from a bed to a chair?: A Lot  How much help from another person needed to walk in hospital room?: A Lot  How much help from another person for climbing 3-5 steps with a railing?: A Lot  AM-PAC Inpatient Mobility Raw Score : 15  AM-PAC Inpatient T-Scale Score : 39.45  Mobility Inpatient CMS 0-100% Score: 57.7  Mobility Inpatient CMS G-Code Modifier : CK    Nursing cleared patient for PT evaluation. The admitting diagnosis and active problem list as listed above have been reviewed prior to the initiation of this evaluation. OBJECTIVE;   Initial Evaluation  Date: 6/4/2021 Treatment Date:     Short Term/ Long Term   Goals   Was pt agreeable to Eval/treatment? Yes   To be met in 3 days   Pain level   9/10  abdominal area      Bed Mobility    Rolling: Minimal assist of 1   Supine to sit: Minimal assist of 1   Sit to supine: Minimal assist of 1   Scooting: Moderate assist of 1   Rolling: Independent   Supine to sit:  Independent   Sit to supine: Independent   Scooting: Independent    Transfers Sit to stand: Not assessed  patient reports too much pain to stand  Sit to stand: Independent    Ambulation    not assessed    50 feet using  wheeled walker with Modified Independent    Stair negotiation: ascended and descended   Not assessed       ROM Within functional limits      Strength BUE:  4/5  RLE:  3+/5  LLE:  4/5  Increase strength in affected mm groups by 1/3 grade   Balance Sitting EOB:  fair d/t to pain  Dynamic Standing:  not assessed   Sitting EOB:  good   Dynamic Standing: good      Patient is Alert & Oriented x person, place, time and situation and follows directions   Sensation:  Patient  denies numbness/tingling     Edema:  no   Endurance: fair  - d/t to pain    Vitals: room air   Blood Pressure at rest  Blood Pressure during session    Heart Rate at rest  Heart Rate during session 122   SPO2 at rest %  SPO2 during session 98 %     Patient education  Patient educated on role of Physical Therapy, risks of immobility, safety and plan of care,  importance of mobility while in hospital , ankle pumps, quad set and glut set for edema control, blood clot prevention and safety      Patient response to education:   Pt verbalized understanding Pt demonstrated skill Pt requires further education in this area   Yes Partial Yes      Treatment:  Patient practiced and was instructed/facilitated in the following treatment: Patient performed supine exercises. Pt assisted to EOB. Sat edge of bed 5 minutes with Minimal assist of 1 to increase dynamic sitting balance and activity tolerance. d/t to pain, pt reporting he has to lay back down. Patient assisted back to supine position, seizure pads and heel protectors place back on. Therapeutic Exercises:  ankle pumps, quad sets, glut sets, heel slide, hip abduction/adduction and straight leg raise  x 10 reps. At end of session, patient in bed with alarm call light and phone within reach,  all lines and tubes intact, nursing notified. Patient would benefit from continued skilled Physical Therapy to improve functional independence and quality of life. Patient's/ family goals   home      Time in  1022  Time out  1042    Total Treatment Time  0 minutes    Evaluation time includes thorough review of current medical information, gathering information on past medical history/social history and prior level of function, completion of standardized testing/informal observation of tasks, assessment of data, and development of Plan of care and goals.      CPT codes:  Low Complexity PT evaluation (13759)  No treatment    Teresa Mcgrath PT

## 2021-06-05 NOTE — PROGRESS NOTES
Patient ID:  Lucio Madden  [id number]  63 y.o.  1955    HPI:  Patient sleeping easily awakened tolerating tube feeding, patient had surgery yesterday with additional lysis and removal of PEG tube and reinsertion of new PEG tube, patient wanted to tachycardia metoprolol was restarted. . Questions, answers, and tests reviewed. ROS:  Cardiovascular:   Denies any chest pain, irregular heartbeats, or palpitations. Respiratory:   Denies shortness of breath, coughing, sputum production, hemoptysis, or wheezing. Gastrointestinal:   Denies nausea, vomiting, diarrhea, or constipation. Denies any abdominal pain. Patient has a PEG tube receiving feeding through the PEG tube  Extremities:   Denies any lower extremity swelling or edema. Neurology:    Denies any headache or focal neurological deficits. No weakness or paresthesia. Derm:    Denies any rashes, ulcers, or excoriations. Denies bruising. Genitourinary:    Denies any urgency, frequency, hematuria. Voiding without difficulty. Physical Exam:    Vitals:    06/05/21 0835   BP: 110/83   Pulse: 128   Resp: 18   Temp: 97.7 °F (36.5 °C)   SpO2:        HEENT:  PERRLA. EOMI. Sclera clear. Buccal mucosa moist.    Neck:  Supple. Trachea midline. No thyromegaly. No JVD. No bruits. Heart:  Rhythm regular, rate controlled. No murmurs. Lungs:  Symmetrical. Clear to auscultation bilaterally. No wheezes. No rhonchi. No rales. Abdomen: Soft. Non-tender. Non-distended. Bowel sounds positive. No organomegaly or masses. No pain on palpation    Extremities:  Peripheral pulses present. No peripheral edema. No ulcers. Neurologic:  Alert x 3. No focal deficit. Cranial nerves grossly intact. Skin:  No petechia. No hemorrhage. No wounds.     Labs:  CBC:   Lab Results   Component Value Date    WBC 17.7 06/04/2021    RBC 3.35 06/04/2021    HGB 9.3 06/04/2021    HCT 28.4 06/04/2021    MCV 84.8 06/04/2021    MCH 27.8 06/04/2021    MCHC 32.7 06/04/2021 RDW 18.8 06/04/2021     06/04/2021    MPV 11.4 06/04/2021     CMP:    Lab Results   Component Value Date     06/04/2021    K 4.9 06/04/2021    K 5.0 03/27/2021     06/04/2021    CO2 25 06/04/2021    BUN 25 06/04/2021    CREATININE 1.4 06/04/2021    GFRAA >60 06/04/2021    LABGLOM 51 06/04/2021    GLUCOSE 125 06/04/2021    PROT 4.5 06/04/2021    LABALBU 2.3 06/04/2021    CALCIUM 6.8 06/04/2021    BILITOT 0.6 06/04/2021    ALKPHOS 94 06/04/2021    AST 42 06/04/2021    ALT 12 06/04/2021     PT/INR:    Lab Results   Component Value Date    PROTIME 16.2 06/03/2021    INR 1.4 06/03/2021         XR ABDOMEN (KUB) (SINGLE AP VIEW)   Final Result   1. Decreased small bowel distension. 2. Increased gastric distension etiology unknown. 3. Sequela of right abdominal surgery. XR CHEST 1 VIEW   Final Result   No radiographic evidence of acute cardiopulmonary disease process. Correlate   with CT abdomen pelvis findings to evaluate bowel gas pattern. CT HEAD WO CONTRAST   Final Result   1. There is no acute intracranial abnormality. Specifically, there is no   intracranial hemorrhage. 2. Atrophy and periventricular leukomalacia,         CT CERVICAL SPINE WO CONTRAST   Final Result   1. There is no acute compression fracture or subluxation of the cervical spine   2. Degenerative disc disease at the C5-6 and C6-7 levels most prominent at   the C5-6 level. CT ABDOMEN PELVIS W IV CONTRAST Additional Contrast? None   Final Result   1. High-grade distal small-bowel obstruction. There are multiple air-fluid   levels. The small bowel measures up to 6.2 cm in maximum dimension. 2. The transition point is seen within the mid right hemiabdomen just   proximal to the anastomosis of the small bowel. .   3. There is no intra-abdominal free air. There is a small amount of free   fluid within the pelvis.              Other Data:      Intake/Output Summary (Last 24 hours) at 6/5/2021 1115  Last data filed at 6/5/2021 0659  Gross per 24 hour   Intake 1626 ml   Output 20 ml   Net 1606 ml         Scheduled Medications:   metoprolol tartrate  25 mg PEG Tube BID    atorvastatin  80 mg Oral Nightly    calcium-cholecalciferol  1 tablet Oral BID    levETIRAcetam  250 mg Oral QAM    levETIRAcetam  500 mg Oral Nightly    mirtazapine  15 mg Oral Nightly    oxybutynin  5 mg Oral BID    oxyCODONE HCl  10 mg Oral Q4H    pantoprazole  40 mg Oral Daily    sucralfate  1 g Oral 4x Daily    apixaban  5 mg Oral BID    sodium chloride flush  5-40 mL Intravenous 2 times per day         Infusion Medications:   sodium chloride      IV infusion builder 100 mL/hr at 06/05/21 0659       Assessment:   Patient Active Problem List    Diagnosis Date Noted    Moderate protein-calorie malnutrition ([address] 05/08/2021    Acute renal failure (ARF) ([address] 05/03/2021    Hyperparathyroidism ([address] 04/21/2021    Altered mental state 04/19/2021    Seizures ([address] 04/18/2021    History of pulmonary embolism 03/28/2021    Acute on chronic renal insufficiency 03/28/2021    Ischemic cardiomyopathy 03/28/2021    SBO (small bowel obstruction) ([address] 03/27/2021    HCAP (healthcare-associated pneumonia) 02/08/2021    Acute on chronic renal failure ([address] 02/08/2021    Sepsis ([address]     Small bowel obstruction ([address] 02/07/2021    Stage 3b chronic kidney disease ([address] 01/29/2021    Hyperkalemia 01/29/2021    Leukocytosis 01/29/2021    Crohn's disease ([address]     COPD (chronic obstructive pulmonary disease) ([address]     Tobacco abuse     Hypertension     Hyperlipidemia     Bilateral pulmonary embolism ([address] 01/27/2021    Multiple sclerosis ([address] 01/06/2021    Malnutrition ([address] 09/10/2012         Plan: 1. Patient was small bowel obstruction had adhesiolysis and reinsertion of new PEG tube tolerating tube feeding. 2.  History of pulmonary embolism Eliquis has been restarted.   3.  Sinus tachycardia depending on patient tolerability of the beta-blocker may require titration of metoprolol tartrate to 50 mg twice a day. 4.  Stage IIIb chronic renal insufficiency continue tube feeding with water every shift. 5.  History of seizure disorder continue his antiseizure medication. 6.  History of Crohn's disease and bowel obstruction secondary to adhesion was already seen the lysis yesterday. Continue current therapy. See orders for further plan of care.     Completed By:  Dr. Babak Moreland MD, [address]  11:15 AM  6/5/2021      Electronically signed by Carmita Mar MD on 6/5/21 at 11:15 AM EDT

## 2021-06-05 NOTE — PROGRESS NOTES
Progress Note  Date:2021       Room:0614/0614-02  Patient Name:Davian Zhang     YOB: 1955     Age:65 y.o. Subjective    Subjective:  Symptoms:  Worsening. He reports shortness of breath, malaise, weakness, anorexia and anxiety. No cough, chest pain, headache or chest pressure. Diet:  NPO. Activity level: Impaired due to weakness. Pain:  He complains of pain that is moderate. saw pt in am . Pt stated that he moved his bowel and fell better and wants to eat. Med was changed to po but when called back in afternoon to see how pt was doing. Nurse has informed me that pt has never moved his bowel and vomited when tried to give him med. He was tachycardic. Nurse was to notify dr Grzegorz Gomez that pt still have sbo and I was mislead by him this morning. Review of Systems   Respiratory: Positive for shortness of breath. Negative for cough. Cardiovascular: Negative for chest pain. Gastrointestinal: Positive for anorexia. Neurological: Positive for weakness. Objective         Vitals Last 24 Hours:  TEMPERATURE:  Temp  Av.9 °F (36.6 °C)  Min: 97.2 °F (36.2 °C)  Max: 98.9 °F (37.2 °C)  RESPIRATIONS RANGE: Resp  Av.5  Min: 1  Max: 29  PULSE OXIMETRY RANGE: SpO2  Av.1 %  Min: 81 %  Max: 100 %  PULSE RANGE: Pulse  Av.9  Min: 99  Max: 122  BLOOD PRESSURE RANGE: Systolic (74QVH), AHE:986 , Min:61 , BKC:583   ; Diastolic (95VUT), UGL:08, Min:44, Max:111    I/O (24Hr): Intake/Output Summary (Last 24 hours) at 2021 2336  Last data filed at 2021 1718  Gross per 24 hour   Intake 2454 ml   Output 170 ml   Net 2284 ml     Objective:  General Appearance:  Ill-appearing. Vital signs: (most recent): Blood pressure (!) 148/91, pulse 120, temperature 97.5 °F (36.4 °C), temperature source Infrared, resp. rate 16, height 5' 1\" (1.549 m), weight 99 lb (44.9 kg), SpO2 95 %. Vital signs are normal.    Output: Producing urine.     HEENT: Normal HEENT exam. Lungs: He is in respiratory distress. Heart: Regular rhythm. S1 normal and S2 normal.    Abdomen: Abdomen is soft. Bowel sounds are normal.   There is epigastric tenderness. Extremities: Normal range of motion. Pulses: Distal pulses are intact. Neurological: Patient is alert and oriented to person, place and time. Pupils:  Pupils are equal, round, and reactive to light. Labs/Imaging/Diagnostics    Labs:  CBC:  Recent Labs     06/03/21 0600 06/04/21  0748   WBC 13.7* 17.7*   RBC 3.91 3.35*   HGB 10.8* 9.3*   HCT 34.4* 28.4*   MCV 88.0 84.8   RDW 18.4* 18.8*    321     CHEMISTRIES:  Recent Labs     06/03/21 0600 06/04/21  0748    143   K 3.8 4.9    104   CO2 29 25   BUN 14 25*   CREATININE 1.6* 1.4*   GLUCOSE 131* 125*     PT/INR:  Recent Labs     06/03/21  0600   PROTIME 16.2*   INR 1.4     APTT:  Recent Labs     06/03/21  0600   APTT 33.2     LIVER PROFILE:  Recent Labs     06/03/21 0600 06/04/21  0748   AST 29 42*   ALT 14 12   BILITOT 0.5 0.6   ALKPHOS 98 94       Imaging Last 24 Hours:  XR ABDOMEN (KUB) (SINGLE AP VIEW)    Result Date: 6/4/2021  EXAMINATION: ONE SUPINE XRAY VIEW(S) OF THE ABDOMEN 6/4/2021 2:26 pm COMPARISON:  image from CT performed the preceding day. HISTORY: ORDERING SYSTEM PROVIDED HISTORY: small bowel obstruction TECHNOLOGIST PROVIDED HISTORY: Reason for exam:->small bowel obstruction FINDINGS: There is increased gastric distention and decreased small bowel distention compared to the prior day. A staple line projects over the right mid abdomen. There are no signs of organomegaly or soft tissue mass. A PEG tube traverses the upper abdominal wall, its tip lies in the gastic fundus. 1. Decreased small bowel distension. 2. Increased gastric distension etiology unknown. 3. Sequela of right abdominal surgery.      CT HEAD WO CONTRAST    Result Date: 6/3/2021  EXAMINATION: CT OF THE HEAD WITHOUT CONTRAST  6/3/2021 7:12 am TECHNIQUE: CT of the effusion. Organs: The liver, spleen, pancreas and adrenal glands are unremarkable. There are multiple simple right renal cyst.  There is significant atrophy of the left kidney. There is no obstructive uropathy. GI, bowel: The gallbladder is surgically absent. There is no evidence of choledocholithiasis. The stomach is decompressed. There is a gastrostomy tube within the stomach. There are multiple dilated loops of small bowel consistent with high-grade distal small-bowel obstruction. Air-fluid levels are noted. The small bowel distension measures up to 6.2 cm. The transition point is seen within the mid right hemiabdomen just proximal to the anastomosis. There is no free air within the abdominal cavity. The colon is decompressed. There is a small amount of free fluid within the pelvis. Pelvis: There is no pelvic inflammatory process or free air. Peritoneum/Retroperitoneum:  No evidence of retroperitoneal lymphadenopathy. . The abdominal aorta is normal in caliber. There is no aneurysm or dissection. Soft and calcified plaque are seen throughout the course of the aorta. Bones/Soft Tissues:  No acute abnormality of the visualized osseous structures. 1. High-grade distal small-bowel obstruction. There are multiple air-fluid levels. The small bowel measures up to 6.2 cm in maximum dimension. 2. The transition point is seen within the mid right hemiabdomen just proximal to the anastomosis of the small bowel. . 3. There is no intra-abdominal free air. There is a small amount of free fluid within the pelvis. XR CHEST 1 VIEW    Result Date: 6/3/2021  EXAMINATION: ONE XRAY VIEW OF THE CHEST 6/3/2021 6:30 am COMPARISON: 04/18/2021 HISTORY: ORDERING SYSTEM PROVIDED HISTORY: altered mental status TECHNOLOGIST PROVIDED HISTORY: Reason for exam:->altered mental status FINDINGS: Stable cardiomediastinal silhouette. Stable elevation of the right hemidiaphragm. There is linear scarring in the right midlung.   No acute infiltrate, effusion, or pneumothorax is seen. There is air-filled dilated bowel loop beneath the hemidiaphragm. Correlate with CT findings. No radiographic evidence of acute cardiopulmonary disease process. Correlate with CT abdomen pelvis findings to evaluate bowel gas pattern. Assessment//Plan           Hospital Problems         Last Modified POA    Small bowel obstruction ([address] 6/3/2021 Yes        Assessment:  (Problem List as of 6/4/2021 Reviewed: 5/26/2021 11:51 AM by Katerien Jett MD    History of pulmonary embolism    Ischemic cardiomyopathy    Malnutrition ([address]    Multiple sclerosis ([address]    Bilateral pulmonary embolism ([address]    Crohn's disease ([address]    COPD (chronic obstructive pulmonary disease) ([address]    Tobacco abuse    Hypertension    Hyperlipidemia    Stage 3b chronic kidney disease    Hyperkalemia    Leukocytosis    Small bowel obstruction (HCC)    HCAP (healthcare-associated pneumonia)    Acute on chronic renal failure (HCC)    Sepsis (HCC)    SBO (small bowel obstruction) (HCC)    Acute on chronic renal insufficiency    Seizures (HCC)    Altered mental state    Hyperparathyroidism ([address]    Acute renal failure (ARF) ([address]    Moderate protein-calorie malnutrition ([address]    ). Plan:   (Keep pt npo. abd series. Contact surgeon for possible need to take to or.).        Electronically signed by Sandor Clemons MD on 6/4/21 at 11:36 PM EDT

## 2021-06-05 NOTE — PROGRESS NOTES
Surgery Progress Note            Chief complaint:   Chief Complaint   Patient presents with    Seizures     Pt had a grand mal seizure of unknown length of time.        Patient Active Problem List   Diagnosis    [facility]    [facility]    Bilateral pulmonary embolism (HCC)    Crohn's disease ([facility]    COPD (chronic obstructive pulmonary disease) ([facility]    Tobacco abuse    Hypertension    Hyperlipidemia    Stage 3b chronic kidney disease ([facility]    Hyperkalemia    Leukocytosis    Small bowel obstruction ([facility]    HCAP (healthcare-associated pneumonia)    Acute on chronic renal failure (HCC)    Sepsis ([facility]    SBO (small bowel obstruction) ([facility]    History of pulmonary embolism    Acute on chronic renal insufficiency    Ischemic cardiomyopathy    Seizures ([facility]    Altered mental state    Hyperparathyroidism ([facility]    Acute renal failure (ARF) ([facility]    Moderate protein-calorie malnutrition ([facility]       S: resting    O:   Vitals:    06/05/21 0835   BP: 110/83   Pulse: 128   Resp: 18   Temp: 97.7 °F (36.5 °C)   SpO2:        Intake/Output Summary (Last 24 hours) at 6/5/2021 1007  Last data filed at 6/5/2021 0659  Gross per 24 hour   Intake 1626 ml   Output 20 ml   Net 1606 ml           Labs:  Lab Results   Component Value Date    WBC 17.7 06/04/2021    WBC 13.7 06/03/2021    WBC 6.4 05/18/2021    HGB 9.3 06/04/2021    HGB 10.8 06/03/2021    HGB 10.3 05/18/2021    HCT 28.4 06/04/2021    HCT 34.4 06/03/2021    HCT 32.3 05/18/2021     Lab Results   Component Value Date    CREATININE 1.4 (H) 06/04/2021    BUN 25 (H) 06/04/2021     06/04/2021    K 4.9 06/04/2021     06/04/2021    CO2 25 06/04/2021     Lab Results   Component Value Date    LIPASE 33 06/03/2021         Physical exam:   /83   Pulse 128   Temp 97.7 °F (36.5 °C)   Resp 18   Ht 5' 1\" (1.549 m)   Wt 99 lb (44.9 kg)   SpO2 92%   BMI 18.71 kg/m²   General appearance: NAD  Head: NCAT  Neck: supple, no masses  Lungs: equal chest rise bilateral  Heart: S1S2 present  Abdomen: soft, mildly tender, nondistended,  Incisions clean  Skin; no lesions  Gu: no cva tenderness  Extremities: extremities normal, atraumatic, no cyanosis or edema    A:  POD # 1 Diagnostic laparoscopy with lysis of adhesions,  removal of PEG tube, and replacement of PEG tube.     P: cont current care, will check cbc as he looks pale, HR up and the previous peg was through mesentery and could result in bleeding post op    Jennifer Wayne MD, MD  6/5/2021

## 2021-06-06 NOTE — PLAN OF CARE
Problem: Pain:  Goal: Pain level will decrease  Description: Pain level will decrease  Outcome: Met This Shift     Problem: Pain:  Goal: Control of acute pain  Description: Control of acute pain  Outcome: Met This Shift     Problem: Pain:  Goal: Control of chronic pain  Description: Control of chronic pain  Outcome: Met This Shift     Problem: Skin Integrity:  Goal: Will show no infection signs and symptoms  Description: Will show no infection signs and symptoms  Outcome: Met This Shift     Problem: Skin Integrity:  Goal: Absence of new skin breakdown  Description: Absence of new skin breakdown  Outcome: Met This Shift     Problem: Falls - Risk of:  Goal: Will remain free from falls  Description: Will remain free from falls  Outcome: Met This Shift     Problem: Falls - Risk of:  Goal: Absence of physical injury  Description: Absence of physical injury  Outcome: Met This Shift

## 2021-06-06 NOTE — PROGRESS NOTES
Patient ID:  Becky Brunson  [id number]  48 y.o.  1955    HPI:  Patient sleeping easily awakened and tolerating tube feeding. Yesterday patient had vomited, melanotic stool this morning. ELIQUIS  placed on hold for today. Questions, answers, and tests reviewed. ROS:  Cardiovascular:   Denies any chest pain, irregular heartbeats, or palpitations. Respiratory:   Denies shortness of breath, coughing, sputum production, hemoptysis, or wheezing. Gastrointestinal:   Denies nausea, vomiting, diarrhea, or constipation. Denies any abdominal pain. Extremities:   Denies any lower extremity swelling or edema. Neurology:    Denies any headache or focal neurological deficits. No weakness or paresthesia. Derm:    Denies any rashes, ulcers, or excoriations. Denies bruising. Genitourinary:    Denies any urgency, frequency, hematuria. Voiding without difficulty. Physical Exam:    Vitals:    06/06/21 1456   BP: 113/64   Pulse: 101   Resp: 18   Temp: 98.6 °F (37 °C)   SpO2: 98%       HEENT:  PERRLA. EOMI. Sclera clear. Buccal mucosa moist.    Neck:  Supple. Trachea midline. No thyromegaly. No JVD. No bruits. Heart:  Rhythm regular, rate controlled. No murmurs. Lungs:  Symmetrical. Clear to auscultation bilaterally. No wheezes. No rhonchi. No rales. Abdomen: Soft. Non-tender. Non-distended. Bowel sounds positive. No organomegaly or masses. No pain on palpation    Extremities:  Peripheral pulses present. No peripheral edema. No ulcers. Neurologic:  Alert x 3. No focal deficit. Cranial nerves grossly intact. Skin:  No petechia. No hemorrhage. No wounds.     Labs:  CBC:   Lab Results   Component Value Date    WBC 14.1 06/05/2021    RBC 2.30 06/05/2021    HGB 9.5 06/05/2021    HCT 29.8 06/05/2021    MCV 84.3 06/05/2021    MCH 28.3 06/05/2021    MCHC 33.5 06/05/2021    RDW 18.6 06/05/2021     06/05/2021    MPV 11.0 06/05/2021     CMP:    Lab Results   Component Value Date     Medications:   metoprolol tartrate  50 mg PEG Tube BID    levETIRAcetam  500 mg PEG Tube Nightly    levETIRAcetam  250 mg PEG Tube QAM    atorvastatin  80 mg Oral Nightly    calcium-cholecalciferol  1 tablet Oral BID    mirtazapine  15 mg Oral Nightly    oxybutynin  5 mg Oral BID    pantoprazole  40 mg Oral Daily    sucralfate  1 g Oral 4x Daily    apixaban  5 mg Oral BID    sodium chloride flush  5-40 mL Intravenous 2 times per day         Infusion Medications:   sodium chloride 50 mL/hr at 06/06/21 1420    sodium chloride      sodium chloride         Assessment:   Patient Active Problem List    Diagnosis Date Noted    Moderate protein-calorie malnutrition ([facility] 05/08/2021    Acute renal failure (ARF) ([facility] 05/03/2021    Hyperparathyroidism ([facility] 04/21/2021    Altered mental state 04/19/2021    Seizures ([facility] 04/18/2021    History of pulmonary embolism 03/28/2021    Acute on chronic renal insufficiency 03/28/2021    Ischemic cardiomyopathy 03/28/2021    SBO (small bowel obstruction) ([facility] 03/27/2021    HCAP (healthcare-associated pneumonia) 02/08/2021    Acute on chronic renal failure ([address] 02/08/2021    Sepsis ([facility]     Small bowel obstruction ([facility] 02/07/2021    Stage 3b chronic kidney disease ([facility] 01/29/2021    Hyperkalemia 01/29/2021    Leukocytosis 01/29/2021    Crohn's disease ([facility]     COPD (chronic obstructive pulmonary disease) (HCC)     Tobacco abuse     Hypertension     Hyperlipidemia     Bilateral pulmonary embolism ([address] 01/27/2021    Multiple sclerosis ([facility] 01/06/2021    Malnutrition ([facility] 09/10/2012         Plan: 1. Melanotic stool, hemoglobin low yesterday requiring 1 unit packed red cell transfusion. 2.  Patient will bowel obstruction had additional lysis and PEG tube placement 2 days ago started on tube feeding tolerating. 3.  History of pulmonary embolism patient on Eliquis due to melanotic stool Eliquis placed on hold for today.   4.  History of multiple sclerosis patient hardly ambulates every time I seen patient in the office has been in wheelchair patient has generalized debility. 5.  Seizure disorder continue his present antiepileptic. Continue current therapy. See orders for further plan of care.     Completed By:  Dr. Tom Montilla MD, Basil Neri.  4:22 PM  6/6/2021      Electronically signed by Jose Alberto Simmons MD on 6/6/21 at 4:22 PM EDT

## 2021-06-07 NOTE — CARE COORDINATION
SOCIAL WORK / DISCHARGE PLANNING:  COVID neg 6/01. Sw went to room with RN today to speak with wife. She was upset and wanted pt to be unhooked and discharge right now. SW attempted to determine why was uspet and it started with wanting a BSC but she just wanted him to discharge home now. Sw attempted to discuss that TF wasn't yet in place and she said well the doctor will have to make it happen. Sw also attempted to advise her that pt's Hgb had been low and needed rechecked. She remained focused on pt returning home asap. Sw updated [facility] rep of potential dc AMA vs physician approving dc. Sw working on getting tube feed script for bolus feedings. Florentin Cuellar, rep for [facility], they will have an opening for pt next date but need to know for sure by 3pm that discharge is going to happen to secure that spot. Herbert MAXWELL charge will assist in obtaining [address] order and TF script. Addendum: 252pm -dc on hold due to Hgb drop again, GI consult. Key[address] Mcnam[address] and [address] notified. Still need [facility]HC order and Tube feed order for arrangements to be made.            Electronically signed by BRENT Pathak on 6/7/2021 at 12:19 PM

## 2021-06-07 NOTE — PROGRESS NOTES
General Surgery Progress Note  Vinicius King MD, MS    Patient's Name/Date of Birth: Joleen Gutierrez / 1955    Date: June 7, 2021     Surgeon: Rebecca Paget, MD    Chief Complaint: s/p dx lap with removal and replacement gtube    Patient Active Problem List   Diagnosis    Malnutrition ([address]    Multiple sclerosis ([address]    Bilateral pulmonary embolism ([address]    Crohn's disease ([address]    COPD (chronic obstructive pulmonary disease) ([address]    Tobacco abuse    Hypertension    Hyperlipidemia    Stage 3b chronic kidney disease ([address]    Hyperkalemia    Leukocytosis    Small bowel obstruction ([address]    HCAP (healthcare-associated pneumonia)    Acute on chronic renal failure ([address]    Sepsis ([address]    SBO (small bowel obstruction) ([address]    History of pulmonary embolism    Acute on chronic renal insufficiency    Ischemic cardiomyopathy    Seizures ([address]    Altered mental state    Hyperparathyroidism ([address]    Acute renal failure (ARF) ([address]    Moderate protein-calorie malnutrition ([address]       Subjective: tolerating TF, melena with no bleeding around tube. Hgb from 6.5 to9.5 after single unit PRBC, now down to 7.1 receiving second unit. Discussion with wife at bedside regarding frustration with PEG and bleeding. I discussed the likely source of blood loss was PEG replacement and poss mesenteric oozing postop due to coagulopathy. She had requested PEG removal as she sees it as the source of his current hospitalization and states she \"never wanted it to start with\". I revisited our office discussion and explained she never expressed hesitancy during our consult or the day of the PEG placement. She is frustrated with his overall condition and is asking for transfer to [facility] where his Neurologist and [facility] virus specialist is. Patient appears confortable and tolerating TF.      Objective:  /66   Pulse 76   Temp 97 °F (36.1 °C) (Oral)   Resp 20   Ht 5' 1\" (1.549 m)   Wt 99 lb (44.9 kg)   SpO2 97%   BMI 18.71 kg/m²   Labs:  Recent Labs     06/05/21  1535 06/05/21  2304 06/07/21  1208   WBC 14.1*  --  10.8   HGB 6.5* 9.5* 7.1*   HCT 19.4* 29.8* 22.4*     Lab Results   Component Value Date    CREATININE 1.3 (H) 06/07/2021    BUN 22 06/07/2021     06/07/2021    K 3.3 (L) 06/07/2021     06/07/2021    CO2 25 06/07/2021     No results for input(s): LIPASE, AMYLASE in the last 72 hours. General appearance:  NAD, cachectic and weak appearing  Head: NCAT, PERRLA, EOMI, pink conjunctiva  Neck: supple, no masses  Lungs: Equal chest rise bilateral  Heart: Reg rate  Abdomen: soft, nondistended, nontender, PEG in good position, TF running  Skin; no lesions  Gu: no cva tenderness  Extremities: atraumatic      Assessment/Plan:  Ladonna Pan is a 63 y.o. male s/p dx lap with replacement of PEG for severe protein calorie malnutrition, SBO from adhesions, multi[ple medical problems, severe deconditioning and acute blood loss anemia, coagulopathy    Transfuse as needed  I do not recommend removal of PEG due to his coagulopathy, malnutrition and discussed removal in 4 weeks once has time to scar to abdominal wall if they still desire removal  Wife requesting [facility] transfer.  [facility] from surgery to transfer if desired  Cont TF to goal  Diet as tolerated  GI for poss GI bleed although suspect related to recent PEG placement and surgery  Hold anticoag until hgb stable for 24hrs    Physician Signature: Electronically signed by Dr. Benedicto Willams  [phone] (p)  6/7/2021  4:39 PM

## 2021-06-07 NOTE — PLAN OF CARE
Problem: Pain:  Goal: Pain level will decrease  Description: Pain level will decrease  Outcome: Met This Shift     Problem: Pain:  Goal: Control of acute pain  Description: Control of acute pain  Outcome: Met This Shift     Problem: Pain:  Goal: Control of chronic pain  Description: Control of chronic pain  Outcome: Met This Shift     Problem: Skin Integrity:  Goal: Will show no infection signs and symptoms  Description: Will show no infection signs and symptoms  6/7/2021 0618 by Mary Tsang RN  Outcome: Met This Shift    Problem: Skin Integrity:  Goal: Absence of new skin breakdown  Description: Absence of new skin breakdown  6/7/2021 0618 by Mary Tsang RN  Outcome: Met This Shift     Problem: Falls - Risk of:  Goal: Will remain free from falls  Description: Will remain free from falls  Outcome: Met This Shift     Problem: Falls - Risk of:  Goal: Absence of physical injury  Description: Absence of physical injury  Outcome: Met This Shift

## 2021-06-07 NOTE — PROGRESS NOTES
Pt received 1 unit of PRBC this shift, unable to scan. Filled out in paper form that was sent up form the Blood bank and verified with Zoila Lamar RN. At end of blood transfusion pt IV infiltrated. Dressing was placed and IV in that arm was taking out. Another IV was placed in the right arm. Will cont to monitor.

## 2021-06-08 NOTE — CONSULTS
Brandt Rivera M.D. [facility]  Dr. Kale Wray M.D.,  Dr. Rom August M.D.,  Dr. Dinorah Wallace D.O.,  Dr. Cecelia Curtis D.O. ,  Dr. Eve Canales M.D.,  Dr. Damaris Noel D.O. Joleen Gutierrez  63 y.o.  male      Re: Suspected GI bleed below HGB  Requesting physician: Dr Shahnaz Joiner  Date:9:47 AM 6/8/2021          HPI: 62-year-old male patient who is known to me from previous hospital admission. He was recently in the hospital and evaluated for dysphagia in the beginning of May when he underwent upper endoscopy revealing LA class D-C erosive esophagitis. Patient was placed on twice a day proton pump inhibitor with Carafate and plan for repeat endoscopy in 12 weeks. Since patient has history of Crohn's disease not on treatment at that time no PEG tube has been placed. Patient also has history of multiple sclerosis. Patient underwent PEG tube placement by general surgery on 1 June. After the procedure experienced abdominal pain and on CT scan small bowel obstruction was found. Patient underwent laparoscopic gastrostomy tube placement on the on 4 June by Dr. Honorio Short. Patient hemoglobin was found to be 10.8 on 3 Marisel and decreasing as low as 6.5 on 5 June. Patient hemoglobin was 8.6 yesterday and today is 11. PPI has been increased to twice a day dosing. Patient denies currently abdominal pain. He denies nausea vomiting including denying hematemesis emesis of coffee-ground material.  Patient is chronically anticoagulated on Eliquis and has received Eliquis most recently yesterday morning.   Patient denies noticing blood in the stool or melena    Information sources:   -Patient  -medical record  -health care team    PMHx:  Past Medical History:   Diagnosis Date    [facility] obstructive [facility]) [facility]     Crohn's disease ([facility]     Hx of blood clots 2012    liver, lung    Hyperlipidemia     Hypertension     Multiple sclerosis ([facility]     Seizures ([facility]     Stage 3b chronic kidney disease ([facility] [address] 1/29/2021       PSHx:  Past Surgical History:   Procedure Laterality Date    APPENDECTOMY      CHOLECYSTECTOMY      COLECTOMY N/A 6/4/2021    DIAGNOSTIC LAPAROSCOPY  GASTROSTOMY TUBE PLACEMENT performed by Gil Patrick MD at [address] [facility] COLON, DIAGNOSTIC      GASTROSTOMY TUBE PLACEMENT N/A 6/1/2021    PERCUTANEOUS ENDOSCOPIC GASTROSTOMY TUBE PLACEMENT performed by Mine Cobb MD at [facility] 5/6/2021    EGD BIOPSY performed by Anoop Wang DO at [facility]:  Current Facility-Administered Medications   Medication Dose Route Frequency Provider Last Rate Last Admin    0.9 % sodium chloride infusion   Intravenous PRN Shelby Conway MD        pantoprazole (PROTONIX) injection 40 mg  40 mg Intravenous BID Sailaja Riggs MD   40 mg at 06/07/21 2130    And    sodium chloride (PF) 0.9 % injection 10 mL  10 mL Intravenous BID Sailaja Riggs MD   10 mL at 06/07/21 2130    metoprolol tartrate (LOPRESSOR) tablet 50 mg  50 mg PEG Tube BID Ian Wayne MD   50 mg at 06/07/21 2209    oxyCODONE (ROXICODONE) immediate release tablet 10 mg  10 mg Oral Q6H PRN Ian Wayne MD   10 mg at 06/07/21 2212    0.45 % sodium chloride infusion   Intravenous Continuous Carlos Alberto Brody Kolb MD 50 mL/hr at 06/08/21 0342 Restarted at 06/08/21 0342    0.9 % sodium chloride infusion   Intravenous PRN Patrice Martines MD        levETIRAcetam (KEPPRA) 100 MG/ML solution 500 mg  500 mg PEG Tube Nightly Gil Patrick MD   500 mg at 06/07/21 2211    levETIRAcetam (KEPPRA) 100 MG/ML solution 250 mg  250 mg PEG Tube QAM Gil Patrick MD   250 mg at 06/07/21 1029    atorvastatin (LIPITOR) tablet 80 mg  80 mg Oral Nightly Gil Patrick MD   80 mg at 06/07/21 2209    calcium-cholecalciferol 500-200 MG-UNIT per tablet 1 tablet  1 tablet Oral BID Gil Patrick MD   1 tablet at 21    mirtazapine (REMERON) tablet 15 mg  15 mg Oral Nightly Moses Rollins MD   15 mg at 21    oxybutynin (DITROPAN) tablet 5 mg  5 mg Oral BID Moses Rollins MD   5 mg at 21    sucralfate (CARAFATE) tablet 1 g  1 g Oral 4x Daily Moses Rollins MD   1 g at 21    [Held by provider] apixaban (ELIQUIS) tablet 5 mg  5 mg Oral BID Moses Rollins MD   5 mg at 21 1029    HYDROcodone-acetaminophen 7.5-325 MG per 15ML solution 5 mL  5 mL Per G Tube Q4H PRN Moses Rollins MD        sodium chloride flush 0.9 % injection 5-40 mL  5-40 mL Intravenous 2 times per day Moses Rollins MD   10 mL at 21    sodium chloride flush 0.9 % injection 5-40 mL  5-40 mL Intravenous PRN Moses Rollins MD        0.9 % sodium chloride infusion  25 mL Intravenous PRN Moses Rollins MD        acetaminophen (TYLENOL) tablet 650 mg  650 mg Oral Q4H PRN Moses Rollins MD        ondansetron (ZOFRAN-ODT) disintegrating tablet 4 mg  4 mg Oral Q8H PRN Moses Rollins MD   4 mg at 21 1550    Or    ondansetron (ZOFRAN) injection 4 mg  4 mg Intravenous Q6H PRN Moses Rollins MD   4 mg at 21 1449    morphine injection 4 mg  4 mg Intravenous Q4H PRN Moses Rollins MD   4 mg at 21 1904       SocHx:  Social History     Socioeconomic History    Marital status:      Spouse name: Not on file    Number of children: Not on file    Years of education: Not on file    Highest education level: Not on file   Occupational History    Not on file   Tobacco Use    Smoking status: Former Smoker     Types: Cigarettes     Quit date: 2021     Years since quittin.3    Smokeless tobacco: Never Used   Substance and Sexual Activity    Alcohol use: No    Drug use: No    Sexual activity: Not Currently   Other Topics Concern    Not on file   Social History Narrative    Not on file     Social Determinants of Health     Financial Resource Strain:  Difficulty of Paying Living Expenses:    Food Insecurity:     Worried About Running Out of Food in the Last Year:     Ran Out of Food in the Last Year:    Transportation Needs:     Lack of Transportation (Medical):  Lack of Transportation (Non-Medical):    Physical Activity:     Days of Exercise per Week:     Minutes of Exercise per Session:    Stress:     Feeling of Stress :    Social Connections:     Frequency of Communication with Friends and Family:     Frequency of Social Gatherings with Friends and Family:     Attends Rastafari Services:     Active Member of Clubs or Organizations:     Attends Club or Organization Meetings:     Marital Status:    Intimate Partner Violence:     Fear of Current or Ex-Partner:     Emotionally Abused:     Physically Abused:     Sexually Abused:        FamHx:No family history on file. Allergy:  Allergies   Allergen Reactions    Neurontin [Gabapentin] Palpitations    Lyrica [Pregabalin] Other (See Comments)     photophobia         ROS: As described in the HPI and in addition is negative upon detailed review of systems or unobtainable unless otherwise stated in this dictation. PE:  /72   Pulse 71   Temp 98.6 °F (37 °C) (Oral)   Resp 17   Ht 5' 1\" (1.549 m)   Wt 99 lb (44.9 kg)   SpO2 99%   BMI 18.71 kg/m²     Gen.: NAD/ male  Head: Atraumatic/normocephalic  Eyes: EOMI/anicteric sclera/no conjunctival erythema  ENT: Moist oral mucosa/no discharge from nose or ears  Neck: Supple with trachea midline  Chest: CTA B/symmetrical excursions  Cor: Appears regular  Abd.: Soft and not distended. Surgical incisions consistent with laparoscopy are noted.   PEG tube is noted in place  Extr.:  No significant peripheral edema  Muscles: Decreased muscle tone and bulk throughout  Skin: Warm and dry        DATA:  Stool (measured) : 0 mL  Lab Results   Component Value Date    WBC 10.5 06/08/2021    RBC 3.73 06/08/2021    HGB 11.0 06/08/2021    HCT

## 2021-06-08 NOTE — PROGRESS NOTES
Progress Note  Date:2021       Room:0614/0614-02  Patient Name:Davian Zhang     YOB: 1955     Age:65 y.o. Subjective    Subjective:  Symptoms:  Stable. He reports shortness of breath, malaise, weakness, anorexia and anxiety. No cough, chest pain, headache or chest pressure. Diet:  Poor intake. Activity level: Impaired due to weakness. Pain:  He complains of pain that is moderate. pt is weak and blood count dropped again. He is tolerating tube feeding  Review of Systems   Respiratory: Positive for shortness of breath. Negative for cough. Cardiovascular: Negative for chest pain. Gastrointestinal: Positive for anorexia. Neurological: Positive for weakness. Objective         Vitals Last 24 Hours:  TEMPERATURE:  Temp  Av.7 °F (36.5 °C)  Min: 97 °F (36.1 °C)  Max: 98.3 °F (36.8 °C)  RESPIRATIONS RANGE: Resp  Av  Min: 16  Max: 20  PULSE OXIMETRY RANGE: SpO2  Av %  Min: 96 %  Max: 100 %  PULSE RANGE: Pulse  Av.7  Min: 76  Max: 82  BLOOD PRESSURE RANGE: Systolic (32DNZ), PQQ:845 , Min:102 , DVO:566   ; Diastolic (69FCE), QCR:94, Min:55, Max:73    I/O (24Hr): Intake/Output Summary (Last 24 hours) at 2021 2323  Last data filed at 2021 1732  Gross per 24 hour   Intake 849 ml   Output 300 ml   Net 549 ml     Objective:  General Appearance:  Comfortable. Vital signs: (most recent): Blood pressure 120/70, pulse 82, temperature 98.2 °F (36.8 °C), temperature source Oral, resp. rate 18, height 5' 1\" (1.549 m), weight 99 lb (44.9 kg), SpO2 100 %. Vital signs are normal.    Output: Producing urine. HEENT: Normal HEENT exam.    Heart: Regular rhythm. S1 normal and S2 normal.    Abdomen: Abdomen is soft. Bowel sounds are normal.     Extremities: Normal range of motion. Neurological: Patient is alert and oriented to person, place and time. Pupils:  Pupils are equal, round, and reactive to light.       Labs/Imaging/Diagnostics    Labs:

## 2021-06-10 PROBLEM — K50.119 CROHN'S COLITIS, UNSPECIFIED COMPLICATION (HCC): Status: ACTIVE | Noted: 2021-01-01

## 2021-06-10 NOTE — DISCHARGE SUMMARY
Discharge Summary    Date: 6/10/2021  Patient Name: Becky Brunson YOB: 1955 Age: 63 y.o. Admit Date: 5/3/2021  Discharge Date: 5/10/2021  Discharge Condition: Stable    Admission Diagnosis  Acute renal failure (ARF) ([facility] [address] (N17.9)     Discharge Diagnosis  Active Problems: Acute renal failure (ARF) (HCC) Moderate protein-calorie malnutrition (HCC)Resolved Problems: * No resolved hospital problems. [facility] Stay  Narrative of Hospital Course:  Pt came in with acute renal failure. During hospital course he was getting ivf hydration with improvement with his kidneys fucntions. He has poor appetite and advised family re peg tube due to his severe malnutrition    Consultants:  IP CONSULT TO [place] CONSULT TO HEBER CONSULT TO DIETITIAN    Surgeries/procedures Performed:       Treatments:   IV Hydration        Discharge Plan/Disposition:  Home    Hospital/Incidental Findings Requiring Follow Up:    Patient Instructions:    Diet: Regular Diet    Activity:Activity as Tolerated  For number of days (if applicable): Other Instructions:    Provider Follow-Up:   No follow-ups on file. Significant Diagnostic Studies:    Recent Labs:  Admission on 05/03/2021, Discharged on 05/10/2021No results displayed because visit has over 200 results. ------------    Radiology last 7 days:  XR ABDOMEN (KUB) (SINGLE AP VIEW)Result Date: 6/4/20211. Decreased small bowel distension. 2. Increased gastric distension etiology unknown. 3. Sequela of right abdominal surgery.       Pending Labs   Order Current Status  Surgical Pathology Collected (05/06/21 1329)      Discharge Medications    Discharge Medication List as of 5/10/2021  4:18 PMSTART taking these medicationssucralfate (CARAFATE) 1 GM tabletTake 1 tablet by mouth 4 times daily, Disp-120 tablet, R-3Normal    Discharge Medication List as of 5/10/2021  4:18 PM    Discharge Medication List as of 5/10/2021  4:18 PMCONTINUE these medications which have NOT CHANGEDapixaban (ELIQUIS) 5 MG TABS tabletTake 5 mg by mouth 2 times dailyHistorical MedoxyCODONE HCl (OXY-IR) 10 MG immediate release tabletTake 10 mg by mouth as needed. Historical Medpantoprazole (PROTONIX) 40 MG tabletHistorical Medmetoprolol succinate (TOPROL XL) 50 MG extended release tabletTake 1 tablet by mouth daily, Disp-30 tablet, R-5Normalcalcium-cholecalciferol 500-200 MG-UNIT per tabletTake 1 tablet by mouth 2 times daily, Disp-60 tablet, R-5Normaloxybutynin (DITROPAN) 5 MG tabletTake 5 mg by mouth 2 times dailyHistorical Med!! atorvastatin (LIPITOR) 80 MG tabletTake 1 tablet by mouth nightly, Disp-30 tablet, R-3Normalmirtazapine (REMERON) 15 MG tabletTake 15 mg by mouth nightly Historical MedpredniSONE (DELTASONE) 10 MG tabletTake 10 mg by mouth dailyHistorical Med!! atorvastatin (LIPITOR) 80 MG tabletTake 80 mg by mouth dailyHistorical Med!! levETIRAcetam (KEPPRA) 500 MG tabletTake 500 mg by mouth 2 times dailyHistorical Med!! levETIRAcetam (KEPPRA) 500 MG tabletTake 1 tablet by mouth 2 times daily, Disp-60 tablet, R-3Normal!! - Potential duplicate medications found. Please discuss with provider. Discharge Medication List as of 5/10/2021  4:18 PMSTOP taking these medicationsapixaban starter pack (ELIQUIS DVT/PE STARTER PACK) 5 MG TBPK tabletComments:Reason for Stopping:    Time Spent on Discharge:E] minutes were spent in patient examination, evaluation, counseling as well as medication reconciliation, prescriptions for required medications, discharge plan, and follow up.     Electronically signed by Elvie Julian MD on 6/10/21 at 8:06 AM EDT

## 2021-06-10 NOTE — ED PROVIDER NOTES
HPI:  6/10/21,   Time: 3:18 PM EDT       Justino Pascal is a 63 y.o. male presenting to the ED for abd pain, beginning 9 days ago. The complaint has been persistent, moderate in severity, and worsened by nothing. Bib ems, cont abd pain, echymosis lue. Pt recent admit to [facility] for sbo post peg tube placement, left ama 2 days ago, back in for cont pain. Pt won't elicit much hx, just curled and moans. Most hx from ems. Recent blood transfusions    Review of Systems:   Pertinent positives and negatives are stated within HPI, all other systems reviewed and are negative.          --------------------------------------------- PAST HISTORY ---------------------------------------------  Past Medical History:  has a past medical history of COPD (chronic obstructive pulmonary disease) ([facility] [address] Crohn's disease ([facility] [address] Hx of blood clots, Hyperlipidemia, Hypertension, Multiple sclerosis ([facility] [address] Seizures ([facility] [address] and [facility] Past Surgical History:  has a past surgical history that includes Cholecystectomy; Appendectomy; Colonoscopy; Endoscopy, colon, diagnostic; Upper gastrointestinal endoscopy (N/A, 5/6/2021); Gastrostomy tube placement (N/A, 6/1/2021); and colectomy (N/A, 6/4/2021). Social History:  reports that he quit smoking about 4 months ago. His smoking use included cigarettes. He has never used smokeless tobacco. He reports that he does not drink alcohol and does not use drugs. Family History: family history is not on file. The patients home medications have been reviewed.     Allergies: Neurontin [gabapentin] and Lyrica [pregabalin]        ---------------------------------------------------PHYSICAL EXAM--------------------------------------    Constitutional/General: Alert and oriented x3, in pain  Head: Normocephalic and atraumatic  Eyes: PERRL, EOMI, conjunctive normal, sclera non icteric  Mouth: Oropharynx clear, handling secretions, no trismus, no asymmetry of the posterior oropharynx or uvular edema  Neck: Supple, full ROM,   Respiratory: Lungs clear to auscultation bilaterally, no wheezes, rales, or rhonchi. Not in respiratory distress  Cardiovascular:  Regular rate. Regular rhythm. No murmurs, gallops, or rubs. 2+ distal pulses  Chest: No chest wall tenderness  GI:  Abdomen Soft,  Diffuse ttp Non distended. Peg tube in luq,  No rebound, guarding, or rigidity. Musculoskeletal: Moves all extremities x 4. Warm and well perfused, no clubbing, cyanosis, or edema. Capillary refill <3 seconds  Integument: skin warm and dry. No rashes. Lymphatic: no lymphadenopathy noted  Neurologic: GCS 15, no focal deficits, symmetric strength 5/5 in the upper and lower extremities bilaterally  Psychiatric: Normal Affect    -------------------------------------------------- RESULTS -------------------------------------------------  I have personally reviewed all laboratory and imaging results for this patient. Results are listed below.      LABS:  Results for orders placed or performed during the hospital encounter of 06/10/21   CBC Auto Differential   Result Value Ref Range    WBC 4.6 4.5 - 11.5 E9/L    RBC 4.55 3.80 - 5.80 E12/L    Hemoglobin 13.2 12.5 - 16.5 g/dL    Hematocrit 41.6 37.0 - 54.0 %    MCV 91.4 80.0 - 99.9 fL    MCH 29.0 26.0 - 35.0 pg    MCHC 31.7 (L) 32.0 - 34.5 %    RDW 16.9 (H) 11.5 - 15.0 fL    Platelets 424 841 - 742 E9/L    MPV 10.4 7.0 - 12.0 fL    Neutrophils % 31.3 (L) 43.0 - 80.0 %    Lymphocytes % 45.5 (H) 20.0 - 42.0 %    Monocytes % 21.4 (H) 2.0 - 12.0 %    Eosinophils % 0.9 0.0 - 6.0 %    Basophils % 0.9 0.0 - 2.0 %    Neutrophils Absolute 1.43 (L) 1.80 - 7.30 E9/L    Lymphocytes Absolute 2.12 1.50 - 4.00 E9/L    Monocytes Absolute 0.97 (H) 0.10 - 0.95 E9/L    Eosinophils Absolute 0.04 (L) 0.05 - 0.50 E9/L    Basophils Absolute 0.04 0.00 - 0.20 E9/L    Smudge Cells 1+     Anisocytosis 1+     Polychromasia 1+     Poikilocytes 2+     Schistocytes 1+     Kin Cerrato Cells 2+     Ovalocytes 1+    Comprehensive Metabolic Panel w/ Reflex to MG   Result Value Ref Range    Sodium 138 132 - 146 mmol/L    Potassium reflex Magnesium 3.5 3.5 - 5.0 mmol/L    Chloride 100 98 - 107 mmol/L    CO2 24 22 - 29 mmol/L    Anion Gap 14 7 - 16 mmol/L    Glucose 119 (H) 74 - 99 mg/dL    BUN 10 6 - 23 mg/dL    CREATININE 1.3 (H) 0.7 - 1.2 mg/dL    GFR Non-African American 56 >=60 mL/min/1.73    GFR African American >60     Calcium 7.3 (L) 8.6 - 10.2 mg/dL    Total Protein 4.7 (L) 6.4 - 8.3 g/dL    Albumin 2.2 (L) 3.5 - 5.2 g/dL    Total Bilirubin 0.9 0.0 - 1.2 mg/dL    Alkaline Phosphatase 75 40 - 129 U/L    ALT 21 0 - 40 U/L    AST 43 (H) 0 - 39 U/L   Lipase   Result Value Ref Range    Lipase 22 13 - 60 U/L   Protime-INR   Result Value Ref Range    Protime 18.9 (H) 9.3 - 12.4 sec    INR 1.7    APTT   Result Value Ref Range    aPTT 28.2 24.5 - 35.1 sec   Magnesium   Result Value Ref Range    Magnesium 1.0 (L) 1.6 - 2.6 mg/dL       RADIOLOGY:  Interpreted by Radiologist.  CT ABDOMEN PELVIS W IV CONTRAST Additional Contrast? None   Final Result   Large amount of free intraperitoneal air as noted which may be related to   previous surgery G tube placement. Bowel perforation is less likely. Diffusely thickened edematous and inflamed small bowel loops and colon with   proximally dilated small bowel loops. This may be due to diffuse   enterocolitis/inflammatory bowel disease. Superimposed pseudomembranous   colitis has to be excluded. There may be an element of bowel obstruction   which is slightly improved. The transition point seems to be in the right   lower quadrant. Atelectasis/pleural effusion lung bases likely CHF. Abdominal aortic aneurysm. RECOMMENDATIONS:   Abdominal aortic aneurysm. 5 year surveillance is recommended. EKG:  This EKG is signed and interpreted by the EP.     Time:   Rate:   Rhythm:   Interpretation:   Comparison: ------------------------- NURSING NOTES AND VITALS REVIEWED ---------------------------   The nursing notes within the ED encounter and vital signs as below have been reviewed by myself. /86   Pulse 102   Temp 97.5 °F (36.4 °C)   Resp 18   Wt 110 lb (49.9 kg)   SpO2 97%   BMI 20.78 kg/m²   Oxygen Saturation Interpretation: Normal    The patients available past medical records and past encounters were reviewed.         ------------------------------ ED COURSE/MEDICAL DECISION MAKING----------------------  Medications   sodium chloride flush 0.9 % injection 10 mL (10 mLs Intravenous Given 6/10/21 1759)   0.9 % sodium chloride infusion (has no administration in time range)   magnesium sulfate 2000 mg in 50 mL IVPB premix (2,000 mg Intravenous New Bag 6/10/21 2129)   atorvastatin (LIPITOR) tablet 80 mg (has no administration in time range)   mirtazapine (REMERON) tablet 15 mg (has no administration in time range)   sucralfate (CARAFATE) tablet 1 g (has no administration in time range)   pantoprazole (PROTONIX) injection 40 mg (has no administration in time range)   metoprolol (LOPRESSOR) injection 5 mg (has no administration in time range)   levETIRAcetam (KEPPRA) 250 mg/50 mL IVPB (has no administration in time range)   levETIRAcetam (KEPPRA) 500 mg/100 mL IVPB (has no administration in time range)   sodium chloride flush 0.9 % injection 5-40 mL (has no administration in time range)   sodium chloride flush 0.9 % injection 5-40 mL (has no administration in time range)   0.9 % sodium chloride infusion (has no administration in time range)   ondansetron (ZOFRAN-ODT) disintegrating tablet 4 mg (has no administration in time range)     Or   ondansetron (ZOFRAN) injection 4 mg (has no administration in time range)   polyethylene glycol (GLYCOLAX) packet 17 g (has no administration in time range)   acetaminophen (TYLENOL) tablet 650 mg (has no administration in time range)     Or   acetaminophen (TYLENOL) suppository 650 mg (has no administration in time range)   glucose (GLUTOSE) 40 % oral gel 15 g (has no administration in time range)   dextrose 50 % IV solution (has no administration in time range)   glucagon (rDNA) injection 1 mg (has no administration in time range)   dextrose 5 % solution (has no administration in time range)   dextrose 5 % and 0.45 % NaCl with KCl 20 mEq infusion (has no administration in time range)   morphine (PF) injection 2 mg (has no administration in time range)   0.9 % sodium chloride bolus (0 mLs Intravenous Stopped 6/10/21 1820)   ondansetron (ZOFRAN) injection 4 mg (4 mg Intravenous Given 6/10/21 1617)   fentaNYL (SUBLIMAZE) injection 100 mcg (100 mcg Intravenous Given 6/10/21 1617)   iopamidol (ISOVUE-370) 76 % injection 90 mL (90 mLs Intravenous Given 6/10/21 1759)         ED COURSE:       Medical Decision Making:    W/u noted, mg given, surgery eval, no surgical intervention, feel severe crohns, rec admit, sound to admit      This patient's ED course included: a personal history and physicial examination    This patient has remained hemodynamically stable during their ED course. Re-Evaluations:             Re-evaluation. Patients symptoms show no change        Consultations:             surgery  Sound    Critical Care:         Counseling: The emergency provider has spoken with the patient and discussed todays results, in addition to providing specific details for the plan of care and counseling regarding the diagnosis and prognosis. Questions are answered at this time and they are agreeable with the plan.       --------------------------------- IMPRESSION AND DISPOSITION ---------------------------------    IMPRESSION  1. Generalized abdominal pain    2. Hypomagnesemia    3.  Exacerbation of Crohn's disease with complication ([facility]        DISPOSITION  Disposition: Admit to med/surg floor  Patient condition is stable    NOTE: This report was transcribed using voice recognition software.  Every effort was made to ensure accuracy; however, inadvertent computerized transcription errors may be present        Usman Forte MD  06/10/21 5022

## 2021-06-10 NOTE — ED NOTES
Bed: 16  Expected date:   Expected time:   Means of arrival:   Comments:  Marysol Carrera RN  06/10/21 9106

## 2021-06-10 NOTE — TELEPHONE ENCOUNTER
I called the patient to schedule a hospital f/u but I was informed that he would like to f/u in [address] [place] [address] or [facility]

## 2021-06-11 PROBLEM — E43 SEVERE PROTEIN-CALORIE MALNUTRITION (HCC): Chronic | Status: ACTIVE | Noted: 2021-01-01

## 2021-06-11 PROBLEM — K66.8 PNEUMOPERITONEUM: Status: ACTIVE | Noted: 2021-01-01

## 2021-06-11 PROBLEM — E83.51 HYPOCALCEMIA: Status: ACTIVE | Noted: 2021-01-01

## 2021-06-11 PROBLEM — K56.609 BOWEL OBSTRUCTION (HCC): Status: ACTIVE | Noted: 2021-01-01

## 2021-06-11 PROBLEM — E43 SEVERE MALNUTRITION (HCC): Status: ACTIVE | Noted: 2021-01-01

## 2021-06-11 PROBLEM — K94.23 PEG TUBE MALFUNCTION (HCC): Status: ACTIVE | Noted: 2021-01-01

## 2021-06-11 PROBLEM — Q61.3 POLYCYSTIC KIDNEY: Status: ACTIVE | Noted: 2021-01-01

## 2021-06-11 PROBLEM — Z79.01 ANTICOAGULATED: Status: ACTIVE | Noted: 2021-01-01

## 2021-06-11 PROBLEM — E16.2 HYPOGLYCEMIA: Status: ACTIVE | Noted: 2021-01-01

## 2021-06-11 PROBLEM — E86.0 SEVERE DEHYDRATION: Status: ACTIVE | Noted: 2021-01-01

## 2021-06-11 PROBLEM — E83.42 HYPOMAGNESEMIA: Status: ACTIVE | Noted: 2021-01-01

## 2021-06-11 NOTE — ED NOTES
Received report from Nirmal Jack, [address] [place] [address].       Rah Rodriguez RN  06/10/21 1731

## 2021-06-11 NOTE — CONSULTS
GENERAL SURGERY  CONSULT NOTE  6/10/2021    Physician Consulted: Dr. Randy Gamez  Reason for Consult: abdominal pain  Referring Physician: Dr. Spencer Hairston    HPI  Nataly Stacy is a 63 y.o. male with history of MS, Crohn's disease and recent lap G-tube placement presented for evaluation of abdominal pain. History is obtained from his wife. He was recently admitted to [address] [place] [address] for dysphagia and malnutrition and he received a PEG tube on 6/1 with laparoscopic revision on 6/4 due to SBO. He was tolerating tube feeds while hospitalized. GI was consulted on 6/8 for his known esophageal ulcers and the possibility of a GI bleed and they were planning an EGD on 6/9 but the wife signed him out AMA on 6/8 prior to any further workup. She expressed that she did not believe he was being taken care of there and she wanted to take him to [facility]. However, she did not take him there and when he began to have increasing abdominal pain, nausea and diarrhea she brought him here for further evaluation. On arrival he is afebrile and slightly tachycardic. Lab work is unremarkable. CT scan revealed diffusely inflamed and edematous small and large bowel. There is some free air that is likely still present from surgery. PEG is in good position. On exam he is tender diffusely but non-distended.        Past Medical History:   Diagnosis Date    COPD (chronic obstructive pulmonary disease) ([address]     Crohn's disease ([address]     Hx of blood clots 2012    liver, lung    Hyperlipidemia     Hypertension     Multiple sclerosis ([address]     Seizures ([address]     Stage 3b chronic [facility] disease [facility] 1/29/2021       Past Surgical History:   Procedure Laterality Date    APPENDECTOMY      CHOLECYSTECTOMY      COLECTOMY N/A 6/4/2021    DIAGNOSTIC LAPAROSCOPY  GASTROSTOMY TUBE PLACEMENT performed by Luis M Perez MD at [facility], DIAGNOSTIC      GASTROSTOMY TUBE PLACEMENT N/A 6/1/2021    PERCUTANEOUS ENDOSCOPIC GASTROSTOMY TUBE PLACEMENT performed by Duarte Morillo MD at [address] [facility] N/A 2021    EGD BIOPSY performed by Ale Hall DO at [place] [address]       Medications Prior to Admission:    Prior to Admission medications    Medication Sig Start Date End Date Taking? Authorizing Provider   calcium carbonate 600 MG TABS tablet Take 1 tablet by mouth daily   Yes Historical Provider, MD   levETIRAcetam (KEPPRA) 500 MG tablet Take 250 mg by mouth every morning   Yes Historical Provider, MD   levETIRAcetam (KEPPRA) 500 MG tablet Take 500 mg by mouth nightly   Yes Historical Provider, MD   sucralfate (CARAFATE) 1 GM tablet Take 1 tablet by mouth 4 times daily 5/10/21  Yes Saintclair Frees, MD   apixaban (ELIQUIS) 5 MG TABS tablet Take 5 mg by mouth 2 times daily   Yes Historical Provider, MD   oxyCODONE HCl (OXY-IR) 10 MG immediate release tablet Take 10 mg by mouth every 4 hours. Yes Historical Provider, MD   pantoprazole (PROTONIX) 40 MG tablet Take 40 mg by mouth 2 times daily    Yes Historical Provider, MD   metoprolol succinate (TOPROL XL) 50 MG extended release tablet Take 1 tablet by mouth daily 21 Yes Saintclair Frees, MD   oxybutynin (DITROPAN) 5 MG tablet Take 5 mg by mouth as needed    Yes Historical Provider, MD   atorvastatin (LIPITOR) 80 MG tablet Take 1 tablet by mouth nightly 21  Yes Saintclair Frees, MD   mirtazapine (REMERON) 15 MG tablet Take 15 mg by mouth nightly    Yes Historical Provider, MD       Allergies   Allergen Reactions    Neurontin [Gabapentin] Palpitations    Lyrica [Pregabalin] Other (See Comments)     photophobia       No family history on file.     Social History     Tobacco Use    Smoking status: Former Smoker     Types: Cigarettes     Quit date: 2021     Years since quittin.3    Smokeless tobacco: Never Used   Substance Use Topics    Alcohol use: No    Drug use: No         Review of Systems   General ROS: negative  Hematological and Lymphatic ROS: negative  Respiratory ROS: negative  Cardiovascular ROS: negative  Gastrointestinal ROS: positive for - abdominal pain, diarrhea and nausea/vomiting  Genito-Urinary ROS: negative  Musculoskeletal ROS: negative      PHYSICAL EXAM:    Vitals:    06/10/21 1945   BP: 121/68   Pulse: 104   Resp: 18   Temp:    SpO2: 99%       General Appearance:  awake, alert, oriented, in no acute distress  Skin:  Skin color, texture, turgor normal. No rashes or lesions. Head/face:  NCAT  Eyes:  No gross abnormalities. Lungs:  Normal expansion. Heart:  Tachycardic   Abdomen:  Soft, tender diffusely, non-distended. PEG in place 3cm at the skin, no surrounding drainage or erythema   Extremities: Extremities warm to touch, pink, with no edema. LABS:    CBC  Recent Labs     06/10/21  1628   WBC 4.6   HGB 13.2   HCT 41.6        BMP  Recent Labs     06/10/21  1628      K 3.5      CO2 24   BUN 10   CREATININE 1.3*   CALCIUM 7.3*     Liver Function  Recent Labs     06/10/21  1628   LIPASE 22   BILITOT 0.9   AST 43*   ALT 21   ALKPHOS 75   PROT 4.7*   LABALBU 2.2*     No results for input(s): LACTATE in the last 72 hours. Recent Labs     06/10/21  1628   INR 1.7       RADIOLOGY    CT ABDOMEN PELVIS W IV CONTRAST Additional Contrast? None    Result Date: 6/10/2021  EXAMINATION: CT OF THE ABDOMEN AND PELVIS WITH CONTRAST 6/10/2021 6:01 pm TECHNIQUE: CT of the abdomen and pelvis was performed with the administration of intravenous contrast. Multiplanar reformatted images are provided for review. Dose modulation, iterative reconstruction, and/or weight based adjustment of the mA/kV was utilized to reduce the radiation dose to as low as reasonably achievable.  COMPARISON: 06/03/2021 HISTORY: ORDERING SYSTEM PROVIDED HISTORY: abd pain TECHNOLOGIST PROVIDED HISTORY: Additional Contrast?->None Reason for exam:->abd pain Decision Support Exception - unselect if not a suspected or confirmed emergency medical condition->Emergency Medical Condition (MA) What reading provider will be dictating this exam?->CRC FINDINGS: The lung bases demonstrate cardiomegaly with coronary artery calcification. There is small pleural effusions and atelectasis in the lung bases likely mild CHF. There is a hiatal hernia. A large amount of free air is identified in the abdomen pelvis with air anterior to the liver, stomach and bowel loops with the air bubbles in the mesentery. There is a ventral hernia in the epigastric region with the air herniating into the hernia sac. This may be due to recent surgery and the G-tube placement. Bowel perforation is less likely. Punctate hypodense lesions are identified in the liver without change. Gallbladder is absent. A wedge-shaped hypodensity is identified in the spleen which may represent a previous splenic infarct or injury. Pancreas, the adrenals and the right kidney are normal except for multiple cystic lesions in the right kidney, the largest 1 measuring 5 cm. The left kidney is severely atrophic. There is mild abdominal aortic aneurysm measuring 2.5 x 2.8 cm. There is diffusely thickened dilated and edematous small bowel loops with the bowel loops measuring up to 4.7 cm. Pelvis. Bladder is distended. There is inflammatory fluid in the pelvis. There is diffusely thickened edematous and enhancing colon. Air bubbles are identified in the ascites. Large amount of free intraperitoneal air as noted which may be related to previous surgery G tube placement. Bowel perforation is less likely. Diffusely thickened edematous and inflamed small bowel loops and colon with proximally dilated small bowel loops. This may be due to diffuse enterocolitis/inflammatory bowel disease. Superimposed pseudomembranous colitis has to be excluded. There may be an element of bowel obstruction which is slightly improved.   The transition point seems to be in the right lower

## 2021-06-11 NOTE — ED NOTES
Dr. Narcisa Mccullough updated via telephone on patient condition. Physician aware of POCT glucose 70. Per physician's verbal order will hang dextrose maintence fluids as soon as NS bolus complete. Remeron marked not given-patient NPO at this time.       Rajwinder Pearce RN  06/11/21 7855

## 2021-06-11 NOTE — ED NOTES
Dr. Geena Alexander notified patient requesting pain medication but PRN morphine not given at this time due to BP 93/68. New orders received.       Patricia Cifuentes RN  06/11/21 0045

## 2021-06-11 NOTE — CARE COORDINATION
Attempted to meet with pt who was asleep, spouse at bedside. Per spouse they reside in a one story home with 2 steps to enter. Pt has a walker, wheelchair, cane, extended shower chair, bed side commode, and is in the process of getting a hospital bed. Per spouse, pt is active with [facility], but pt hasn't been home much in last several months. . Dr. Lynnie Siemens is PCP. Pt has a new peg tube, placed 6/1. Plan is home with spouse to transport. Their son has moved back home to help take care of pt. Discharge plan is home with spouse and son, family to transport home, per spouse pt can stand and help pivot into chair/car. Received message from [facility]) they were to open pt the day pt was admitted to hospital, [facility] has accepted will need new [facility] orders. Agustin Ghotra, MSW, LSW

## 2021-06-11 NOTE — ED NOTES
POCT glucose due at 2200 completed at this time- POCT glucose 70.       Nimo Pelayo RN  06/11/21 5770

## 2021-06-11 NOTE — CONSULTS
15 mg Oral Nightly    sucralfate  1 g Oral 4x Daily    pantoprazole  40 mg Intravenous BID    levetiracetam  250 mg Intravenous Daily    levetiracetam  500 mg Intravenous Nightly    sodium chloride flush  5-40 mL Intravenous 2 times per day     Continuous Infusions:   sodium chloride      dextrose      dextrose 5% and 0.45% NaCl with KCl 20 mEq 75 mL/hr at 21 1050     PRN Meds:sodium chloride flush, metoprolol, sodium chloride flush, sodium chloride, ondansetron **OR** ondansetron, polyethylene glycol, acetaminophen **OR** acetaminophen, glucose, dextrose, glucagon (rDNA), dextrose, morphine    Allergies:  Neurontin [gabapentin] and Lyrica [pregabalin]    Social History:   Social History     Socioeconomic History    Marital status:      Spouse name: Not on file    Number of children: Not on file    Years of education: Not on file    Highest education level: Not on file   Occupational History    Not on file   Tobacco Use    Smoking status: Former Smoker     Types: Cigarettes     Quit date: 2021     Years since quittin.3    Smokeless tobacco: Never Used   Substance and Sexual Activity    Alcohol use: No    Drug use: No    Sexual activity: Not Currently   Other Topics Concern    Not on file   Social History Narrative    Not on file     Social Determinants of Health     Financial Resource Strain:     Difficulty of Paying Living Expenses:    Food Insecurity:     Worried About Running Out of Food in the Last Year:     Ran Out of Food in the Last Year:    Transportation Needs:     Lack of Transportation (Medical):      Lack of Transportation (Non-Medical):    Physical Activity:     Days of Exercise per Week:     Minutes of Exercise per Session:    Stress:     Feeling of Stress :    Social Connections:     Frequency of Communication with Friends and Family:     Frequency of Social Gatherings with Friends and Family:     Attends [employer] Services:     Active Member of Clubs or Organizations:     Attends Club or Organization Meetings:     Marital Status:    Intimate Partner Violence:     Fear of Current or Ex-Partner:     Emotionally Abused:     Physically Abused:     Sexually Abused: Tobacco: No  Alcohol: No  Pets: No  Travel: No    Family History:   No family history on file. . Otherwise non-pertinent to the chief complaint. REVIEW OF SYSTEMS:    CONSTITUTIONAL:  No chills, fevers or night sweats. No loss of weight. EYES:  No double vision or drainage from eyes, ears or throat. HEENT:  No neck stiffness. No dysphagia. No drainage from eyes, ears or throat  RESPIRATORY:  No cough, productive sputum or hemoptysis. CARDIOVASCULAR:  No chest pain, palpitations, orthopnea or dyspnea on exertion. GASTROINTESTINAL:  No nausea, vomiting, diarrhea or constipation or hematochezia   GENITOURINARY:  No frequency burning dysuria or hematuria. INTEGUMENT/BREAST:  No rash or breast masses. HEMATOLOGIC/LYMPHATIC:  No lymphadenopathy or blood dyscrasics. ALLERGIC/IMMUNOLOGIC:  No anaphylaxis. ENDOCRINE:  No polyuria or polydipsia or temperature intolerance. MUSCULOSKELETAL:  No myalgia or arthralgia. Full ROM. NEUROLOGICAL:  No focal motor sensory deficit. BEHAVIOR/PSYCH:  No psychosis. PHYSICAL EXAM:    Vitals:    /73   Pulse 113   Temp 96.9 °F (36.1 °C) (Temporal)   Resp 19   Ht 5' 1\" (1.549 m)   Wt 110 lb (49.9 kg)   SpO2 98%   BMI 20.78 kg/m²   Constitutional: The patient is awake, alert, and oriented. Skin: Warm and dry. No rashes were noted. HEENT: Eyes show round, and reactive pupils. No jaundice. Moist mucous membranes, no ulcerations, no thrush. Neck: Supple to movements. No lymphadenopathy. Chest: No use of accessory muscles to breathe. Symmetrical expansion. Auscultation reveals no wheezing, crackles, or rhonchi. Cardiovascular: S1 and S2 are rhythmic and regular. No murmurs appreciated.    Abdomen: Positive bowel sounds to

## 2021-06-11 NOTE — PROGRESS NOTES
GENERAL SURGERY  DAILY PROGRESS NOTE  6/11/2021    Subjective:  Patient was monitored for. Lying in bed. Patient is difficult to understand and seems to be mildly altered however, he is expressing abdominal pain. Appears to be mildly tender on palpation. PEG tube in place and functional.  Review of CT scan. Intraperitoneal air most likely due to G-tube placement. Diffuse thickened and edematous bowel most likely secondary to Crohn's. Does appear to be mildly improved from previous CT scan. Patient denies any nausea/vomiting. Objective:  /74   Pulse 101   Temp 96.9 °F (36.1 °C) (Temporal)   Resp 20   Wt 110 lb (49.9 kg)   SpO2 98%   BMI 20.78 kg/m²     GENERAL:  Laying in bed,  cooperative, no apparent distress  HEAD: Normocephalic  EYES: No sclera icterus  LUNGS:  No increased work of breathing  CARDIOVASCULAR:  RR  ABDOMEN:  Soft, mildly tender diffusely over the abdomen, non-distended  EXTREMITIES: No edema or swelling  SKIN: Warm and dry    Assessment/Plan:  63 y.o. male with likely severe Crohn's flare. No acute surgical intervention   Okay to start tube feeds   Pain is most likely 2/2 to crohn's to which he follows in [facility]  Yesterday patients wife was expressing desire to be transferred there  Would recommend GI consult here to evaluate Acute Crohn flare  Please call if there is any other questions     Electronically signed by Geneva Medina DO on 6/11/2021 at 6:34 AM     As above.

## 2021-06-11 NOTE — H&P
Hospital Medicine History & Physical      PCP: David Morrison MD    Date of Admission: 6/10/2021    Date of Service: Pt seen/examined on 6/10/2021 and Admitted to Inpatient with expected LOS greater than two midnights due to medical therapy. Chief Complaint: Abdominal pain      History Of Present Illness:      63 y.o. male who presented to [facility] with medical history of Crohn's disease status post had PEG tube placed on 6/1/2021, developed small bowel obstruction due to PEG tube, had diagnostic laparoscopy with adhesiolysis, removal and replacement of PEG tube done on 6/4/2021, COPD, PE on anticoagulation with Eliquis, hyperlipidemia, hypertension, MS, seizure disorder, stage II chronic kidney disease, ischemic cardiomyopathy, narcotic dependence and malnutrition. Patient has been having abdominal pain for the past 9 days. He is a poor historian, states that his pain is sharp, constant, severe, associated with diarrhea. Denies any nausea or vomiting. He has leg swelling. No chest pain or shortness of breath. No fever. He was in the hospital in May for dysphagia, EGD showed erosive esophagitis. During his most recent hospitalization, he had acute on chronic anemia that was transfused. Patient left AMA from the hospital from [facility]. His wife brought him back because she feels that his PEG tube is not placed right. Patient is thirsty and asking to drink. States that he has been eating normal food at home and using his PEG tube for feeding as well. Records have been reviewed. Vital signs notable for heart rate of 106. Labs showed magnesium of 1, creatinine 1.5, usual baseline 1.2-1.5. Calcium 7.3 and INR 1.7. CT scan of the abdomen showed large amount of free air, thickened/edema/inflammation of small bowel and colon, dilated proximal loop, cannot rule out pseudomembranous colitis, enlargement of bowel suspicious for obstruction, AAA measuring 2.5 x 2.8 cm.   Findings of CHF, polycystic kidney with largest cyst up to 5 cm on the right and atrophic left kidney. He is being admitted for further management. Past Medical History:          Diagnosis Date    [facility]) [facility]     Crohn's disease ([facility]     Hx of blood clots 2012    liver, lung    Hyperlipidemia     Hypertension     Multiple sclerosis ([facility]     Seizures ([facility]     Stage [facility] 1/29/2021       Past Surgical History:          Procedure Laterality Date    APPENDECTOMY      CHOLECYSTECTOMY      COLECTOMY N/A 6/4/2021    DIAGNOSTIC LAPAROSCOPY  GASTROSTOMY TUBE PLACEMENT performed by Maite Salazar MD at [address] [place] [address], COLON, DIAGNOSTIC      GASTROSTOMY TUBE PLACEMENT N/A 6/1/2021    PERCUTANEOUS ENDOSCOPIC GASTROSTOMY TUBE PLACEMENT performed by Cliff Cali MD at [facility] 5/6/2021    EGD BIOPSY performed by Amanda Hwang DO at [place] [address]       Medications Prior to Admission:      Prior to Admission medications    Medication Sig Start Date End Date Taking? Authorizing Provider   calcium carbonate 600 MG TABS tablet Take 1 tablet by mouth daily   Yes Historical Provider, MD   levETIRAcetam (KEPPRA) 500 MG tablet Take 250 mg by mouth every morning   Yes Historical Provider, MD   levETIRAcetam (KEPPRA) 500 MG tablet Take 500 mg by mouth nightly   Yes Historical Provider, MD   sucralfate (CARAFATE) 1 GM tablet Take 1 tablet by mouth 4 times daily 5/10/21  Yes Geo Berger MD   apixaban (ELIQUIS) 5 MG TABS tablet Take 5 mg by mouth 2 times daily   Yes Historical Provider, MD   oxyCODONE HCl (OXY-IR) 10 MG immediate release tablet Take 10 mg by mouth every 4 hours.     Yes Historical Provider, MD   pantoprazole (PROTONIX) 40 MG tablet Take 40 mg by mouth 2 times daily    Yes Historical Provider, MD   metoprolol succinate (TOPROL XL) 50 MG extended release tablet Take 1 tablet by mouth MD    Thank you Zahira Eng MD for the opportunity to be involved in this patient's care.

## 2021-06-11 NOTE — PROGRESS NOTES
cyanosis, no bilateral lower extremity edema. Brisk capillary refill. Skin:  No rashes  on visible skin  Neurologic: awake, alert and following commands     ASSESSMENT:  #Acute flare up of Crohn's  -patient still complains of abdominal pain  -GI consulted; await rec's  -on IV solumedrol  -hydrate gently with iVF  -general surgery also evaluated patient and advocate no acute surgical intervention; abdominal pain most likely due to Crohn's, for which he follows up at [facility]. #Elevated procalcitonin  -procalcitonin is >100  -will get blood cultures  -start on IV zosyn. -ID consulted  -C Diff ordered     #Dysphagia  -PEG tube iin place. On tube feeds    #Seizure disorder: on Keppra    #History of esophagitis: on PPI nd sucralfate    #Hypetension: on metoprolol    #Hypocalcemia and hypomagnesemia  - Will replace    #History of PE:  - eliquis held on admission as PEG tube was hooked to gravity. To resume eliquis once PEG tube is functional    #History of polycystic kidney disease: Stable    #Severe malnutrition with failure to thrive: Dietitian consulted. DVT prophylaxis: lovenox.  Stop Lovenox and resume Eliquis once patient's PEG tube is functional      DISPOSITION: to be determined    Medications:  REVIEWED DAILY    Infusion Medications    sodium chloride      dextrose      dextrose 5% and 0.45% NaCl with KCl 20 mEq 75 mL/hr at 06/11/21 1050     Scheduled Medications    enoxaparin  1 mg/kg Subcutaneous BID    methylPREDNISolone  40 mg Intravenous Q12H    magnesium sulfate  4,000 mg Intravenous Once    piperacillin-tazobactam  3,375 mg Intravenous Q8H    sodium chloride   Intravenous Q8H    atorvastatin  80 mg Oral Nightly    mirtazapine  15 mg Oral Nightly    sucralfate  1 g Oral 4x Daily    pantoprazole  40 mg Intravenous BID    levetiracetam  250 mg Intravenous Daily    levetiracetam  500 mg Intravenous Nightly    sodium chloride flush  5-40 mL Intravenous 2 times per day     PRN Meds: morphine, sodium chloride flush, metoprolol, sodium chloride flush, sodium chloride, ondansetron **OR** ondansetron, polyethylene glycol, acetaminophen **OR** acetaminophen, glucose, dextrose, glucagon (rDNA), dextrose    Labs:     Recent Labs     06/10/21  1628 06/11/21  0636   WBC 4.6 19.0*   HGB 13.2 12.7   HCT 41.6 39.5    287       Recent Labs     06/10/21  1628 06/11/21  0636    139   K 3.5 3.5    105   CO2 24 23   BUN 10 12   CREATININE 1.3* 1.3*   CALCIUM 7.3* 6.8*   PHOS  --  4.6*       Recent Labs     06/10/21  1628 06/11/21  0636   PROT 4.7* 4.1*   ALKPHOS 75 58   ALT 21 18   AST 43* 35   BILITOT 0.9 0.7   LIPASE 22  --        Recent Labs     06/10/21  1628   INR 1.7       No results for input(s): Agnieszka Webb in the last 72 hours. Chronic labs:    Lab Results   Component Value Date    CHOL 206 (H) 09/25/2020    TRIG 173 (H) 09/25/2020    HDL 46 09/25/2020    LDLCALC 125 (H) 09/25/2020    TSH 0.788 09/25/2020    PSA 3.14 09/25/2020    INR 1.7 06/10/2021       Radiology: REVIEWED DAILY    +++++++++++++++++++++++++++++++++++++++++++++++++  Da Vincent MD  [employer] - [address] [place], [place]  +++++++++++++++++++++++++++++++++++++++++++++++++  NOTE: This report was transcribed using voice recognition software. Every effort was made to ensure accuracy; however, inadvertent computerized transcription errors may be present.

## 2021-06-11 NOTE — PROGRESS NOTES
Patient had very small formed bowel movement and does not meet criteria to send to lab for cdiff testing.

## 2021-06-11 NOTE — ED NOTES
Urinalysis was ordered at 1522 and not previously completed. Patient's urinal at bedside emptied and urine sent to lab.       Jesús Graham RN  06/11/21 4595

## 2021-06-11 NOTE — PROGRESS NOTES
Comprehensive Nutrition Assessment    Type and Reason for Visit:  Initial, Consult, Positive Nutrition Screen    Nutrition Recommendations/Plan: Tube Feeding recommendation per physician consult. Recommend Semi-Elemental (Vital AF 1.2) @45/hr to provide 1080ml, 1296 calories, 81g protein, 876ml water. Monitor phosphorus levels as available and adjust tube feed product as needed. Nutrition Assessment:  Pt adm w/ severe Crohn's flare up ; wife at bedside stating pt receives tube feeds at home and pt also eats soft food such as soups and puddings ; tube feeds are ordered although not hung yet at this time ; pt meets criteria for severe malnutrition AEB poor po intake > 1 month, weight loss, and muscle/fat wasting ; s/p PEG removal and placement on 6/4 ; hx of colitis ; noted C-Diff ; hx of COPD and CKD ; will provide TF recommendations    Malnutrition Assessment:  Malnutrition Status:  Severe malnutrition    Context:  Chronic Illness     Findings of the 6 clinical characteristics of malnutrition:  Energy Intake:  7 - 75% or less estimated energy requirements for 1 month or longer  Weight Loss:  7 - Greater than 5% over 1 month     Body Fat Loss:  7 - Severe body fat loss Orbital, Triceps   Muscle Mass Loss:  7 - Severe muscle mass loss Temples (temporalis), Clavicles (pectoralis & deltoids), Thigh (quadraceps), Calf (gastrocnemius), Hand (interosseous)  Fluid Accumulation:  No significant fluid accumulation     Strength:  Not Performed    Estimated Daily Nutrient Needs:  Energy (kcal):  2596-0136 (REE 1090 x 1.3 SF); Weight Used for Energy Requirements:  Current     Protein (g):  65-85 (1.5-1.8g/kg CBW);  Weight Used for Protein Requirements:  Current        Fluid (ml/day):  6048-1022; Method Used for Fluid Requirements:  1 ml/kcal      Nutrition Related Findings:  I&Os WNL, 2+ edema, A&O X 3, abd pain, loose stools, PEG, hypoactive BS, tenderness to abd, missing teeth, redness to heels/buttocks, muscle/fat wasting, elevated phosphorus, dysphagia      Wounds:  Surgical Incision, Skin Tears (skin tear x 1 ; lap site incisions noted)       Current Nutrition Therapies:    Diet NPO  ADULT TUBE FEEDING; PEG; Other Tube Feeding (specify); Jevity 1.5; Continuous; 10; Yes; 15; Q 4 hours; 40; 30; Q 4 hours    Anthropometric Measures:  · Height: 5' 1\" (154.9 cm)  · Current Body Weight: 95 lb (43.1 kg) (6/11, bedscale per RD)   · Admission Body Weight: 110 lb (49.9 kg) (6/10, stated)    · Usual Body Weight: 109 lb (49.4 kg) (5/3/21, bedscale ; EMR shows weight loss of 14# in the past 1 month (109# to 95#) (13%) ; wife admits to this weight loss)     · Ideal Body Weight: 112 lbs; % Ideal Body Weight 84.8 %   · BMI: 18   · BMI Categories: Underweight (BMI less than 18.5)       Nutrition Diagnosis:   · Severe malnutrition, In context of chronic illness related to catabolic illness (hx of Crohn's) as evidenced by poor intake prior to admission, weight loss greater than or equal to 5% in 1 month, severe loss of subcutaneous fat, severe muscle loss      Nutrition Interventions:   Food and/or Nutrient Delivery:  Continue NPO, Start Tube Feeding  Nutrition Education/Counseling:  Education not indicated   Coordination of Nutrition Care:  Continue to monitor while inpatient    Goals:  TF will meet nutritional needs with good tolerance       Nutrition Monitoring and Evaluation:   Behavioral-Environmental Outcomes:  Beliefs and Attitutes   Food/Nutrient Intake Outcomes:  Enteral Nutrition Intake/Tolerance  Physical Signs/Symptoms Outcomes:  Biochemical Data, GI Status, Diarrhea, Fluid Status or Edema, Hemodynamic Status, Nutrition Focused Physical Findings, Skin, Weight     Discharge Planning:     Too soon to determine     Electronically signed by Anton Wyatt RD, LD on 6/11/21 at 4:13 PM EDT    Contact: [phone]

## 2021-06-11 NOTE — ED NOTES
Patient has bed assignment [id number]. Bed marked \"Dirty\" at this time.       Farhana Herrera RN  06/11/21 6853

## 2021-06-12 NOTE — CONSULTS
Department of Podiatry   Consult Note        Reason for Consult:  B/L LE edema     CHIEF COMPLAINT:  B/L LE edema    HISTORY OF PRESENT ILLNESS:                Mr. Rosanna Crockett is a 63 y.o. male who was evaluated at bedside this afternoon for b/l LE. Wife at bedside today and states his problem with edema is a recurring one and causes him a great deal of pain. Patient has a  significant past medical history of CKD Crohn's, and COPD. Patient denies any N/V/D/F/C/SOB/CP and has no other pedal complaints at this time. Past Medical History:        Diagnosis Date    [facility]     Crohn's disease [facility]     Hx of blood clots 2012    liver, lung    Hyperlipidemia     Hypertension     Multiple sclerosis ([facility]     Seizures ([facility]     Stage 3b chronic kidney disease ([facility] 1/29/2021   ·     Past Surgical History:        Procedure Laterality Date    APPENDECTOMY      CHOLECYSTECTOMY      COLECTOMY N/A 6/4/2021    DIAGNOSTIC LAPAROSCOPY  GASTROSTOMY TUBE PLACEMENT performed by Aston Cummings MD at [address] [place] [address], COLON, DIAGNOSTIC      [address] [facility]20[facility]    PERCUTANEOUS ENDOSCOPIC GASTROSTOMY TUBE PLACEMENT performed by Jnenifer Rojas MD at [address] [place] [address] N/A 5/6/2021    EGD BIOPSY performed by Rosalia Melendez DO at [place] [address]   ·     Medications Prior to Admission:    · Medications Prior to Admission: calcium carbonate 600 MG TABS tablet, Take 1 tablet by mouth daily  · levETIRAcetam (KEPPRA) 500 MG tablet, Take 250 mg by mouth every morning  · levETIRAcetam (KEPPRA) 500 MG tablet, Take 500 mg by mouth nightly  · sucralfate (CARAFATE) 1 GM tablet, Take 1 tablet by mouth 4 times daily  · apixaban (ELIQUIS) 5 MG TABS tablet, Take 5 mg by mouth 2 times daily  · oxyCODONE HCl (OXY-IR) 10 MG immediate release tablet, Take 10 mg by mouth every 4 hours.    · pantoprazole (PROTONIX) 40 MG tablet, BID    piperacillin-tazobactam  3,375 mg Intravenous Q8H    sodium chloride   Intravenous Q8H    atorvastatin  80 mg Oral Nightly    mirtazapine  15 mg Oral Nightly    sucralfate  1 g Oral 4x Daily    pantoprazole  40 mg Intravenous BID    levetiracetam  250 mg Intravenous Daily    levetiracetam  500 mg Intravenous Nightly     Continuous Infusions:   sodium chloride      dextrose      dextrose 5% and 0.45% NaCl with KCl 20 mEq 75 mL/hr at 06/11/21 1050     PRN Meds:.sodium chloride flush, sodium chloride, heparin flush, morphine, sodium chloride flush, metoprolol, ondansetron **OR** ondansetron, polyethylene glycol, acetaminophen **OR** acetaminophen, glucose, dextrose, glucagon (rDNA), dextrose    RADIOLOGY:  CT ABDOMEN PELVIS W IV CONTRAST Additional Contrast? None   Final Result   Large amount of free intraperitoneal air as noted which may be related to   previous surgery G tube placement. Bowel perforation is less likely. Diffusely thickened edematous and inflamed small bowel loops and colon with   proximally dilated small bowel loops. This may be due to diffuse   enterocolitis/inflammatory bowel disease. Superimposed pseudomembranous   colitis has to be excluded. There may be an element of bowel obstruction   which is slightly improved. The transition point seems to be in the right   lower quadrant. Atelectasis/pleural effusion lung bases likely CHF. Abdominal aortic aneurysm. RECOMMENDATIONS:   Abdominal aortic aneurysm. 5 year surveillance is recommended.              Vitals:    /68   Pulse 112   Temp 97.7 °F (36.5 °C) (Temporal)   Resp 19   Ht 5' 1\" (1.549 m)   Wt 95 lb (43.1 kg)   SpO2 97%   BMI 17.95 kg/m²     LABS:   Recent Labs     06/10/21  1628 06/11/21  0636   WBC 4.6 19.0*   HGB 13.2 12.7   HCT 41.6 39.5    287     Recent Labs     06/12/21  0926      K 4.1      CO2 19*   PHOS 3.1   BUN 17   CREATININE 1.2     Recent Labs 06/10/21  1628 06/11/21  0636   PROT 4.7* 4.1*   INR 1.7  --    APTT 28.2  --        ASSESSMENTS:   1.B/L LE pittign edema. 2.DVT of RLE    Severe protein-calorie malnutrition ([facility]    Stage 3b chronic kidney disease ([facility]    Seizures (HCC)    Crohn's colitis, unspecified complication ([facility]    Hypoglycemia    Bowel obstruction (HCC)    PEG tube malfunction (HCC)    Pneumoperitoneum    Hypomagnesemia    Hypocalcemia    Anticoagulated    Polycystic kidney    Severe malnutrition (HCC)    Severe dehydration    Generalized abdominal pain           PLAN:    - Patient was examined and evaluated. Reviewed patient's recent lab results and pertinent diagnostic imaging. Reviewed ancillary service notes. - WBC: 19, Sed Rate: 10  - UNNA boots ordered to be applied   - Antibiotics as per ID   - Vascular: DVT to RLE  - Prevalon boots ordered  - Discussed patient with    - Will continue to follow patient while they are in-house. Thank you for the opportunity to take part in the patient's care. Please do not hesitate to call for any questions or concerns.

## 2021-06-12 NOTE — CONSULTS
[address]                  [address] [facility], [address] [place] [address]                                  CONSULTATION    PATIENT NAME: Sony Clark                   :        1955  MED REC NO:   [id number]                            ROOM:       8423  ACCOUNT NO:   [de-identified]                           ADMIT DATE: 06/10/2021  PROVIDER:     Mariana Canseco MD    CONSULT DATE:  2021    REASON FOR CONSULTATION:  Crohn's disease. HISTORY:  This is a 28-year-old male with a history of Crohn's disease,  apparently with a single resection for same about 8 to 9 years ago. From what I can tell, he has no history of medications for his Crohn's  disease since. It must be stated the history is obtained pretty much  exclusively from the chart at this point. His ability to contribute is  quite limited. His background history also includes multiple sclerosis for which he has  been followed at the [facility]. The notes suggest he has been  treated with Tysabri in the past.  He is JCV positive. More recently,  he was treated with Ocrevus, terminating in December. He was  hospitalized at [facility] in January with a pulmonary  embolism and is treated with anticoagulant therapy. He had several  hospitalizations for abdominal pain and evidence of small bowel  obstruction. His endoscopic evaluation has been limited to the upper GI  tract on three separate occasions. Once diagnostically and twice for  the placement of PEG tube and its subsequent revision. I do not see  that he has really been maintained on steroids. Apparently, there were  considerations for biologic therapy as he was assessed for hepatitis B  core antibody in December and that was negative. He also had a T-SPOT  performed and that was negative. It does not seem that therapy was  otherwise pursued.     Because of issues of malnutrition, he had a PEG tube placed at 12 h.  Tube feedings  have been resumed. The impression was that the free air was residual  from his laparoscopic/PEG tube placement on the 4th. His white count on  admission was normal, but arabella to 19,000 yesterday. He has been  afebrile. Tube feedings have been daily and without interruption. Eliquis was held in favor of Lovenox. PAST HISTORY:  Includes multiple sclerosis. Seems he has been treated  in the past with Tysabri and Ocrevus. He is JCV positive. He has a  history of presumptive coronary artery disease and impaired left  ventricular function. Crohn's disease. COPD. Former smoker, quitting  in February. Hypertension, hyperlipidemia, seizure disorder which  apparently is new and stage IIIB chronic kidney disease. CURRENT MEDICATIONS:  Lipitor, Lovenox, Keppra, methylprednisolone 80 mg  in divided doses, metoprolol, Remeron, morphine and Zofran. SOCIAL HISTORY:  Former smoker. . No alcohol. OBJECTIVE:  VITAL SIGNS:  he has been afebrile since admission. Blood pressure has  been normal.  Room air saturation 97%. His weight is 95 pounds, which  is probably pretty close to baseline for him. GENERAL:  He is pale. He does not have a rash. Deeply lethargic. NECK:  He has no adenopathy in the neck. LUNGS:  Clear. HEART:  Tones normal.  ABDOMEN:  PEG tube in the right upper quadrant. Site clean. Midline  scar. Active bowel sounds. Tender with percussion tenderness noted in  the right. No obvious fluid accumulation. No guarding or peritoneal  signs. MUSCULOSKELETAL:  No edema. Diminished muscle mass. NEUROLOGIC:  A formal neurological exam was not completed. ASSESSMENT:  He has Crohn's disease and one has to be concerned at this  point just on the procalcitonin and its marked elevation. Procalcitonin  has been studied for marker of inflammatory bowel disease and it can be  elevated, but the numbers are not of this magnitude.   If it is a true  number, then I would say it has to be presumed to be infectious and  antibiotics have to be employed. He is not a candidate for Remicade  because of his multiple sclerosis or Entyvio b/o JCV. Significantly malnourished. He has a fluid accumulation which one could perhaps speculate was blood  because of the drop in his hemoglobin and I am not certain I can  understand the numbers with the hemoglobin a week ago 6.5 if that was  correct down from 9.3, now consistently here in the 13, he only got was  a single unit of blood and he does not seem to be terribly volume  contracted. Sedimentation rate is only 10. He does have a C-reactive  protein, however, at 16. The fluid collection probably should be sampled for any evidence of  infection. It does not look like blood. Certainly, it is not localized  abscess. Antibiotics I think are must.  Steroids, but the dose can be  reduced. He really should be transferred to [facility] [employer] because this is a  complex mix of inflammatory bowel disease with advanced multiple  Sclerosis and positive JCV.         Payam Christensen MD    D: 06/12/2021 13:40:42       T: 06/12/2021 13:52:41     [id number]  Job#: [id number]     Doc#: [id number]    CC:

## 2021-06-12 NOTE — PROGRESS NOTES
Hospitalist Progress Note      Synopsis: Patient admitted on 6/10/2021   63 y.o. male who presented to [facility] with medical history of Crohn's disease status post had PEG tube placed on 6/1/2021, developed small bowel obstruction due to PEG tube, had diagnostic laparoscopy with adhesiolysis, removal and replacement of PEG tube done on 6/4/2021, COPD, PE on anticoagulation with Eliquis, hyperlipidemia, hypertension, MS, seizure disorder, stage II chronic kidney disease, ischemic cardiomyopathy, narcotic dependence and malnutrition. Patient has been having abdominal pain for the past 9 days. He is a poor historian, states that his pain is sharp, constant, severe, associated with diarrhea. Denies any nausea or vomiting. He has leg swelling. No chest pain or shortness of breath. No fever. He was in the hospital in May for dysphagia, EGD showed erosive esophagitis. During his most recent hospitalization, he had acute on chronic anemia that was transfused. Patient left AMA from the hospital from [facility]. His wife brought him back because she feels that his PEG tube is not placed right. Patient is thirsty and asking to drink. States that he has been eating normal food at home and using his PEG tube for feeding as well. Patient was assessed by infectious disease as well as general surgery. General surgery feels patient has no need for surgical intervention and there was no plan. Also felt the patient is having a severe Crohn's flareup. They recommended a GI service consult if patient was planning to stay here. Patient had an elevated procalcitonin so infectious disease was consulted as well  He has been recommended GI consult by them also. No antibiotics currently because it is felt that the prednisone may blurred his WBCs elevation  GI consult awaited      Subjective    Patient is currently in isolation. He looks elderly and worn out. Alert however unable to speak.   States he is no better no worse  Exam:  BP 119/68   Pulse 112   Temp 97.7 °F (36.5 °C) (Temporal)   Resp 19   Ht 5' 1\" (1.549 m)   Wt 95 lb (43.1 kg)   SpO2 97%   BMI 17.95 kg/m²   General appearance: No apparent distress, appears stated age and cooperative. HEENT: Pupils equal, round, and reactive to light. Conjunctivae/corneas clear. Neck: No megaly  Respiratory:  Normal respiratory effort. Decreased   cardiovascular: Regular rate and rhythm with normal S1/S2 without murmurs, rubs or gallops. Abdomen: Soft generally diffusely tender and PEG is in place   musculoskeletal: No clubbing no cyanosis however right leg is swollen distally. Both heels and heel protectors  .  Skin:  No rashes    Neurologic: awake, alert and following commands but weak    Medications:  Reviewed    Infusion Medications    sodium chloride      dextrose      dextrose 5% and 0.45% NaCl with KCl 20 mEq 75 mL/hr at 06/11/21 1050     Scheduled Medications    enoxaparin  1 mg/kg Subcutaneous BID    methylPREDNISolone  40 mg Intravenous Q12H    piperacillin-tazobactam  3,375 mg Intravenous Q8H    sodium chloride   Intravenous Q8H    atorvastatin  80 mg Oral Nightly    mirtazapine  15 mg Oral Nightly    sucralfate  1 g Oral 4x Daily    pantoprazole  40 mg Intravenous BID    levetiracetam  250 mg Intravenous Daily    levetiracetam  500 mg Intravenous Nightly    sodium chloride flush  5-40 mL Intravenous 2 times per day     PRN Meds: morphine, sodium chloride flush, metoprolol, sodium chloride flush, sodium chloride, ondansetron **OR** ondansetron, polyethylene glycol, acetaminophen **OR** acetaminophen, glucose, dextrose, glucagon (rDNA), dextrose    I/O    Intake/Output Summary (Last 24 hours) at 6/12/2021 0941  Last data filed at 6/11/2021 1327  Gross per 24 hour   Intake 0 ml   Output 500 ml   Net -500 ml       Labs:   Recent Labs     06/10/21  1628 06/11/21  0636   WBC 4.6 19.0*   HGB 13.2 12.7   HCT 41.6 39.5    287       Recent Labs     06/10/21  1628 06/11/21  0636    139   K 3.5 3.5    105   CO2 24 23   BUN 10 12   CREATININE 1.3* 1.3*   CALCIUM 7.3* 6.8*   PHOS  --  4.6*       Recent Labs     06/10/21  1628 06/11/21  0636   PROT 4.7* 4.1*   ALKPHOS 75 58   ALT 21 18   AST 43* 35   BILITOT 0.9 0.7   LIPASE 22  --        Recent Labs     06/10/21  1628   INR 1.7       No results for input(s): Taj Martinez in the last 72 hours. Chronic labs:  Lab Results   Component Value Date    CHOL 206 (H) 09/25/2020    TRIG 173 (H) 09/25/2020    HDL 46 09/25/2020    LDLCALC 125 (H) 09/25/2020    TSH 0.788 09/25/2020    PSA 3.14 09/25/2020    INR 1.7 06/10/2021       Radiology:  XR ABDOMEN (KUB) (SINGLE AP VIEW)    Result Date: 6/4/2021  1. Decreased small bowel distension. 2. Increased gastric distension etiology unknown. 3. Sequela of right abdominal surgery. CT HEAD WO CONTRAST    Result Date: 6/3/2021  1. There is no acute intracranial abnormality. Specifically, there is no intracranial hemorrhage. 2. Atrophy and periventricular leukomalacia,     CT CERVICAL SPINE WO CONTRAST    Result Date: 6/3/2021  1. There is no acute compression fracture or subluxation of the cervical spine 2. Degenerative disc disease at the C5-6 and C6-7 levels most prominent at the C5-6 level. CT ABDOMEN PELVIS W IV CONTRAST Additional Contrast? None    Result Date: 6/10/2021  Large amount of free intraperitoneal air as noted which may be related to previous surgery G tube placement. Bowel perforation is less likely. Diffusely thickened edematous and inflamed small bowel loops and colon with proximally dilated small bowel loops. This may be due to diffuse enterocolitis/inflammatory bowel disease. Superimposed pseudomembranous colitis has to be excluded. There may be an element of bowel obstruction which is slightly improved. The transition point seems to be in the right lower quadrant. Atelectasis/pleural effusion lung bases likely CHF. Abdominal aortic aneurysm. RECOMMENDATIONS: Abdominal aortic aneurysm. 5 year surveillance is recommended. CT ABDOMEN PELVIS W IV CONTRAST Additional Contrast? None    Result Date: 6/3/2021  1. High-grade distal small-bowel obstruction. There are multiple air-fluid levels. The small bowel measures up to 6.2 cm in maximum dimension. 2. The transition point is seen within the mid right hemiabdomen just proximal to the anastomosis of the small bowel. . 3. There is no intra-abdominal free air. There is a small amount of free fluid within the pelvis. XR CHEST 1 VIEW    Result Date: 6/3/2021  No radiographic evidence of acute cardiopulmonary disease process. Correlate with CT abdomen pelvis findings to evaluate bowel gas pattern. ASSESSMENT:  Abdominal pain with acute colitis  Active Problems:    Severe protein-calorie malnutrition (HCC)    Stage 3b chronic kidney disease (HCC)    Seizures (HCC)    Crohn's colitis, unspecified complication (HCC)    Hypoglycemia    Bowel obstruction (HCC)    PEG tube malfunction (HCC)    Pneumoperitoneum    Hypomagnesemia    Hypocalcemia    Anticoagulated    Polycystic kidney    Severe malnutrition (HCC)    Severe dehydration    Generalized abdominal pain  Resolved Problems:    * No resolved hospital problems. *       PLAN:    1.  Labs are awaited today. They were ordered however IV team had to come back for special draw. They were just drawn as I was in the room. 2.  On steroids intravenously. Still positive for discomfort on abdominal exam  3. PEG in place Stacy's catheter in place  4. Also on IV proton pump inhibitor and he is currently on Zosyn as well. 5.  Still on IV fluids. Blood pressure appears to be appropriate  6. His need for Eliquis has been switched to Lovenox till he is able to take through PEG orally safely. Diet: Diet NPO  ADULT TUBE FEEDING; PEG; Other Tube Feeding (specify);  Vital AF 1.2; Continuous; 10; Yes; 15; Q 4 hours; 40; 30; Q 4 hours  Code Status: Full Code PT/OT Eval Status:   Order  DVT Prophylaxis: Lovenox  Recommended disposition at discharge:   Unsure yet but family feels they want him at home    +++++++++++++++++++++++++++++++++++++++++++++++++  Chris Benz MD   [facility].  +++++++++++++++++++++++++++++++++++++++++++++++++  NOTE: This report was transcribed using voice recognition software. Every effort was made to ensure accuracy; however, inadvertent computerized transcription errors may be present.

## 2021-06-12 NOTE — PROGRESS NOTES
flare   ID asked to see because of elevated procalcitonin   No positive cultures   WBC 19,000   See CT of abd report above   ? Pseudomembranous colitis   GI consult ? He is on prednisone started this morning which will blur his WBC picture       Plan  He is on zosyn   Not started by me  Check cultures pending  GI consulted   Baseline ESR, CRP   Monitor labs   Will follow with you   Check c.  Diff         Electronically signed by Dillon Guerra MD on 6/12/2021 at 10:20 AM

## 2021-06-12 NOTE — PROGRESS NOTES
Power picc line Placement 6/12/2021    Product number: [id number]   Lot Number: [id number]      Ultrasound:    Right Brachial vein:                Upper Arm Circumference: 23cm    Size: 5.5frdl    Exposed Length: 4cm    Internal Length: 31cm   Cut: 20cm   Vein Measurement: 0.56cm    Abilio Alcantar RN  6/12/2021  4:00 PM    Power picc line placed with vps tip confromation.  Tip in lower 1/3 svc/caj

## 2021-06-13 NOTE — DISCHARGE SUMMARY
Hospital Medicine Discharge Summary    Patient ID: Dennys Rodriguez      Patient's PCP: Aleksandar Dukes MD    Admit Date: 6/10/2021     Discharge Date:   6/13/21    Admitting Physician: Kervin Calix MD     Discharge Physician: Merrick Reeder MD     Discharge Diagnoses: Active Hospital Problems    Diagnosis     Hypoglycemia [E16.2]     Bowel obstruction ([address] [K56.609]     PEG tube malfunction (HCC) [K94.23]     Pneumoperitoneum [K66.8]     Hypomagnesemia [E83.42]     Hypocalcemia [E83.51]     Anticoagulated [Z79.01]     Polycystic kidney [Q61.3]     Severe malnutrition ([address] [E43]     Severe dehydration [E86.0]     Severe protein-calorie malnutrition (HCC) [E43]     Generalized abdominal pain [R10.84]     Crohn's colitis, unspecified complication ([address] [Z84.716]     Seizures ([address] [R56.9]     Stage 3b chronic kidney disease ([address] [N18.32]        The patient was seen and examined on day of discharge and this discharge summary is in conjunction with any daily progress note from day of discharge. Hospital Course:   \"77 y. o. male history Crohn's disease status post had PEG tube placed on 6/1/2021, developed small bowel obstruction due to PEG tube, had diagnostic laparoscopy with adhesiolysis, removal and replacement of PEG tube done on 6/4/2021, COPD, PE on anticoagulation with Eliquis, hypertension, MS, seizure disorder, stage II chronic kidney disease, ischemic cardiomyopathy, narcotic dependence and malnutrition who presented with abdominal pain for 9 days. He had leg swelling. He was in the hospital in May 2021 for dysphagia, EGD showed erosive esophagitis. During his most recent hospitalization, he had acute on chronic anemia that was transfused. Patient left AMA from  [facility]. His wife brought him back because she feels that his PEG tube is not placed right. Patient was assessed by infectious disease as well as general surgery.   No surgical intervention recommended from general surgery. Patient was seen by GI for Crohn's disease flare.  Patient had an elevated procalcitonin so infectious disease was consulted as well. \"     Crohn's flare, consult GI, stool studies including  -Continue IV steroids  -Procalcitonin extremely elevated. CRP elevated but ESR within normal limits. ID was consulted. Continue Zosyn.  -Follow-up stool culture  -GI consulted. He is not a candidate for Remicade due to multiple sclerosis. Recommend transfer to [facility] due to complex mixture of inflammatory bowel disease with advanced multiple sclerosis.     Hypoglycemia  -Resolved     Pneumoperitoneum possibly postprocedural  -No concerns for acute abdomen per general surgery.     Suspected small bowel obstruction  -No concerns for bowel obstruction per general surgery     Hypomagnesemia,  -Resolved     Dysphagia status post PEG tube placement.  -No PEG tube malfunction per general surgery. Tube feeds were restarted.     History of PE on Eliquis.    -Eliquis is currently on hold alongside oral medications due to PEG tube being hooked to gravity. Continue empiric Lovenox.     Hypertension  -BP mildly high today, otherwise has been controlled     Seizure disorder  -Continue Keppra IV.     Hypocalcemia  -Monitor and replace     History of multiple sclerosis  -On immune modulators     Stage III chronic kidney disease at baseline-due to polycystic kidney disease  -Renal function stable     Pedal edema  -Continue Prevalon boots per podiatry      Severe malnutrition with failure to thrive. -  Dietitian consult.     Erosive gastritis  - continue PPI IV and Carafate.     Anxiety  -Start Atarax as needed       Physical Exam Performed:     BP (!) 140/112 Comment: manual  Pulse 137   Temp 97.9 °F (36.6 °C) (Temporal)   Resp 18   Ht 5' 1\" (1.549 m)   Wt 100 lb 4.8 oz (45.5 kg)   SpO2 96%   BMI 18.95 kg/m²   General appearance: No apparent distress, appears stated age and cooperative. on nasal 02. HEENT: Pupils equal, round, and reactive to light. Conjunctivae/corneas clear. Neck: Supple, with full range of motion. No jugular venous distention. Trachea midline. Respiratory:  Normal respiratory effort. Clear to auscultation, bilaterally without Rales/Wheezes/Rhonchi. Cardiovascular: Regular rate and rhythm with normal S1/S2 without murmurs, rubs or gallops. Abdomen: Soft, ++ tender, non-distended with normal bowel sounds. Musculoskeletal: No clubbing, cyanosis. Trace LE edema bilaterally. Full range of motion without deformity. Skin: Skin color, texture, turgor normal.  No rashes or lesions. Neurologic:  Neurovascularly intact without any focal sensory/motor deficits. Cranial nerves: II-XII intact, grossly non-focal.  Psychiatric: Alert and oriented, thought content appropriate, normal insight          Labs: For convenience and continuity at follow-up the following most recent labs are provided:      CBC:    Lab Results   Component Value Date    WBC 14.3 06/13/2021    HGB 12.3 06/13/2021    HCT 39.8 06/13/2021     06/13/2021       Renal:    Lab Results   Component Value Date     06/13/2021    K 4.0 06/13/2021    K 3.5 06/10/2021     06/13/2021    CO2 17 06/13/2021    BUN 17 06/13/2021    CREATININE 1.1 06/13/2021    CALCIUM 6.9 06/13/2021    PHOS 2.7 06/13/2021         Significant Diagnostic Studies    Radiology:   CT ABDOMEN PELVIS W IV CONTRAST Additional Contrast? None   Final Result   Large amount of free intraperitoneal air as noted which may be related to   previous surgery G tube placement. Bowel perforation is less likely. Diffusely thickened edematous and inflamed small bowel loops and colon with   proximally dilated small bowel loops. This may be due to diffuse   enterocolitis/inflammatory bowel disease. Superimposed pseudomembranous   colitis has to be excluded. There may be an element of bowel obstruction   which is slightly improved.   The transition point seems to be in the right   lower quadrant. Atelectasis/pleural effusion lung bases likely CHF. Abdominal aortic aneurysm. RECOMMENDATIONS:   Abdominal aortic aneurysm. 5 year surveillance is recommended. Consults:     IP CONSULT TO GENERAL SURGERY  IP CONSULT TO INTERNAL MEDICINE  IP CONSULT TO GI  IP CONSULT TO DIETITIAN  IP CONSULT TO DIETITIAN  IP CONSULT TO INFECTIOUS DISEASES  IP CONSULT TO PODIATRY    Disposition: [facility]    Condition at Discharge: Stable    Discharge Instructions/Follow-up:     [facility] [address]  [phone]           Code Status:  Full Code    Activity: activity as tolerated    Diet: npo       Discharge Medications:     Current Discharge Medication List           Details   hydrOXYzine (ATARAX) 25 MG tablet Take 1 tablet by mouth every 6 hours as needed for Itching or Anxiety  Qty: 120 tablet, Refills: 0      enoxaparin (LOVENOX) 60 MG/0.6ML injection Inject 0.5 mLs into the skin 2 times daily  Qty: 30 mL, Refills: 0      methylPREDNISolone sodium (SOLU-MEDROL) 40 MG injection Infuse 0.5 mLs intravenously every 12 hours for 10 days  Qty: 400 mg, Refills: 0      !! levETIRAcetam (KEPPRA) 500 MG/100ML SOLN IVPB Infuse 50 mLs intravenously daily  Qty: 4000 mL, Refills: 0      !! levETIRAcetam (KEPPRA) 500 MG/100ML SOLN IVPB Infuse 100 mLs intravenously nightly  Qty: 4000 mL, Refills: 0      metoprolol (LOPRESSOR) 5 MG/5ML SOLN injection Infuse 5 mLs intravenously every 6 hours as needed (SBP>160 or HR >110)  Qty: 15 mL, Refills: 0      !! Wound Dressings (UNNA-FLEX ELASTIC UNNA BOOT) MISC Apply 1 each topically once for 1 dose  Qty: 1 each, Refills: 0      !! Wound Dressings (UNNA-FLEX ELASTIC UNNA BOOT) MISC Apply 1 each topically once for 1 dose  Qty: 1 each, Refills: 0      !!  Wound Dressings (UNNA-FLEX ELASTIC UNNA BOOT) MISC Apply 2 each topically daily  Qty: 1 each, Refills: 0 piperacillin-tazobactam (ZOSYN) infusion Infuse 2.25 g intravenously every 8 hours for 10 days Compound per protocol. Qty: 67.5 g, Refills: 0      pantoprazole (PROTONIX) 40 MG injection Infuse 40 mg intravenously 2 times daily for 10 days  Qty: 800 mg, Refills: 0       !! - Potential duplicate medications found. Please discuss with provider. Details   calcium carbonate 600 MG TABS tablet Take 1 tablet by mouth daily      sucralfate (CARAFATE) 1 GM tablet Take 1 tablet by mouth 4 times daily  Qty: 120 tablet, Refills: 3      oxyCODONE HCl (OXY-IR) 10 MG immediate release tablet Take 10 mg by mouth every 4 hours. oxybutynin (DITROPAN) 5 MG tablet Take 5 mg by mouth as needed       atorvastatin (LIPITOR) 80 MG tablet Take 1 tablet by mouth nightly  Qty: 30 tablet, Refills: 3      mirtazapine (REMERON) 15 MG tablet Take 15 mg by mouth nightly              Time Spent on discharge is more than 30 minutes in the examination, evaluation, counseling and review of medications and discharge plan. Signed:    Barry Berry MD   6/13/2021      Thank you Zhane Baca MD for the opportunity to be involved in this patient's care. If you have any questions or concerns please feel free to contact me at [phone].

## 2021-06-13 NOTE — PROGRESS NOTES
Initiated transfer to [facility]. Spoke to hospitalist Dr. Eleanor Kent who has accepted the patient for transfer. Awaiting for bed availability.

## 2021-06-13 NOTE — PROGRESS NOTES
Infectious Disease  Progress Note  [facility]    Chief Complaint: abdominal discomfort    Subjective: abdominal pain    Scheduled Meds:   Unna-Flex Elastic Unna Boot  2 each Topical Daily    lidocaine PF  5 mL Intradermal Once    sodium chloride flush  5-40 mL Intravenous 2 times per day    heparin flush  3 mL Intravenous 2 times per day    methylPREDNISolone  20 mg Intravenous Q12H    Unna-Flex Elastic Unna Boot  1 each Topical Once    Unna-Flex Elastic Unna Boot  1 each Topical Once    enoxaparin  1 mg/kg Subcutaneous BID    piperacillin-tazobactam  3,375 mg Intravenous Q8H    sodium chloride   Intravenous Q8H    atorvastatin  80 mg Oral Nightly    mirtazapine  15 mg Oral Nightly    sucralfate  1 g Oral 4x Daily    pantoprazole  40 mg Intravenous BID    levetiracetam  250 mg Intravenous Daily    levetiracetam  500 mg Intravenous Nightly     Continuous Infusions:   sodium chloride      dextrose      dextrose 5% and 0.45% NaCl with KCl 20 mEq 75 mL/hr at 06/13/21 0614     PRN Meds:sodium chloride flush, sodium chloride, heparin flush, morphine, sodium chloride flush, metoprolol, ondansetron **OR** ondansetron, polyethylene glycol, acetaminophen **OR** acetaminophen, glucose, dextrose, glucagon (rDNA), dextrose    Patient Vitals for the past 24 hrs:   BP Temp Temp src Pulse Resp SpO2 Weight   06/13/21 0900 (!) 144/90 97.9 °F (36.6 °C) Oral 99 18 96 %    06/13/21 0411       100 lb 4.8 oz (45.5 kg)   06/12/21 2017 (!) 150/84 97.3 °F (36.3 °C) Axillary 90 18 99 %    06/12/21 1545 139/83 97.7 °F (36.5 °C) Temporal 95 18 98 %        CBC with Differential:    Lab Results   Component Value Date    WBC 14.3 06/13/2021    RBC 4.31 06/13/2021    HGB 12.3 06/13/2021    HCT 39.8 06/13/2021     06/13/2021    MCV 92.3 06/13/2021    MCH 28.5 06/13/2021    MCHC 30.9 06/13/2021    RDW 16.6 06/13/2021    NRBC 0.9 05/18/2021    SEGSPCT 62 07/11/2013    METASPCT 0.9 06/13/2021    LYMPHOPCT 6.0 06/13/2021 MONOPCT 11.2 06/13/2021    MYELOPCT 0.9 06/13/2021    BASOPCT 0.2 06/13/2021    MONOSABS 1.57 06/13/2021    LYMPHSABS 0.86 06/13/2021    EOSABS 0.00 06/13/2021    BASOSABS 0.00 06/13/2021     CMP:    Lab Results   Component Value Date     06/13/2021    K 4.0 06/13/2021    K 3.5 06/10/2021     06/13/2021    CO2 17 06/13/2021    BUN 17 06/13/2021    CREATININE 1.1 06/13/2021    GFRAA >60 06/13/2021    LABGLOM >60 06/13/2021    GLUCOSE 151 06/13/2021    PROT 4.1 06/11/2021    LABALBU 1.9 06/11/2021    CALCIUM 6.9 06/13/2021    BILITOT 0.7 06/11/2021    ALKPHOS 58 06/11/2021    AST 35 06/11/2021    ALT 18 06/11/2021       BP (!) 144/90   Pulse 99   Temp 97.9 °F (36.6 °C) (Oral)   Resp 18   Ht 5' 1\" (1.549 m)   Wt 100 lb 4.8 oz (45.5 kg)   SpO2 96%   BMI 18.95 kg/m²     Physical Exam  Const/Neuro- unchanged, no signs of acute distress, Alert  ENMT- Within Normal Limits, Normocephalic, mucous membranes pink/moist, No thrush  Neck: Neck supple  Heart- Regular, Rate, Rhythm- no murmur appreciated. Lungs- clear to ascultation. Respirations even and nonlabored. Abdomen- Soft, bowel sounds positive, non tender  Musculo/Extremities-  Equal and symmetrical, no edema. No tenderness. Skin:  Warm and dry, free from rashes. Cultures reviewed    Radiology reviewed  CT ABDOMEN PELVIS W IV CONTRAST Additional Contrast? None   Final Result   Large amount of free intraperitoneal air as noted which may be related to   previous surgery G tube placement. Bowel perforation is less likely. Diffusely thickened edematous and inflamed small bowel loops and colon with   proximally dilated small bowel loops. This may be due to diffuse   enterocolitis/inflammatory bowel disease. Superimposed pseudomembranous   colitis has to be excluded. There may be an element of bowel obstruction   which is slightly improved. The transition point seems to be in the right   lower quadrant.       Atelectasis/pleural effusion lung bases likely CHF. Abdominal aortic aneurysm. RECOMMENDATIONS:   Abdominal aortic aneurysm. 5 year surveillance is recommended.              Assessment  Severe Crohn's flare   ID asked to see because of elevated procalcitonin   No positive cultures   WBC 14,300 today    Afebrile       Plan  He is on zosyn presently and WBC down   Baseline ESR, CRP   Monitor labs   Will follow with you   Podiatry note noted today   Dr Jeremy Garg note noted   Recommends transfer to [place]   Recommends fluid sampling abd        Electronically signed by Allie Cruz MD on 6/13/2021 at 11:03 AM

## 2021-06-13 NOTE — PROGRESS NOTES
notified regarding a high bp of 160/120 and heart rate ranging between 120's-130's. Also, this nurse informed him that pt is vomiting and screaming out about pain. He was also told that morphine was given. He said he will discontinue morphine and order Dilaudid. Dr. Tacos Aldrich gave orders to give Dilaudid now.

## 2021-06-13 NOTE — PROGRESS NOTES
Department of Podiatry  Progress Note    SUBJECTIVE:  Mr. Shmuel Tucker was evaluated at bedside this morning for b/l LE edema. No acute events overnight. Patient denies any N/V/D/F/C/SOB/CP and has no other pedal complaints at this time. OBJECTIVE:    Scheduled Meds:   Unna-Flex Elastic Unna Boot  2 each Topical Daily    lidocaine PF  5 mL Intradermal Once    sodium chloride flush  5-40 mL Intravenous 2 times per day    heparin flush  3 mL Intravenous 2 times per day    methylPREDNISolone  20 mg Intravenous Q12H    Unna-Flex Elastic Unna Boot  1 each Topical Once    Unna-Flex Elastic [place]  1 each Topical Once    enoxaparin  1 mg/kg Subcutaneous BID    piperacillin-tazobactam  3,375 mg Intravenous Q8H    sodium chloride   Intravenous Q8H    atorvastatin  80 mg Oral Nightly    mirtazapine  15 mg Oral Nightly    sucralfate  1 g Oral 4x Daily    pantoprazole  40 mg Intravenous BID    levetiracetam  250 mg Intravenous Daily    levetiracetam  500 mg Intravenous Nightly     Continuous Infusions:   sodium chloride      dextrose      dextrose 5% and 0.45% NaCl with KCl 20 mEq 75 mL/hr at 06/13/21 0614     PRN Meds:.sodium chloride flush, sodium chloride, heparin flush, morphine, sodium chloride flush, metoprolol, ondansetron **OR** ondansetron, polyethylene glycol, acetaminophen **OR** acetaminophen, glucose, dextrose, glucagon (rDNA), dextrose    Allergies   Allergen Reactions    Neurontin [Gabapentin] Palpitations    Lyrica [Pregabalin] Other (See Comments)     photophobia       BP (!) 150/84   Pulse 90   Temp 97.3 °F (36.3 °C) (Axillary)   Resp 18   Ht 5' 1\" (1.549 m)   Wt 100 lb 4.8 oz (45.5 kg)   SpO2 99%   BMI 18.95 kg/m²       EXAM:    VASCULAR:  DP and PT pulses are non-palpable. CFT delayed B/L. Warm to warm from the tibial tuberosity to the distal aspect of the digits dorsally.  No hair growth noted to the distal aspects dorsally.     NEUROLOGIC:  Unable to evaluate at the time of examination      DERM:  Skin is intact and well hydrated to the bilateral lower extremities. Diffuse pitting edema noted R>L b/l. No Erythema, No Hyperkeratotic tissue noted. No open lesions or abrasions. Webspaces 1-4 b/l are C/D/I.     MUSCULOSKELETAL: Equinus b/l. MMT deferred    Scheduled Meds:   Unna-Flex Elastic Unna Boot  2 each Topical Daily    lidocaine PF  5 mL Intradermal Once    sodium chloride flush  5-40 mL Intravenous 2 times per day    heparin flush  3 mL Intravenous 2 times per day    methylPREDNISolone  20 mg Intravenous Q12H    Unna-Flex Elastic Unna Boot  1 each Topical Once    Unna-Flex Elastic [place]  1 each Topical Once    enoxaparin  1 mg/kg Subcutaneous BID    piperacillin-tazobactam  3,375 mg Intravenous Q8H    sodium chloride   Intravenous Q8H    atorvastatin  80 mg Oral Nightly    mirtazapine  15 mg Oral Nightly    sucralfate  1 g Oral 4x Daily    pantoprazole  40 mg Intravenous BID    levetiracetam  250 mg Intravenous Daily    levetiracetam  500 mg Intravenous Nightly     Continuous Infusions:   sodium chloride      dextrose      dextrose 5% and 0.45% NaCl with KCl 20 mEq 75 mL/hr at 06/13/21 0614     PRN Meds:.sodium chloride flush, sodium chloride, heparin flush, morphine, sodium chloride flush, metoprolol, ondansetron **OR** ondansetron, polyethylene glycol, acetaminophen **OR** acetaminophen, glucose, dextrose, glucagon (rDNA), dextrose    RADIOLOGY:  CT ABDOMEN PELVIS W IV CONTRAST Additional Contrast? None   Final Result   Large amount of free intraperitoneal air as noted which may be related to   previous surgery G tube placement. Bowel perforation is less likely. Diffusely thickened edematous and inflamed small bowel loops and colon with   proximally dilated small bowel loops. This may be due to diffuse   enterocolitis/inflammatory bowel disease. Superimposed pseudomembranous   colitis has to be excluded.   There may be an element of bowel obstruction which is slightly improved. The transition point seems to be in the right   lower quadrant. Atelectasis/pleural effusion lung bases likely CHF. Abdominal aortic aneurysm. RECOMMENDATIONS:   Abdominal aortic aneurysm. 5 year surveillance is recommended. BP (!) 150/84   Pulse 90   Temp 97.3 °F (36.3 °C) (Axillary)   Resp 18   Ht 5' 1\" (1.549 m)   Wt 100 lb 4.8 oz (45.5 kg)   SpO2 99%   BMI 18.95 kg/m²     LABS:    Recent Labs     06/11/21  0636 06/13/21  0626   WBC 19.0* 14.3*   HGB 12.7 12.3*   HCT 39.5 39.8    292        Recent Labs     06/13/21  0626      K 4.0      CO2 17*   PHOS 2.7   BUN 17   CREATININE 1.1        Recent Labs     06/10/21  1628 06/11/21  0636   PROT 4.7* 4.1*   INR 1.7  --    APTT 28.2  --          ASSESSMENT:  1.B/L LE pitting edema     Severe protein-calorie malnutrition (HCC)    Stage 3b chronic kidney disease (HCC)    Seizures (HCC)    Crohn's colitis, unspecified complication (HCC)    Hypoglycemia    Bowel obstruction (HCC)    PEG tube malfunction (HCC)    Pneumoperitoneum    Hypomagnesemia    Hypocalcemia    Anticoagulated    Polycystic kidney    Severe malnutrition (HCC)    Severe dehydration    Generalized abdominal pain        PLAN:  - Patient was examined and evaluated. Reviewed patient's recent lab results and pertinent diagnostic imaging. Reviewed ancillary service notes. - WBC: 14.3, trending down  - UNNA Boots placed to B/L LE this morning.   - Prevalon boots placed. - Pain Control: IV and PO  - Antibiotics as per ID: Zosyn   - Discharge once cleared from all other teams on board. Will follow up within one week in office.   - Discussed patient with Dr. Marina Carter   - Will continue to follow patient while they are in-house.

## 2021-06-13 NOTE — PROGRESS NOTES
Hospitalist Progress Note      PCP: Emery Alves MD    Date of Admission: 6/10/2021    Hospital Course: \"63 y.o. male history Crohn's disease status post had PEG tube placed on 6/1/2021, developed small bowel obstruction due to PEG tube, had diagnostic laparoscopy with adhesiolysis, removal and replacement of PEG tube done on 6/4/2021, COPD, PE on anticoagulation with Eliquis, hypertension, MS, seizure disorder, stage II chronic kidney disease, ischemic cardiomyopathy, narcotic dependence and malnutrition who presented with abdominal pain for 9 days. He had leg swelling. He was in the hospital in May 2021 for dysphagia, EGD showed erosive esophagitis. During his most recent hospitalization, he had acute on chronic anemia that was transfused. Patient left AMA from  [facility]. His wife brought him back because she feels that his PEG tube is not placed right. Patient was assessed by infectious disease as well as general surgery. No surgical intervention recommended from general surgery. Patient was seen by GI for Crohn's disease flare. Patient had an elevated procalcitonin so infectious disease was consulted as well. \"    Subjective:  Pt has been having abdominal pain. He is coughing.     Medications:  Reviewed    Infusion Medications    sodium chloride      dextrose      dextrose 5% and 0.45% NaCl with KCl 20 mEq 75 mL/hr at 06/13/21 7570     Scheduled Medications    Unna-Flex Elastic Unna Boot  2 each Topical Daily    lidocaine PF  5 mL Intradermal Once    sodium chloride flush  5-40 mL Intravenous 2 times per day    heparin flush  3 mL Intravenous 2 times per day    methylPREDNISolone  20 mg Intravenous Q12H    Unna-Flex Elastic Unna Boot  1 each Topical Once    Unna-Flex Elastic Unna Boot  1 each Topical Once    enoxaparin  1 mg/kg Subcutaneous BID    piperacillin-tazobactam  3,375 mg Intravenous Q8H    sodium chloride   Intravenous Q8H    atorvastatin  80 mg Oral Nightly    134 135   K 3.5 4.1 4.0    106 105   CO2 23 19* 17*   BUN 12 17 17   CREATININE 1.3* 1.2 1.1   CALCIUM 6.8* 6.9* 6.9*   PHOS 4.6* 3.1 2.7     Recent Labs     06/10/21  1628 06/11/21  0636   AST 43* 35   ALT 21 18   BILITOT 0.9 0.7   ALKPHOS 75 58     Recent Labs     06/10/21  1628   INR 1.7     No results for input(s): Yessica Hind in the last 72 hours. Urinalysis:      Lab Results   Component Value Date    NITRU Negative 06/11/2021    WBCUA NONE 06/03/2021    BACTERIA MODERATE 06/03/2021    RBCUA >20 06/03/2021    BLOODU Negative 06/11/2021    SPECGRAV <=1.005 06/11/2021    GLUCOSEU Negative 06/11/2021       Radiology:  CT ABDOMEN PELVIS W IV CONTRAST Additional Contrast? None   Final Result   Large amount of free intraperitoneal air as noted which may be related to   previous surgery G tube placement. Bowel perforation is less likely. Diffusely thickened edematous and inflamed small bowel loops and colon with   proximally dilated small bowel loops. This may be due to diffuse   enterocolitis/inflammatory bowel disease. Superimposed pseudomembranous   colitis has to be excluded. There may be an element of bowel obstruction   which is slightly improved. The transition point seems to be in the right   lower quadrant. Atelectasis/pleural effusion lung bases likely CHF. Abdominal aortic aneurysm. RECOMMENDATIONS:   Abdominal aortic aneurysm. 5 year surveillance is recommended.                  Assessment/Plan:    Active Hospital Problems    Diagnosis     Hypoglycemia [E16.2]     Bowel obstruction ([address] [K56.609]     PEG tube malfunction ([address] [K94.23]     Pneumoperitoneum [K66.8]     Hypomagnesemia [E83.42]     Hypocalcemia [E83.51]     Anticoagulated [Z79.01]     Polycystic kidney [Q61.3]     Severe malnutrition (HCC) [E43]     Severe dehydration [E86.0]     Severe protein-calorie malnutrition (HCC) [E43]     Generalized abdominal pain [R10.84]     Crohn's colitis, unspecified complication ([facility] [G06.865]     Seizures ([facility] [R56.9]     Stage 3b chronic kidney disease ([facility] [N18.32]      Crohn's flare, consult GI, stool studies including  -Continue IV steroids  -Procalcitonin extremely elevated. CRP elevated but ESR within normal limits. ID was consulted. Continue Zosyn.  -Follow-up stool culture  -GI consulted. He is not a candidate for Remicade due to multiple sclerosis. Recommend transfer to [facility] due to complex mixture of inflammatory bowel disease with advanced multiple sclerosis. Hypoglycemia  -Resolved    Pneumoperitoneum possibly postprocedural  -No concerns for acute abdomen per general surgery. Suspected small bowel obstruction  -No concerns for bowel obstruction per general surgery    Hypomagnesemia,  -Resolved    Dysphagia status post PEG tube placement.  -No PEG tube malfunction per general surgery. Tube feeds were restarted. History of PE on Eliquis.    -Eliquis is currently on hold alongside oral medications due to PEG tube being hooked to gravity. Continue empiric Lovenox. Hypertension  -BP mildly high today, otherwise has been controlled    Seizure disorder  -Continue Keppra IV. Hypocalcemia  -Monitor and replace    History of multiple sclerosis  -On immune modulators    Stage III chronic kidney disease at baseline-due to polycystic kidney disease  -Renal function stable    Pedal edema  -Continue Prevalon boots per podiatry      Severe malnutrition with failure to thrive. -  Dietitian consult. Erosive gastritis  - continue PPI IV and Carafate. Anxiety  -Start Atarax as needed    Wife present at bedside. She is agreeable to [place] transfer. DVT Prophylaxis: Lovenox  Diet: Diet NPO  ADULT TUBE FEEDING; PEG; Other Tube Feeding (specify);  Vital AF 1.2; Continuous; 10; Yes; 15; Q 4 hours; 40; 30; Q 4 hours  Code Status: Full Code    PT/OT Eval Status: As needed    Dispo -transfer to [facility]

## 2021-06-14 NOTE — ANESTHESIA PROCEDURE NOTES
Airway  Date/Time: 6/13/2021 10:12 PM  Urgency: emergent    Airway not difficult    General Information and Staff    Patient location during procedure: ICU  Anesthesiologist: Jermaine Camacho MD  Resident/CRNA: PALMA Calix CRNA  Performed: resident/CRNA     Consent for Airway (if performed for an anesthetic, see related documentation for consents)  Patient identity confirmed: per hospital policy  Consent: The procedure was performed in an emergent situation. Verbal consent not obtained. Written consent not obtained.   Risks and benefits: risks, benefits and alternatives were not discussed      Code status verified:yes  Indications and Patient Condition  Indications for airway management: respiratory distress, cardiovascular instability and airway protection  Spontaneous ventilation: present  Sedation level: deep  Preoxygenated: yes  Patient position: sniffing  MILS not maintained throughout  Mask difficulty assessment: not attempted    Final Airway Details  Final airway type: endotracheal airway      Successful airway: ETT  Cuffed: yes   Successful intubation technique: direct laryngoscopy  Facilitating devices/methods: cricoid pressure  Endotracheal tube insertion site: oral  Blade: Doug  Blade size: #4  ETT size (mm): 8.0  Cormack-Lehane Classification: grade I - full view of glottis  Placement verified by: chest auscultation and capnometry   Measured from: lips  ETT to lips (cm): 24  Number of attempts at approach: 1  Ventilation between attempts: bag mask    Additional Comments  16mg Amidate and 120mg Anectine given for rapid sequence induction

## 2021-06-14 NOTE — ANESTHESIA POSTPROCEDURE EVALUATION
Department of Anesthesiology  Postprocedure Note    Patient: Vishal De La Cruz  MRN: [id number]  YOB: 1955  Date of evaluation: 6/14/2021  Time:  6:27 AM     Procedure Summary     Date: 06/13/21 Room / Location: [facility] OR 09 / [facility]    Anesthesia Start: 2240 Anesthesia Stop: 06/14/21 0034    Procedure: LAPAROTOMY EXPLORATORY, RIGHT HEMICOLECTOMY, END ILEOSTOMY DRAINAGE INTRA  ABDOMINAL ABSCESS (N/A Abdomen) Diagnosis: (ISCHEMIC BOWEL)    Surgeons: Kevin Hawk MD Responsible Provider: Calos Balderrama MD    Anesthesia Type: general ASA Status: 4 - Emergent          Anesthesia Type: general    Laura Phase I: Laura Score: 4    Laura Phase II:      Last vitals: Reviewed and per EMR flowsheets.        Anesthesia Post Evaluation    Patient location during evaluation: ICU  Patient participation: complete - patient cannot participate  Level of consciousness: sedated and ventilated  Airway patency: patent  Complications: no  Cardiovascular status: hemodynamically unstable and vasoactive/inotropes  Respiratory status: ventilator and intubated

## 2021-06-14 NOTE — PROGRESS NOTES
[facility]  SURGICAL INTENSIVE CARE UNIT (SICU)  ATTENDING PHYSICIAN CRITICAL CARE PROGRESS NOTE     I have examined the patient, reviewed the record, and discussed the case with the APN/ resident. Please refer to the APN/ resident's note. I agree with the assessment and plan. I have reviewed all relevant labs and imaging data. The following summarizes my clinical findings and independent assessment.     CC:  Critical care management for pneumoperitoneum    Hospital Course/Overnight Events:  6/1--underwent PEG  6/4--underwent lap revision of G-tube  6/8--signed out AMA with plans to go to [facility]  6/10--presented here with abd pain  6/12--treated by GI for Crohn's exacerbation  6/13--RRT for hypotension/resp distress--intubated and started on pressors; to OR for ex lap, right hemicolectomy for perforated TI and end ileostomy  6/14--remains on levophed/vasopressin; hypothermic; electrolyte abnormalities    Intubated  Eyes to voice  Follows commands  Hrt:  Regular  Lungs:  Fairly clear bilaterally  Abd:  Soft; ostomy pink; MARY ELLEN drain with serosang drainage  Skin:  Warm/dry    Patient Active Problem List    Diagnosis Date Noted    Septic shock ([address]     Severe protein-calorie malnutrition ([address] 06/11/2021    Hypoglycemia 06/11/2021    Bowel obstruction ([address] 06/11/2021    PEG tube malfunction ([address] 06/11/2021    Pneumoperitoneum 06/11/2021    Hypomagnesemia 06/11/2021    Hypocalcemia 06/11/2021    Anticoagulated 06/11/2021    Polycystic kidney 06/11/2021    Severe malnutrition ([address] 06/11/2021    Severe dehydration 06/11/2021    Generalized abdominal pain     Crohn's colitis, unspecified complication ([address] 64/51/6819    Moderate protein-calorie malnutrition ([address] 05/08/2021    Acute renal failure (ARF) ([address] 05/03/2021    Hyperparathyroidism ([address] 04/21/2021    Altered mental state 04/19/2021    Seizures ([address] 04/18/2021    History of pulmonary embolism 03/28/2021    Acute on chronic renal insufficiency 03/28/2021    Ischemic cardiomyopathy 03/28/2021    SBO (small bowel obstruction) ([facility] 03/27/2021    HCAP (healthcare-associated pneumonia) 02/08/2021    Acute on chronic renal failure ([facility] 02/08/2021    Sepsis ([facility]     Small bowel obstruction ([facility] 02/07/2021    Stage 3b chronic kidney disease ([facility] 01/29/2021    Hyperkalemia 01/29/2021    Leukocytosis 01/29/2021    Crohn's disease ([facility]     COPD (chronic obstructive pulmonary disease) (HCC)     Tobacco abuse     Hypertension     Hyperlipidemia     Bilateral pulmonary embolism ([facility] 01/27/2021    Multiple sclerosis ([facility] 01/06/2021       S/p PEG  S/p lap revision of PEG  S/p ex lap with right hemicolectomy and end ileostomy  Hypotension--cont fluid resuscitation; wean pressors as able; on stress dose steroids  Worsening lactic acidosis--cont fluid resuscitation  Acute resp failure--cont mech vent support  Hypoalbuminemia/severe protein calorie malnutrition--NPO for now  Electrolyte imbalance (hypokalemia/hypocalcemia/hypophosphatemia)--correct as able  Acute blood loss anemia--monitor H/H  Thrombocytopenia--monitor  Zosyn #4  Hx of MS/Crohns/MOISES virus/chronic kidney disease  DVT risk--PCDs    Pt is at risk for hemodynamic/metabolic/respiratory deterioration and requires ICU care    Patrizia Fraser MD, FACS  6/14/2021  8:40 AM      Critical care time exclusive of teaching and procedures = 42 minutes

## 2021-06-14 NOTE — PROGRESS NOTES
Hospitalist Progress Note      PCP: Elvie Julian MD    Date of Admission: 6/10/2021    Hospital Course: \"63 y.o. male history Crohn's disease status post had PEG tube placed on 6/1/2021, developed small bowel obstruction due to PEG tube, had diagnostic laparoscopy with adhesiolysis, removal and replacement of PEG tube done on 6/4/2021, COPD, PE on anticoagulation with Eliquis, hypertension, MS, seizure disorder, stage II chronic kidney disease, ischemic cardiomyopathy, narcotic dependence and malnutrition who presented with abdominal pain for 9 days. He had leg swelling. He was in the hospital in May 2021 for dysphagia, EGD showed erosive esophagitis. During his most recent hospitalization, he had acute on chronic anemia that was transfused. Patient left AMA from  [facility]. His wife brought him back because she feels that his PEG tube is not placed right. Patient was assessed by infectious disease as well as general surgery. No surgical intervention recommended from general surgery. Patient was seen by GI for Crohn's disease flare. Patient had an elevated procalcitonin so infectious disease was consulted as well. \"    Subjective:  RRT called yesterday. Pt was septic. He was taken to OR for perforated bowel. Pt was hypoxic and placed on vent.     Medications:  Reviewed    Infusion Medications    fentaNYL 5 mcg/ml in 0.9%  ml infusion 25 mcg/hr (06/14/21 1356)    norepinephrine 15 mcg/min (06/14/21 1528)    propofol Stopped (06/14/21 0105)    vasopressin (Septic Shock) infusion 0.04 Units/min (06/14/21 1524)    sodium chloride      lactated ringers 150 mL/hr at 06/14/21 1525    sodium chloride      sodium chloride      dextrose       Scheduled Medications    [START ON 6/15/2021] levetiracetam  250 mg Intravenous Daily    metroNIDAZOLE  500 mg Intravenous Q8H    fluconazole  200 mg Intravenous Q24H    fentanNYL  50 mcg Intravenous Once    [Held by provider] metoprolol  5 mg Intravenous Q6H    chlorhexidine  15 mL Mouth/Throat BID    GenTeal Tears  1 drop Both Eyes Q4H    And    artificial tears   Both Eyes Q4H    hydrocortisone sodium succinate PF  100 mg Intravenous Q8H    sodium chloride flush  5-40 mL Intravenous 2 times per day    heparin flush  3 mL Intravenous 2 times per day    [Held by provider] enoxaparin  1 mg/kg Subcutaneous BID    piperacillin-tazobactam  3,375 mg Intravenous Q8H    sodium chloride   Intravenous Q8H    atorvastatin  80 mg Oral Nightly    [Held by provider] mirtazapine  15 mg Oral Nightly    sucralfate  1 g Oral 4x Daily    pantoprazole  40 mg Intravenous BID    levetiracetam  500 mg Intravenous Nightly     PRN Meds: sodium chloride, sodium chloride, [Held by provider] hydrOXYzine, sodium chloride flush, sodium chloride, heparin flush, ondansetron **OR** ondansetron, polyethylene glycol, acetaminophen **OR** acetaminophen, glucose, dextrose, glucagon (rDNA), dextrose      Intake/Output Summary (Last 24 hours) at 6/14/2021 1546  Last data filed at 6/14/2021 1500  Gross per 24 hour   Intake 39700.65 ml   Output 2910 ml   Net 8533.65 ml       Physical Exam Performed:    BP (!) 156/77   Pulse 119   Temp 99.7 °F (37.6 °C) (Bladder)   Resp 28   Ht 5' 1\" (1.549 m)   Wt 130 lb 15.3 oz (59.4 kg)   SpO2 (!) 61%   BMI 24.74 kg/m²     General appearance: No apparent distress, appears stated age and cooperative. on mechanical vent. HEENT: ET  Neck: Supple, with full range of motion. No jugular venous distention. Trachea midline. Respiratory:  Normal respiratory effort. Clear to auscultation, bilaterally without Rales/Wheezes/Rhonchi. Cardiovascular: Regular rate and rhythm with normal S1/S2 without murmurs, rubs or gallops. Abdomen: Soft, abdominal dressing,ileostomy, MARY ELLEN drain adjacent,non-distended with normal bowel sounds. Musculoskeletal: No clubbing, cyanosis. Trace-1+ LE edema bilaterally. Full range of motion without deformity.   Skin: Skin color, texture, turgor normal.  No rashes or lesions. Neurologic:  sedated  Psychiatric: sedated      Labs:   Recent Labs     06/14/21  0045 06/14/21  0600 06/14/21  1220   WBC 1.2* 0.9* 2.4*   HGB 6.2* 6.9* 10.9*   HCT 20.0* 22.5* 33.3*   * 72* 36*     Recent Labs     06/14/21  0045 06/14/21  0600 06/14/21  1220    139 135   K 3.2* 4.9 4.5    110* 108*   CO2 18* 16* 14*   BUN 14 15 14   CREATININE 0.9 1.0 1.0   CALCIUM 5.4* 6.6* 6.7*   PHOS 2.9 3.2 3.2     Recent Labs     06/14/21  0045 06/14/21  0600 06/14/21  1220   AST 20 28 281*   ALT 8 10 116*   BILITOT 0.5 0.5 0.5   ALKPHOS 36* 31* 32*     No results for input(s): INR in the last 72 hours. No results for input(s): Yessica Hind in the last 72 hours. Urinalysis:      Lab Results   Component Value Date    NITRU Negative 06/11/2021    WBCUA NONE 06/03/2021    BACTERIA MODERATE 06/03/2021    RBCUA >20 06/03/2021    BLOODU Negative 06/11/2021    SPECGRAV <=1.005 06/11/2021    GLUCOSEU Negative 06/11/2021       Radiology:  XR CHEST PORTABLE   Final Result   1. No interval change in the multifocal pneumonia seen throughout the right   lung. 2. Trace right pleural effusion         XR ABDOMEN FOR NG/OG/NE TUBE PLACEMENT   Final Result   NG tube in the stomach. XR CHEST PORTABLE   Final Result   Support tubes and lines in place as described. Increasing airspace disease in the right lung consistent with multifocal   pneumonia. Resolving basilar atelectasis on the right. Small right pleural   effusion. XR CHEST PORTABLE   Final Result   Right-sided PICC line with the distal tip in the SVC and no pneumothorax. Increasing atelectatic changes in the right lung base. Increasing airspace disease in the right lung, most consistent with pneumonia.          CT ABDOMEN PELVIS W IV CONTRAST Additional Contrast? None   Final Result   Progressive diffuse inflammation of the mural thickening of the small bowel   loops and colon with extensive pneumatosis intestinalis of small bowel loops   and colon concerning for progressive inflammation/enterocolitis and possibly   ischemic bowel. There is large volume of superior mesenteric venous air and   portal venous air with large amount of air in the liver. Multiple hypodensities in the liver concerning for multifocal hepatitis. Increasing infiltrates and pleural effusion lung bases concerning for   pneumonia. Progressive free intraperitoneal air which could be due to PEG tube insertion   and or bowel perforation. Critical results were called by Dr. Gloria Lynn to hanane. On 6/13/2021   at 21:11. CT ABDOMEN PELVIS W IV CONTRAST Additional Contrast? None   Final Result   Large amount of free intraperitoneal air as noted which may be related to   previous surgery G tube placement. Bowel perforation is less likely. Diffusely thickened edematous and inflamed small bowel loops and colon with   proximally dilated small bowel loops. This may be due to diffuse   enterocolitis/inflammatory bowel disease. Superimposed pseudomembranous   colitis has to be excluded. There may be an element of bowel obstruction   which is slightly improved. The transition point seems to be in the right   lower quadrant. Atelectasis/pleural effusion lung bases likely CHF. Abdominal aortic aneurysm. RECOMMENDATIONS:   Abdominal aortic aneurysm. 5 year surveillance is recommended.          XR CHEST PORTABLE    (Results Pending)   XR CHEST PORTABLE    (Results Pending)           Assessment/Plan:    Active Hospital Problems    Diagnosis     Septic shock ([address] [A41.9, R65.21]     Hypoglycemia [E16.2]     Bowel obstruction ([address] [K56.609]     PEG tube malfunction ([address] [K94.23]     Pneumoperitoneum [K66.8]     Hypomagnesemia [E83.42]     Hypocalcemia [E83.51]     Anticoagulated [Z79.01]     Polycystic kidney [Q61.3]     Severe malnutrition ([address] [E43]     Severe dehydration [E86.0]     Severe protein-calorie malnutrition (HCC) [E43]     Generalized abdominal pain [R10.84]     Crohn's colitis, unspecified complication ([facility] [B92.585]     Seizures ([facility] [R56.9]     Stage 3b chronic kidney disease ([facility] [N18.32]      Small bowel perforation  -s/p emergent ex lap,R hemicolectomy, end ileostomy and drainage of intra-abdominal abscess 6/13  -Continue zosyn  -Management as per General Surgery    Sepsis-due to peritonitis  -s/p surgery and drainage of abscess  -Continue IV abx  -Lactic acid elevated, continue IV hydration  -Wean pressors as tolerated    Crohn's flare  -IV steroids held due to perforation.  -Procalcitonin extremely elevated. CRP elevated but ESR within normal limits. ID was consulted. Continue Zosyn.  -Follow-up stool culture  -GI consulted. He is not a candidate for Remicade due to multiple sclerosis. Recommended transfer to [facility] due to complex mixture of inflammatory bowel disease with advanced multiple sclerosis prior to bowel perforation. Patient was accepted to [facility] for hospitalist transfer. Transfer on hold now with perforated bowel.  -Palliative care consulted. Pancytopenia-due to severe sepsis  -Continue to treat infection. Hypoglycemia  -Resolved    Hypomagnesemia  -Resolved    Dysphagia status post PEG tube placement.  -No PEG tube malfunction per general surgery. Tube feeds were restarted. History of PE on Eliquis.    -Eliquis is currently on hold alongside oral medications due to PEG tube being hooked to gravity. Continue empiric Lovenox. Seizure disorder  -Continue Keppra IV. Hypocalcemia  -Monitor and replace    History of multiple sclerosis  -On immune modulators at [facility] [address] [facility]    Stage III chronic kidney disease at baseline-due to polycystic kidney disease  -Renal function stable    Pedal edema  -Continue Prevalon boots per podiatry      Severe malnutrition with failure to thrive.

## 2021-06-14 NOTE — FLOWSHEET NOTE
Patient arrived to unit after RRT with bedside RN, resident, attending and ICU RN. Generally unresponsive except to pain with some obvious signs of anxiety (tachypnea, grunting and pulling at NRB mask). Upon arrival to unit pt was started on LR @150. Patient turned to evaluate skin where he had a large orange/red BM. Cuff pressure unreadable after, ST on monitor. 1L bolus of LR started @2150. Doppler obtained to check manually. Unable to find doppler pulse brachially and radially. Levophed started @2200 per resident. ART line inserted by resident. ABGs obtained. Anesthesia at bedside with RT to intubate. Medications given by anesthesia @2212. Intubated [id number]. @2223 2 amps of bicarb given for ABG results per resident. CVC L IJ inserted by resident @3127. See flowsheet for vitals. PT left unit with anesthesia, RT and OR staff at [id number].

## 2021-06-14 NOTE — PROGRESS NOTES
Palliative Medicine Social Work     Patient Name: Dennys Rodriguez  Age: 63 y.o. [place] Status: no    Next of Jonothan Fleischer  FB:[phone]    Additional Support: son Nurys Ford    Minor Children: no    Advanced Directives: no    Confirm Code Status: full    Mental Health History: no    Substance Abuse:no    Indications of Abuse/Neglect: no    Financial Concerns: no    Living Situation: presides with his wife, has a walker, wheelchair, cane, extended shower chair, bed side commode, and is in the process of getting a hospital bed. He has a new peg tube and has been active with [facility]    Physical Care Needs Met: yes    Assessment: 62 yo  male seen in icu, he is intubated after  an exploratory laparotomy, hemicolectomy, ileostomy, drainage of abscess. History of crohns, copd, MS.   No family present at this time, will review with team.

## 2021-06-14 NOTE — OP NOTE
Operative Note      Patient: Lucio Madden  YOB: 1955  MRN: [id number]    Date of Procedure: 6/13/2021    Pre-Op Diagnosis: ISCHEMIC BOWEL    Post-Op Diagnosis: small bowel perforation       Procedure(s):  LAPAROTOMY EXPLORATORY, RIGHT HEMICOLECTOMY, END ILEOSTOMY DRAINAGE INTRA  ABDOMINAL ABSCESS    Surgeon(s):  Lupe eLzama MD    Assistant:   Resident: Mandeep White MD PGY4  Anesthesia: General    Estimated Blood Loss (mL): 561BZ    Complications: None    Specimens:   ID Type Source Tests Collected by Time Destination   1 : peritoneal fluid - culture fungus, culture, gram stain aerobic and aerobic Tissue Tissue CULTURE, ANAEROBIC, CULTURE, FUNGUS, GRAM STAIN, CULTURE, SURGICAL (Canceled) Lupe Lezama MD 6/13/2021 4448    A : small bowel and right colon Tissue Tissue SURGICAL PATHOLOGY Lupe Lezama MD 6/13/2021 4551        Implants:  * No implants in log *      Drains:   - 23 Fr round drain placed in pelvis and right lower quadrant  - nasogastric tube placed  - PEG tube (preexisting)    Findings: pneumoperitoneum and feculent peritonitis from perforation in distal ileum just proximal to neoterminal ileum, chronically dilated small bowel upstream. Right hemicolectomy with resection 45cm distal ileum, about 140cm small bowel remaining, end ileostomy. History: This is a 63year old male with history of Crohn's disease, MS, DVT/PE, who is admitted for Crohn's flare. Recently underwent PEG complicated by malposition and SBO requiring laparoscopic PEG revision. This evening he had worsening abdominal pain, hypoxia, and hypotension, and imaging demonstrated free air and fluid and pneumatosis and portal venous gas concerning for intestinal ischemia. He was transferred to SICU, intubated for impending respiratory failure and started on pressors.  Exploratory laparotomy with bowel resection, possible ostomy, possible wound vac was discussed with his wife and son who were agreeable to proceed. Detailed Description of Procedure: The patient was brought to the operating suite and positioned supine. He was already on IV antibiotics, Zosyn, which was continued. Stress dose steroids were given. General anesthesia was administered. The abdomen was prepped with Chloraprep and draped in the usual fashion. A time out was called and agreed upon by all present. A midline laparotomy skin incision was made with a scalpel through his previous scar. The abdomen was entered and there was a rush of air and succus upon entering the abdomen. The incision was opened cephalad and caudad with cautery and ascites and succus was suctioned out. Peritoneal fluid cultures were sent. The small bowel was eviscerated. There were multiple areas of fibrinous exudate and some interloop abscess which were broken up with finger fracture. There was a perforation noted in the very distal ileum, just proximal to a small bowel anastomosis and stricture. The distal ileum had some faint duskiness with smooth transition to pink viable bowel. Upstream to this was significant chronic small bowel dilation. The perforation was within 10cm of the cecum and with unknown ileocecal valve status we elected to perform right hemicolectomy in continuity with our small bowel resection. A GUSTAVO 75mm blue load was fired in the distal ileum. The white line of Toldt was incised with cautery and the right colon was mobilized medially past the hepatic flexure. The colon here was pink and viable. The GUSTAVO 75mm blue load was fired across the proximal transverse colon. The mesocolon was divided with Ligasure. There was bleeding from the ileocolic pedicle which was controlled with Vicryl suture. There was also bleeding from the retroperitoneum which was suture ligated. The colon and small bowel were passed off the field. 45cm of small bowel was resected, and there was about 140cm of remaining small bowel.  The remainder of small bowel, colon and rectum were inspected and all appeared viable. The abdomen was copiously irrigated with warm saline until the effluent was clear. A disc of skin was excised from the right lower quadrant for an ileostomy. The incision was carried down through subcutaneous tissue with cautery. A cruciate incision was made in the anterior rectus sheath, the rectus muscle fibers were spread, and an incision was made in the posterior sheath. Two fingers fit easily through the hole. The distal ileum was brought up through the hole and grasped with a [place] for an end ileostomy. A 19 Fr Shon drain was placed in the pelvis and right lower quadrant, and brought out through the left lower quadrant. The abdomen was closed using running O looped PDS on the fascia and two 0 Ethilon retention sutures. The skin was intermittently closed with staples. The ileostomy was matured with 3-0 Vicryl in a [place] fashion and an ostomy appliance was placed. The ileostomy wall was very thickened but the mucosa was viable. The midline incision was dressed with betadine soaked telfa and an island dressing. The drain was placed to bulb suction. Needle, sponge, and instrument counts were correct. Dr. Jenn Bernal was present for the procedure. Disposition: The patient was kept intubated and transferred to SICU in critical condition. He was on two pressors at the start of the case and on one pressor at the end of the case. Electronically signed by Frandy Mccallum MD on 6/14/2021 at 12:52 AM     I was present and scrubbed for this entire procedure.     Omi Bowens MD

## 2021-06-14 NOTE — H&P
General Surgery History and Physical  [facility]    Patient's Name/Date of Birth: Justino Pascal / 1955    Date: June 13, 2021     Surgeon: Marc House MD    PCP: Geo Berger MD     Chief Complaint: Worsening abdominal pain and distention    HPI:   Justino Pascal is a 63 y.o. male who presents for evaluation of above. HE was admitted 2 days ago with same but his symptoms have gotten acutely worse necessitating transfer to sicu and RRT. CT showed pneumatosis of small intestine with portal venous gas. After discussion with pete and his wife, they have elected to proceed with surgical intervention. We discussed the risks of surgery and potential for poor outcome in light of CT findings and profound acidosis.      Patient Active Problem List   Diagnosis    Severe protein-calorie malnutrition ([address]    Multiple sclerosis (HCC)    Bilateral pulmonary embolism (HCC)    Crohn's disease ([address]    COPD (chronic obstructive pulmonary disease) ([address]    Tobacco abuse    Hypertension    Hyperlipidemia    Stage 3b chronic kidney disease (HCC)    Hyperkalemia    Leukocytosis    Small bowel obstruction ([address]    HCAP (healthcare-associated pneumonia)    Acute on chronic renal failure (HCC)    Sepsis ([address]    SBO (small bowel obstruction) (HCC)    History of pulmonary embolism    Acute on chronic renal insufficiency    Ischemic cardiomyopathy    Seizures (HCC)    Altered mental state    Hyperparathyroidism ([address]    Acute renal failure (ARF) ([address]    Moderate protein-calorie malnutrition ([address]    Crohn's colitis, unspecified complication ([address]    Hypoglycemia    Bowel obstruction (HCC)    PEG tube malfunction ([address]    Pneumoperitoneum    Hypomagnesemia    Hypocalcemia    Anticoagulated    Polycystic kidney    Severe malnutrition (HCC)    Severe dehydration    Generalized abdominal pain       Past Medical History:   Diagnosis Date    [facility]) [facility]     Crohn's disease ([facility]     Hx of blood clots     liver, lung    Hyperlipidemia     Hypertension     Multiple sclerosis ([facility]     [facility]     Stage 3b chronic kidney disease ([facility] 2021       Past Surgical History:   Procedure Laterality Date    APPENDECTOMY      CHOLECYSTECTOMY      COLECTOMY N/A 2021    DIAGNOSTIC LAPAROSCOPY  GASTROSTOMY TUBE PLACEMENT performed by Stefany Yung MD at [facility], COLON, DIAGNOSTIC      GASTROSTOMY TUBE PLACEMENT N/A 2021    PERCUTANEOUS ENDOSCOPIC GASTROSTOMY TUBE PLACEMENT performed by Jing Mayberry MD at [facility] N/A 2021    EGD BIOPSY performed by Lakisha Muir DO at [address] 1936   Allergen Reactions    Neurontin [Gabapentin] Palpitations    Lyrica [Pregabalin] Other (See Comments)     photophobia       The patient has a family history that is negative for severe cardiovascular or respiratory issues, negative for reaction to anesthesia. Time spent reviewing past medical, surgical, social and family history, vitals, nursing assessment and images. No changes from above documented history.     Social History     Socioeconomic History    Marital status:      Spouse name: Not on file    Number of children: Not on file    Years of education: Not on file    Highest education level: Not on file   Occupational History    Not on file   Tobacco Use    Smoking status: Former Smoker     Types: Cigarettes     Quit date: 2021     Years since quittin.4    Smokeless tobacco: Never Used   Substance and Sexual Activity    Alcohol use: No    Drug use: No    Sexual activity: Not Currently   Other Topics Concern    Not on file   Social History Narrative    Not on file     Social Determinants of Health     Financial Resource Strain:     Difficulty of Paying Living Expenses:    Food Insecurity:     (56.1 kg)   SpO2 100%   BMI 23.37 kg/m²   General appearance: Moderate distress from pain and labored breating  Head: NCAT  Neck: supple, no masses, trachea midline  Lungs: mechanically ventilated, equal chest rise  Heart: sinus tachycardia  Abdomen: soft, tender with guarding  Skin; warm and dry, no cyanosis  Gu: Stacy catheter in place  Extremities: atraumatic, no focal motor deficits, no open wounds  Psych: No tremor      Radiology: I reviewed relevant abdominal imaging from this admission and that available in the EMR including CT abd/pel from 06/13/21. My assessment is pneumatosis of SB and colon, portal venous extensive, worsening free fluid, free air    Assessment:  Amanda Chi is a 63 y.o. male with pneumatosis intestinalis, portal venous gas, lactic acidosis and septic shock  Patient Active Problem List   Diagnosis    Severe protein-calorie malnutrition (HCC)    Multiple sclerosis ([address]    Bilateral pulmonary embolism (HCC)    Crohn's disease ([address]    COPD (chronic obstructive pulmonary disease) ([address]    Tobacco abuse    Hypertension    Hyperlipidemia    Stage 3b chronic kidney disease ([address]    Hyperkalemia    Leukocytosis    Small bowel obstruction ([address]    HCAP (healthcare-associated pneumonia)    Acute on chronic renal failure (HCC)    Sepsis ([address]    SBO (small bowel obstruction) ([address]    History of pulmonary embolism    Acute on chronic renal insufficiency    Ischemic cardiomyopathy    Seizures (HCC)    Altered mental state    Hyperparathyroidism ([address]    Acute renal failure (ARF) ([address]    Moderate protein-calorie malnutrition ([address]    Crohn's colitis, unspecified complication ([address]    Hypoglycemia    Bowel obstruction (HCC)    PEG tube malfunction ([address]    Pneumoperitoneum    Hypomagnesemia    Hypocalcemia    Anticoagulated    Polycystic kidney    Severe malnutrition (HCC)    Severe dehydration    Generalized abdominal pain         Plan:   To OR for exploration  -The procedure, risks, benefits and alternatives were discussed with patient's wife who  agrees to proceed.         Chanell Haddad MD  10:27 PM  6/13/2021

## 2021-06-14 NOTE — SIGNIFICANT EVENT
RRT called at 20:56 for hypoxia and hypotension, increased abdominal pain. Patient awake and alert following commands. Complaints of increased abdominal pain. Tenderness to palpation, absent bowel sounds, Hypotensive. 80/doppler,   1 lt NS given, labs drawn fentanyl 50 mcg IV given   STAT ABG shows metabolic acidosis. CT abdomen w contrast shows portal venous gas intestinal pneumatosis, possible bowel perforation/ischemia. Transferred to SICU for further workup and management. Possible ex lap.

## 2021-06-14 NOTE — PROCEDURES
Anjum Ya is a 63 y.o. male patient. 1. Generalized abdominal pain    2. Hypomagnesemia    3. Exacerbation of Crohn's disease with complication ([address]    4. Crohn's colitis, unspecified complication ([address]    5. Severe protein-calorie malnutrition ([address]    6. Stage 3b chronic kidney disease ([address]    7. Seizures ([address]    8. Hypoglycemia    9. PEG tube malfunction ([address]    10. Pneumoperitoneum    11. Hypocalcemia    12. Anticoagulated    13. Polycystic kidney    14. Severe malnutrition ([address]    15. Severe dehydration    16. Multiple sclerosis ([address]    17. Bilateral pulmonary embolism ([address]    18. Tobacco abuse    19. Hyperkalemia    20. Small bowel obstruction ([address]    21. HCAP (healthcare-associated pneumonia)    25. SBO (small bowel obstruction) ([address]    23. History of pulmonary embolism    24. Acute on chronic renal insufficiency    25. Ischemic cardiomyopathy    26. Hyperparathyroidism ([address]    27. Moderate protein-calorie malnutrition ([address]      Past Medical History:   Diagnosis Date    COPD (chronic obstructive pulmonary disease) ([address]     Crohn's disease ([address]     Hx of blood clots 2012    liver, lung    Hyperlipidemia     Hypertension     Multiple sclerosis (HCC)     Seizures (HCC)     Stage 3b chronic kidney disease ([address] 1/29/2021     Blood pressure (!) 111/44, pulse 122, temperature 96.1 °F (35.6 °C), resp. rate 22, height 5' 1\" (1.549 m), weight 123 lb 10.9 oz (56.1 kg), SpO2 100 %.     Insert Arterial Line    Date/Time: 6/13/2021 10:37 PM  Performed by: Maged Duarte MD  Authorized by: Norman Kerns MD   Indications: multiple ABGs, respiratory failure and hemodynamic monitoring  Location: right femoral  Anesthesia: local infiltration  Needle gauge: 20  Seldinger technique: Seldinger technique used  Number of attempts: 2 (First attempt R radial)  Post-procedure: line sutured and dressing applied  Patient tolerance: patient tolerated the procedure well with no immediate complications Rubia Moreland MD  6/13/2021

## 2021-06-14 NOTE — FLOWSHEET NOTE
Stage  CI HR MAP TPRI SVI   Baseline        Challenge        Result (%Ä)     42.8     250cc fluid bolus challenge complteted

## 2021-06-14 NOTE — PROGRESS NOTES
Contacted by Dr. Kate Renner to evaluate patient during RRT. RRT had been called for hypoxia. RRT gave 1L bolus crystalloid. Noted to have worsened abdominal tenderness. Subsequent CT AP demonstrated more free air, free fluid, pneumatosis, and significant portal venous gas concerning for bowel ischemia. Discussed findings and grim prognosis with or without surgery with his wife Bishop Andrade. She wants everything done as she states he pulled through something similar in 2012. Patient transferred to SICU. Will arrange for exploratory laparotomy, bowel resection, possible wound vac tonight.     Discussed with Dr. Lise Mac    Electronically signed by Milla Amaro MD on 6/13/2021 at 9:54 PM

## 2021-06-14 NOTE — ANESTHESIA PRE PROCEDURE
Department of Anesthesiology  Preprocedure Note       Name:  Criss Bourgeois   Age:  63 y.o.  :  1955                                          MRN:  [id number]         Date:  2021      Surgeon: Teddy Goldsmith):  Cherrie Maza MD    Procedure: Procedure(s):  LAPAROTOMY EXPLORATORY, POSSIBLE BOWEL RESECTION    Medications prior to admission:   Prior to Admission medications    Medication Sig Start Date End Date Taking? Authorizing Provider   hydrOXYzine (ATARAX) 25 MG tablet Take 1 tablet by mouth every 6 hours as needed for Itching or Anxiety 21  Divine Hanley MD   enoxaparin (LOVENOX) 60 MG/0.6ML injection Inject 0.5 mLs into the skin 2 times daily 21  Divine Hanley MD   methylPREDNISolone sodium (SOLU-MEDROL) 40 MG injection Infuse 0.5 mLs intravenously every 12 hours for 10 days 21  Divine Hanley MD   levETIRAcetam (KEPPRA) 500 MG/100ML SOLN IVPB Infuse 50 mLs intravenously daily 21   Divine Hanley MD   levETIRAcetam (KEPPRA) 500 MG/100ML SOLN IVPB Infuse 100 mLs intravenously nightly 21   Divine Hanley MD   Wound Dressings (UNNA-FLEX ELASTIC UNNA BOOT) MISC Apply 1 each topically once for 1 dose 21  Divine Hanley MD   Wound Dressings (UNNA-FLEX ELASTIC UNNA BOOT) MISC Apply 1 each topically once for 1 dose 21  Divine Hanley MD   Wound Dressings (UNNA-FLEX ELASTIC UNNA BOOT) MISC Apply 2 each topically daily 21   Divine Hanley MD   piperacillin-tazobactam (ZOSYN) infusion Infuse 2.25 g intravenously every 8 hours for 10 days Compound per protocol.  21  Divine Hanley MD   pantoprazole (PROTONIX) 40 MG injection Infuse 40 mg intravenously 2 times daily for 10 days 21  Divine Hanley MD   metoprolol (LOPRESSOR) 5 MG/5ML SOLN injection Infuse 5 mLs intravenously every 6 hours 21   Divine Hanley MD   cloNIDine (CATAPRES) 0.1 MG/24HR PTWK Place 1 patch onto the skin once a week 6/13/21   Shital Brown MD   HYDROmorphone (DILAUDID) 1 MG/ML injection Infuse 0.5 mLs intravenously every 4 hours as needed for Pain for up to 3 days. 6/13/21 6/16/21  Shital Brown MD   calcium carbonate 600 MG TABS tablet Take 1 tablet by mouth daily    Historical Provider, MD   sucralfate (CARAFATE) 1 GM tablet Take 1 tablet by mouth 4 times daily 5/10/21   Carolina Jimenez MD   oxyCODONE HCl (OXY-IR) 10 MG immediate release tablet Take 10 mg by mouth every 4 hours. Historical Provider, MD   oxybutynin (DITROPAN) 5 MG tablet Take 5 mg by mouth as needed     Historical Provider, MD   atorvastatin (LIPITOR) 80 MG tablet Take 1 tablet by mouth nightly 2/1/21   Carolina Jimenez MD   mirtazapine (REMERON) 15 MG tablet Take 15 mg by mouth nightly     Historical Provider, MD       Current medications:    No current facility-administered medications for this visit.      Current Outpatient Medications   Medication Sig Dispense Refill    hydrOXYzine (ATARAX) 25 MG tablet Take 1 tablet by mouth every 6 hours as needed for Itching or Anxiety 120 tablet 0    enoxaparin (LOVENOX) 60 MG/0.6ML injection Inject 0.5 mLs into the skin 2 times daily 30 mL 0    methylPREDNISolone sodium (SOLU-MEDROL) 40 MG injection Infuse 0.5 mLs intravenously every 12 hours for 10 days 400 mg 0    [START ON 6/14/2021] levETIRAcetam (KEPPRA) 500 MG/100ML SOLN IVPB Infuse 50 mLs intravenously daily 4000 mL 0    levETIRAcetam (KEPPRA) 500 MG/100ML SOLN IVPB Infuse 100 mLs intravenously nightly 4000 mL 0    Wound Dressings (UNNA-FLEX ELASTIC UNNA BOOT) MISC Apply 1 each topically once for 1 dose 1 each 0    Wound Dressings (UNNA-FLEX ELASTIC UNNA BOOT) MISC Apply 1 each topically once for 1 dose 1 each 0    [START ON 6/14/2021] Wound Dressings (UNNA-FLEX ELASTIC UNNA BOOT) MISC Apply 2 each topically daily 1 each 0    piperacillin-tazobactam (ZOSYN) infusion Infuse 2.25 g intravenously every 8 hours for 10 days Compound per protocol. 67.5 g 0    pantoprazole (PROTONIX) 40 MG injection Infuse 40 mg intravenously 2 times daily for 10 days 800 mg 0    metoprolol (LOPRESSOR) 5 MG/5ML SOLN injection Infuse 5 mLs intravenously every 6 hours 15 mL 0    cloNIDine (CATAPRES) 0.1 MG/24HR PTWK Place 1 patch onto the skin once a week 4 patch 0    HYDROmorphone (DILAUDID) 1 MG/ML injection Infuse 0.5 mLs intravenously every 4 hours as needed for Pain for up to 3 days.  9 mL 0     Facility-Administered Medications Ordered in Other Visits   Medication Dose Route Frequency Provider Last Rate Last Admin    Unna-Flex Elastic Unna Boot [facility] 2 each  2 each Topical Daily Cliff Ross MD   2 each at 06/13/21 0950    hydrOXYzine (ATARAX) tablet 25 mg  25 mg Oral Q6H PRN Merrick Reeder MD   25 mg at 06/13/21 1351    metoprolol (LOPRESSOR) injection 5 mg  5 mg Intravenous Q6H Concepcion Mota MD        HYDROmorphone (DILAUDID) injection 0.5 mg  0.5 mg Intravenous Q4H PRN Merrick Reeder MD   0.5 mg at 06/13/21 1854    lactated ringers infusion   Intravenous Continuous Bob Osorio  mL/hr at 06/13/21 2141 New Bag at 06/13/21 2141    lactated ringers bolus  1,000 mL Intravenous Once Doug Dale MD 1,000 mL/hr at 06/13/21 2154 1,000 mL at 06/13/21 2154    norepinephrine (LEVOPHED) 64 MCG/ML infusion SOLN             vasopressin (VASOSTRICT) 20 UNIT/ML injection             lidocaine PF 1 % injection 5 mL  5 mL Intradermal Once Gene Rodriguez MD        sodium chloride flush 0.9 % injection 5-40 mL  5-40 mL Intravenous 2 times per day Gene Rodriguez MD   10 mL at 06/13/21 2142    sodium chloride flush 0.9 % injection 5-40 mL  5-40 mL Intravenous PRN Gene Rodriguez MD   10 mL at 06/13/21 1435    0.9 % sodium chloride infusion  25 mL Intravenous PRN Gene Rodriguez MD        heparin flush 100 UNIT/ML injection 300 Units  3 mL Intravenous 2 times per day Gene Rodriguez MD   300 Units at 06/13/21 0949    heparin flush 100 UNIT/ML injection 06/13/21 1616    Or    acetaminophen (TYLENOL) suppository 650 mg  650 mg Rectal Q6H PRN Rasta Lugo MD        glucose (GLUTOSE) 40 % oral gel 15 g  15 g Oral PRN Rasta Lugo MD        dextrose 50 % IV solution  12.5 g Intravenous PRN Rasta Lugo MD        glucagon (rDNA) injection 1 mg  1 mg Intramuscular PRN Rasta Lugo MD        dextrose 5 % solution  100 mL/hr Intravenous PRN Rasta Lugo MD        dextrose 5 % and 0.45 % NaCl with KCl 20 mEq infusion   Intravenous Continuous Tra Cobb MD 75 mL/hr at 06/13/21 0614 New Bag at 06/13/21 7312       Allergies:     Allergies   Allergen Reactions    Neurontin [Gabapentin] Palpitations    Lyrica [Pregabalin] Other (See Comments)     photophobia       Problem List:    Patient Active Problem List   Diagnosis Code    Severe protein-calorie malnutrition ([facility] E43    Multiple sclerosis ([facility] G35    Bilateral pulmonary embolism ([facility]) I26.99    Crohn's disease ([facility] K50.90    COPD (chronic obstructive pulmonary disease) ([facility]) J44.9    Tobacco abuse Z72.0    Hypertension I10    Hyperlipidemia E78.5    Stage 3b chronic kidney disease ([address] N18.32    Hyperkalemia E87.5    Leukocytosis D72.829    Small bowel obstruction ([address] K56.609    HCAP (healthcare-associated pneumonia) J18.9    Acute on chronic renal failure (HCC) N17.9, N18.9    Sepsis ([address] A41.9    SBO (small bowel obstruction) ([address] K56.609    History of pulmonary embolism Z86.711    Acute on chronic renal insufficiency N28.9, N18.9    Ischemic cardiomyopathy I25.5    Seizures ([facility]) R56.9    Altered mental state R41.82    Hyperparathyroidism ([address] E21.3    Acute renal failure (ARF) ([facility]) N17.9    Moderate protein-calorie malnutrition (HCC) E44.0    Crohn's colitis, unspecified complication ([facility]) F53.976    Hypoglycemia E16.2    Bowel obstruction ([facility]) K56.609    PEG tube malfunction ([facility]) K94.23    Pneumoperitoneum K66.8    Hypomagnesemia E83.42    Hypocalcemia E83.51    Anticoagulated Z79.01    Polycystic kidney Q61.3    Severe malnutrition (HCC) E43    Severe dehydration E86.0    Generalized abdominal pain R10.84       Past Medical History:        Diagnosis Date    [facility] disease) [facility]     Crohn's disease ([facility]     Hx of blood clots     liver, lung    Hyperlipidemia     Hypertension     Multiple sclerosis ([facility]     [facility]     Stage 3b chronic kidney disease ([facility] 2021       Past Surgical History:        Procedure Laterality Date    APPENDECTOMY      CHOLECYSTECTOMY      COLECTOMY N/A 2021    DIAGNOSTIC LAPAROSCOPY  GASTROSTOMY TUBE PLACEMENT performed by Jay Arvizu MD at [address] [facility], COLON, DIAGNOSTIC      GASTROSTOMY TUBE PLACEMENT N/A 2021    PERCUTANEOUS ENDOSCOPIC GASTROSTOMY TUBE PLACEMENT performed by Zehra Vega MD at [address] [place] N/A 2021    EGD BIOPSY performed by Rufino Fields DO at [address] History:    Social History     Tobacco Use    Smoking status: Former Smoker     Types: Cigarettes     Quit date: 2021     Years since quittin.4    Smokeless tobacco: Never Used   Substance Use Topics    Alcohol use: No                                Counseling given: Not Answered      Vital Signs (Current): There were no vitals filed for this visit.                                            BP Readings from Last 3 Encounters:   21 (!) 111/44   21 122/72   21 (!) 150/94       NPO Status:                                                                                 BMI:   Wt Readings from Last 3 Encounters:   21 123 lb 10.9 oz (56.1 kg)   21 99 lb (44.9 kg)   21 99 lb (44.9 kg)     There is no height or weight on file to calculate BMI.    CBC:   Lab Results   Component Value Date    WBC 14.3 2021    RBC 4.31 2021    HGB 12.3 within 24 Hrs      ROS comment: Normal sinus rhythm  Nonspecific ST and T wave abnormality  Abnormal ECG    Summary  Compared to prior echo, changes noted. Technically adequate study. Left ventricle size is normal.  Normal left ventricular wall thickness. Ejection fraction is visually estimated at 50-55%. Mild basal septal and basal anteroseptal hypokinesis  E/A flow reversal noted. Suggestive of diastolic dysfunction. Physiologic and/or trace aortic regurgitation is noted. Physiologic and/or trace tricuspid regurgitation. RVSP is normal    PE comment: Tachycardic   Neuro/Psych:   (+) seizures (Controlled on Keppra):, neuromuscular disease: multiple sclerosis,             GI/Hepatic/Renal:   (+) renal disease (Stage III CKD:  creatinine 1.5 & GFR 47): CRI and ARF,          ROS comment: ISCHEMIC BOWEL - Probable dead bowel  Crohn's. Endo/Other:    (+) blood dyscrasia (Eliquis): anticoagulation therapy and anemia, arthritis: OA., electrolyte abnormalities, . Pt had no PAT visit        ROS comment: Hyperparathyroidism    Narcotic dependent chronic pain (Oxycodone) Abdominal:   (+) scaphoid        Vascular:   + DVT, PE. Anesthesia Plan      general     ASA 4 - emergent       Induction: intravenous. MIPS: Postoperative opioids intended and Prophylactic antiemetics administered. Anesthetic plan and risks discussed with Unable to obtain due to emergent nature (Patient intubated, unresponsive). Plan discussed with CRNA.                   Ann Dao MD   6/13/2021

## 2021-06-14 NOTE — PROGRESS NOTES
Department of Podiatry  Progress Note    SUBJECTIVE:  Mr. Darian Anderson was evaluated at bedside this morning for b/l LE edema. Patient transferred to SICU. Worsening respiratory status. Acidotic.  Patient unresponsive at this time      OBJECTIVE:    Scheduled Meds:   [START ON 6/15/2021] levetiracetam  250 mg Intravenous Daily    [Held by provider] metoprolol  5 mg Intravenous Q6H    chlorhexidine  15 mL Mouth/Throat BID    GenTeal Tears  1 drop Both Eyes Q4H    And    artificial tears   Both Eyes Q4H    hydrocortisone sodium succinate PF  100 mg Intravenous Q8H    sodium chloride flush  5-40 mL Intravenous 2 times per day    heparin flush  3 mL Intravenous 2 times per day    [Held by provider] enoxaparin  1 mg/kg Subcutaneous BID    piperacillin-tazobactam  3,375 mg Intravenous Q8H    sodium chloride   Intravenous Q8H    atorvastatin  80 mg Oral Nightly    [Held by provider] mirtazapine  15 mg Oral Nightly    sucralfate  1 g Oral 4x Daily    pantoprazole  40 mg Intravenous BID    levetiracetam  500 mg Intravenous Nightly     Continuous Infusions:   fentaNYL 5 mcg/ml in 0.9%  ml infusion Stopped (06/14/21 0115)    norepinephrine 15 mcg/min (06/14/21 1050)    propofol Stopped (06/14/21 0105)    vasopressin (Septic Shock) infusion 0.04 Units/min (06/14/21 0757)    sodium chloride      lactated ringers 150 mL/hr at 06/14/21 0757    sodium chloride      sodium chloride      dextrose       PRN Meds:.sodium chloride, sodium chloride, [Held by provider] hydrOXYzine, sodium chloride flush, sodium chloride, heparin flush, ondansetron **OR** ondansetron, polyethylene glycol, acetaminophen **OR** acetaminophen, glucose, dextrose, glucagon (rDNA), dextrose    Allergies   Allergen Reactions    Neurontin [Gabapentin] Palpitations    Lyrica [Pregabalin] Other (See Comments)     photophobia       BP (!) 158/79   Pulse 126   Temp 99.5 °F (37.5 °C)   Resp 24   Ht 5' 1\" (1.549 m)   Wt 130 lb 15.3 oz chloride, sodium chloride, [Held by provider] hydrOXYzine, sodium chloride flush, sodium chloride, heparin flush, ondansetron **OR** ondansetron, polyethylene glycol, acetaminophen **OR** acetaminophen, glucose, dextrose, glucagon (rDNA), dextrose    RADIOLOGY:  XR CHEST PORTABLE   Final Result   1. No interval change in the multifocal pneumonia seen throughout the right   lung. 2. Trace right pleural effusion         XR ABDOMEN FOR NG/OG/NE TUBE PLACEMENT   Final Result   NG tube in the stomach. XR CHEST PORTABLE   Final Result   Support tubes and lines in place as described. Increasing airspace disease in the right lung consistent with multifocal   pneumonia. Resolving basilar atelectasis on the right. Small right pleural   effusion. XR CHEST PORTABLE   Final Result   Right-sided PICC line with the distal tip in the SVC and no pneumothorax. Increasing atelectatic changes in the right lung base. Increasing airspace disease in the right lung, most consistent with pneumonia. CT ABDOMEN PELVIS W IV CONTRAST Additional Contrast? None   Final Result   Progressive diffuse inflammation of the mural thickening of the small bowel   loops and colon with extensive pneumatosis intestinalis of small bowel loops   and colon concerning for progressive inflammation/enterocolitis and possibly   ischemic bowel. There is large volume of superior mesenteric venous air and   portal venous air with large amount of air in the liver. Multiple hypodensities in the liver concerning for multifocal hepatitis. Increasing infiltrates and pleural effusion lung bases concerning for   pneumonia. Progressive free intraperitoneal air which could be due to PEG tube insertion   and or bowel perforation. Critical results were called by Dr. Reginaldo Collado to hanane. On 6/13/2021   at 21:11.          CT ABDOMEN PELVIS W IV CONTRAST Additional Contrast? None   Final Result   Large amount of free intraperitoneal air as noted which may be related to   previous surgery G tube placement. Bowel perforation is less likely. Diffusely thickened edematous and inflamed small bowel loops and colon with   proximally dilated small bowel loops. This may be due to diffuse   enterocolitis/inflammatory bowel disease. Superimposed pseudomembranous   colitis has to be excluded. There may be an element of bowel obstruction   which is slightly improved. The transition point seems to be in the right   lower quadrant. Atelectasis/pleural effusion lung bases likely CHF. Abdominal aortic aneurysm. RECOMMENDATIONS:   Abdominal aortic aneurysm. 5 year surveillance is recommended. XR CHEST PORTABLE    (Results Pending)   XR CHEST PORTABLE    (Results Pending)     BP (!) 158/79   Pulse 126   Temp 99.5 °F (37.5 °C)   Resp 24   Ht 5' 1\" (1.549 m)   Wt 130 lb 15.3 oz (59.4 kg)   SpO2 (!) 63%   BMI 24.74 kg/m²     LABS:    Recent Labs     06/14/21  0045 06/14/21  0600   WBC 1.2* 0.9*   HGB 6.2* 6.9*   HCT 20.0* 22.5*   * 72*        Recent Labs     06/14/21  0600      K 4.9   *   CO2 16*   PHOS 3.2   BUN 15   CREATININE 1.0        Recent Labs     06/14/21  0045 06/14/21  0600   PROT 2.3* 1.7*         ASSESSMENT:  1.B/L LE pitting edema   2. Crohn's Disease      Severe protein-calorie malnutrition ([address]    Stage 3b chronic kidney disease ([address]    Seizures ([address]    Crohn's colitis, unspecified complication ([address]    Hypoglycemia    Bowel obstruction (HCC)    PEG tube malfunction (HCC)    Pneumoperitoneum    Hypomagnesemia    Hypocalcemia    Anticoagulated    Polycystic kidney    Severe malnutrition (HCC)    Severe dehydration    Generalized abdominal pain        PLAN:  - Patient was examined and evaluated. Reviewed patient's recent lab results and pertinent diagnostic imaging. Reviewed ancillary service notes. Tiarra Tyler Boots placed to B/L LE this morning by nursing.  Only need to perform twice a week: M, Th.   - Prevalon boots placed. - Pain Control: IV and PO  - Antibiotics as per ID: Zosyn, Diflucan, Metro  - Discussed patient with Dr. Agnieszka Rodgers   - Will continue to follow patient while they are in-house.

## 2021-06-14 NOTE — PROGRESS NOTES
Infectious Disease  Progress Note  [facility]    Chief Complaint: abdominal discomfort    Subjective: abdominal pain    Scheduled Meds:   [START ON 6/15/2021] levetiracetam  250 mg Intravenous Daily    [Held by provider] metoprolol  5 mg Intravenous Q6H    chlorhexidine  15 mL Mouth/Throat BID    GenTeal Tears  1 drop Both Eyes Q4H    And    artificial tears   Both Eyes Q4H    hydrocortisone sodium succinate PF  100 mg Intravenous Q8H    sodium chloride flush  5-40 mL Intravenous 2 times per day    heparin flush  3 mL Intravenous 2 times per day    [Held by provider] enoxaparin  1 mg/kg Subcutaneous BID    piperacillin-tazobactam  3,375 mg Intravenous Q8H    sodium chloride   Intravenous Q8H    atorvastatin  80 mg Oral Nightly    [Held by provider] mirtazapine  15 mg Oral Nightly    sucralfate  1 g Oral 4x Daily    pantoprazole  40 mg Intravenous BID    levetiracetam  500 mg Intravenous Nightly     Continuous Infusions:   fentaNYL 5 mcg/ml in 0.9%  ml infusion Stopped (06/14/21 0115)    norepinephrine 10 mcg/min (06/14/21 1058)    propofol Stopped (06/14/21 0105)    vasopressin (Septic Shock) infusion 0.04 Units/min (06/14/21 0757)    sodium chloride      lactated ringers 150 mL/hr at 06/14/21 0757    sodium chloride      sodium chloride      dextrose       PRN Meds:sodium chloride, sodium chloride, [Held by provider] hydrOXYzine, sodium chloride flush, sodium chloride, heparin flush, ondansetron **OR** ondansetron, polyethylene glycol, acetaminophen **OR** acetaminophen, glucose, dextrose, glucagon (rDNA), dextrose    Patient Vitals for the past 24 hrs:   BP Temp Temp src Pulse Resp SpO2 Weight   06/14/21 1047 (!) 158/79 99.5 °F (37.5 °C)  126 24     06/14/21 1028    131 25 (!) 63 %    06/14/21 1000 (!) 101/59 99.3 °F (37.4 °C) Bladder 128 26 (!) 77 %    06/14/21 0955    132 26 (!) 71 %    06/14/21 0950    135 27     06/14/21 0941 (!) 148/74 99.3 °F (37.4 °C) Bladder    [facility]   06/14/21 0926 (!) 158/74 99.1 °F (37.3 °C) Bladder 136 24     06/14/21 0900 139/89 99.1 °F (37.3 °C) Bladder 130 24 (!) 84 %    06/14/21 0824    132 25 98 %    06/14/21 0815    127 26 95 %    06/14/21 0800 (!) 134/98 98.2 °F (36.8 °C) Bladder 137 24 100 %    06/14/21 0700  97.5 °F (36.4 °C)  127 22 94 %    06/14/21 0650 (!) 82/50 97.5 °F (36.4 °C) Bladder 128 22 91 %    06/14/21 0600 95/78 96.8 °F (36 °C) Bladder 126 22 96 % 130 lb 15.3 oz (59.4 kg)   06/14/21 0500    116 22 (!) 87 %    06/14/21 0445    113 22 91 %    06/14/21 0400 107/72 95.2 °F (35.1 °C) Bladder 122 22 100 %    06/14/21 0300 115/78 92.8 °F (33.8 °C)  122 22 94 %    06/14/21 0245    118 22 (!) 89 %    06/14/21 0230    118 22 90 %    06/14/21 0200 126/89 (!) 91.6 °F (33.1 °C) Bladder 118 22 98 %    06/14/21 0155 (!) 151/78 (!) 91.4 °F (33 °C) Bladder 116 22 98 %    06/14/21 0150    117 22 96 %    06/14/21 0145    116 22 94 %    06/14/21 0130  (!) 91 °F (32.8 °C) Bladder 115 22 90 %    06/14/21 0115  (!) 90.9 °F (32.7 °C) Bladder 113 22 92 %    06/14/21 0112 118/61 (!) 90.9 °F (32.7 °C) Bladder 111 22 92 %    06/14/21 0100 82/61 (!) 90.9 °F (32.7 °C) Bladder 109 22 90 %    06/14/21 0049    104 22 96 %    06/14/21 0045  95 °F (35 °C) Bladder 102 22 99 %    06/14/21 0032 130/68 95 °F (35 °C) Bladder 100 22 99 %    06/14/21 0030  (!) 90.7 °F (32.6 °C) Bladder 99 22     06/13/21 2236  96.1 °F (35.6 °C)  122 22 100 %    06/13/21 2230  96.1 °F (35.6 °C)  130 22     06/13/21 2226    124 18 100 %    06/13/21 2225    123 18 100 %    06/13/21 2220    121 18 100 %    06/13/21 2219  96.6 °F (35.9 °C)        06/13/21 2215    130 22 100 %    06/13/21 2213    130 23 96 %    06/13/21 2210    121 21 99 %    06/13/21 2208    121 21 100 %    06/13/21 2200 (!) 111/44 96.8 °F (36 °C) Bladder 122 21 98 %    06/13/21 2155    121 21 98 %    06/13/21 2150     (!) 31 90 %    06/13/21 2135  97.5 °F (36.4 °C) Axillary 127 23 99 %    06/13/21 2133       123 lb 10.9 oz (56.1 kg)   06/13/21 1900 (!) 155/117   127  91 %    06/13/21 1823 (!) 160/120 97.4 °F (36.3 °C) Temporal 122 22     06/13/21 1815 (!) 160/110   107 18     06/13/21 1629 (!) 140/112   137      06/13/21 1530 (!) 152/111 97.9 °F (36.6 °C) Temporal 121 18 96 %        CBC with Differential:    Lab Results   Component Value Date    WBC 0.9 06/14/2021    RBC 2.41 06/14/2021    HGB 6.9 06/14/2021    HCT 22.5 06/14/2021    PLT 72 06/14/2021    MCV 93.4 06/14/2021    MCH 28.6 06/14/2021    MCHC 30.7 06/14/2021    RDW 17.0 06/14/2021    NRBC 0.9 05/18/2021    SEGSPCT 62 07/11/2013    METASPCT 0.9 06/13/2021    LYMPHOPCT 28.0 06/14/2021    MONOPCT 16.0 06/14/2021    MYELOPCT 0.9 06/13/2021    BASOPCT 0.0 06/14/2021    MONOSABS 0.19 06/14/2021    LYMPHSABS 0.34 06/14/2021    EOSABS 0.00 06/14/2021    BASOSABS 0.00 06/14/2021     CMP:    Lab Results   Component Value Date     06/14/2021    K 4.9 06/14/2021    K 3.5 06/10/2021     06/14/2021    CO2 16 06/14/2021    BUN 15 06/14/2021    CREATININE 1.0 06/14/2021    GFRAA >60 06/14/2021    LABGLOM >60 06/14/2021    GLUCOSE 93 06/14/2021    PROT 1.7 06/14/2021    LABALBU 0.9 06/14/2021    CALCIUM 6.6 06/14/2021    BILITOT 0.5 06/14/2021    ALKPHOS 31 06/14/2021    AST 28 06/14/2021    ALT 10 06/14/2021       BP (!) 158/79   Pulse 126   Temp 99.5 °F (37.5 °C)   Resp 24   Ht 5' 1\" (1.549 m)   Wt 130 lb 15.3 oz (59.4 kg)   SpO2 (!) 63%   BMI 24.74 kg/m²     Physical Exam  Const/Neuro- unchanged, no signs of acute distress, Alert  ENMT- Within Normal Limits, Normocephalic, mucous membranes pink/moist, No thrush  Neck: Neck supple  Heart- Regular, Rate, Rhythm- no murmur appreciated. Lungs- clear to ascultation. Respirations even and nonlabored.   Abdomen- Soft, bowel sounds positive, non tender  Musculo/Extremities-  Equal and symmetrical, no edema. No tenderness. Skin:  Warm and dry, free from rashes. Cultures reviewed    Radiology reviewed  XR CHEST PORTABLE   Final Result   1. No interval change in the multifocal pneumonia seen throughout the right   lung. 2. Trace right pleural effusion         XR ABDOMEN FOR NG/OG/NE TUBE PLACEMENT   Final Result   NG tube in the stomach. XR CHEST PORTABLE   Final Result   Support tubes and lines in place as described. Increasing airspace disease in the right lung consistent with multifocal   pneumonia. Resolving basilar atelectasis on the right. Small right pleural   effusion. XR CHEST PORTABLE   Final Result   Right-sided PICC line with the distal tip in the SVC and no pneumothorax. Increasing atelectatic changes in the right lung base. Increasing airspace disease in the right lung, most consistent with pneumonia. CT ABDOMEN PELVIS W IV CONTRAST Additional Contrast? None   Final Result   Progressive diffuse inflammation of the mural thickening of the small bowel   loops and colon with extensive pneumatosis intestinalis of small bowel loops   and colon concerning for progressive inflammation/enterocolitis and possibly   ischemic bowel. There is large volume of superior mesenteric venous air and   portal venous air with large amount of air in the liver. Multiple hypodensities in the liver concerning for multifocal hepatitis. Increasing infiltrates and pleural effusion lung bases concerning for   pneumonia. Progressive free intraperitoneal air which could be due to PEG tube insertion   and or bowel perforation. Critical results were called by Dr. Millie Foley to hanane. On 6/13/2021   at 21:11. CT ABDOMEN PELVIS W IV CONTRAST Additional Contrast? None   Final Result   Large amount of free intraperitoneal air as noted which may be related to   previous surgery G tube placement. Bowel perforation is less likely. Diffusely thickened edematous and inflamed small bowel loops and colon with   proximally dilated small bowel loops. This may be due to diffuse   enterocolitis/inflammatory bowel disease. Superimposed pseudomembranous   colitis has to be excluded. There may be an element of bowel obstruction   which is slightly improved. The transition point seems to be in the right   lower quadrant. Atelectasis/pleural effusion lung bases likely CHF. Abdominal aortic aneurysm. RECOMMENDATIONS:   Abdominal aortic aneurysm. 5 year surveillance is recommended.          XR CHEST PORTABLE    (Results Pending)   XR CHEST PORTABLE    (Results Pending)       Assessment  Severe Crohn's flare   ID asked to see because of elevated procalcitonin   No positive cultures   Events over the evening noted     Dr Luis Lazo note noted   He RRT before transfer to F  Repeat CT showed pneumoatosis of SB and left colon with extenisve portal venous gas throughout the liver  Brought to SICU    Exp lap right hemicolectomy , end ileostomy  Perforation   intraabd abscess  Remains on levophed/vasopressin, hypothermic  Follows commands  Leukopenic which actually is bad prognostic indicator for septic shock       Plan  Continue zosyn and add diflucan and and metronidazole for now   Discussed with SICU nurse           Electronically signed by Esmer Mcnair MD on 6/14/2021 at 11:01 AM

## 2021-06-14 NOTE — CONSULTS
and small bowel obstruction. He required exploratory laparoscopy and lysis of adhesions, removal/replacement of PEG tube 6/4. During hospitalization, wife requested transfer to [facility] and patient left AMA 6/8 and she reported she was [facility]. As above, patient returned to the hospital 6/10 with abdominal pain. ID, general surgery, GI, podiatry consulted. No surgical recommendations on admission and patient treated for severe Crohn's flare. Zosyn was started. Referral was made to transfer [facility] however he suffered a RRT with hypoxia and hypotension and worsening abdominal pain. Patient taken to the OR urgently and underwent a exploratory laparotomy with right hemicolectomy, end ileostomy, drainage of intra-abdominal abscess due to a small bowel perforation. Patient is requiring pressors x2, intubation and admission to the SICU. Chart reviewed. Patient's wife Rell Schulte [id number] at bedside. Palliative medicine services introduced to her. She recalled his recent medical history and identified her gratitude for being admitted to [facility] currently. She feels he is in a safe place and getting the treatment he needs currently. She is very clear about her goals for him, she wishes for him to continue treatment and improved from this acute issue so he can return [facility] to be assessed for further treatment for his MS. She wishes to continue his current care including him as a full code. Support was provided through active listening exploration of needs.     Past Medical History:   Diagnosis Date    COPD (chronic obstructive pulmonary disease) ([address]     Crohn's disease ([address]     Hx of blood clots 2012    liver, lung    Hyperlipidemia     Hypertension     Multiple sclerosis ([address]     Seizures ([address]     Stage 3b chronic kidney disease ([address] 1/29/2021       Past Surgical History:   Procedure Laterality Date    APPENDECTOMY      CHOLECYSTECTOMY      COLECTOMY N/A 6/4/2021    DIAGNOSTIC LAPAROSCOPY  GASTROSTOMY TUBE PLACEMENT performed by Isabel Hernandez MD at [address] [place] [address], COLON, DIAGNOSTIC      GASTROSTOMY TUBE PLACEMENT N/A 6/1/2021    PERCUTANEOUS ENDOSCOPIC GASTROSTOMY TUBE PLACEMENT performed by Carol Villegas MD at [address] [place] [address] N/A 5/6/2021    EGD BIOPSY performed by Radha Ellis DO at [facility] ENDOSCOPY       Inpatient medications reviewed: yes  Home Medications reviewed: yes    Allergies   Allergen Reactions    Neurontin [Gabapentin] Palpitations    Lyrica [Pregabalin] Other (See Comments)     photophobia     No family history on file. Social history:   status: no  Marital status:   Living status: with family:  spouse   Work history: unknown  Tobacco use: history  Drug use: no  Alcohol use: no    Review of Systems:  unable to obtained due to current condition of patient    OBJECTIVE:   Prognosis: unknown    Physical Exam:  BP (!) 155/82   Pulse 130   Temp 99.3 °F (37.4 °C) (Bladder)   Resp 26   Ht 5' 1\" (1.549 m)   Wt 130 lb 15.3 oz (59.4 kg)   SpO2 (!) 64%   BMI 24.74 kg/m²   Gen: Ill-appearing, intubated, previously hypothermic now 99.7  HEENT: Endotracheal tube, normocephalic, atraumatic, mucosa dry, EOMI, sclera anicteric  Neck:  Trachea midline  Lungs: Mechanical ventilation, respirations unlabored  Heart[de-identified] Sinus tachycardia per monitor   Abd: PEG tube right upper quadrant, ileostomy right lower quadrant, drain left abdomen  : Stacy catheter  Ext: Edema lower extremities  Skin: Pale   neuro: Not responsive during discussion, did not try to wake intubated    Objective data reviewed: labs, images, records, medication use, vitals and chart  Relevant data: Per HPI    Time/Communication  Greater than 50% of time spent, total 50 minutes in counseling and coordination of care at the bedside/over the telephone regarding goals of care and see above.     Thank you for allowing Palliative Medicine to participate in the care of [place] Michael. Provider SHARON Mcclure PA-C  Palliative Medicine    Note: This report was completed using computerize voiced recognition software. Every effort has been made to ensure accuracy; however, inadvertent computerized transcription errors may be present.

## 2021-06-14 NOTE — FLOWSHEET NOTE
ET Nurse(initial evaluation) [id number]  Admit Date: 6/10/2021  3:10 PM    Reason for consult:  New ileostomy    Significant history: extensive medical history- refer to chart  Chief Complaint: Worsening abdominal pain and distention  Presents for evaluation of above. He was admitted 2 days ago with same but his symptoms have gotten acutely worse necessitating transfer to sicu and RRT. CT showed pneumatosis of small intestine with portal venous gas. Pre-Op Diagnosis: ISCHEMIC BOWEL     Post-Op Diagnosis: small bowel perforation       Procedure(s): 6/13  LAPAROTOMY EXPLORATORY, RIGHT HEMICOLECTOMY, END ILEOSTOMY DRAINAGE INTRA  ABDOMINAL ABSCESS     Stoma assessment:       06/14/21 1205   Ileostomy Ileostomy RLQ   Placement Date: 06/13/21   Pre-existing: No  Inserted by: DR Mayank Biggs  Ileostomy Type: Ileostomy  Location: RLQ   Stomal Appliance 1 piece; Changed; Intact   Stoma  Assessment Moist;Protrudes; Red   Stool Color   (no stool)     Plan:   Will follow    Deonte Gray RN 6/14/2021 12:06 PM

## 2021-06-14 NOTE — PLAN OF CARE
Problem: Non-Violent Restraints  Goal: Removal from restraints as soon as assessed to be safe  Outcome: Not Met This Shift     Problem: Non-Violent Restraints  Goal: No harm/injury to patient while restraints in use  Outcome: Met This Shift     Problem: Non-Violent Restraints  Goal: Patient's dignity will be maintained  Outcome: Met This Shift

## 2021-06-14 NOTE — PROGRESS NOTES
GENERAL SURGERY  DAILY PROGRESS NOTE    Date:2021       RYEM:[id number]  Patient Name:Davian Zhang     YOB: 1955     Age:65 y.o. Chief Complaint:  Chief Complaint   Patient presents with    Feeding Tube Problem     extremely tender & wife feels its not in right place. Patient getting worse over last 9 days. Subjective: Max on levo and vaso. Resuscitated overnight, and receiving another unit of blood this morning, MARY ELLEN was straight sanguinous for about 300 mL but returning to serosanguineous now, was likely coagulopathic from hypothermia and acidosis, last dose of blood thinner was yesterday morning therapeutic Lovenox for his history of DVT PE (Eliquis held)    Objective:  BP (!) 82/50   Pulse 128   Temp 97.5 °F (36.4 °C) (Bladder)   Resp 22   Ht 5' 1\" (1.549 m)   Wt 130 lb 15.3 oz (59.4 kg)   SpO2 91%   BMI 24.74 kg/m²   Temp (24hrs), Av.3 °F (34.1 °C), Min:90.7 °F (32.6 °C), Max:97.9 °F (36.6 °C)      I/O (24Hr):  I/O last 3 completed shifts: In: 2140 [I.V.:650; NG/GT:490; IV Piggyback:1000]  Out: 375 [Urine:375]     GENERAL:  No acute distress. Nonresponsive on vent. LUNGS:  No cough. Nonlabored breathing on vent  CARDIOVASC:  Tachy rate, no cyanosis. ABDOMEN:  Soft, full but non-distended, non-tender. PEG in place. Ostomy sweat. MARY ELLEN serosang with a clot. No guarding / rigidity / rebound. EXTREMITIES:  No edema, no deformities.     Assessment:  63 y.o. male with R miguel w/ end ileostomy for TI perf on     Plan:  - continue to monitor, Atiya Fletcher becoming lighter serosang w/ resuscitation and correction of hypothermia/acidosis  - continue NG LIWS, NPO  - appreciate ICU care    Electronically signed by Pamela Woods MD on 2021 at 6:54 AM

## 2021-06-14 NOTE — PROGRESS NOTES
Increased pain today  WBC and procalcitonin decreased  Looks terrible  Dusky, confused  Distended tender  Sat 81%  CT repeat  Portal vein gas in abundance  Some increased fluid, likely pneumatosis    IMP: Ischemic gut    ICU, ?  Laparoscopy  Spoke to wife at bedside and outline grim prognosis

## 2021-06-14 NOTE — CONSULTS
LAPAROSCOPY  GASTROSTOMY TUBE PLACEMENT performed by Freda Reinoso MD at [address], DIAGNOSTIC      GASTROSTOMY TUBE PLACEMENT N/A 6/1/2021    PERCUTANEOUS ENDOSCOPIC GASTROSTOMY TUBE PLACEMENT performed by Red Levi MD at [facility] N/A 5/6/2021    EGD BIOPSY performed by Sae Heller DO at [facility] ENDOSCOPY      Neurontin [gabapentin] and Lyrica [pregabalin]   Current Facility-Administered Medications   Medication Dose Route Frequency Provider Last Rate Last Admin    [facility] each  2 each Topical Daily Sumit Brown MD   2 each at 06/13/21 0950    hydrOXYzine (ATARAX) tablet 25 mg  25 mg Oral Q6H PRN Justina Brenner MD   25 mg at 06/13/21 1351    metoprolol (LOPRESSOR) injection 5 mg  5 mg Intravenous Q6H Concepcion Mota MD        HYDROmorphone (DILAUDID) injection 0.5 mg  0.5 mg Intravenous Q4H PRN Justina Brenner MD   0.5 mg at 06/13/21 1854    lactated ringers infusion   Intravenous Continuous Jennifer Gray  mL/hr at 06/13/21 2141 New Bag at 06/13/21 2141    lactated ringers bolus  1,000 mL Intravenous Once Tanvi Lamas MD 1,000 mL/hr at 06/13/21 2154 1,000 mL at 06/13/21 2154    norepinephrine (LEVOPHED) 64 MCG/ML infusion SOLN             vasopressin (VASOSTRICT) 20 UNIT/ML injection             sodium bicarbonate 8.4 % injection 100 mEq  100 mEq Intravenous Once Sharita Ardon MD        sodium bicarbonate 8.4 % injection             sodium bicarbonate 8.4 % injection             lidocaine PF 1 % injection 5 mL  5 mL Intradermal Once Aury Mccoy MD        sodium chloride flush 0.9 % injection 5-40 mL  5-40 mL Intravenous 2 times per day Aury Mccoy MD   10 mL at 06/13/21 2142    sodium chloride flush 0.9 % injection 5-40 mL  5-40 mL Intravenous PRN Aury Mccoy MD   10 mL at 06/13/21 1435    0.9 % sodium chloride infusion  25 mL Intravenous PRN Winda Cove, glycol (GLYCOLAX) packet 17 g  17 g Oral Daily PRN Rasta Lugo MD        acetaminophen (TYLENOL) tablet 650 mg  650 mg Oral Q6H PRN Rasta Lugo MD   650 mg at 21 1616    Or    acetaminophen (TYLENOL) suppository 650 mg  650 mg Rectal Q6H PRN Rasta Lugo MD        glucose (GLUTOSE) 40 % oral gel 15 g  15 g Oral PRN Rasta Lugo MD        dextrose 50 % IV solution  12.5 g Intravenous PRN Rasta Lugo MD        glucagon (rDNA) injection 1 mg  1 mg Intramuscular PRN Rasta Lugo MD        dextrose 5 % solution  100 mL/hr Intravenous PRN Rasta Lugo MD        dextrose 5 % and 0.45 % NaCl with KCl 20 mEq infusion   Intravenous Continuous Tra Cobb MD 75 mL/hr at 21 0614 New Bag at 21 0614       Social History     Tobacco Use    Smoking status: Former Smoker     Types: Cigarettes     Quit date: 2021     Years since quittin.4    Smokeless tobacco: Never Used   Substance Use Topics    Alcohol use: No          Review of Systems:  Review of Systems   Unable to perform ROS: Acuity of condition         New Imaging Reviewed:   Chest Xray PICC R side in good position  and Bibasilar atelectasis , R sided pleural effusion with R > L diffuse patchy infiltrates       Physical Exam:  Physical Exam  Constitutional:       General: He is in acute distress. Appearance: He is ill-appearing, toxic-appearing and diaphoretic. HENT:      Head: Normocephalic and atraumatic. Eyes:      General: No scleral icterus. Pupils: Pupils are equal, round, and reactive to light. Cardiovascular:      Rate and Rhythm: Regular rhythm. Tachycardia present. Pulmonary:      Effort: Respiratory distress present. Comments: Tachypneic, use of accessory muscles   Abdominal:      General: There is distension. Tenderness: There is no abdominal tenderness. There is no guarding or rebound.    Genitourinary:     Comments: Stacy in place, clear yellow urine  Skin: Findings: Bruising present. Assessment   Active Problems:    Severe protein-calorie malnutrition (HCC)    Stage 3b chronic kidney disease ([facility]    Seizures ([facility]    Crohn's colitis, unspecified complication (HCC)    Hypoglycemia    Bowel obstruction (HCC)    PEG tube malfunction (HCC)    Pneumoperitoneum    Hypomagnesemia    Hypocalcemia    Anticoagulated    Polycystic kidney    Severe malnutrition (HCC)    Severe dehydration    Generalized abdominal pain  Resolved Problems:    * No resolved hospital problems.  *      Plan   GI: Regular Diet  and  NPO , No bowel regiment, hx of crohn's on steroids, will increase to stress dose, GI following, pneumatosis of small and large bowel, portal venous gas for OR with general surgery   Neuro: Hx of MS, monitor neurological exam , hx of seizures on Keppra, hold home remeron  Renal: 150cc LR, Monitor Urine Output, Daily CBC,BMP, Mg,Phos, ionized Ca and Every 6 Hour Lactate, hx of CKD, severe metabolic acidosis 2 amps of Bicarb given for pH 7.0 prior to OR, will possible switch to bicarb gtt post-op  Musculoskeletal: WBAT all extremities , Spines Clear  Pulmonary: Aggressive pulmonary hygiene , Chest Xray Daily Mechanical Ventilation  Mode: AC/VC   VT:400   Rate:18  PEEP:8  FiO2: 60 PF ratio   pending, Monitor RR and Maintain SpO2 > 92%  ID: Zosyn, for intraabominal infection, ID following abdominal wound culture pending Monitor leukocytosis and Monitor Fever Curve  Heme: No indication for Transfusion , Monitor Hb  and Monitor Platelet Count   Cardiac: Monitor Hemodynamics hx of afib, on eliquis, transitioned to lovenox for now, last dose this AM, continue to hold for now Statin lopressor 5 q6 with hold parameters   Endocrine: No Glycemic Issues, will stop solumedrol and start stress dose steroids of solucortef     DVT Prophylaxis: PCDs, therapeutic Lovenox when okay with GS  Ulcer Prophylaxis: Home PPI   Tubes and Lines: Continue PIV, Place [facility], Continue PICC, Continue Stacy for Strict input and output, Place Arterial Line , Place ETT and Continue PEG   Seizure proph:     Keppra  Ancillary consults:   Internal Medicine  and Gastroenterology   Family Update:         As available   CODE Status:       Full Code- long discussion with wife about grim prognosis and she wants everything done at this time. Dispo: Critical    Kale Lynn MD     [facility]   Attending Physician Statement:    I personally saw, examined and provided care for the patient. Radiographs, labs and medication list were reviewed by me independently. The case was discussed in detail and plans for care were established. Review of Residents documentation was conducted and revisions were made as appropriate. I agree with the above documented exam, problem list and plan of care. Late entry   CC: septic shock    64 yo white male with a past medical history of MS, Crohn's disease recent lab G-tube placement. Patient had a complicated course that underwent a PEG on 6/1 that was complicated by misplacement  requiring revision to allow G-tube on 6/4. Patient signed out [facility] only to come [facility] from [facility].  He had worsening abdominal pain today and a CT scan of the abdomen pelvis that showed pneumatosis of the small bowel and left colon with extensive portal venous gas. Patient is currently admitted to the SICU with plans to go emergently for the operating room. I managing the following conditions:    -Acute respiratory failurecontinue full vent support  -Septic shockstart Zosyn for intra-abdominal infection. With the CT scan findings of portal venous gas and pneumatosis, high probability of bowel perforation. Patient is going to the OR emergently with general surgery`  -Renalmonitor urine output.   History of chronic renal insufficiency  -History of Crohn's disease/on Solu-Medrolwill need stress dose steroids  -PPI  SCDs  Full code    Critical care time exclusive of teaching and procedures = 35 min     Pt needs continuous ICU monitoring because the patient is at risk for deterioration from a septic shock standpoint. Liban Meza MD, FACS       NOTE: This report was transcribed using voice recognition software. Every effort was made to ensure accuracy; however, inadvertent computerized transcription errors may be present.

## 2021-06-14 NOTE — PROCEDURES
8/21/20 Patient: Samantha Ballesteros YOB: 2010 Date of Visit: 8/21/2020 Maria Guadalupe Olsen MD 
[address] [address] VIA Facsimile: [phone] Dear Maria Guadalupe Olsen MD, Thank you for referring Ms. Michelle Hargrove to [address] [place] [address] for evaluation. My notes for this consultation are attached. Chief Complaint Patient presents with  Follow-up  Thyroid Problem Per mother, pt being seen for follow up. Mother stated that pt is tired and has been having headaches every day. Pt stated that she has been experiencing pain in neck. Pt stated that she also experiences abdominal pain. Mother stated that issue started 1 weeks ago. Subjective:  
Reason for visit: Autoimmune thyroiditis Last seen 1.5 year ago, previous to that she was seen 1 year ago 
 
patient was living with her father for the last 1 year. Patient was seen by an endocrinologist in [place]. Patient moved back to be with mother may have 2020. She will be staying in [place] from now. Is here today with mother History of present illness: 
Samantha Ballesteros is a 8  y.o. 7  m.o. female 2/2018 - Thyromegaly noted Labs done at [address] [place] [address] -  3/2018 - Positive TG Ab - 5 (<1) and TPO Ab - 248 (<9) TSH elevated at 17.67, FT4 normal at 1.0  
 
start Levothyroxine at 37.5 mcg daily. Repeat TFT 10/2018 - wnl   
 
3/2020 - TSH - 0.94, FT4  - 1.4 - [place] No levothyroxine dose changes have been made Patient has been compliant with her levothyroxine medicine Thyroid US - 3/2018 - Right - Volume - 4.4 ml Left - Volume - 2.3 ml Normal 50-97% is 1.57 to 2.84 mls for girls aged 6 years Impression - Right volume is enlarged. 10/2019 - Thyroid US Right lobe length - 3.9 cm Left lobe length - 3.2 cm No nodules palpated Mother reports that the thyroid gland has recently been enlarged 
 
symptoms of thyroid disorders such as 
 Nithya Quarles is a 63 y.o. male patient. 1. Generalized abdominal pain    2. Hypomagnesemia    3. Exacerbation of Crohn's disease with complication ([facility]    4. [facility]'s colitis, unspecified complication [facility]      Past Medical History:   Diagnosis Date    COPD (chronic obstructive pulmonary disease) ([facility]     Crohn's disease ([facility]     Hx of blood clots 2012    liver, lung    Hyperlipidemia     Hypertension     Multiple sclerosis [facility]     Seizures ([facility]     Stage 3b chronic kidney disease ([facility] 1/29/2021     Blood pressure (!) 111/44, pulse 130, temperature 96.1 °F (35.6 °C), resp. rate 22, height 5' 1\" (1.549 m), weight 123 lb 10.9 oz (56.1 kg), SpO2 100 %. Central Line    Date/Time: 6/13/2021 10:35 PM  Performed by: Delmi Gilliland MD  Authorized by: Goyo Akins MD   Indications: vascular access  Preparation: skin prepped with 2% chlorhexidine  Skin prep agent dried: skin prep agent completely dried prior to procedure  Sterile barriers: all five maximum sterile barriers used - cap, mask, sterile gown, sterile gloves, and large sterile sheet  Hand hygiene: hand hygiene performed prior to central venous catheter insertion  Location details: left internal jugular  Patient position: reverse Trendelenburg  Catheter type: triple lumen  Catheter size: 7 Fr  Ultrasound guidance: yes  Sterile ultrasound techniques: sterile gel and sterile probe covers were used  Number of attempts: 1  Successful placement: yes  Post-procedure: dressing applied and line sutured  Assessment: blood return through all ports and free fluid flow  Patient tolerance: patient tolerated the procedure well with no immediate complications  Comments: Patient emergently taken to OR prior to CXR, will obtain post-op.           Delmi Gilliland MD  6/13/2021 - unintentional weight changes + temperature intolerance,  
-  mood changes,  
+ excessive tiredness  
- skin changes Had long standing history of constipation -  Improved after starting Miralax. Mother gives MiraLAX as needed. Hair fall noted -this has improved. Mother is concerned as mother had similar hair loss and was found to have iron deficiency anemia. Mother is also concerned that pt has poor focusing in regards to her school work. Forgets easily. Patient was getting tutoring in school back in [place]. Her grades had significantly improved. Patient finds reading and writing hard. Past Medical History:  
Diagnosis Date  Thyromegaly 2/25/2018 Term gestation. No past surgical history on file. Family History Problem Relation Age of Onset  No Known Problems Mother  No Known Problems Father MGM - Lupus, ? Thyroid condition MGGM - Thyroid condition Fathers history - Unknown Prior to Admission medications Medication Sig Start Date End Date Taking? Authorizing Provider  
levothyroxine (SYNTHROID) 75 mcg tablet TAKE 1/2 TABLET BY MOUTH ONCE DAILY 30 MINUTES PRIOR TO BREAKFAST 9/6/19   Keny Olson MD  
 
 
No Known Allergies Social History - Will start third grade Lives with mother, step dad, 13 yo brother and 3 yo sister Likes school Biological father lives in [place] Review of Systems: 
Constitutional: good energy ENT: normal hearing, no sorethroat Eye: normal vision, denied double vision, blurred vision Respiratory system: no wheezing, no respiratory discomfort CVS: no palpitations, no pedal edema GI: normal bowel movements, no abdominal pain. Allergy: no skin rash Neuorlogical: no headache, no focal weakness. No burning Behavioural: normal behavior, normal mood. Objective:  
 
Visit Vitals /66 (BP 1 Location: Left arm, BP Patient Position: Sitting) Pulse 69 Temp 97 °F (36.1 °C) (Temporal) Resp 19  
 Ht (!) 4' 3.38\" (1.305 m) Wt 61 lb (27.7 kg) SpO2 98% BMI 16.25 kg/m² Wt Readings from Last 3 Encounters:  
08/21/20 61 lb (27.7 kg) (51 %, Z= 0.02)*  
02/15/19 50 lb 3.2 oz (22.8 kg) (48 %, Z= -0.05)*  
02/22/18 44 lb 9.6 oz (20.2 kg) (47 %, Z= -0.08)* * Growth percentiles are based on CDC (Girls, 2-20 Years) data. Height: 48 %ile (Z= -0.05) based on CDC (Girls, 2-20 Years) Stature-for-age data based on Stature recorded on 8/21/2020. Weight: 51 %ile (Z= 0.02) based on [facility] (Girls, 2-20 Years) weight-for-age data using vitals from 8/21/2020. BMI: Body mass index is 16.25 kg/m². Percentile Alert, Cooperative HEENT: + thyromegaly - 3 x 4 cm - enlarged compared to previous, firm, EOM intact, No tonsillar hypertrophy S1 S2 heard: Normal rhythm Bilateral air entry. No rhonchi or crepitation Abdomen is soft, non tender, No organomegaly  - Gustavo 1 (Previous visit) MSK - Normal ROM Skin - No rashes or birth marks Laboratory data: See above Assessment:  
Lisa Gandhi is a 8  y.o. 7  m.o. female with Autoimmune thyroiditis - On levothyroxine [place]via [place] Symptomatic with fatigue, cold intolerance, poor concentration,  and hair fall. Plan:  
Diagnosis, etiology, pathophysiology, risk/ benefits of rx, proposed eval, and expected follow up discussed with family and all questions answered Orders Placed This Encounter  T4, FREE  
 TSH 3RD GENERATION  
 VITAMIN D, 25 HYDROXY  FERRITIN Discussed that if results are normal, a letter will be sent out to home. If results are abnormal, family will be called to discuss results and a letter will be also sent out. Continue levothyroxine 37.5 mcg daily Mother again was concerned about inattention with her school work, would want to rule out medical problems before considering medication to improve focus, in case if needed. Is planning to see a psychologist for testing F/U in 4months or sooner if any concerns. Total time with patient 40 minutes Time spent counseling patient more than 50% If you have questions, please do not hesitate to call me. I look forward to following your patient along with you. Sincerely, David Sweeney MD

## 2021-06-15 NOTE — CARE COORDINATION
R miguel w/ end ileostomy for TI perf on 6/15. Pt remains sedated on vent, on max levo and vaso. Lactic acid continues to rise 8.4 today. Hypoglycemia yesterday. IVF changed to [email or web address], IV Diflucan, Flagyl and Zosyn. Cultures remain with no growth so far. Palliate Med consulted yesterday/ Pt's wife wishes to continue treatment. Transfer to [facility] on hold.

## 2021-06-15 NOTE — PROGRESS NOTES
Physician Progress Note      Carmelita Morejon  CSN #:                  [id number]  :                       1955  ADMIT DATE:       6/10/2021 3:10 PM  [address] [place] DATE:  RESPONDING  PROVIDER #:        Jonathon Brooks DO          QUERY TEXT:    Pt admitted with abdominal pain and Crohns Flare. Pt noted to have sepsis and   septic shock on  by SICU attending. If possible, please document in   progress notes and discharge summary the present on admission status of   Sepsis:    The medical record reflects the following:  Risk Factors: Crohns Flare, Abdominal Pain  Clinical Indicators: Per H&P Crohn's flare, consult GI, Pneumoperitoneum   possibly postprocedural, PEG tube is connected to gravity and draining   feculent material; Per SICU Consult  Septic shock-start Zosyn for   intra-abdominal infection. With the CT scan findings of portal venous gas and   pneumatosis, high probability of bowel perforation; Per progress note    Sepsis-due to peritonitis s/p surgery and drainage of abscess; Labs on admit   WBC 4.6  Lactic 1.4 procal >100 CRP 16   VS on admit VS on admit 97.5, 106,   16, 144/89, 95%NC  96.9, 101, 20, 134/74, 98%RA  Treatment: Surgical Consult, ID Consult, IV antibiotics, transfer to ICU   requiring pressor and vent support, STAT OR with hemicolectomy    Thank you  KELLIE Machado  Clinical Documentation Improvement  Options provided:  -- Yes, Sepsis was present at the time of the order to admit to the hospital  -- No, Sepsis was not present on admission and developed during the inpatient   stay  -- Other - I will add my own diagnosis  -- Disagree - Not applicable / Not valid  -- Disagree - Clinically unable to determine / Unknown  -- Refer to Clinical Documentation Reviewer    PROVIDER RESPONSE TEXT:    No, Sepsis was not present on admission and developed during the inpatient   stay.     Query created by: Chuckie Burleson on  10:85 AM Electronically signed by:  Gutierrez Soriano DO 6/15/2021 10:22 AM

## 2021-06-15 NOTE — PROGRESS NOTES
[facility]  SURGICAL INTENSIVE CARE UNIT (SICU)  ATTENDING PHYSICIAN CRITICAL CARE PROGRESS NOTE     I have examined the patient, reviewed the record, and discussed the case with the APN/ resident. Please refer to the APN/ resident's note. I agree with the assessment and plan. I have reviewed all relevant labs and imaging data. The following summarizes my clinical findings and independent assessment.     CC:  Critical care management for pneumoperitoneum    Hospital Course/Overnight Events:  6/1--underwent PEG  6/4--underwent lap revision of G-tube  6/8--signed out AMA with plans to go to [facility]  6/10--presented here with abd pain  6/12--treated by GI for Crohn's exacerbation  6/13--RRT for hypotension/resp distress--intubated and started on pressors; to OR for ex lap, right hemicolectomy for perforated TI and end ileostomy  6/14--remains on levophed/vasopressin; hypothermic; electrolyte abnormalities  6/15--worsening lactic acidosis; hypoglycemia overnight    Intubated  Eyes to voice  Hrt:  Regular  Lungs:  Fairly clear bilaterally  Abd:  Soft; ostomy pink with some dusky areas; MARY ELLEN drain with serosang drainage  Skin:  Warm/dry    Patient Active Problem List    Diagnosis Date Noted    Septic shock ([address]     Severe protein-calorie malnutrition ([address] 06/11/2021    Hypoglycemia 06/11/2021    Bowel obstruction ([address] 06/11/2021    PEG tube malfunction (HCC) 06/11/2021    Pneumoperitoneum 06/11/2021    Hypomagnesemia 06/11/2021    Hypocalcemia 06/11/2021    Anticoagulated 06/11/2021    Polycystic kidney 06/11/2021    Severe malnutrition ([address] 06/11/2021    Severe dehydration 06/11/2021    Generalized abdominal pain     Crohn's colitis, unspecified complication ([address] 11/16/7564    Moderate protein-calorie malnutrition ([address] 05/08/2021    Acute renal failure (ARF) ([address] 05/03/2021    Hyperparathyroidism ([address] 04/21/2021    Altered mental state 04/19/2021    Seizures ([address] 04/18/2021    History of pulmonary embolism 03/28/2021    Acute on chronic renal insufficiency 03/28/2021    Ischemic cardiomyopathy 03/28/2021    SBO (small bowel obstruction) ([facility] 03/27/2021    HCAP (healthcare-associated pneumonia) 02/08/2021    Acute on chronic renal failure ([address] 02/08/2021    Sepsis ([facility]     Small bowel obstruction ([facility] 02/07/2021    Stage 3b chronic kidney disease ([facility] 01/29/2021    Hyperkalemia 01/29/2021    Leukocytosis 01/29/2021    Crohn's disease ([facility]     COPD (chronic obstructive pulmonary disease) (HCC)     Tobacco abuse     Hypertension     Hyperlipidemia     Bilateral pulmonary embolism ([facility] 01/27/2021    Multiple sclerosis ([facility] 01/06/2021       S/p PEG  S/p lap revision of PEG  S/p ex lap with right hemicolectomy and end ileostomy  Hypotension--cont fluid resuscitation; wean pressors as able; on stress dose steroids  Worsening lactic acidosis--cont fluid resuscitation  Acute resp failure--cont mech vent support  Hypoalbuminemia/severe protein calorie malnutrition--NPO for now  Electrolyte imbalance (hyponatremia/hypocalcemia/hyperphosphatemia)--correct as able  Acute blood loss anemia--monitor H/H  Thrombocytopenia--monitor; transfuse platelets  Elevated LFTs  Zosyn #5/Flagyl #2--per ID  Hx of MS/Crohns/MOISES virus/chronic kidney disease  DVT risk--PCDs    Pt is at risk for hemodynamic/metabolic/respiratory deterioration and requires ICU care    Katheryn Trotter MD, FACS  6/15/2021  9:28 AM      Critical care time exclusive of teaching and procedures = 40 minutes

## 2021-06-15 NOTE — FLOWSHEET NOTE
Patient continues to reach for ett while performing patient care unable to redirect at this time will continue to monitor

## 2021-06-15 NOTE — PROGRESS NOTES
Body Weight: 95 lb (43.1 kg) (actual bedscale 6/11, will keep this one for current wt, increased wt d/t fuild sift)   · Admission Body Weight: 110 lb (49.9 kg) (6/10 stated)    · Usual Body Weight: 109 lb (49.4 kg) (EMR actual wts; 109 5/3, 112lb 1/27)     · Ideal Body Weight: 112 lbs; % Ideal Body Weight 84.8 %   · BMI: 18 BMI Categories: Underweight (BMI less than 22) age over 72       Nutrition Diagnosis:   · Severe malnutrition, In context of chronic illness related to altered GI function (hx crohn's/ chronic dysphagia) as evidenced by severe loss of subcutaneous fat, severe muscle loss, poor intake prior to admission, weight loss greater than or equal to 5% in 1 month    Nutrition Interventions:   Nutrition Education/Counseling:  Education not appropriate   Coordination of Nutrition Care:  Continue to monitor while inpatient    Goals:  Nutrition progression when medially appropriate       Nutrition Monitoring and Evaluation:   Food/Nutrient Intake Outcomes:  Diet Advancement/Tolerance  Physical Signs/Symptoms Outcomes:  Biochemical Data, Hemodynamic Status, GI Status, Weight, Skin, Nutrition Focused Physical Findings, Fluid Status or Edema     Discharge Planning:     Too soon to determine     Electronically signed by Eduardo Ellsworth RD, LD on 6/15/21 at 2:15 PM EDT    Contact: Ext [phone]

## 2021-06-15 NOTE — PROGRESS NOTES
GENERAL SURGERY  DAILY PROGRESS NOTE    Date:6/15/2021       [facility]:[id number]  Patient Name:Davian Zhang     YOB: 1955     Age:65 y.o. Chief Complaint:  Chief Complaint   Patient presents with    Feeding Tube Problem     extremely tender & wife feels its not in right place. Patient getting worse over last 9 days. Subjective:  Down on levo, still on vaso. Difficulty clearing lactate. No new issues. Objective:  BP (!) 147/97   Pulse 106   Temp 98.2 °F (36.8 °C) (Bladder)   Resp 27   Ht 5' 1\" (1.549 m)   Wt 131 lb 13.4 oz (59.8 kg)   SpO2 (!) 38% Comment: not reading  BMI 24.91 kg/m²   Temp (24hrs), Av.5 °F (37.5 °C), Min:97.9 °F (36.6 °C), Max:101.1 °F (38.4 °C)      I/O (24Hr):  I/O last 3 completed shifts: In: 7579.5 [I.V.:4831.5; Blood:1050; IV Piggyback:1698]  Out: 3297 [Urine:930; Emesis/NG output:1175; Drains:690; Stool:575]     GENERAL:  No acute distress. Nonresponsive on vent. LUNGS:  No cough. Nonlabored breathing on vent  CARDIOVASC:  Tachy rate, no cyanosis. ABDOMEN:  Soft, non-distended, non-tender, mottled but incision intact. PEG in place. Ostomy stool. MARY ELLEN serosang (more serous). No guarding / rigidity / rebound. EXTREMITIES:  No edema, no deformities.     Assessment:  63 y.o. male with R miguel w/ end ileostomy for TI perf on     Plan:  - continue NG LIWS, NPO  - ostomy functioning but still on dual pressors, septic with plt 14  - appreciate ICU care    Electronically signed by Sherrill Davis MD on 6/15/2021 at 8:22 AM    Patient was seen and examined at noon today  Patient remains intubated on fentanyl: Minimal neurologic response  He remains persistently on vasopressin and 10 mics of Levophed  He has had worsening lactic acidosis and was profoundly neutropenic on labs yesterday though this has improved slightly  His abdomen is soft, his having ileostomy function and his MARY ELLEN drain remains serosanguineous  I have low suspicion for persistent intra-abdominal catastrophe leading to his persistent lactic acidosis and therefore the night plan for reexploration at this time  We will continue resuscitation and recommend CT abdomen and pelvis and monitor progression  I had a long discussion with the patient's wife yesterday and she understands that he is very critical and may not survive this episode    John Tobar MD

## 2021-06-15 NOTE — PROGRESS NOTES
Infectious Disease  Progress Note  [facility]    Chief Complaint: abdominal discomfort    Subjective: abdominal pain    Scheduled Meds:   levetiracetam  250 mg Intravenous Daily    metroNIDAZOLE  500 mg Intravenous Q8H    fluconazole  200 mg Intravenous Q24H    [Held by provider] metoprolol  5 mg Intravenous Q6H    chlorhexidine  15 mL Mouth/Throat BID    GenTeal Tears  1 drop Both Eyes Q4H    And    artificial tears   Both Eyes Q4H    hydrocortisone sodium succinate PF  100 mg Intravenous Q8H    sodium chloride flush  5-40 mL Intravenous 2 times per day    heparin flush  3 mL Intravenous 2 times per day    [Held by provider] enoxaparin  1 mg/kg Subcutaneous BID    piperacillin-tazobactam  3,375 mg Intravenous Q8H    sodium chloride   Intravenous Q8H    [Held by provider] atorvastatin  80 mg Oral Nightly    [Held by provider] mirtazapine  15 mg Oral Nightly    sucralfate  1 g Oral 4x Daily    pantoprazole  40 mg Intravenous BID    levetiracetam  500 mg Intravenous Nightly     Continuous Infusions:   dextrose 5% in lactated ringers 150 mL/hr at 06/15/21 0948    norepinephrine 12 mcg/min (06/15/21 0910)    propofol Stopped (06/14/21 0105)    vasopressin (Septic Shock) infusion 0.04 Units/min (06/15/21 0834)    fentaNYL 5 mcg/ml in 0.9%  ml infusion 25 mcg/hr (06/14/21 1906)    sodium chloride      dextrose       PRN Meds:[Held by provider] hydrOXYzine, sodium chloride flush, sodium chloride, heparin flush, ondansetron **OR** ondansetron, polyethylene glycol, acetaminophen **OR** acetaminophen, glucose, dextrose, glucagon (rDNA), dextrose    Patient Vitals for the past 24 hrs:   BP Temp Temp src Pulse Resp SpO2 Weight   06/15/21 1000  98.1 °F (36.7 °C) Bladder 114 27     06/15/21 0900  98.1 °F (36.7 °C) Bladder 112 27     06/15/21 0836    112 27     06/15/21 0800  98.2 °F (36.8 °C) Bladder 106 27     06/15/21 0600  97.9 °F (36.6 °C) Bladder 101 28 (!) 38 % 131 lb 13.4 oz (59.8 tip in the SVC and no pneumothorax. Increasing atelectatic changes in the right lung base. Increasing airspace disease in the right lung, most consistent with pneumonia. CT ABDOMEN PELVIS W IV CONTRAST Additional Contrast? None   Final Result   Progressive diffuse inflammation of the mural thickening of the small bowel   loops and colon with extensive pneumatosis intestinalis of small bowel loops   and colon concerning for progressive inflammation/enterocolitis and possibly   ischemic bowel. There is large volume of superior mesenteric venous air and   portal venous air with large amount of air in the liver. Multiple hypodensities in the liver concerning for multifocal hepatitis. Increasing infiltrates and pleural effusion lung bases concerning for   pneumonia. Progressive free intraperitoneal air which could be due to PEG tube insertion   and or bowel perforation. Critical results were called by Dr. Restrepo Peers to elyssa On 6/13/2021   at 21:11. CT ABDOMEN PELVIS W IV CONTRAST Additional Contrast? None   Final Result   Large amount of free intraperitoneal air as noted which may be related to   previous surgery G tube placement. Bowel perforation is less likely. Diffusely thickened edematous and inflamed small bowel loops and colon with   proximally dilated small bowel loops. This may be due to diffuse   enterocolitis/inflammatory bowel disease. Superimposed pseudomembranous   colitis has to be excluded. There may be an element of bowel obstruction   which is slightly improved. The transition point seems to be in the right   lower quadrant. Atelectasis/pleural effusion lung bases likely CHF. Abdominal aortic aneurysm. RECOMMENDATIONS:   Abdominal aortic aneurysm. 5 year surveillance is recommended.          XR CHEST PORTABLE    (Results Pending)   XR CHEST PORTABLE    (Results Pending)       Assessment  Severe Crohn's flare   ID asked to

## 2021-06-15 NOTE — PROGRESS NOTES
bowel obstruction. He required exploratory laparoscopy and lysis of adhesions, removal/replacement of PEG tube 6/4. During hospitalization, wife requested transfer to [facility] and patient left AMA 6/8 and she reported she was taking him to [place]. As above, patient returned to the hospital 6/10 with abdominal pain. ID, general surgery, GI, podiatry consulted. No surgical recommendations on admission and patient treated for severe Crohn's flare. Zosyn was started. Referral was made to transfer patient to [place] however he suffered a RRT with hypoxia and hypotension and worsening abdominal pain. Patient taken to the OR urgently and underwent a exploratory laparotomy with right hemicolectomy, end ileostomy, drainage of intra-abdominal abscess due to a small bowel perforation. Patient is requiring pressors x2, intubation and admission to the SICU.    6/15/2021 Patient's chart reviewed. Patient reviewed with Staff RN. Spoke extensively with patient's spouse of 45 years at the bedside. Went over all the code status levels in great detail. Patient on lactated ringers, levophed, vasopressin, and fentanyl. Patient was diagnosed in 2005 with MS. Patient has been non-ambulatory for the past 2 months. He is wheelchair bound. Wife stated that patient went downhill since his PEG tube insertion on June 1. Patient's wife asked me to please change him to a DNR-CCA at this time. Wife given pamphlet with code status levels outlined. Corrine Millard thanked me for my time. 6/14/2021 Per Anika Salinas PA-C Chart reviewed. Patient's wife Corrine Millard at bedside. Palliative medicine services introduced to her. She recalled his recent medical history and identified her gratitude for being admitted to [facility] currently. She feels he is in a safe place and getting the treatment he needs currently.   She is very clear about her goals for him, she wishes for him to continue treatment and improved from this acute anicteric  Neck:  Trachea midline  Lungs: Mechanical ventilation, respirations unlabored  Heart[de-identified] Sinus tachycardia per monitor   Abd: PEG tube right upper quadrant, ileostomy right lower quadrant, drain left abdomen  : Stacy catheter  Ext: Edema lower extremities  Skin: Pale   neuro: Not responsive during discussion, did not try to wake intubated    Objective data reviewed: labs, images, records, medication use, vitals and chart  Relevant data: Per HPI    Time/Communication  Greater than 50% of time spent, total 35 minutes in counseling and coordination of care at the bedside/over the telephone regarding goals of care and see above. Thank you for allowing Palliative Medicine to participate in the care of Kaelyn Valdez. Provider PALMA Meehan - CNP   Palliative Medicine    Note: This report was completed using computerize voiced recognition software. Every effort has been made to ensure accuracy; however, inadvertent computerized transcription errors may be present.

## 2021-06-15 NOTE — PROGRESS NOTES
Hospitalist Progress Note      PCP: Ham Blake MD    Date of Admission: 6/10/2021    Hospital Course: \"63 y.o. male history Crohn's disease status post had PEG tube placed on 6/1/2021, developed small bowel obstruction due to PEG tube, had diagnostic laparoscopy with adhesiolysis, removal and replacement of PEG tube done on 6/4/2021, COPD, PE on anticoagulation with Eliquis, hypertension, MS, seizure disorder, stage II chronic kidney disease, ischemic cardiomyopathy, narcotic dependence and malnutrition who presented with abdominal pain for 9 days. He had leg swelling. He was in the hospital in May 2021 for dysphagia, EGD showed erosive esophagitis. During his most recent hospitalization, he had acute on chronic anemia that was transfused. Patient left AMA from  [facility]. His wife brought him back because she feels that his PEG tube is not placed right. Patient was assessed by infectious disease as well as general surgery. No surgical intervention recommended from general surgery. Patient was seen by GI for Crohn's disease flare. Patient had an elevated procalcitonin so infectious disease was consulted as well. \"    Subjective    Remains on ventilator.   SPO2 38-61%  Lactic acid 8.4    Medications:  Reviewed    Infusion Medications    dextrose 5 % and 0.45 % NaCl 100 mL/hr at 06/15/21 0636    sodium chloride      norepinephrine 12 mcg/min (06/15/21 0736)    propofol Stopped (06/14/21 0105)    vasopressin (Septic Shock) infusion 0.04 Units/min (06/15/21 0012)    sodium chloride      lactated ringers 50 mL/hr at 06/15/21 0636    sodium chloride      fentaNYL 5 mcg/ml in 0.9%  ml infusion 25 mcg/hr (06/14/21 1906)    sodium chloride      dextrose       Scheduled Medications    calcium gluconate IVPB  2,000 mg Intravenous Once    levetiracetam  250 mg Intravenous Daily    metroNIDAZOLE  500 mg Intravenous Q8H    fluconazole  200 mg Intravenous Q24H    fentanNYL  50 mcg Intravenous Once    [Held by provider] metoprolol  5 mg Intravenous Q6H    chlorhexidine  15 mL Mouth/Throat BID    GenTeal Tears  1 drop Both Eyes Q4H    And    artificial tears   Both Eyes Q4H    hydrocortisone sodium succinate PF  100 mg Intravenous Q8H    sodium chloride flush  5-40 mL Intravenous 2 times per day    heparin flush  3 mL Intravenous 2 times per day    [Held by provider] enoxaparin  1 mg/kg Subcutaneous BID    piperacillin-tazobactam  3,375 mg Intravenous Q8H    sodium chloride   Intravenous Q8H    atorvastatin  80 mg Oral Nightly    [Held by provider] mirtazapine  15 mg Oral Nightly    sucralfate  1 g Oral 4x Daily    pantoprazole  40 mg Intravenous BID    levetiracetam  500 mg Intravenous Nightly     PRN Meds: sodium chloride, sodium chloride, sodium chloride, [Held by provider] hydrOXYzine, sodium chloride flush, sodium chloride, heparin flush, ondansetron **OR** ondansetron, polyethylene glycol, acetaminophen **OR** acetaminophen, glucose, dextrose, glucagon (rDNA), dextrose      Intake/Output Summary (Last 24 hours) at 6/15/2021 0823  Last data filed at 6/15/2021 0801  Gross per 24 hour   Intake 7579.54 ml   Output 3410 ml   Net 4169.54 ml       Physical Exam Performed:    BP (!) 147/97   Pulse 106   Temp 98.2 °F (36.8 °C) (Bladder)   Resp 27   Ht 5' 1\" (1.549 m)   Wt 131 lb 13.4 oz (59.8 kg)   SpO2 (!) 38% Comment: not reading  BMI 24.91 kg/m²     General appearance: No apparent distress, appears stated age and cooperative. on mechanical vent. HEENT: ET  Neck: Supple, with full range of motion. No jugular venous distention. Trachea midline. Respiratory:  Normal respiratory effort. Clear to auscultation, bilaterally without Rales/Wheezes/Rhonchi. Cardiovascular: Regular rate and rhythm with normal S1/S2 without murmurs, rubs or gallops. Abdomen: Soft, abdominal dressing,ileostomy, MARY ELLEN drain adjacent,non-distended with normal bowel sounds.   Musculoskeletal: No clubbing, cyanosis. Trace-1+ LE edema bilaterally. Full range of motion without deformity. Skin: Skin color, texture, turgor normal.  No rashes or lesions. Neurologic:  sedated  Psychiatric: sedated      Labs:   Recent Labs     06/14/21  1720 06/15/21  0020 06/15/21  0600   WBC 5.0 8.0 9.8   HGB 11.4* 10.3* 9.4*   HCT 33.6* 31.5* 29.4*   PLT 27* 14* 11*     Recent Labs     06/14/21  1720 06/15/21  0020 06/15/21  0600    132 130*   K 4.6 4.8 4.8    104 103   CO2 15* 13* 12*   BUN 13 13 13   CREATININE 1.1 1.1 1.0   CALCIUM 6.6* 6.9* 7.0*   PHOS 3.5 4.2 4.8*     Recent Labs     06/14/21  1720 06/15/21  0020 06/15/21  0600   * 248* 251*   * 114* 120*   BILITOT 0.7 0.6 0.5   ALKPHOS 43 53 57     No results for input(s): INR in the last 72 hours. No results for input(s): Ethelene Soulier in the last 72 hours. Urinalysis:      Lab Results   Component Value Date    NITRU Negative 06/11/2021    WBCUA NONE 06/03/2021    BACTERIA MODERATE 06/03/2021    RBCUA >20 06/03/2021    BLOODU Negative 06/11/2021    SPECGRAV <=1.005 06/11/2021    GLUCOSEU Negative 06/11/2021       Radiology:  XR CHEST PORTABLE   Final Result   Slight improvement in aeration. Continued follow-up recommended. XR CHEST PORTABLE   Final Result   1. No interval change in the multifocal pneumonia seen throughout the right   lung. 2. Trace right pleural effusion         XR ABDOMEN FOR NG/OG/NE TUBE PLACEMENT   Final Result   NG tube in the stomach. XR CHEST PORTABLE   Final Result   Support tubes and lines in place as described. Increasing airspace disease in the right lung consistent with multifocal   pneumonia. Resolving basilar atelectasis on the right. Small right pleural   effusion. XR CHEST PORTABLE   Final Result   Right-sided PICC line with the distal tip in the SVC and no pneumothorax. Increasing atelectatic changes in the right lung base.       Increasing airspace disease in the right lung, most consistent with pneumonia. CT ABDOMEN PELVIS W IV CONTRAST Additional Contrast? None   Final Result   Progressive diffuse inflammation of the mural thickening of the small bowel   loops and colon with extensive pneumatosis intestinalis of small bowel loops   and colon concerning for progressive inflammation/enterocolitis and possibly   ischemic bowel. There is large volume of superior mesenteric venous air and   portal venous air with large amount of air in the liver. Multiple hypodensities in the liver concerning for multifocal hepatitis. Increasing infiltrates and pleural effusion lung bases concerning for   pneumonia. Progressive free intraperitoneal air which could be due to PEG tube insertion   and or bowel perforation. Critical results were called by Dr. Gloria Lynn to hanane. On 6/13/2021   at 21:11. CT ABDOMEN PELVIS W IV CONTRAST Additional Contrast? None   Final Result   Large amount of free intraperitoneal air as noted which may be related to   previous surgery G tube placement. Bowel perforation is less likely. Diffusely thickened edematous and inflamed small bowel loops and colon with   proximally dilated small bowel loops. This may be due to diffuse   enterocolitis/inflammatory bowel disease. Superimposed pseudomembranous   colitis has to be excluded. There may be an element of bowel obstruction   which is slightly improved. The transition point seems to be in the right   lower quadrant. Atelectasis/pleural effusion lung bases likely CHF. Abdominal aortic aneurysm. RECOMMENDATIONS:   Abdominal aortic aneurysm. 5 year surveillance is recommended.          XR CHEST PORTABLE    (Results Pending)   XR CHEST PORTABLE    (Results Pending)           Assessment/Plan:    Active Hospital Problems    Diagnosis     Septic shock ([address] [A41.9, R65.21]     Hypoglycemia [E16.2]     Bowel obstruction ([address] [P17.648]     PEG tube malfunction ([facility] [K94.23]     Pneumoperitoneum [K66.8]     Hypomagnesemia [E83.42]     Hypocalcemia [E83.51]     Anticoagulated [Z79.01]     Polycystic kidney [Q61.3]     Severe malnutrition (HCC) [E43]     Severe dehydration [E86.0]     Severe protein-calorie malnutrition (HCC) [E43]     Generalized abdominal pain [R10.84]     Crohn's colitis, unspecified complication ([facility] [E25.540]     Seizures ([facility] [R56.9]     Stage 3b chronic kidney disease ([facility] [N18.32]      Small bowel perforation  -s/p emergent ex lap,R hemicolectomy, end ileostomy and drainage of intra-abdominal abscess 6/13  -Continue zosyn, add Diflucan and metronidazole  -Management as per General Surgery    Sepsis-due to peritonitis  -s/p surgery and drainage of abscess  -Continue IV abx  -Lactic acid elevated, continue IV hydration  -Wean pressors as tolerated    Crohn's flare  -IV steroids held due to perforation.  -Procalcitonin extremely elevated. CRP elevated but ESR within normal limits. ID was consulted. Continue Zosyn.  -Follow-up stool culture  -GI consulted. He is not a candidate for Remicade due to multiple sclerosis. [facility] due to complex mixture of inflammatory bowel disease with advanced multiple sclerosis prior to bowel perforation. Patient was accepted to [facility] for hospitalist transfer. Transfer on hold now with perforated bowel.  -Palliative care consulted. Pancytopenia-due to severe sepsis  -Continue to treat infection. Hypoglycemia  -Resolved    Hypomagnesemia  -Resolved    Dysphagia status post PEG tube placement.  -No PEG tube malfunction per general surgery. Tube feeds were restarted. History of PE on Eliquis.    -Eliquis is currently on hold alongside oral medications due to PEG tube being hooked to gravity. Continue empiric Lovenox. Seizure disorder  -Continue Keppra IV.     Hypocalcemia  -Monitor and replace    History of multiple sclerosis  -On immune modulators at [address] [facility]    Stage III chronic kidney disease at baseline-due to polycystic kidney disease  -Renal function stable      Severe malnutrition with failure to thrive. -  Dietitian consult. Erosive gastritis  - continue PPI IV and Carafate.     DVT Prophylaxis: Lovenox  Diet: Diet NPO  Code Status: Full Code    PT/OT Eval Status: As needed    Dispo -ICU    Abdulaziz Spivey DO

## 2021-06-16 LAB
BLOOD CULTURE, ROUTINE: NORMAL
CULTURE, BLOOD 2: NORMAL
EKG ATRIAL RATE: 131 BPM
EKG Q-T INTERVAL: 400 MS
EKG QRS DURATION: 74 MS
EKG QTC CALCULATION (BAZETT): 588 MS
EKG R AXIS: 12 DEGREES
EKG T AXIS: 71 DEGREES
EKG VENTRICULAR RATE: 130 BPM

## 2021-06-16 NOTE — PROGRESS NOTES
Order for terminal extubate from ventilator reviewed by this RT. Pt was suctioned through ETT for no secretions.  balloon deflated and pt extubated per order.

## 2021-06-16 NOTE — PROGRESS NOTES
Was called to the room by nursing staff patient for patient being unresponsive. Upon arrival patient was found to have:   Absent pulse in all 4 extremities   Rhythm strip asystole   Absent breath sounds on auscultation   Absent heart sounds on auscultaion   Carotid pulses absent   Pupil reflexes absent   No gag reflex present   No movement to pain stimulation, no movement upon sternal rub   No movement with verbal stimulation    The patient was pronounced dead at 2129 on 6/15/2021.     Jassi Spears MD

## 2021-06-17 LAB
ANAEROBIC CULTURE: NORMAL
BLOOD BANK DISPENSE STATUS: NORMAL
BLOOD BANK PRODUCT CODE: NORMAL
BODY FLUID CULTURE, STERILE: ABNORMAL
BPU ID: NORMAL
DESCRIPTION BLOOD BANK: NORMAL
GRAM STAIN RESULT: ABNORMAL
ORGANISM: ABNORMAL

## 2021-06-17 NOTE — DISCHARGE SUMMARY
Hospitalist Discharge Summary    Patient ID: Kodi Gibson   Patient : 1955  Patient's PCP: Elvie Julian MD    Admit Date: 6/10/2021   Admitting Physician: Tashi Uribe MD    Discharge Date:  2021   Discharge Physician: Abdulaziz Spivey DO   Discharge Condition:   Discharge Disposition: [facility] course in brief:  (Please refer to daily progress notes for a comprehensive review of the hospitalization by requesting medical records)  63 y. o. male history Crohn's disease status post had PEG tube placed on 2021, developed small bowel obstruction due to PEG tube, had diagnostic laparoscopy with adhesiolysis, removal and replacement of PEG tube done on 2021, COPD, PE on anticoagulation with Eliquis, hypertension, MS, seizure disorder, stage II chronic kidney disease, ischemic cardiomyopathy, narcotic dependence and malnutrition who presented with abdominal pain for 9 days. He had leg swelling. He was in the hospital in May 2021 for dysphagia, EGD showed erosive esophagitis. During his most recent hospitalization, he had acute on chronic anemia that was transfused. Patient left AMA from  [facility]. His wife brought him back because she feels that his PEG tube is not placed right. Patient was assessed by infectious disease as well as general surgery. No surgical intervention recommended from general surgery. Patient was seen by GI for Crohn's disease flare.  Patient had an elevated procalcitonin so infectious disease was consulted as well    Signed             Show:Clear all  [x]Manual[x]Template[]Copied    Added by:  [x]Esperanza James MD    []Veronica for details  Updated Dr. Lizzy Hdz regarding patient's condition. Worsening lactic acidosis despite resuscitation. Decreasing pressor requirements but worsening neurologic status, no longer responding to stimuli. CT abd/pelv ordered to evaluate for any fluid collections.  Low suspicion for missed bowel injury at this Lab Results   Component Value Date    [id number] 208 (L) 03/29/2021   ,   Lab Results   Component Value Date    FOLATE 7.4 03/29/2021     Thyroid Studies:   Lab Results   Component Value Date    TSH 1.020 06/14/2021       Urinalysis:    Lab Results   Component Value Date    NITRU Negative 06/11/2021    WBCUA NONE 06/03/2021    BACTERIA MODERATE 06/03/2021    RBCUA >20 06/03/2021    BLOODU Negative 06/11/2021    SPECGRAV <=1.005 06/11/2021    GLUCOSEU Negative 06/11/2021       Imaging:  XR ABDOMEN (KUB) (SINGLE AP VIEW)    Result Date: 6/4/2021  EXAMINATION: ONE SUPINE XRAY VIEW(S) OF THE ABDOMEN 6/4/2021 2:26 pm COMPARISON:  image from CT performed the preceding day. HISTORY: ORDERING SYSTEM PROVIDED HISTORY: small bowel obstruction TECHNOLOGIST PROVIDED HISTORY: Reason for exam:->small bowel obstruction FINDINGS: There is increased gastric distention and decreased small bowel distention compared to the prior day. A staple line projects over the right mid abdomen. There are no signs of organomegaly or soft tissue mass. A PEG tube traverses the upper abdominal wall, its tip lies in the gastic fundus. 1. Decreased small bowel distension. 2. Increased gastric distension etiology unknown. 3. Sequela of right abdominal surgery. CT HEAD WO CONTRAST    Result Date: 6/3/2021  EXAMINATION: CT OF THE HEAD WITHOUT CONTRAST  6/3/2021 7:12 am TECHNIQUE: CT of the head was performed without the administration of intravenous contrast. Dose modulation, iterative reconstruction, and/or weight based adjustment of the mA/kV was utilized to reduce the radiation dose to as low as reasonably achievable. COMPARISON: 04/18/2021 HISTORY: ORDERING SYSTEM PROVIDED HISTORY: fall, altered mental status TECHNOLOGIST PROVIDED HISTORY: Has a \"code stroke\" or \"stroke alert\" been called? ->No Reason for exam:->fall, altered mental status Decision Support Exception - unselect if not a suspected or confirmed emergency medical the C5-6 and C6-7 levels most prominent at the C5-6 level. Significant facet arthropathy is not appreciated. The prevertebral soft tissues are unremarkable. SOFT TISSUES: There is no prevertebral soft tissue swelling. 1. There is no acute compression fracture or subluxation of the cervical spine 2. Degenerative disc disease at the C5-6 and C6-7 levels most prominent at the C5-6 level. CT ABDOMEN PELVIS W IV CONTRAST Additional Contrast? None    Result Date: 6/13/2021  EXAMINATION: CT OF THE ABDOMEN AND PELVIS WITH CONTRAST 6/13/2021 7:55 pm TECHNIQUE: CT of the abdomen and pelvis was performed with the administration of intravenous contrast. Multiplanar reformatted images are provided for review. Dose modulation, iterative reconstruction, and/or weight based adjustment of the mA/kV was utilized to reduce the radiation dose to as low as reasonably achievable. COMPARISON: 06/10/2021. HISTORY: ORDERING SYSTEM PROVIDED HISTORY: abdominal pain TECHNOLOGIST PROVIDED HISTORY: Reason for exam:->abdominal pain Additional Contrast?->None What reading provider will be dictating this exam?->CRC FINDINGS: The lung bases demonstrate increasing infiltrates and pleural effusion more on the right side concerning for pneumonia. There is distended fluid-filled distal esophagus. The liver is heterogeneous with some areas of decreased enhancement concerning for multifocal hepatitis. The previously noted hypodense lesion in the right hepatic lobe is noted. There is extensive portal venous air and mesenteric venous air. Spleen, pancreas, the adrenals and the right kidney are normal except for a 5.6 cm large cyst in the right kidney. The left kidney is severely atrophic. There is mild abdominal aortic aneurysm measuring 2.3 x 1.7 cm. Degenerative changes are identified in the thoracolumbar spine. Large volume of free air is identified in the abdomen pelvis as before which is slightly increased.   Air is identified anterior to the liver spleen and bowel loops and partially herniating  to ventral hernia. A PEG tube is noted in the stomach which is distended with fluid. The free air could be attributed to surgery and or bowel perforation. Pelvis. Bladder is distended. There is diffusely thickened inflamed small bowel loops and colon with extensive pneumatosis intestinalis predominantly of the small bowel loops and to a lesser extent of the colon. There is extensive air in the superior mesenteric vein and branches concerning for progressive enterocolitis. Large volume of ascites is present. Progressive diffuse inflammation of the mural thickening of the small bowel loops and colon with extensive pneumatosis intestinalis of small bowel loops and colon concerning for progressive inflammation/enterocolitis and possibly ischemic bowel. There is large volume of superior mesenteric venous air and portal venous air with large amount of air in the liver. Multiple hypodensities in the liver concerning for multifocal hepatitis. Increasing infiltrates and pleural effusion lung bases concerning for pneumonia. Progressive free intraperitoneal air which could be due to PEG tube insertion and or bowel perforation. Critical results were called by Dr. Monica Acosta to elyssa On 6/13/2021 at 21:11. CT ABDOMEN PELVIS W IV CONTRAST Additional Contrast? None    Result Date: 6/10/2021  EXAMINATION: CT OF THE ABDOMEN AND PELVIS WITH CONTRAST 6/10/2021 6:01 pm TECHNIQUE: CT of the abdomen and pelvis was performed with the administration of intravenous contrast. Multiplanar reformatted images are provided for review. Dose modulation, iterative reconstruction, and/or weight based adjustment of the mA/kV was utilized to reduce the radiation dose to as low as reasonably achievable.  COMPARISON: 06/03/2021 HISTORY: ORDERING SYSTEM PROVIDED HISTORY: abd pain TECHNOLOGIST PROVIDED HISTORY: Additional Contrast?->None Reason for exam:->abd pain Decision transition point seems to be in the right lower quadrant. Atelectasis/pleural effusion lung bases likely CHF. Abdominal aortic aneurysm. RECOMMENDATIONS: Abdominal aortic aneurysm. 5 year surveillance is recommended. CT ABDOMEN PELVIS W IV CONTRAST Additional Contrast? None    Result Date: 6/3/2021  EXAMINATION: CT OF THE ABDOMEN AND PELVIS WITH CONTRAST 6/3/2021 7:12 am TECHNIQUE: CT of the abdomen and pelvis was performed with the administration of intravenous contrast. Multiplanar reformatted images are provided for review. Dose modulation, iterative reconstruction, and/or weight based adjustment of the mA/kV was utilized to reduce the radiation dose to as low as reasonably achievable. COMPARISON: None. HISTORY: ORDERING SYSTEM PROVIDED HISTORY: diffuse pain TECHNOLOGIST PROVIDED HISTORY: Reason for exam:->diffuse pain Additional Contrast?->None Decision Support Exception - unselect if not a suspected or confirmed emergency medical condition->Emergency Medical Condition (MA) FINDINGS: Lower Chest: There is chronic pleuroparenchymal scarring seen within the right lung base laterally. There is a very small patchy infiltrate within the right lung base posteriorly. There is a trace right pleural effusion. Organs: The liver, spleen, pancreas and adrenal glands are unremarkable. There are multiple simple right renal cyst.  There is significant atrophy of the left kidney. There is no obstructive uropathy. GI, bowel: The gallbladder is surgically absent. There is no evidence of choledocholithiasis. The stomach is decompressed. There is a gastrostomy tube within the stomach. There are multiple dilated loops of small bowel consistent with high-grade distal small-bowel obstruction. Air-fluid levels are noted. The small bowel distension measures up to 6.2 cm. The transition point is seen within the mid right hemiabdomen just proximal to the anastomosis. There is no free air within the abdominal cavity.   The colon is decompressed. There is a small amount of free fluid within the pelvis. Pelvis: There is no pelvic inflammatory process or free air. Peritoneum/Retroperitoneum:  No evidence of retroperitoneal lymphadenopathy. . The abdominal aorta is normal in caliber. There is no aneurysm or dissection. Soft and calcified plaque are seen throughout the course of the aorta. Bones/Soft Tissues:  No acute abnormality of the visualized osseous structures. 1. High-grade distal small-bowel obstruction. There are multiple air-fluid levels. The small bowel measures up to 6.2 cm in maximum dimension. 2. The transition point is seen within the mid right hemiabdomen just proximal to the anastomosis of the small bowel. . 3. There is no intra-abdominal free air. There is a small amount of free fluid within the pelvis. XR CHEST PORTABLE    Result Date: 6/15/2021  EXAMINATION: ONE XRAY VIEW OF THE CHEST 6/15/2021 6:47 am COMPARISON: 06/14/2021 HISTORY: ORDERING SYSTEM PROVIDED HISTORY: intubated TECHNOLOGIST PROVIDED HISTORY: Reason for exam:->intubated What reading provider will be dictating this exam?->CRC FINDINGS: EKG leads overlie the chest.  Support tubes and lines remain in place. Heart size is normal.  No pneumothorax. Multifocal parenchymal infiltrates greater towards the right appears slightly improved. No other interval change. Slight improvement in aeration. Continued follow-up recommended. XR CHEST PORTABLE    Result Date: 6/14/2021  EXAMINATION: ONE XRAY VIEW OF THE CHEST 6/14/2021 7:44 am COMPARISON: 06/14/2021 HISTORY: ORDERING SYSTEM PROVIDED HISTORY: intubated TECHNOLOGIST PROVIDED HISTORY: Reason for exam:->intubated What reading provider will be dictating this exam?->CRC FINDINGS: There is a large right perihilar infiltrate ascending into the right upper lobe, right middle lobe and right lower lobe. This infiltrate is unchanged in size when compared to patient's prior study.   The left lung is grossly clear. There is no left pleural effusion. There is a trace right pleural effusion     1. No interval change in the multifocal pneumonia seen throughout the right lung. 2. Trace right pleural effusion     XR CHEST PORTABLE    Result Date: 6/14/2021  EXAMINATION: ONE XRAY VIEW OF THE CHEST 6/13/2021 11:43 pm COMPARISON: June 13 HISTORY: ORDERING SYSTEM PROVIDED HISTORY: line placement TECHNOLOGIST PROVIDED HISTORY: Reason for exam:->line placement What reading provider will be dictating this exam?->CRC FINDINGS: Left IJ line with the distal tip in the SVC. Right-sided PICC line remains in the SVC. NG tube in the stomach with the distal tip at the level of the gastric fundus. ET tube with the distal tip at approximately 2.8 cm from the level of the roel. Increasing airspace disease in the right lung, most consistent with multifocal pneumonia. Resolving atelectatic changes in the right lung base. Small right pleural effusion. Support tubes and lines in place as described. Increasing airspace disease in the right lung consistent with multifocal pneumonia. Resolving basilar atelectasis on the right. Small right pleural effusion. XR CHEST PORTABLE    Result Date: 6/13/2021  EXAMINATION: ONE XRAY VIEW OF THE CHEST 6/13/2021 8:46 pm COMPARISON: Marisel 3, HISTORY: ORDERING SYSTEM PROVIDED HISTORY: [facility] TECHNOLOGIST PROVIDED HISTORY: Reason for exam:->AHRF What reading provider will be dictating this exam?->CRC FINDINGS: Right-sided PICC line with the distal tip in the SVC and no pneumothorax. Cardiac silhouette is not enlarged. Increasing atelectatic changes in the right lung base. Increasing airspace disease in the right lung, most confluent in the right upper lobe and most consistent with pneumonia. The pulmonary vasculature is within normal limits. There is no evidence of pleural effusion. Right-sided PICC line with the distal tip in the SVC and no pneumothorax.  Increasing atelectatic changes in the right lung base. Increasing airspace disease in the right lung, most consistent with pneumonia. XR CHEST 1 VIEW    Result Date: 6/3/2021  EXAMINATION: ONE XRAY VIEW OF THE CHEST 6/3/2021 6:30 am COMPARISON: 04/18/2021 HISTORY: ORDERING SYSTEM PROVIDED HISTORY: altered mental status TECHNOLOGIST PROVIDED HISTORY: Reason for exam:->altered mental status FINDINGS: Stable cardiomediastinal silhouette. Stable elevation of the right hemidiaphragm. There is linear scarring in the right midlung. No acute infiltrate, effusion, or pneumothorax is seen. There is air-filled dilated bowel loop beneath the hemidiaphragm. Correlate with CT findings. No radiographic evidence of acute cardiopulmonary disease process. Correlate with CT abdomen pelvis findings to evaluate bowel gas pattern. XR ABDOMEN FOR NG/OG/NE TUBE PLACEMENT    Result Date: 6/14/2021  EXAMINATION: ONE SUPINE XRAY VIEW(S) OF THE ABDOMEN 6/13/2021 11:44 pm COMPARISON: None. HISTORY: ORDERING SYSTEM PROVIDED HISTORY: Confirmation of course of NG/OG/NE tube and location of tip of tube TECHNOLOGIST PROVIDED HISTORY: Reason for exam:->Confirmation of course of NG/OG/NE tube and location of tip of tube Portable? ->Yes What reading provider will be dictating this exam?->CRC FINDINGS: NG tube in the stomach with the distal tip at the level of the gastric fundus. Gastrostomy tube projected over the gastric contour in the mid abdomen. NG tube in the stomach.        Discharge Medications:      Medication List      START taking these medications    cloNIDine 0.1 MG/24HR Ptwk  Commonly known as: CATAPRES  Place 1 patch onto the skin once a week     enoxaparin 60 MG/0.6ML injection  Commonly known as: LOVENOX  Inject 0.5 mLs into the skin 2 times daily     hydrOXYzine 25 MG tablet  Commonly known as: ATARAX  Take 1 tablet by mouth every 6 hours as needed for Itching or Anxiety     * levETIRAcetam 500 MG/100ML Soln IVPB  Commonly known as: KEPPRA  Infuse 100 mLs intravenously nightly     * levETIRAcetam 500 MG/100ML Soln IVPB  Commonly known as: KEPPRA  Infuse 50 mLs intravenously daily     methylPREDNISolone sodium 40 MG injection  Commonly known as: SOLU-MEDROL  Infuse 0.5 mLs intravenously every 12 hours for 10 days     metoprolol 5 MG/5ML Soln injection  Commonly known as: LOPRESSOR  Infuse 5 mLs intravenously every 6 hours     pantoprazole 40 MG injection  Commonly known as: PROTONIX  Infuse 40 mg intravenously 2 times daily for 10 days  Replaces: pantoprazole 40 MG tablet     piperacillin-tazobactam  infusion  Commonly known as: ZOSYN  Infuse 2.25 g intravenously every 8 hours for 10 days Compound per protocol. * Unna-Flex Elastic [facility] Mis  Apply 2 each topically daily         * This list has 3 medication(s) that are the same as other medications prescribed for you. Read the directions carefully, and ask your doctor or other care provider to review them with you. CONTINUE taking these medications    atorvastatin 80 MG tablet  Commonly known as: LIPITOR  Take 1 tablet by mouth nightly     calcium carbonate 600 MG Tabs tablet     mirtazapine 15 MG tablet  Commonly known as: REMERON     oxybutynin 5 MG tablet  Commonly known as: DITROPAN     oxyCODONE HCl 10 MG immediate release tablet  Commonly known as: OXY-IR     sucralfate 1 GM tablet  Commonly known as: CARAFATE  Take 1 tablet by mouth 4 times daily        STOP taking these medications    apixaban 5 MG Tabs tablet  Commonly known as: ELIQUIS     levETIRAcetam 500 MG tablet  Commonly known as: KEPPRA     metoprolol succinate 50 MG extended release tablet  Commonly known as: TOPROL XL     pantoprazole 40 MG tablet  Commonly known as: PROTONIX  Replaced by: pantoprazole 40 MG injection        ASK your doctor about these medications    HYDROmorphone 1 MG/ML injection  Commonly known as: DILAUDID  Infuse 0.5 mLs intravenously every 4 hours as needed for Pain for up to 3 days.   Ask about: Should I take this medication? * Unna-Flex Elastic [place] Misc  Apply 1 each topically once for 1 dose  Ask about: Should I take this medication? * Unna-Flex Elastic [place] Misc  Apply 1 each topically once for 1 dose  Ask about: Should I take this medication? * This list has 2 medication(s) that are the same as other medications prescribed for you. Read the directions carefully, and ask your doctor or other care provider to review them with you. Where to Get Your Medications      Information about where to get these medications is not yet available    Ask your nurse or doctor about these medications  · cloNIDine 0.1 MG/24HR Ptwk  · enoxaparin 60 MG/0.6ML injection  · HYDROmorphone 1 MG/ML injection  · hydrOXYzine 25 MG tablet  · levETIRAcetam 500 MG/100ML Soln IVPB  · levETIRAcetam 500 MG/100ML Soln IVPB  · methylPREDNISolone sodium 40 MG injection  · metoprolol 5 MG/5ML Soln injection  · pantoprazole 40 MG injection  · piperacillin-tazobactam  infusion  · Unna-Flex Elastic Unna Boot Misc  · Unna-Flex Elastic Unna Boot Misc  · Unna-Flex Elastic Unna Boot Misc         Time Spent on discharge is more than 45 minutes in the examination, evaluation, counseling and review of medications and discharge plan.    +++++++++++++++++++++++++++++++++++++++++++++++++  Jewels Rodriguez DO  [address]  +++++++++++++++++++++++++++++++++++++++++++++++++  NOTE: This report was transcribed using voice recognition software. Every effort was made to ensure accuracy; however, inadvertent computerized transcription errors may be present.

## 2021-07-08 NOTE — DISCHARGE SUMMARY
Discharge Summary    Date: 7/7/2021  Patient Name: Lucio Madden YOB: 1955 Age: 61 y.o. Admit Date: 6/3/2021  Discharge Date:  Discharge Condition: Poor    Admission Diagnosis  Small bowel obstruction ([address] (K56.609)     Discharge Diagnosis  Active Problems: Small bowel obstruction (HCC)Resolved Problems: * No resolved hospital problems. [facility] Stay  Narrative of Hospital Course:  Pt came in with small bowel obstruction after peg insertion. Pt went back for surgery and his obstruction is better. Pt is anemic sp surgery. Wife was happy with his outcome. She took him home    Consultants:  [facility] [place] [facility] CONSULT TO GI    Surgeries/procedures Performed:       Treatments:   IV Hydration and Surgery        Discharge Plan/Disposition:  Home    Hospital/Incidental Findings Requiring Follow Up:    Patient Instructions:    Diet: Regular Diet    Activity:Activity as Tolerated  For number of days (if applicable): Other Instructions:    Provider Follow-Up:   No follow-ups on file. Significant Diagnostic Studies:    Recent Labs:  Admission on 06/03/2021, Discharged on 06/08/2021No results displayed because visit has over 200 results. ------------    Radiology last 7 days:  No results found. [unfilled]    Discharge Medications    Discharge Medication List as of 6/8/2021 12:13 PM    Discharge Medication List as of 6/8/2021 12:13 PM    Discharge Medication List as of 6/8/2021 12:13 PMCONTINUE these medications which have NOT CHANGED!! levETIRAcetam (KEPPRA) 500 MG tabletTake 250 mg by mouth every morningHistorical Med!! levETIRAcetam (KEPPRA) 500 MG tabletTake 500 mg by mouth nightlyHistorical Medsucralfate (CARAFATE) 1 GM tabletTake 1 tablet by mouth 4 times daily, Disp-120 tablet, R-3NormaloxyCODONE HCl (OXY-IR) 10 MG immediate release tabletTake 10 mg by mouth every 4 hours.  Historical Medapixaban (ELIQUIS) 5 MG TABS tabletTake 5 mg by mouth 2 times dailyHistorical Medpantoprazole (PROTONIX) 40 MG tabletTake 40 mg by mouth daily Historical Medmetoprolol succinate (TOPROL XL) 50 MG extended release tabletTake 1 tablet by mouth daily, Disp-30 tablet, R-5Normalcalcium-cholecalciferol 500-200 MG-UNIT per tabletTake 1 tablet by mouth 2 times daily, Disp-60 tablet, R-5Normaloxybutynin (DITROPAN) 5 MG tabletTake 5 mg by mouth 2 times dailyHistorical Medatorvastatin (LIPITOR) 80 MG tabletTake 1 tablet by mouth nightly, Disp-30 tablet, R-3Normalmirtazapine (REMERON) 15 MG tabletTake 15 mg by mouth nightly Historical Med!! - Potential duplicate medications found. Please discuss with provider. Discharge Medication List as of 6/8/2021 12:13 PM    Time Spent on Discharge:E] minutes were spent in patient examination, evaluation, counseling as well as medication reconciliation, prescriptions for required medications, discharge plan, and follow up.     Electronically signed by Issa Samayoa MD on 7/7/21 at 8:54 PM EDT

## 2021-07-19 LAB
FUNGUS (MYCOLOGY) CULTURE: NORMAL
FUNGUS STAIN: NORMAL

## 2024-09-09 NOTE — ED NOTES
Called Dr Alden Bird for admission order.  said he will put them in but in the meantime the pt can eat and to run continuous drip.      Saint Osler, RN  05/03/21 [phone]

## (undated) DEVICE — APPLICATOR MEDICATED 26 CC SOLUTION HI LT ORNG CHLORAPREP

## (undated) DEVICE — [HIGH FLOW INSUFFLATOR,  DO NOT USE IF PACKAGE IS DAMAGED,  KEEP DRY,  KEEP AWAY FROM SUNLIGHT,  PROTECT FROM HEAT AND RADIOACTIVE SOURCES.]: Brand: PNEUMOSURE

## (undated) DEVICE — MEDI-VAC NON-CONDUCTIVE SUCTION TUBING: Brand: CARDINAL HEALTH

## (undated) DEVICE — MEDI-VAC YANKAUER SUCTION HANDLE W/BULBOUS TIP: Brand: CARDINAL HEALTH

## (undated) DEVICE — SET MAJOR INSTR HOUSE

## (undated) DEVICE — FORCEPS BX L240CM JAW DIA2.8MM L CAP W/ NDL MIC MESH TOOTH

## (undated) DEVICE — GLOVE ORANGE PI 8   MSG9080

## (undated) DEVICE — BLADE ES ELASTOMERIC COAT INSUL DURABLE BEND UPTO 90DEG

## (undated) DEVICE — CAMERA STRYKER 1488 HD GEN

## (undated) DEVICE — MARKER,SKIN,WI/RULER AND LABELS: Brand: MEDLINE

## (undated) DEVICE — PLUMEPORT LAPAROSCOPIC SMOKE FILTRATION DEVICE: Brand: PLUMEPORT ACTIV

## (undated) DEVICE — SET INSTRUMENT LAP I

## (undated) DEVICE — KENDALL 450 SERIES MONITORING FOAM ELECTRODE - RECTANGULAR SHAPE ( 3/PK): Brand: KENDALL

## (undated) DEVICE — PMI PTFE COATED LAPAROSCOPIC WIRE L-HOOK 44 CM: Brand: PMI

## (undated) DEVICE — COVER,LIGHT HANDLE,FLX,1/PK: Brand: MEDLINE INDUSTRIES, INC.

## (undated) DEVICE — BASIC SINGLE BASIN 1-LF: Brand: MEDLINE INDUSTRIES, INC.

## (undated) DEVICE — SOLUTION IV IRRIG WATER 1000ML POUR BRL 2F7114

## (undated) DEVICE — KIT BEDSIDE REVITAL OX 500ML

## (undated) DEVICE — PATIENT RETURN ELECTRODE, SINGLE-USE, CONTACT QUALITY MONITORING, ADULT, WITH 9FT CORD, FOR PATIENTS WEIGING OVER 33LBS. (15KG): Brand: MEGADYNE

## (undated) DEVICE — Device

## (undated) DEVICE — Device: Brand: DEFENDO VALVE AND CONNECTOR KIT

## (undated) DEVICE — CONTAINER SPEC COLL 960ML POLYPR TRIANG GRAD INTAKE/OUTPUT

## (undated) DEVICE — APPLIER CLP M/L SHFT DIA5MM 15 LIG LIGAMAX 5

## (undated) DEVICE — 3M(TM) MEDIPORE(TM) +PAD SOFT CLOTH ADHESIVE WOUND DRESSING 3571: Brand: 3M™ MEDIPORE™

## (undated) DEVICE — Z INACTIVE USE 2660664 SOLUTION IRRIG 3000ML 0.9% SOD CHL USP UROMATIC PLAS CONT

## (undated) DEVICE — GENERATOR ELECSURG FORCETRAID

## (undated) DEVICE — TUBING, SUCTION, 1/4" X 10', STRAIGHT: Brand: MEDLINE

## (undated) DEVICE — GOWN ISOLATN REG YEL M WT MULTIPLY SIDETIE LEV 2

## (undated) DEVICE — PAD,NON-ADHERENT,3X8,STERILE,LF,1/PK: Brand: MEDLINE

## (undated) DEVICE — DOUBLE BASIN SET: Brand: MEDLINE INDUSTRIES, INC.

## (undated) DEVICE — GLOVE ORANGE PI 7 1/2   MSG9075

## (undated) DEVICE — TRAY PROCED CUSTOM GASTROINTESTINAL

## (undated) DEVICE — GOWN,SIRUS,FABRNF,XL,20/CS: Brand: MEDLINE

## (undated) DEVICE — DRAPE THER FLUID WARMING 66X44 IN FLAT SLUSH DBL DISC ORS

## (undated) DEVICE — SURGICAL PROCEDURE PACK TRAUM

## (undated) DEVICE — TRAP,MUCUS SPECIMEN,40CC: Brand: MEDLINE

## (undated) DEVICE — DRIP REDUCTION MANIFOLD

## (undated) DEVICE — PUMP SUC IRR TBNG L10FT W/ HNDPC ASSEMB STRYKEFLOW 2

## (undated) DEVICE — TOWEL,OR,DSP,ST,BLUE,STD,6/PK,12PK/CS: Brand: MEDLINE

## (undated) DEVICE — SEALER ENDOSCP NANO COAT OPN DIV CRV L JAW LIGASURE IMPACT

## (undated) DEVICE — NDL CNTR 40CT FM MAG: Brand: MEDLINE INDUSTRIES, INC.

## (undated) DEVICE — INSUFFLATION NEEDLE TO ESTABLISH PNEUMOPERITONEUM.: Brand: INSUFFLATION NEEDLE

## (undated) DEVICE — GOWN,SIRUS,NONRNF,SETINSLV,XL,20/CS: Brand: MEDLINE

## (undated) DEVICE — Device: Brand: SENSURA MIO

## (undated) DEVICE — COVER,TABLE,44X90,STERILE: Brand: MEDLINE

## (undated) DEVICE — SYRINGE IRRIG 60ML SFT PLIABLE BLB EZ TO GRP 1 HND USE W/

## (undated) DEVICE — TROCAR: Brand: KII FIOS FIRST ENTRY

## (undated) DEVICE — DRAIN CHN 19FR L0.25MM DIA6.3MM SIL RND HUBLESS FULL FLUT

## (undated) DEVICE — GAUZE,SPONGE,4"X4",8PLY,STRL,LF,10/TRAY: Brand: MEDLINE

## (undated) DEVICE — SPONGE GZ 4IN 4IN 4 PLY N WVN AVANT

## (undated) DEVICE — ELECTRODE PT RET AD L9FT HI MOIST COND ADH HYDRGEL CORDED

## (undated) DEVICE — RELOAD STPL L75MM OPN H3.8MM CLS 1.5MM WIRE DIA0.2MM REG

## (undated) DEVICE — GARMENT,MEDLINE,DVT,INT,CALF,MED, GEN2: Brand: MEDLINE

## (undated) DEVICE — SOLUTION IV IRRIG POUR BRL 0.9% SODIUM CHL 2F7124

## (undated) DEVICE — TROCAR: Brand: KII SLEEVE

## (undated) DEVICE — SYRINGE MED 10ML TRNSLUC BRL PLUNG BLK MRK POLYPR CTRL

## (undated) DEVICE — COVER,TABLE,60X90,STERILE: Brand: MEDLINE

## (undated) DEVICE — PACK SURG LAP CHOLE CUSTOM

## (undated) DEVICE — MASK,FACE,MAXFLUIDPROTECT,SHIELD/ERLPS: Brand: MEDLINE

## (undated) DEVICE — SUTURE BAG: Brand: DEVON

## (undated) DEVICE — 6 X 9  1.75MIL 4-WALL LABGUARD: Brand: MINIGRIP COMMERCIAL LLC

## (undated) DEVICE — LUBRICANT SURG JELLY ST BACTER TUBE 4.25OZ

## (undated) DEVICE — BLOCK BITE 60FR CAREGUARD

## (undated) DEVICE — YANKAUER,BULB TIP,W/O VENT,RIGID,STERILE: Brand: MEDLINE

## (undated) DEVICE — SHEET,DRAPE,40X58,STERILE: Brand: MEDLINE

## (undated) DEVICE — STAPLER INT L75MM CUT LN L73MM STPL LN L77MM BLU B FRM 8

## (undated) DEVICE — NEEDLE HYPO 25GA L1.5IN BLU POLYPR HUB S STL REG BVL STR

## (undated) DEVICE — BINDER ABD M/L H12IN FOR 46-62IN WHT 4 SLD PNL DSGN HOOP

## (undated) DEVICE — 3M™ IOBAN™ 2 ANTIMICROBIAL INCISE DRAPE 6640EZ: Brand: IOBAN™ 2

## (undated) DEVICE — SET INSTRUMENT LAP II